# Patient Record
Sex: MALE | Race: WHITE | NOT HISPANIC OR LATINO | Employment: FULL TIME | URBAN - METROPOLITAN AREA
[De-identification: names, ages, dates, MRNs, and addresses within clinical notes are randomized per-mention and may not be internally consistent; named-entity substitution may affect disease eponyms.]

---

## 2023-06-09 ENCOUNTER — RA CDI HCC (OUTPATIENT)
Dept: OTHER | Facility: HOSPITAL | Age: 63
End: 2023-06-09

## 2023-06-09 NOTE — PROGRESS NOTES
[facility]  coding opportunities       Chart reviewed, no opportunity found: CHART REVIEWED, NO OPPORTUNITY FOUND        Patients Insurance        Commercial Insurance: [address] [place] [address]

## 2023-06-13 RX ORDER — OMEGA-3S/DHA/EPA/FISH OIL/D3 300MG-1000
400 CAPSULE ORAL DAILY
COMMUNITY

## 2023-06-16 ENCOUNTER — OFFICE VISIT (OUTPATIENT)
Dept: FAMILY MEDICINE CLINIC | Facility: CLINIC | Age: 63
End: 2023-06-16
Payer: COMMERCIAL

## 2023-06-16 VITALS
BODY MASS INDEX: 46.65 KG/M2 | HEIGHT: 69 IN | WEIGHT: 315 LBS | OXYGEN SATURATION: 98 % | HEART RATE: 91 BPM | SYSTOLIC BLOOD PRESSURE: 198 MMHG | DIASTOLIC BLOOD PRESSURE: 100 MMHG

## 2023-06-16 DIAGNOSIS — M16.11 PRIMARY OSTEOARTHRITIS OF RIGHT HIP: ICD-10-CM

## 2023-06-16 DIAGNOSIS — Z00.00 ENCOUNTER FOR SCREENING AND PREVENTATIVE CARE: Primary | ICD-10-CM

## 2023-06-16 DIAGNOSIS — M17.11 PRIMARY OSTEOARTHRITIS OF RIGHT KNEE: ICD-10-CM

## 2023-06-16 DIAGNOSIS — Z13.0 SCREENING FOR DEFICIENCY ANEMIA: ICD-10-CM

## 2023-06-16 DIAGNOSIS — Z13.220 SCREENING CHOLESTEROL LEVEL: ICD-10-CM

## 2023-06-16 DIAGNOSIS — Z13.1 SCREENING FOR DIABETES MELLITUS: ICD-10-CM

## 2023-06-16 DIAGNOSIS — R73.03 PREDIABETES: ICD-10-CM

## 2023-06-16 DIAGNOSIS — R60.0 BILATERAL EDEMA OF LOWER EXTREMITY: ICD-10-CM

## 2023-06-16 DIAGNOSIS — Z13.29 SCREENING FOR THYROID DISORDER: ICD-10-CM

## 2023-06-16 DIAGNOSIS — I10 PRIMARY HYPERTENSION: ICD-10-CM

## 2023-06-16 DIAGNOSIS — I89.0 LYMPHEDEMA: ICD-10-CM

## 2023-06-16 DIAGNOSIS — Z79.899 MEDICATION MANAGEMENT: ICD-10-CM

## 2023-06-16 DIAGNOSIS — M17.12 PRIMARY OSTEOARTHRITIS OF LEFT KNEE: ICD-10-CM

## 2023-06-16 DIAGNOSIS — Z11.59 NEED FOR HEPATITIS C SCREENING TEST: ICD-10-CM

## 2023-06-16 PROBLEM — K42.0 INCARCERATED UMBILICAL HERNIA: Status: ACTIVE | Noted: 2022-01-05

## 2023-06-16 PROCEDURE — 99204 OFFICE O/P NEW MOD 45 MIN: CPT | Performed by: FAMILY MEDICINE

## 2023-06-16 RX ORDER — HYDROCHLOROTHIAZIDE 12.5 MG/1
12.5 TABLET ORAL DAILY
Qty: 30 TABLET | Refills: 0 | Status: SHIPPED | OUTPATIENT
Start: 2023-06-16

## 2023-06-16 NOTE — PATIENT INSTRUCTIONS
Please obtain the labs that I have ordered for you today  They should be fasting, no food or drink except for water for 8 to 12 hours before  It is very important that you do fast so that the lipids or cholesterol and the sugars are accurate  Please obtain the medication that I ordered for you today  It is called hydrochlorothiazide and I would start taking it in the morning so that if you do have an increase to urination which you likely will you are not can to be getting up overnight to do that  Please make sure you are monitoring muscle aches, pains, spasms  Occasionally when we start these diuretics they can get rid of some of the potassium or electrolytes that are necessary if you do have any of the symptoms stop it and give me a call  Please obtain the echocardiogram which is an ultrasound of the heart  This is to look at the function and make sure that it is not contributing to the fluid in the legs  In order to look at the liver we will be watching the liver function tests on the labs and if those are abnormal we will be following up with other imaging  Hydrochlorothiazide (By mouth)   Hydrochlorothiazide (ivan-droe-klor-je-OPXX-s-zide)  Treats high blood pressure and fluid retention (edema)  This medicine is a diuretic (water pill)  Brand Name(s):   There may be other brand names for this medicine  When This Medicine Should Not Be Used: This medicine is not right for everyone  Do not use this medicine if you had an allergic reaction to hydrochlorothiazide or sulfa drugs, or if you are not able to urinate  How to Use This Medicine:   Capsule, Liquid, Tablet  Take your medicine as directed  Your dose may need to be changed several times to find what works best for you  Measure the oral liquid medicine with a marked measuring spoon, oral syringe, or medicine cup  Missed dose: Take a dose as soon as you remember   If it is almost time for your next dose, wait until then and take a regular dose  Do not take extra medicine to make up for a missed dose  Store the medicine in a closed container at room temperature, away from heat, moisture, and direct light  Drugs and Foods to Avoid:   Ask your doctor or pharmacist before using any other medicine, including over-the-counter medicines, vitamins, and herbal products  Some medicines and foods can affect how hydrochlorothiazide works  Tell your doctor if you are also using any of the following:   Cholestyramine, colestipol, digoxin, lithium  Insulin or other diabetes medicine  NSAIDs (including aspirin, diclofenac, ibuprofen, naproxen, celecoxib)  Steroid medicine (including hydrocortisone, methylprednisolone, prednisone, prednisolone, dexamethasone)  Alcohol, narcotic pain relievers, or sleeping pills may cause you to feel more lightheaded, dizzy, or faint when used with this medicine  Warnings While Using This Medicine:   Tell your doctor if you are pregnant or breastfeeding, or if you have kidney disease, liver disease, heart disease or heart failure, high cholesterol, diabetes, gout, trouble urinating, or lupus  This medicine may cause the following problems:  Eye or vision problems, including glaucoma, myopia  High uric acid in the blood  Parathyroid gland problems  Increased risk of skin cancer  This medicine may make you dizzy  Do not drive or do anything else that could be dangerous until you know how this medicine affects you  This medicine could lower your blood pressure too much, especially when you first use it or if you are dehydrated  Stand or sit up slowly if you feel lightheaded or dizzy  Alcohol may make this problem worse  Tell any doctor or dentist who treats you that you are using this medicine  This medicine may make your skin more sensitive to sunlight  Wear sunscreen  Do not use sunlamps or tanning beds  Your doctor will do lab tests at regular visits to check on the effects of this medicine   Keep all appointments  Keep all medicine out of the reach of children  Never share your medicine with anyone  Possible Side Effects While Using This Medicine:   Call your doctor right away if you notice any of these side effects: Allergic reaction: Itching or hives, swelling in your face or hands, swelling or tingling in your mouth or throat, chest tightness, trouble breathing  Blistering, peeling, or red skin rash  Confusion, weakness, and muscle twitching  Dry mouth, increased thirst, muscle cramps, nausea or vomiting, uneven heartbeat  Lightheadedness, dizziness, or fainting  Sores, reddish patch or irritated area, shiny bump, pink growth, or white, yellow or waxy scar-like area on the skin  Trouble seeing, eye pain, blurred vision or other vision changes  Unusual tiredness or weakness  If you notice these less serious side effects, talk with your doctor:   Headache  Mild diarrhea, constipation, nausea  If you notice other side effects that you think are caused by this medicine, tell your doctor  Call your doctor for medical advice about side effects  You may report side effects to FDA at 1-047-FDA-7555  © Copyright [employer] 2022 Information is for End User's use only and may not be sold, redistributed or otherwise used for commercial purposes  The above information is an  only  It is not intended as medical advice for individual conditions or treatments  Talk to your doctor, nurse or pharmacist before following any medical regimen to see if it is safe and effective for you

## 2023-06-16 NOTE — PROGRESS NOTES
New Patient Outpatient Note    HPI:     Pamela Garibay, 70 y o  male  presents today as a new patient and to establish care  The patient presents today after not seeing a physician for quite some time  He is following with the orthopedic physician for increased arthritis in the right hip and bilateral knees  His orthopedic specialist is through [facility]  The primary reason the patient comes in today is that he has been having increased swelling in his bilateral lower extremities  The left started about 5 years ago, and over the course of the last several years the right side has also become swollen  Initially about 5 years ago on the left side there may have been a spider bite or area of blotchiness that started the process, and then it has got progressively worse from there  Patient was evaluated for Lyme's disease, treated with antibiotics, and ruled out a DVT about 4 years ago when the initial symptoms started in the left lower extremity  He recently had an issue where he had a change in sensation to the leg at an iron pigs game where he had to stop and rest before he could continue to walk  He does continue to get aching in the legs with walking  He is concerned due to the severity and frequency of his symptoms      Family Hx  UTD in chart    Past Medical History:   Diagnosis Date   • Diabetes mellitus ([facility] [address] )    • Migraine    • Obesity    • MARLA on CPAP    • Sleep apnea    • Vertigo     episode 1/30/2016        Past Surgical History:   Procedure Laterality Date   • HAND SURGERY      football injury   • KNEE SURGERY Left     left meniscus    • MENISCECTOMY Left           Current Outpatient Medications:   •  Ascorbic Acid, Vitamin C, (VITAMIN C) 100 MG tablet, Take by mouth daily, Disp: , Rfl:   •  cholecalciferol (VITAMIN D3) 400 units tablet, Take 400 Units by mouth daily, Disp: , Rfl:   •  hydrochlorothiazide (HYDRODIURIL) 12 5 mg tablet, Take 1 tablet (12 5 mg total) by mouth daily, Disp: 30 tablet, Rfl: 0  •  meloxicam (MOBIC) 15 mg tablet, , Disp: , Rfl:      SOCIAL:   Rent/Own: Own  Currently living with: wife, dog  Stable food: Yes  Safe At Home: Yes  Hobbies: old cars  Profession/ employment: pump maintenance  Restriction to medical procedures:    None    SEXUAL HISTORY:   Preference: Women  Sexually Active: yes  Birth Control: post nivia    Psychological History  Psychiatric history: None  History of inpatient:  none  Current Therapy/ Provider:  None  Current Medications:  None    Substance History  Smoking: none  Alcohol Use: 14 drinks per week  Substance use:  None    ROS:   Review of Systems   Constitutional: Negative for chills, fever and unexpected weight change  HENT: Negative for congestion, ear discharge, ear pain, hearing loss, postnasal drip, rhinorrhea, sinus pressure, sinus pain and sore throat  Eyes: Positive for visual disturbance (glasses for distance)  Respiratory: Negative for cough, chest tightness and shortness of breath  Cardiovascular: Positive for palpitations (intermittent, few seconds, rarely)  Negative for chest pain  Gastrointestinal: Negative for abdominal pain, blood in stool, constipation, diarrhea, nausea and vomiting  Genitourinary: Positive for frequency (intermittent throughout the week, not nightly  )  Negative for dysuria  Skin: Negative for rash and wound  Neurological: Positive for dizziness (balance issue)  Negative for light-headedness and headaches  Psychiatric/Behavioral: Negative for self-injury and suicidal ideas  OBJECTIVE  Vitals:    06/16/23 1334   BP: (!) 198/100   Pulse: 91   SpO2: 98%        Physical Exam  Constitutional:       General: He is not in acute distress  Appearance: Normal appearance  He is obese  He is not ill-appearing, toxic-appearing or diaphoretic  HENT:      Head: Normocephalic and atraumatic  Right Ear: Tympanic membrane, ear canal and external ear normal  There is no impacted cerumen        Left Ear: Tympanic membrane, ear canal and external ear normal  There is no impacted cerumen  Nose: Nose normal  No congestion or rhinorrhea  Mouth/Throat:      Mouth: Mucous membranes are moist       Pharynx: Oropharynx is clear  No oropharyngeal exudate or posterior oropharyngeal erythema  Eyes:      General:         Right eye: No discharge  Left eye: No discharge  Extraocular Movements: Extraocular movements intact  Pupils: Pupils are equal, round, and reactive to light  Cardiovascular:      Rate and Rhythm: Normal rate and regular rhythm  Heart sounds: Normal heart sounds  No murmur heard  No friction rub  No gallop  Pulmonary:      Effort: Pulmonary effort is normal  No respiratory distress  Breath sounds: Normal breath sounds  No stridor  No wheezing, rhonchi or rales  Comments: No crackles noted  Abdominal:      General: Bowel sounds are normal  There is no distension  Palpations: Abdomen is soft  Tenderness: There is no abdominal tenderness  Musculoskeletal:         General: Swelling and tenderness ( mild tenderness with dimpling on left side) present  Cervical back: Neck supple  No tenderness  Right lower leg: Edema ( severe lower extremity edema, +4 pitting) present  Left lower leg: Edema (  severe lower extremity edema, +4 pitting) present  Lymphadenopathy:      Cervical: No cervical adenopathy  Skin:     General: Skin is warm  Capillary Refill: Capillary refill takes less than 2 seconds  Neurological:      Mental Status: He is alert  Sensory: Sensory deficit ( pressure like sensation in lower legs  Light touch present in all 4 extremities) present  Motor: No weakness ( 5/5 in all 4 extremities)  Deep Tendon Reflexes: Reflexes normal ( 2/4, oral)  ASSESSMENT AND PLAN   Dejan Pollard was seen today for establish care    Diagnoses and all orders for this visit:    Encounter for screening and preventative care  Screening for diabetes mellitus  Screening cholesterol level  Screening for thyroid disorder  Screening for deficiency anemia  Need for hepatitis C screening test  Prediabetes  Patient presents for initial visit and to establish care  Baseline labs will be obtained to review for since previous labs were in 2021  We will ob baseline labs will be obtained to review for evidence of abnormal liver/kidney function, glucose, electrolytes, cell lines, thyroid function, and hepatitis C  Patient notes in the past he may have been borderline diabetic/prediabetic  Hemoglobin A1c also ordered for further evaluation   -     Lipid panel; Future  -     Comprehensive metabolic panel; Future  -     HEMOGLOBIN A1C W/ EAG ESTIMATION; Future  -     CBC and differential; Future  -     TSH, 3rd generation with Free T4 reflex; Future  -     Hepatitis C antibody; Future    Bilateral edema of lower extremity  Lymphedema  Unknown etiology of severe lower extremity edema  Obtain echocardiogram to rule out cardiac dysfunction/heart failure  Obtain labs to determine if there is any evidence of cirrhosis or liver dysfunction  Due to patient's severe hypertension he will require treatment, will use diuretic initially to help with lower extremity edema  Once labs have been obtained may consider transitioning to Lasix depending on echo and kidney function  Patient is to start 12 5 mg daily and we will increase to 25 mg if kidney function normal   -     hydrochlorothiazide (HYDRODIURIL) 12 5 mg tablet; Take 1 tablet (12 5 mg total) by mouth daily  -     Echo complete w/ contrast if indicated; Future    Primary hypertension  Medication management  Patient initially presented with blood pressure of 198/100  This was retaken and his BP was still elevated, but found to be at 170/90  Will start hydrochlorothiazide  Labs to be obtained to review risk factors associated with elevated blood pressure   -     Lipid panel;  Future  - Comprehensive metabolic panel; Future  -     CBC and differential; Future  -     hydrochlorothiazide (HYDRODIURIL) 12 5 mg tablet; Take 1 tablet (12 5 mg total) by mouth daily  -     Echo complete w/ contrast if indicated; Future    Primary osteoarthritis of left knee  Primary osteoarthritis of right hip  Primary osteoarthritis of right knee  Patient is following with orthopedics for osteoarthritis  Recommend weight loss and continue follow-up with specialist to decrease weight on joints and follow-up medication/surgical intervention  Patient will likely need to lose weight and confirm that he is not diabetic prior to consideration of replacements         Tiffanie Rodríguez DO  [facility]  6/17/2023 8:02 PM

## 2023-06-20 ENCOUNTER — APPOINTMENT (OUTPATIENT)
Dept: LAB | Facility: CLINIC | Age: 63
End: 2023-06-20
Payer: MEDICARE

## 2023-06-20 DIAGNOSIS — Z13.0 SCREENING FOR DEFICIENCY ANEMIA: ICD-10-CM

## 2023-06-20 DIAGNOSIS — Z79.899 MEDICATION MANAGEMENT: ICD-10-CM

## 2023-06-20 DIAGNOSIS — Z11.59 NEED FOR HEPATITIS C SCREENING TEST: ICD-10-CM

## 2023-06-20 DIAGNOSIS — Z13.1 SCREENING FOR DIABETES MELLITUS: ICD-10-CM

## 2023-06-20 DIAGNOSIS — Z00.00 ENCOUNTER FOR SCREENING AND PREVENTATIVE CARE: ICD-10-CM

## 2023-06-20 DIAGNOSIS — R73.03 PREDIABETES: ICD-10-CM

## 2023-06-20 DIAGNOSIS — Z13.29 SCREENING FOR THYROID DISORDER: ICD-10-CM

## 2023-06-20 DIAGNOSIS — Z13.220 SCREENING CHOLESTEROL LEVEL: ICD-10-CM

## 2023-06-20 LAB
ALBUMIN SERPL BCP-MCNC: 3.5 G/DL (ref 3.5–5)
ALP SERPL-CCNC: 80 U/L (ref 46–116)
ALT SERPL W P-5'-P-CCNC: 31 U/L (ref 12–78)
ANION GAP SERPL CALCULATED.3IONS-SCNC: 3 MMOL/L
AST SERPL W P-5'-P-CCNC: 16 U/L (ref 5–45)
BASOPHILS # BLD AUTO: 0.01 THOUSANDS/ÂΜL (ref 0–0.1)
BASOPHILS NFR BLD AUTO: 0 % (ref 0–1)
BILIRUB SERPL-MCNC: 0.99 MG/DL (ref 0.2–1)
BUN SERPL-MCNC: 15 MG/DL (ref 5–25)
CALCIUM SERPL-MCNC: 9.1 MG/DL (ref 8.3–10.1)
CHLORIDE SERPL-SCNC: 106 MMOL/L (ref 96–108)
CHOLEST SERPL-MCNC: 191 MG/DL
CO2 SERPL-SCNC: 27 MMOL/L (ref 21–32)
CREAT SERPL-MCNC: 0.95 MG/DL (ref 0.6–1.3)
EOSINOPHIL # BLD AUTO: 0.03 THOUSAND/ÂΜL (ref 0–0.61)
EOSINOPHIL NFR BLD AUTO: 0 % (ref 0–6)
ERYTHROCYTE [DISTWIDTH] IN BLOOD BY AUTOMATED COUNT: 12.7 % (ref 11.6–15.1)
EST. AVERAGE GLUCOSE BLD GHB EST-MCNC: 126 MG/DL
GFR SERPL CREATININE-BSD FRML MDRD: 83 ML/MIN/1.73SQ M
GLUCOSE P FAST SERPL-MCNC: 126 MG/DL (ref 65–99)
HBA1C MFR BLD: 6 %
HCT VFR BLD AUTO: 48.7 % (ref 36.5–49.3)
HCV AB SER QL: NORMAL
HDLC SERPL-MCNC: 53 MG/DL
HGB BLD-MCNC: 15.8 G/DL (ref 12–17)
IMM GRANULOCYTES # BLD AUTO: 0.03 THOUSAND/UL (ref 0–0.2)
IMM GRANULOCYTES NFR BLD AUTO: 0 % (ref 0–2)
LDLC SERPL CALC-MCNC: 111 MG/DL (ref 0–100)
LYMPHOCYTES # BLD AUTO: 1.68 THOUSANDS/ÂΜL (ref 0.6–4.47)
LYMPHOCYTES NFR BLD AUTO: 19 % (ref 14–44)
MCH RBC QN AUTO: 31.5 PG (ref 26.8–34.3)
MCHC RBC AUTO-ENTMCNC: 32.4 G/DL (ref 31.4–37.4)
MCV RBC AUTO: 97 FL (ref 82–98)
MONOCYTES # BLD AUTO: 0.71 THOUSAND/ÂΜL (ref 0.17–1.22)
MONOCYTES NFR BLD AUTO: 8 % (ref 4–12)
NEUTROPHILS # BLD AUTO: 6.37 THOUSANDS/ÂΜL (ref 1.85–7.62)
NEUTS SEG NFR BLD AUTO: 73 % (ref 43–75)
NONHDLC SERPL-MCNC: 138 MG/DL
NRBC BLD AUTO-RTO: 0 /100 WBCS
PLATELET # BLD AUTO: 210 THOUSANDS/UL (ref 149–390)
PMV BLD AUTO: 10 FL (ref 8.9–12.7)
POTASSIUM SERPL-SCNC: 3.9 MMOL/L (ref 3.5–5.3)
PROT SERPL-MCNC: 7.1 G/DL (ref 6.4–8.4)
RBC # BLD AUTO: 5.02 MILLION/UL (ref 3.88–5.62)
SODIUM SERPL-SCNC: 136 MMOL/L (ref 135–147)
TRIGL SERPL-MCNC: 133 MG/DL
TSH SERPL DL<=0.05 MIU/L-ACNC: 2.66 UIU/ML (ref 0.45–4.5)
WBC # BLD AUTO: 8.83 THOUSAND/UL (ref 4.31–10.16)

## 2023-06-20 PROCEDURE — 83036 HEMOGLOBIN GLYCOSYLATED A1C: CPT

## 2023-06-20 PROCEDURE — 86803 HEPATITIS C AB TEST: CPT

## 2023-06-20 PROCEDURE — 36415 COLL VENOUS BLD VENIPUNCTURE: CPT

## 2023-06-20 PROCEDURE — 84443 ASSAY THYROID STIM HORMONE: CPT

## 2023-06-20 PROCEDURE — 85025 COMPLETE CBC W/AUTO DIFF WBC: CPT

## 2023-06-20 PROCEDURE — 80061 LIPID PANEL: CPT

## 2023-06-20 PROCEDURE — 80053 COMPREHEN METABOLIC PANEL: CPT

## 2023-06-30 ENCOUNTER — TELEPHONE (OUTPATIENT)
Dept: FAMILY MEDICINE CLINIC | Facility: CLINIC | Age: 63
End: 2023-06-30

## 2023-06-30 DIAGNOSIS — R73.03 PREDIABETES: Primary | ICD-10-CM

## 2023-06-30 DIAGNOSIS — E78.2 MIXED HYPERLIPIDEMIA: ICD-10-CM

## 2023-06-30 NOTE — TELEPHONE ENCOUNTER
Patient reviewed labs  He is to work on his diet control with cholesterol and sugars  He is in the prediabetic range for his sugars and had an elevated LDL with other borderline values for triglycerides and total cholesterol  He is to monitor his diet and we will repeat labs in about 3 months

## 2023-07-03 ENCOUNTER — HOSPITAL ENCOUNTER (OUTPATIENT)
Dept: NON INVASIVE DIAGNOSTICS | Facility: HOSPITAL | Age: 63
Discharge: HOME | End: 2023-07-03
Attending: FAMILY MEDICINE
Payer: MEDICARE

## 2023-07-03 VITALS
SYSTOLIC BLOOD PRESSURE: 198 MMHG | DIASTOLIC BLOOD PRESSURE: 100 MMHG | WEIGHT: 315 LBS | HEART RATE: 91 BPM | HEIGHT: 69 IN | BODY MASS INDEX: 46.65 KG/M2

## 2023-07-03 DIAGNOSIS — R60.0 BILATERAL EDEMA OF LOWER EXTREMITY: ICD-10-CM

## 2023-07-03 DIAGNOSIS — Z79.899 MEDICATION MANAGEMENT: ICD-10-CM

## 2023-07-03 DIAGNOSIS — I10 PRIMARY HYPERTENSION: ICD-10-CM

## 2023-07-03 DIAGNOSIS — I89.0 LYMPHEDEMA: ICD-10-CM

## 2023-07-03 LAB
AORTIC ROOT: 3.9 CM
ASCENDING AORTA: 3.6 CM
AV LVOT MEAN GRADIENT: 2.9 MMHG
AV LVOT PEAK GRADIENT: 5.6 MMHG
AV MEAN GRADIENT: 11.6 MMHG
AV PEAK GRADIENT: 6 MMHG
AV VELOCITY RATIO: 0.71
DOP CALC AO PEAK VEL: 1.7 M/S
DOP CALC AO VTI: 32 CM
DOP CALC LVOT PEAK VEL VTI: 25 CM
DOP CALC LVOT PEAK VEL: 1.2 M/S
E WAVE DECELERATION TIME: 222 MS
FRACTIONAL SHORTENING: 32 % (ref 28–44)
INTERVENTRICULAR SEPTUM IN DIASTOLE (PARASTERNAL SHORT AXIS VIEW): 1.6 CM
INTERVENTRICULAR SEPTUM: 1.6 CM (ref 0.6–1.1)
LAAS-AP2: 25.2 CM2
LAAS-AP4: 24.9 CM2
LEFT ATRIUM SIZE: 3.9 CM
LEFT ATRIUM VOLUME (MOD BIPLANE): 90 ML
LEFT INTERNAL DIMENSION IN SYSTOLE: 3 CM (ref 2.1–4)
LEFT VENTRICULAR INTERNAL DIMENSION IN DIASTOLE: 4.4 CM (ref 3.5–6)
LEFT VENTRICULAR POSTERIOR WALL IN END DIASTOLE: 1.6 CM
LEFT VENTRICULAR STROKE VOLUME: 64 ML
LVSV (TEICH): 64 ML
MV E'TISSUE VEL-SEP: 12 CM/S
MV PEAK A VEL: 0.71 M/S
MV PEAK E VEL: 93 CM/S
MV STENOSIS PRESSURE HALF TIME: 64 MS
MV VALVE AREA P 1/2 METHOD: 3.44
RIGHT ATRIAL 2D VOLUME: 41 ML
RIGHT ATRIUM AREA SYSTOLE A4C: 16.9 CM2
RIGHT VENTRICLE ID DIMENSION: 3.7 CM
SL CV LEFT ATRIUM LENGTH A2C: 5.9 CM
SL CV LV EF: 65
SL CV PED ECHO LEFT VENTRICLE DIASTOLIC VOLUME (MOD BIPLANE) 2D: 101 ML
SL CV PED ECHO LEFT VENTRICLE SYSTOLIC VOLUME (MOD BIPLANE) 2D: 36 ML
TRICUSPID ANNULAR PLANE SYSTOLIC EXCURSION: 2.3 CM

## 2023-07-03 PROCEDURE — 93306 TTE W/DOPPLER COMPLETE: CPT | Performed by: INTERNAL MEDICINE

## 2023-07-03 PROCEDURE — 93306 TTE W/DOPPLER COMPLETE: CPT

## 2023-07-05 ENCOUNTER — TELEPHONE (OUTPATIENT)
Dept: FAMILY MEDICINE CLINIC | Facility: CLINIC | Age: 63
End: 2023-07-05

## 2023-07-05 NOTE — TELEPHONE ENCOUNTER
Called and left a message stating I got results of the ECHO. No specific information was left. Will attempt to call back to review.       Violette Da Silva DO  [facility]  7/5/2023 6:00 PM

## 2023-07-14 ENCOUNTER — OFFICE VISIT (OUTPATIENT)
Dept: FAMILY MEDICINE CLINIC | Facility: CLINIC | Age: 63
End: 2023-07-14
Payer: MEDICARE

## 2023-07-14 VITALS
HEIGHT: 69 IN | BODY MASS INDEX: 46.65 KG/M2 | DIASTOLIC BLOOD PRESSURE: 100 MMHG | SYSTOLIC BLOOD PRESSURE: 140 MMHG | OXYGEN SATURATION: 97 % | RESPIRATION RATE: 16 BRPM | HEART RATE: 92 BPM | WEIGHT: 315 LBS

## 2023-07-14 DIAGNOSIS — R60.0 BILATERAL EDEMA OF LOWER EXTREMITY: Primary | ICD-10-CM

## 2023-07-14 DIAGNOSIS — E78.2 MIXED HYPERLIPIDEMIA: ICD-10-CM

## 2023-07-14 DIAGNOSIS — I89.0 LYMPHEDEMA: ICD-10-CM

## 2023-07-14 DIAGNOSIS — I10 PRIMARY HYPERTENSION: ICD-10-CM

## 2023-07-14 DIAGNOSIS — Z79.899 MEDICATION MANAGEMENT: ICD-10-CM

## 2023-07-14 DIAGNOSIS — R73.03 PREDIABETES: ICD-10-CM

## 2023-07-14 PROCEDURE — 99214 OFFICE O/P EST MOD 30 MIN: CPT | Performed by: FAMILY MEDICINE

## 2023-07-14 RX ORDER — HYDROCHLOROTHIAZIDE 25 MG/1
25 TABLET ORAL DAILY
Qty: 90 TABLET | Refills: 0 | Status: SHIPPED | OUTPATIENT
Start: 2023-07-14

## 2023-07-14 NOTE — PROGRESS NOTES
Outpatient Note- Follow up     HPI:     Honorio Lloyd , 70 y.o. male  presents today for follow-up labs and cardiac imaging. The patient recently had labs performed. Overall they were grossly normal except for a mild elevation in his LDL and hemoglobin A1c to 6.0. We reviewed the importance of avoiding excessive carbs and high cholesterol foods. He is also lost some weight whether this was from a decrease in fluid or on purpose, the patient will continue to do so. We started him on hydrochlorothiazide 12.5 mg at his last visit. He was up to 198/100. On follow-up today he is at 140/100. This is a significant improvement and will hopefully be able to continue to improve his blood pressure over the next several weeks. He notes a mild improvement in his left lower extremity with swelling, his right lower extremity remains slightly more edematous. He denies any significant redness, tenderness, or increased heat to either lower extremities. I also reviewed the patient's echocardiogram.  Overall it was grossly normal with some mild sclerosis/calcification of the aortic valve. His EF was 60-65, borderline elevated with mild concentric hypertrophy likely due to elevated blood pressure    Patient uses CPAP and requires a follow-up with CPAP supplies. Past Medical History:   Diagnosis Date   • Diabetes mellitus ([address])    • Migraine    • Obesity    • MARLA on CPAP    • Sleep apnea    • Vertigo     episode 1/30/2016        ROS:   Review of Systems       OBJECTIVE  Vitals:    07/14/23 1324   BP: 140/100   Pulse: 92   Resp: 16   SpO2: 97%        Physical Exam  Constitutional:       General: He is not in acute distress. Appearance: Normal appearance. He is obese. He is not ill-appearing, toxic-appearing or diaphoretic. HENT:      Head: Normocephalic and atraumatic. Cardiovascular:      Rate and Rhythm: Normal rate. Heart sounds: Normal heart sounds. No murmur heard. No friction rub. No gallop. Pulmonary:      Effort: Pulmonary effort is normal. No respiratory distress. Breath sounds: Normal breath sounds. No stridor. No wheezing, rhonchi or rales. Abdominal:      General: Bowel sounds are normal. There is no distension. Palpations: Abdomen is soft. Tenderness: There is no abdominal tenderness. Comments: Large abdomen   Musculoskeletal:      Right lower leg: Edema (+2 pitting ) present. Left lower leg: Edema ( +1 pitting edema) present. Skin:     General: Skin is warm. Capillary Refill: Capillary refill takes less than 2 seconds. Neurological:      Mental Status: He is alert. ASSESSMENT AND PLAN   David Okeefe was seen today for follow-up. Diagnoses and all orders for this visit:    Bilateral edema of lower extremity  Lymphedema  Improving. Patient has seen a moderate improvement on the left leg and mild improvement on the right. He is to let me know next week when we follow up over the phone. I am happy with the mild improvement. He also has had weight loss which is likely loss of fluid. -     hydrochlorothiazide (HYDRODIURIL) 25 mg tablet; Take 1 tablet (25 mg total) by mouth daily    Primary hypertension  Medication management  Improving. Patient's blood pressure has reduced from 198/100 to 140/100. This is secondary to starting hydrochlorothiazide. Will increase to 25mg daily from 12.5mg. I feel this will improve his fluid and blood pressure. -     hydrochlorothiazide (HYDRODIURIL) 25 mg tablet; Take 1 tablet (25 mg total) by mouth daily    Prediabetes  Mixed hyperlipidemia  BMI 45.0-49.9, adult ([address])  Very mild increase to LDL. Patient is to monitor his cholesterol and sugar intake due to borderline values for A1c (prediabetes) and LDL. He will work to lose weight and further improve diet. We did briefly go over possible medications for weight loss but he is interested in completing himself if possible.          Conchis Smalls,   [facility]  7/14/2023 1:56 PM

## 2023-07-21 ENCOUNTER — TELEPHONE (OUTPATIENT)
Dept: FAMILY MEDICINE CLINIC | Facility: CLINIC | Age: 63
End: 2023-07-21

## 2023-07-21 NOTE — TELEPHONE ENCOUNTER
Called and left a message requesting he call back and give information on his lower extremity swelling and blood pressure.      Maryellen Reilly DO  [facility]  7/21/2023 6:03 PM

## 2023-07-21 NOTE — TELEPHONE ENCOUNTER
----- Message from Melvina Shukla DO sent at 7/15/2023 10:51 AM EDT -----  Follow up blood pressure and lower extremity edema

## 2023-07-24 NOTE — TELEPHONE ENCOUNTER
Sounds good I am glad that the swelling has reduced. I want to make sure he continues to monitor. Thank you.

## 2023-07-24 NOTE — TELEPHONE ENCOUNTER
Patient called and stated that he received the message from the doctor and just wanted to respond. He states that he has not checked his bp since his last appointment but says that the swelling has gone down.

## 2023-08-25 ENCOUNTER — OFFICE VISIT (OUTPATIENT)
Dept: FAMILY MEDICINE CLINIC | Facility: CLINIC | Age: 63
End: 2023-08-25
Payer: MEDICARE

## 2023-08-25 VITALS
RESPIRATION RATE: 16 BRPM | WEIGHT: 315 LBS | BODY MASS INDEX: 46.65 KG/M2 | HEART RATE: 100 BPM | HEIGHT: 69 IN | OXYGEN SATURATION: 98 % | SYSTOLIC BLOOD PRESSURE: 150 MMHG | DIASTOLIC BLOOD PRESSURE: 100 MMHG

## 2023-08-25 DIAGNOSIS — Z79.899 MEDICATION MANAGEMENT: ICD-10-CM

## 2023-08-25 DIAGNOSIS — B35.3 TINEA PEDIS OF BOTH FEET: Primary | ICD-10-CM

## 2023-08-25 DIAGNOSIS — R60.0 BILATERAL EDEMA OF LOWER EXTREMITY: ICD-10-CM

## 2023-08-25 DIAGNOSIS — I10 PRIMARY HYPERTENSION: ICD-10-CM

## 2023-08-25 DIAGNOSIS — I89.0 LYMPHEDEMA: ICD-10-CM

## 2023-08-25 PROCEDURE — 99214 OFFICE O/P EST MOD 30 MIN: CPT | Performed by: FAMILY MEDICINE

## 2023-08-25 RX ORDER — FUROSEMIDE 20 MG/1
20 TABLET ORAL DAILY
Qty: 30 TABLET | Refills: 0 | Status: SHIPPED | OUTPATIENT
Start: 2023-08-25

## 2023-08-25 RX ORDER — POTASSIUM CHLORIDE 1.5 G/1.58G
20 POWDER, FOR SOLUTION ORAL DAILY
Qty: 30 PACKET | Refills: 5 | Status: SHIPPED | OUTPATIENT
Start: 2023-08-25

## 2023-08-25 RX ORDER — TERBINAFINE HYDROCHLORIDE 250 MG/1
250 TABLET ORAL DAILY
Qty: 60 TABLET | Refills: 0 | Status: SHIPPED | OUTPATIENT
Start: 2023-08-25 | End: 2023-10-06

## 2023-08-25 NOTE — PROGRESS NOTES
Outpatient Note- Follow up     HPI:     Yifan Dawn , 70 y.o. male  presents today for follow up of lower extremity edema and hypertension. Recently obtained ECHO shows only mild changes to heart with concentric hypertrophy and aortic value sclerosis. The st whichructure and function overall is maintained. I personally reviewed this again with patient. He continues to have swelling bilaterally with the right leg greater than the left leg. He is not using any compression sock or wraps which. He has been using hydrochlorothiazide and there has been mild changes to fluid. He continues to watch diet and lose weight, whether this is actual weight or water weight it is unknown. He continues to have multiple MSK pains in hips, back and lower extremities. After removing his shoes and socks he also has severe tinea pedis with color changes patientand rash. There is mild itching. Miquel Negus He uses socks and airs out feet frequently after work. He denies any chest pain, shortness of breath, nausea, vomiting, diarrhea or constipation. Past Medical History:   Diagnosis Date   • Diabetes mellitus ([address])    • Migraine    • Obesity    • MARLA on CPAP    • Sleep apnea    • Vertigo     episode 1/30/2016        ROS:   Review of Systems   See HPI    OBJECTIVE  Vitals:    08/25/23 1334   BP: 150/100   Pulse: 100   Resp: 16   SpO2: 98%        Physical Exam  Constitutional:       General: He is not in acute distress. Appearance: Normal appearance. He is obese. He is not ill-appearing, toxic-appearing or diaphoretic. HENT:      Nose: Nose normal. No congestion or rhinorrhea. Mouth/Throat:      Mouth: Mucous membranes are moist.      Pharynx: Oropharynx is clear. No oropharyngeal exudate or posterior oropharyngeal erythema. Cardiovascular:      Rate and Rhythm: Normal rate and regular rhythm. Heart sounds: Normal heart sounds. No murmur heard. No friction rub. No gallop.    Pulmonary:      Effort: Pulmonary effort is normal. No respiratory distress. Breath sounds: Normal breath sounds. No stridor. No wheezing, rhonchi or rales. Musculoskeletal:      Cervical back: No tenderness. Lymphadenopathy:      Cervical: No cervical adenopathy. Skin:     General: Skin is warm. Capillary Refill: Capillary refill takes less than 2 seconds. Comments: Lower extremity skin color changes with fluid, PVD likely present. Neurological:      Mental Status: He is alert. ASSESSMENT AND PLAN   Gladis Pearson was seen today for follow-up and hypertension. Diagnoses and all orders for this visit:    Tinea pedis of both feet  Patient has severe tinea pedis of both feet. Since topicals are unlikely to fully treat the rash, will recommend patient start terbinafine 250 mg daily. Once the patient runs out we will need to evaluate his liver function tests. He should attempt to keep the feet dry the best he can to help avoid recurrence  -     terbinafine (LamISIL) 250 mg tablet; Take 1 tablet (250 mg total) by mouth daily    Bilateral edema of lower extremity  Lymphedema  Patient continues to have severe bilateral lower extremity edema. Right leg is worse than left. She will obtain a vascular study of the right side. I do not believe that this requires a stat evaluation since this has been chronic. The ultrasound is to further evaluate due to unilateral increased swelling compared to the left. Will start with Lasix 20 mg to help with blood pressure but also for lower extremity edema. We will also be referred to the lymphedema clinic for further evaluation and treatment if appropriate. Leg graft was given for the right side to help with swelling. Potassium also offered to the patient since it is possible that he may experience low potassium on Lasix. Specific symptoms were reviewed and he is to take the potassium over the weekend if present.  -     furosemide (LASIX) 20 mg tablet;  Take 1 tablet (20 mg total) by mouth daily  -     potassium chloride (Klor-Con) 20 mEq packet; Take 20 mEq by mouth daily  -     Ambulatory Referral to PT/OT Lymphedema Therapy; Future  -     VAS lower limb venous duplex study, unilateral/limited; Future    Primary hypertension  Medication management  Patient continues to have elevated blood pressure. Thankfully it is not in the range that it was previously at the time of establishing care. His systolic is much improved. There is still improvement required since his systolic was 516 today on presentation. We will attempt Lasix 20 mg, and will increase this based on the response of lower extremity swelling. May also want to consider Diovan in addition to Lasix if fluid becomes optimized. -     furosemide (LASIX) 20 mg tablet; Take 1 tablet (20 mg total) by mouth daily  -     potassium chloride (Klor-Con) 20 mEq packet;  Take 20 mEq by mouth daily     Kimberly Moy DO  [facility]  8/25/2023 2:19 PM no

## 2023-08-25 NOTE — PATIENT INSTRUCTIONS
Please discontinue the hydrochlorothiazide. You can put this into the cabinet or keep it safe, but do not take it with the Lasix. The medication that I have sent over to the pharmacy for you is something called Lasix or furosemide. We will start at the 20 mg dose which is the lower dose, and we may need to increase it depending on how well you do with the medication. If you start to have aches pains muscle spasms please make sure that you take the potassium that is also sent to the pharmacy. This is just in case, you do not necessarily need to take it unless you are having some of the symptoms. I have placed a referral for the lymphedema clinic for you today this is the physical therapist and occupational therapist that work with you to try and get rid of some fluid. Lastly I have placed an order for an ultrasound. I do not feel that it is necessary we do it today or tomorrow but I would like it in the next few weeks. If for any reason you have trouble with doing that please message me or call the office, I will attempt to talk to touch base with the  as well. Please start taking the terbinafine 250 mg for the lower foot tinea or fungal infection. Furosemide (By mouth)   Furosemide ([id number]-[id number]-mide)  Treats fluid retention (edema) and high blood pressure. This medicine is a diuretic (water pill). Brand Name(s): Lasix   There may be other brand names for this medicine. When This Medicine Should Not Be Used: This medicine is not right for everyone. Do not use it if you had an allergic reaction to furosemide. How to Use This Medicine:   Liquid, Tablet  Take your medicine as directed. Your dose may need to be changed several times to find what works best for you. You may take this medicine with food if it upsets your stomach. Oral liquid: Measure the oral liquid medicine with a marked measuring spoon, oral syringe, or medicine cup. Tablet: Swallow the tablet whole.  Do not crush, break, or chew it. Missed dose: Take a dose as soon as you remember. If it is almost time for your next dose, wait until then and take a regular dose. Do not take extra medicine to make up for a missed dose. Store the medicine in a closed container at room temperature, away from heat, moisture, and direct light. Drugs and Foods to Avoid:   Ask your doctor or pharmacist before using any other medicine, including over-the-counter medicines, vitamins, and herbal products. Some medicines can affect how furosemide works. Tell your doctor if you are also using any of the following:  Cisplatin, cyclosporine, digoxin, ethacrynic acid, licorice, lithium, methotrexate, or phenytoin  Adrenocorticotropic hormone (ACTH)  Laxative  Medicine to treat an infection  NSAID pain or arthritis medicine (including aspirin, diclofenac, ibuprofen, indomethacin, naproxen)  Other blood pressure medicines  Steroid medicine (including dexamethasone, hydrocortisone, methylprednisolone, prednisolone, prednisone)  Thyroid medicine  If you also take sucralfate, allow at least 2 hours between the time you take furosemide and the time you take sucralfate. Alcohol, narcotic pain medicine, or sleeping pills may cause you to feel more lightheaded, dizzy, or faint when used with this medicine. Warnings While Using This Medicine:   Tell your doctor if you are pregnant or breastfeeding, or if you have kidney disease, liver disease (including cirrhosis), diabetes, gout, low blood pressure, lupus, an enlarged prostate, trouble urinating, or an allergy to sulfa drugs. Tell your doctor if you are on a low-salt diet. This medicine may cause the following problems:   Low levels of minerals in your blood, such as potassium and sodium  Blood sugar level changes  Hearing problems  Make sure any doctor or dentist who treats you knows that you are using this medicine.   This medicine could lower your blood pressure too much, especially when you first use it or if you are dehydrated. Stand or sit up slowly if you feel lightheaded or dizzy. This medicine may make your skin more sensitive to sunlight. Wear sunscreen. Do not use sunlamps or tanning beds. Your doctor will do lab tests at regular visits to check on the effects of this medicine. Keep all appointments. Keep all medicine out of the reach of children. Never share your medicine with anyone. Possible Side Effects While Using This Medicine:   Call your doctor right away if you notice any of these side effects: Allergic reaction: Itching or hives, swelling in your face or hands, swelling or tingling in your mouth or throat, chest tightness, trouble breathing  Blistering, peeling, red skin rash  Confusion, weakness, muscle twitching  Dry mouth, increased thirst, muscle cramps, uneven heartbeat  Sudden and severe stomach pain, nausea, vomiting, fever, lightheadedness  Hearing loss, ringing in the ears  Lightheadedness, dizziness, fainting  Severe diarrhea  Unusual bleeding or bruising  Yellow skin or eyes  If you notice these less serious side effects, talk with your doctor:   Loss of appetite, stomach cramps  If you notice other side effects that you think are caused by this medicine, tell your doctor. Call your doctor for medical advice about side effects. You may report side effects to FDA at 0-998-FDA-6730  © Copyright Jesus Cardenas 2022 Information is for End User's use only and may not be sold, redistributed or otherwise used for commercial purposes. The above information is an  only. It is not intended as medical advice for individual conditions or treatments. Talk to your doctor, nurse or pharmacist before following any medical regimen to see if it is safe and effective for you. Furosemide (By mouth)   Furosemide ([id number]-BO-iw-mide)  Treats fluid retention (edema) and high blood pressure. This medicine is a diuretic (water pill).    Brand Name(s): Lasix   There may be other brand names for this medicine. When This Medicine Should Not Be Used: This medicine is not right for everyone. Do not use it if you had an allergic reaction to furosemide. How to Use This Medicine:   Liquid, Tablet  Take your medicine as directed. Your dose may need to be changed several times to find what works best for you. You may take this medicine with food if it upsets your stomach. Oral liquid: Measure the oral liquid medicine with a marked measuring spoon, oral syringe, or medicine cup. Tablet: Swallow the tablet whole. Do not crush, break, or chew it. Missed dose: Take a dose as soon as you remember. If it is almost time for your next dose, wait until then and take a regular dose. Do not take extra medicine to make up for a missed dose. Store the medicine in a closed container at room temperature, away from heat, moisture, and direct light. Drugs and Foods to Avoid:   Ask your doctor or pharmacist before using any other medicine, including over-the-counter medicines, vitamins, and herbal products. Some medicines can affect how furosemide works. Tell your doctor if you are also using any of the following:  Cisplatin, cyclosporine, digoxin, ethacrynic acid, licorice, lithium, methotrexate, or phenytoin  Adrenocorticotropic hormone (ACTH)  Laxative  Medicine to treat an infection  NSAID pain or arthritis medicine (including aspirin, diclofenac, ibuprofen, indomethacin, naproxen)  Other blood pressure medicines  Steroid medicine (including dexamethasone, hydrocortisone, methylprednisolone, prednisolone, prednisone)  Thyroid medicine  If you also take sucralfate, allow at least 2 hours between the time you take furosemide and the time you take sucralfate. Alcohol, narcotic pain medicine, or sleeping pills may cause you to feel more lightheaded, dizzy, or faint when used with this medicine.   Warnings While Using This Medicine:   Tell your doctor if you are pregnant or breastfeeding, or if you have kidney disease, liver disease (including cirrhosis), diabetes, gout, low blood pressure, lupus, an enlarged prostate, trouble urinating, or an allergy to sulfa drugs. Tell your doctor if you are on a low-salt diet. This medicine may cause the following problems:   Low levels of minerals in your blood, such as potassium and sodium  Blood sugar level changes  Hearing problems  Make sure any doctor or dentist who treats you knows that you are using this medicine. This medicine could lower your blood pressure too much, especially when you first use it or if you are dehydrated. Stand or sit up slowly if you feel lightheaded or dizzy. This medicine may make your skin more sensitive to sunlight. Wear sunscreen. Do not use sunlamps or tanning beds. Your doctor will do lab tests at regular visits to check on the effects of this medicine. Keep all appointments. Keep all medicine out of the reach of children. Never share your medicine with anyone. Possible Side Effects While Using This Medicine:   Call your doctor right away if you notice any of these side effects: Allergic reaction: Itching or hives, swelling in your face or hands, swelling or tingling in your mouth or throat, chest tightness, trouble breathing  Blistering, peeling, red skin rash  Confusion, weakness, muscle twitching  Dry mouth, increased thirst, muscle cramps, uneven heartbeat  Sudden and severe stomach pain, nausea, vomiting, fever, lightheadedness  Hearing loss, ringing in the ears  Lightheadedness, dizziness, fainting  Severe diarrhea  Unusual bleeding or bruising  Yellow skin or eyes  If you notice these less serious side effects, talk with your doctor:   Loss of appetite, stomach cramps  If you notice other side effects that you think are caused by this medicine, tell your doctor. Call your doctor for medical advice about side effects.  You may report side effects to FDA at 3-322-FDA-7821  © Copyright Holly Boyce 2022 Information is for End User's use only and may not be sold, redistributed or otherwise used for commercial purposes. The above information is an  only. It is not intended as medical advice for individual conditions or treatments. Talk to your doctor, nurse or pharmacist before following any medical regimen to see if it is safe and effective for you.

## 2023-09-07 ENCOUNTER — HOSPITAL ENCOUNTER (OUTPATIENT)
Dept: VASCULAR ULTRASOUND | Facility: HOSPITAL | Age: 63
Discharge: HOME | End: 2023-09-07
Attending: FAMILY MEDICINE
Payer: MEDICARE

## 2023-09-07 DIAGNOSIS — R60.0 BILATERAL EDEMA OF LOWER EXTREMITY: ICD-10-CM

## 2023-09-07 DIAGNOSIS — Z79.899 MEDICATION MANAGEMENT: ICD-10-CM

## 2023-09-07 DIAGNOSIS — I89.0 LYMPHEDEMA: ICD-10-CM

## 2023-09-07 PROCEDURE — 93971 EXTREMITY STUDY: CPT | Performed by: SURGERY

## 2023-09-07 PROCEDURE — 93971 EXTREMITY STUDY: CPT

## 2023-09-12 ENCOUNTER — TELEPHONE (OUTPATIENT)
Dept: FAMILY MEDICINE CLINIC | Facility: CLINIC | Age: 63
End: 2023-09-12

## 2023-09-12 NOTE — TELEPHONE ENCOUNTER
Called patient and informed him that the testing for his lower extremity for possible clot was negative. No further intervention needed in the form of anticoagulation. He has had some mild improvement with Lasix, will consider increasing dose at next visit which is in about 1.5 to 2 weeks.       Grant Antony,   [facility]  9/12/2023 6:25 PM

## 2023-09-17 DIAGNOSIS — R60.0 BILATERAL EDEMA OF LOWER EXTREMITY: ICD-10-CM

## 2023-09-17 DIAGNOSIS — I89.0 LYMPHEDEMA: ICD-10-CM

## 2023-09-17 DIAGNOSIS — Z79.899 MEDICATION MANAGEMENT: ICD-10-CM

## 2023-09-18 RX ORDER — FUROSEMIDE 20 MG/1
20 TABLET ORAL DAILY
Qty: 90 TABLET | Refills: 0 | Status: SHIPPED | OUTPATIENT
Start: 2023-09-18

## 2023-09-18 RX ORDER — POTASSIUM CHLORIDE 1.5 G/1
POWDER, FOR SOLUTION ORAL
Qty: 90 PACKET | Refills: 2 | OUTPATIENT
Start: 2023-09-18

## 2023-09-18 NOTE — TELEPHONE ENCOUNTER
According to patient he has not required the potassium. I will discontinue this medication, if he does require it in the future we will consider 90-day supply. We will continue with Lasix 20 mg.  90 days sent, but may need to consider changing dosage  In future.

## 2023-09-22 ENCOUNTER — OFFICE VISIT (OUTPATIENT)
Dept: FAMILY MEDICINE CLINIC | Facility: CLINIC | Age: 63
End: 2023-09-22
Payer: MEDICARE

## 2023-09-22 VITALS
SYSTOLIC BLOOD PRESSURE: 160 MMHG | WEIGHT: 315 LBS | HEART RATE: 97 BPM | BODY MASS INDEX: 46.65 KG/M2 | DIASTOLIC BLOOD PRESSURE: 80 MMHG | OXYGEN SATURATION: 98 % | HEIGHT: 69 IN | RESPIRATION RATE: 16 BRPM

## 2023-09-22 DIAGNOSIS — I89.0 LYMPHEDEMA: Primary | ICD-10-CM

## 2023-09-22 DIAGNOSIS — R60.0 BILATERAL EDEMA OF LOWER EXTREMITY: ICD-10-CM

## 2023-09-22 DIAGNOSIS — R73.03 PREDIABETES: ICD-10-CM

## 2023-09-22 DIAGNOSIS — E78.2 MIXED HYPERLIPIDEMIA: ICD-10-CM

## 2023-09-22 DIAGNOSIS — I10 PRIMARY HYPERTENSION: ICD-10-CM

## 2023-09-22 DIAGNOSIS — Z79.899 MEDICATION MANAGEMENT: ICD-10-CM

## 2023-09-22 DIAGNOSIS — L56.8 PHOTOSENSITIVITY: ICD-10-CM

## 2023-09-22 DIAGNOSIS — R23.3 EASY BRUISING: ICD-10-CM

## 2023-09-22 PROCEDURE — 99214 OFFICE O/P EST MOD 30 MIN: CPT | Performed by: FAMILY MEDICINE

## 2023-09-22 NOTE — Clinical Note
Dear Dr. Laurent Amador,   I had a patient on Friday that had new skin changes on upper arms. It is intermittent with it resolving and returning. Other info in note. Unknown if this is drug reaction form lasix, I have not seen one before. Also possible just from mobic and increased risk for bleeding.    Thanks,   Gilmar Sahni

## 2023-09-22 NOTE — PROGRESS NOTES
Outpatient Note- Follow up     HPI:     Liv Cunningham , 70 y.o. male  presents today for follow-up blood pressure and lower extremity swelling. The patient has had some improvement in urination, unfortunately his blood pressure has not reduced with the Lasix 20 mg. In addition there is new rash on bilateral arms. It comes intermittently and then resolves. He notes that he has been working outside and may have brushed up against pine trees also it the area has been exposed to sunlight over the weekends. The rash has been intermittent throughout the last 4 weeks. No further pain, pruritus, or other symptoms. He denies any other exposures- foods, detergents, soaps, dryer sheets, clothing. He denies any fever, chills, nausea, vomiting, lightheadedness, dizziness. He denies any strokelike symptoms or excessive headaches. On presentation today his blood pressure is elevated at 160/80. Past Medical History:   Diagnosis Date   • Diabetes mellitus ([address])    • Migraine    • Obesity    • MARLA on CPAP    • Sleep apnea    • Vertigo     episode 1/30/2016      ROS:   Review of Systems   See HPI    OBJECTIVE  Vitals:    09/22/23 1341   BP: 160/80   Pulse: 97   Resp: 16   SpO2: 98%        Physical Exam  Constitutional:       General: He is not in acute distress. Appearance: Normal appearance. He is obese. He is not ill-appearing, toxic-appearing or diaphoretic. HENT:      Head: Normocephalic and atraumatic. Cardiovascular:      Rate and Rhythm: Normal rate and regular rhythm. Heart sounds: Normal heart sounds. No murmur heard. No friction rub. No gallop. Pulmonary:      Effort: Pulmonary effort is normal. No respiratory distress. Breath sounds: Normal breath sounds. No stridor. No wheezing, rhonchi or rales. Abdominal:      General: Bowel sounds are normal. There is no distension. Palpations: Abdomen is soft. Tenderness: There is no abdominal tenderness.    Musculoskeletal: General: Swelling present. No tenderness. Right lower leg: Edema ( +3 pitting) present. Left lower leg: Edema (  +3 pitting) present. Skin:     Capillary Refill: Capillary refill takes less than 2 seconds. Findings: Bruising and rash present. Neurological:      Mental Status: He is alert. ASSESSMENT AND PLAN   Oliver Archibald was seen today for follow-up. Diagnoses and all orders for this visit:    Lymphedema  Bilateral edema of lower extremity  Medication management  Photosensitivity  Easy bruising  Unknown cause of bruising/rash on bilateral upper arms. Possibly associated with Lasix. Patient prefers to continue with lasix due to improvement of lower extremities. If the rash worsens I recommend that he discontinue the medication immediately. I will forward information to dermatology on Monday to determine if there is other possible diagnosis or if they believe it to be drug rash. Coagulation studies also ordered. Bruising may also be from mobic and increased bleeding risk. -     Protime-INR; Future  -     APTT; Future  -     CBC and differential; Future  -     Comprehensive metabolic panel; Future    Primary hypertension  Still elevated. Complications with lasix prevents increase to 40mg until clear by dermatology or patient with continued use. If unable to use lasix due to rash then place back onto hydrochlorothiazide and add ARB. Either way he will need a second agent to improve BP since it is still quite elevated. He will follow up nursing visit to monitor pressures. -     Comprehensive metabolic panel; Future    Prediabetes  Recent labs demonstrate prediabetes with A1c of 6.0. The patient will attempt to cut down on sugar and carbs. Will follow up in three months.   -     HEMOGLOBIN A1C W/ EAG ESTIMATION; Future    Mixed hyperlipidemia  Overall patient has mild elevations in cholesterol. Will hold off on medication at this time until blood pressure is stable. Increased ASCVD risk present. -     Comprehensive metabolic panel; Future  -     Lipid panel;  Future            Wellington Gambino DO  [facility]  9/22/2023 2:15 PM

## 2023-09-22 NOTE — PATIENT INSTRUCTIONS
Please continue the lasix 20 mg daily to help with swelling    Please if you see that the rash that is on the arm is spreading or in other areas please discontinue the lasix immediately. If we are unable to continue with lasix due to recommendations of the dermatologist I have included a blood pressure medication below. I have placed several labs in for you please attempt to get them over the next week to review your labs numbers for kidney, liver, and clotting for blood. Valsartan (By mouth)   Valsartan (deann-GUILHERME-tan)  Treats high blood pressure and heart failure. May lower the risk of death after a heart attack. This medicine is an angiotensin receptor blocker (ARB). Brand Name(s): Michael, Valsartan AvPak   There may be other brand names for this medicine. When This Medicine Should Not Be Used: This medicine is not right for everyone. Do not use it if you had an allergic reaction to valsartan, or if you are pregnant. How to Use This Medicine:   Capsule, Tablet  Take your medicine as directed. Your dose may need to be changed several times to find what works best for you. Oral liquid: Shake the bottle well for at least 10 seconds before you measure the dose. Measure the oral liquid medicine with a marked measuring spoon, oral syringe, or medicine cup. Read and follow the patient instructions that come with this medicine. Talk to your doctor or pharmacist if you have any questions. Missed dose: Take a dose as soon as you remember. If it is almost time for your next dose, wait until then and take a regular dose. Do not take extra medicine to make up for a missed dose. Store the medicine in a closed container at room temperature, away from heat, moisture, and direct light. Store the oral liquid at room temperature for up to 30 days or in the refrigerator for up to 75 days.   Drugs and Foods to Avoid:   Ask your doctor or pharmacist before using any other medicine, including over-the-counter medicines, vitamins, and herbal products. Do not use this medicine together with aliskiren, especially if you have diabetes or kidney disease. Some medicines can affect how valsartan works. Tell your doctor if you also use any of the following:  Cyclosporine, lithium, rifampin, ritonavir  ACE inhibitor blood pressure medicine  Diuretic (water pill)  Heparin  NSAID pain or arthritis medicine, including aspirin, diclofenac, ibuprofen, naproxen  Ask your doctor before you use any medicine, supplement, or salt substitute that contains potassium. Warnings While Using This Medicine: It is not safe to take this medicine during pregnancy. It could harm an unborn baby. Tell your doctor right away if you become pregnant. Tell your doctor if you are breastfeeding, or if you have kidney problems, liver disease, or heart or blood vessel problems. This medicine could lower your blood pressure too much, especially when you first use it or if you are dehydrated. Stand or sit up slowly if you feel lightheaded or dizzy. Your doctor will do lab tests at regular visits to check on the effects of this medicine. Keep all appointments. Keep all medicine out of the reach of children. Never share your medicine with anyone. Possible Side Effects While Using This Medicine:   Call your doctor right away if you notice any of these side effects: Allergic reaction: Itching or hives, swelling in your face or hands, swelling or tingling in your mouth or throat, chest tightness, trouble breathing  Change in how much or how often you urinate, bloody or cloudy urine  Confusion, weakness, uneven heartbeat, trouble breathing, numbness in your hands, feet, or lips  Lightheadedness, dizziness, fainting  Rapid weight gain, swelling in your hands, ankles, or feet  If you notice other side effects that you think are caused by this medicine, tell your doctor. Call your doctor for medical advice about side effects.  You may report side effects to FDA at 3-414-FDA-1088  © Copyright Holly Boyce 2023 Information is for End User's use only and may not be sold, redistributed or otherwise used for commercial purposes. The above information is an  only. It is not intended as medical advice for individual conditions or treatments. Talk to your doctor, nurse or pharmacist before following any medical regimen to see if it is safe and effective for you.

## 2023-09-26 ENCOUNTER — APPOINTMENT (OUTPATIENT)
Dept: LAB | Facility: CLINIC | Age: 63
End: 2023-09-26
Payer: MEDICARE

## 2023-09-26 DIAGNOSIS — I10 PRIMARY HYPERTENSION: ICD-10-CM

## 2023-09-26 DIAGNOSIS — Z79.899 MEDICATION MANAGEMENT: ICD-10-CM

## 2023-09-26 DIAGNOSIS — E78.2 MIXED HYPERLIPIDEMIA: ICD-10-CM

## 2023-09-26 DIAGNOSIS — R23.3 EASY BRUISING: ICD-10-CM

## 2023-09-26 DIAGNOSIS — I89.0 LYMPHEDEMA: ICD-10-CM

## 2023-09-26 DIAGNOSIS — R73.03 PREDIABETES: ICD-10-CM

## 2023-09-26 DIAGNOSIS — L56.8 PHOTOSENSITIVITY: ICD-10-CM

## 2023-09-26 DIAGNOSIS — R60.0 BILATERAL EDEMA OF LOWER EXTREMITY: ICD-10-CM

## 2023-09-26 LAB
ALBUMIN SERPL BCP-MCNC: 3.6 G/DL (ref 3.5–5)
ALP SERPL-CCNC: 67 U/L (ref 34–104)
ALT SERPL W P-5'-P-CCNC: 17 U/L (ref 7–52)
ANION GAP SERPL CALCULATED.3IONS-SCNC: 6 MMOL/L
APTT PPP: 29 SECONDS (ref 23–37)
AST SERPL W P-5'-P-CCNC: 17 U/L (ref 13–39)
BASOPHILS # BLD AUTO: 0.03 THOUSANDS/ÂΜL (ref 0–0.1)
BASOPHILS NFR BLD AUTO: 0 % (ref 0–1)
BILIRUB SERPL-MCNC: 0.85 MG/DL (ref 0.2–1)
BUN SERPL-MCNC: 16 MG/DL (ref 5–25)
CALCIUM SERPL-MCNC: 9.2 MG/DL (ref 8.4–10.2)
CHLORIDE SERPL-SCNC: 102 MMOL/L (ref 96–108)
CHOLEST SERPL-MCNC: 179 MG/DL
CO2 SERPL-SCNC: 28 MMOL/L (ref 21–32)
CREAT SERPL-MCNC: 0.88 MG/DL (ref 0.6–1.3)
EOSINOPHIL # BLD AUTO: 0.02 THOUSAND/ÂΜL (ref 0–0.61)
EOSINOPHIL NFR BLD AUTO: 0 % (ref 0–6)
ERYTHROCYTE [DISTWIDTH] IN BLOOD BY AUTOMATED COUNT: 13.8 % (ref 11.6–15.1)
EST. AVERAGE GLUCOSE BLD GHB EST-MCNC: 146 MG/DL
GFR SERPL CREATININE-BSD FRML MDRD: 90 ML/MIN/1.73SQ M
GLUCOSE P FAST SERPL-MCNC: 120 MG/DL (ref 65–99)
HBA1C MFR BLD: 6.7 %
HCT VFR BLD AUTO: 47.3 % (ref 36.5–49.3)
HDLC SERPL-MCNC: 49 MG/DL
HGB BLD-MCNC: 15.1 G/DL (ref 12–17)
IMM GRANULOCYTES # BLD AUTO: 0.03 THOUSAND/UL (ref 0–0.2)
IMM GRANULOCYTES NFR BLD AUTO: 0 % (ref 0–2)
INR PPP: 0.96 (ref 0.84–1.19)
LDLC SERPL CALC-MCNC: 104 MG/DL (ref 0–100)
LYMPHOCYTES # BLD AUTO: 1.99 THOUSANDS/ÂΜL (ref 0.6–4.47)
LYMPHOCYTES NFR BLD AUTO: 21 % (ref 14–44)
MCH RBC QN AUTO: 30.6 PG (ref 26.8–34.3)
MCHC RBC AUTO-ENTMCNC: 31.9 G/DL (ref 31.4–37.4)
MCV RBC AUTO: 96 FL (ref 82–98)
MONOCYTES # BLD AUTO: 0.59 THOUSAND/ÂΜL (ref 0.17–1.22)
MONOCYTES NFR BLD AUTO: 6 % (ref 4–12)
NEUTROPHILS # BLD AUTO: 6.74 THOUSANDS/ÂΜL (ref 1.85–7.62)
NEUTS SEG NFR BLD AUTO: 73 % (ref 43–75)
NONHDLC SERPL-MCNC: 130 MG/DL
NRBC BLD AUTO-RTO: 0 /100 WBCS
PLATELET # BLD AUTO: 257 THOUSANDS/UL (ref 149–390)
PMV BLD AUTO: 9.8 FL (ref 8.9–12.7)
POTASSIUM SERPL-SCNC: 4.2 MMOL/L (ref 3.5–5.3)
PROT SERPL-MCNC: 7 G/DL (ref 6.4–8.4)
PROTHROMBIN TIME: 13 SECONDS (ref 11.6–14.5)
RBC # BLD AUTO: 4.93 MILLION/UL (ref 3.88–5.62)
SODIUM SERPL-SCNC: 136 MMOL/L (ref 135–147)
TRIGL SERPL-MCNC: 132 MG/DL
WBC # BLD AUTO: 9.4 THOUSAND/UL (ref 4.31–10.16)

## 2023-09-26 PROCEDURE — 80053 COMPREHEN METABOLIC PANEL: CPT

## 2023-09-26 PROCEDURE — 85610 PROTHROMBIN TIME: CPT

## 2023-09-26 PROCEDURE — 85730 THROMBOPLASTIN TIME PARTIAL: CPT

## 2023-09-26 PROCEDURE — 80061 LIPID PANEL: CPT

## 2023-09-26 PROCEDURE — 36415 COLL VENOUS BLD VENIPUNCTURE: CPT

## 2023-09-26 PROCEDURE — 83036 HEMOGLOBIN GLYCOSYLATED A1C: CPT

## 2023-09-26 PROCEDURE — 85025 COMPLETE CBC W/AUTO DIFF WBC: CPT

## 2023-09-28 ENCOUNTER — TELEPHONE (OUTPATIENT)
Dept: FAMILY MEDICINE CLINIC | Facility: CLINIC | Age: 63
End: 2023-09-28

## 2023-09-28 NOTE — TELEPHONE ENCOUNTER
----- Message from Stevie Barfield MD sent at 9/26/2023  6:11 PM EDT -----  Regarding: RE:  Agree with you! Just looks like bruising, actinic purpura  ----- Message -----  From: Hilary Newman DO  Sent: 9/23/2023   1:19 PM EDT  To: Stevie Barfield MD    Dear Dr. Jacquelyn Henley,     I had a patient on Friday that had new skin changes on upper arms. It is intermittent with it resolving and returning. Other info in note. Unknown if this is drug reaction form lasix, I have not seen one before. Also possible just from mobic and increased risk for bleeding.      Thanks,     Ruchi Villanueva

## 2023-09-28 NOTE — TELEPHONE ENCOUNTER
Called and reviewed patient's symptoms. Possibly from increased sensitivity to the sun versus the Mobic causing some problems. We will have the patient monitor his symptoms closely. If there is any significant changes or abnormalities, I do recommend that he stop the Lasix and call immediately.   We will follow-up with blood pressures in 1-2 weeks over the phone        Kasey Mukherjee DO  [facility]  9/28/2023 3:55 PM

## 2023-10-10 ENCOUNTER — OFFICE VISIT (OUTPATIENT)
Dept: FAMILY MEDICINE CLINIC | Facility: CLINIC | Age: 63
End: 2023-10-10
Payer: MEDICARE

## 2023-10-10 VITALS
SYSTOLIC BLOOD PRESSURE: 130 MMHG | HEART RATE: 100 BPM | DIASTOLIC BLOOD PRESSURE: 80 MMHG | BODY MASS INDEX: 46.65 KG/M2 | HEIGHT: 69 IN | WEIGHT: 315 LBS | OXYGEN SATURATION: 98 %

## 2023-10-10 DIAGNOSIS — E11.65 TYPE 2 DIABETES MELLITUS WITH HYPERGLYCEMIA, WITHOUT LONG-TERM CURRENT USE OF INSULIN (HCC): Primary | ICD-10-CM

## 2023-10-10 DIAGNOSIS — I10 PRIMARY HYPERTENSION: ICD-10-CM

## 2023-10-10 DIAGNOSIS — Z79.899 MEDICATION MANAGEMENT: ICD-10-CM

## 2023-10-10 DIAGNOSIS — I89.0 LYMPHEDEMA: ICD-10-CM

## 2023-10-10 DIAGNOSIS — E11.9 NEWLY DIAGNOSED DIABETES (HCC): ICD-10-CM

## 2023-10-10 DIAGNOSIS — R60.0 BILATERAL EDEMA OF LOWER EXTREMITY: ICD-10-CM

## 2023-10-10 PROCEDURE — 99213 OFFICE O/P EST LOW 20 MIN: CPT | Performed by: FAMILY MEDICINE

## 2023-10-11 NOTE — PROGRESS NOTES
Outpatient Note- Follow up     HPI:     Alondra Rosado , 70 y.o. male  presents today for discussion of new diagnosis of diabetes. With recent labs, he transition from a A1c of 6.0-6.7. This places him in the type II diabetic range. We reviewed the pathophysiology of diabetes and the causes. We also discussed what constitutes as a sugar and carbohydrate. Standard of care for diabetics was reviewed including metformin, statin, and ACE/ARB. Patient is hesitant to start any new medication since we have been actively attempting to treat his blood pressure and lower extremity edema. Patient's questions were answered. He presents with his wife today. We discussed different diets that may be helpful/successful in reducing his sugars. He is interested in diet control over metformin currently. We have been monitoring the patient's blood pressure and treating with Lasix 20 mg daily. This was to treat lower extremity edema as well as BP. BP on presentation today 130/80 which is significantly improved from prior level values. He continues to have lower extremity swelling/lymphedema. He has tried different socks which have helped a little bit with swelling and decreased pressure and pain since the others were too small. He does note some mild improvements. Patient denies any chest pain, shortness of breath, nausea, vomiting, polyuria, polydipsia, polyphagia, numbness and tingling in hands or feet. Past Medical History:   Diagnosis Date    Diabetes mellitus ([address])     Migraine     Obesity     MARLA on CPAP     Sleep apnea     Vertigo     episode 1/30/2016      ROS:   Review of Systems   See HPI    OBJECTIVE  Vitals:    10/10/23 1212   BP: 130/80   Pulse: 100   SpO2: 98%        Physical Exam   No PE performed    ASSESSMENT AND PLAN   Gracy Berumen was seen today for follow-up.     Diagnoses and all orders for this visit:    Type 2 diabetes mellitus with hyperglycemia, without long-term current use of insulin [facility])  Newly diagnosed diabetes [facility])  Patient newly diagnosed with type 2 diabetes. He is interested in diet control initially. If unable to reduce elevated sugar, patient will be open to medication management. Currently he is not interested in oral or injectable medications. We will have him follow-up in 3 months with hemoglobin A1c and urine microalbumin. He will need a eye exam and foot exam on next office visit.  -     HEMOGLOBIN A1C W/ EAG ESTIMATION; Future  -     Albumin / creatinine urine ratio; Future    Lymphedema  Bilateral edema of lower extremity  Medication management  Primary hypertension  No significant improvement in lymphedema. Discussed increasing dose of Lasix from 20 mg to 40 mg daily. He will attempt that over the weekend to see if there is any benefit. Most recent labs do not demonstrate any abnormalities in kidney function or electrolytes. Patient has not seen lymphedema clinic yet. Hypertension is better controlled today with BP of 130/80. We will need to continue to monitor.        Radha Irwin DO  [facility]  10/11/2023 7:47 AM

## 2023-11-14 ENCOUNTER — RA CDI HCC (OUTPATIENT)
Dept: OTHER | Facility: HOSPITAL | Age: 63
End: 2023-11-14

## 2023-11-14 NOTE — PROGRESS NOTES
[address] [facility] coding opportunities          Chart Reviewed number of suggestions sent to Provider: 1  E66.01     Patients Insurance     Medicare Insurance: [employer]

## 2023-12-15 DIAGNOSIS — I89.0 LYMPHEDEMA: ICD-10-CM

## 2023-12-15 DIAGNOSIS — Z79.899 MEDICATION MANAGEMENT: ICD-10-CM

## 2023-12-15 DIAGNOSIS — R60.0 BILATERAL EDEMA OF LOWER EXTREMITY: ICD-10-CM

## 2023-12-15 RX ORDER — FUROSEMIDE 20 MG/1
20 TABLET ORAL DAILY
Qty: 90 TABLET | Refills: 0 | Status: SHIPPED | OUTPATIENT
Start: 2023-12-15 | End: 2023-12-21 | Stop reason: SDUPTHER

## 2023-12-21 ENCOUNTER — OFFICE VISIT (OUTPATIENT)
Dept: FAMILY MEDICINE CLINIC | Facility: CLINIC | Age: 63
End: 2023-12-21
Payer: MEDICARE

## 2023-12-21 VITALS
BODY MASS INDEX: 46.65 KG/M2 | WEIGHT: 315 LBS | HEIGHT: 69 IN | OXYGEN SATURATION: 96 % | HEART RATE: 81 BPM | SYSTOLIC BLOOD PRESSURE: 140 MMHG | DIASTOLIC BLOOD PRESSURE: 70 MMHG

## 2023-12-21 DIAGNOSIS — E11.65 TYPE 2 DIABETES MELLITUS WITH HYPERGLYCEMIA, WITHOUT LONG-TERM CURRENT USE OF INSULIN (HCC): ICD-10-CM

## 2023-12-21 DIAGNOSIS — Z79.899 MEDICATION MANAGEMENT: ICD-10-CM

## 2023-12-21 DIAGNOSIS — R60.0 BILATERAL EDEMA OF LOWER EXTREMITY: ICD-10-CM

## 2023-12-21 DIAGNOSIS — I89.0 LYMPHEDEMA: ICD-10-CM

## 2023-12-21 DIAGNOSIS — E11.9 NEWLY DIAGNOSED DIABETES (HCC): ICD-10-CM

## 2023-12-21 DIAGNOSIS — I10 PRIMARY HYPERTENSION: Primary | ICD-10-CM

## 2023-12-21 PROCEDURE — 99214 OFFICE O/P EST MOD 30 MIN: CPT | Performed by: FAMILY MEDICINE

## 2023-12-21 RX ORDER — FUROSEMIDE 20 MG/1
20 TABLET ORAL DAILY
Qty: 90 TABLET | Refills: 0 | Status: SHIPPED | OUTPATIENT
Start: 2023-12-21

## 2023-12-21 RX ORDER — VALSARTAN 40 MG/1
TABLET ORAL DAILY
Qty: 30 TABLET | Refills: 0 | Status: SHIPPED | OUTPATIENT
Start: 2023-12-21 | End: 2024-01-20

## 2023-12-21 NOTE — PATIENT INSTRUCTIONS
[phone]    [facility]      Valsartan (By mouth)   Valsartan (deann-GUILHERME-tan)  Treats high blood pressure and heart failure. May lower the risk of death after a heart attack. This medicine is an angiotensin receptor blocker (ARB).   Brand Name(s): Michael, Valsartan AvPak   There may be other brand names for this medicine.  When This Medicine Should Not Be Used:   This medicine is not right for everyone. Do not use it if you had an allergic reaction to valsartan, or if you are pregnant.  How to Use This Medicine:   Capsule, Tablet  Take your medicine as directed. Your dose may need to be changed several times to find what works best for you.  Oral liquid: Shake the bottle well for at least 10 seconds before you measure the dose. Measure the oral liquid medicine with a marked measuring spoon, oral syringe, or medicine cup.  Read and follow the patient instructions that come with this medicine. Talk to your doctor or pharmacist if you have any questions.  Missed dose: Take a dose as soon as you remember. If it is almost time for your next dose, wait until then and take a regular dose. Do not take extra medicine to make up for a missed dose.  Store the medicine in a closed container at room temperature, away from heat, moisture, and direct light. Store the oral liquid at room temperature for up to 30 days or in the refrigerator for up to 75 days.  Drugs and Foods to Avoid:   Ask your doctor or pharmacist before using any other medicine, including over-the-counter medicines, vitamins, and herbal products.  Do not use this medicine together with aliskiren, especially if you have diabetes or kidney disease.  Some medicines can affect how valsartan works. Tell your doctor if you also use any of the following:  Cyclosporine, lithium, rifampin, ritonavir  ACE inhibitor blood pressure medicine  Diuretic (water pill)  Heparin  NSAID pain or arthritis medicine, including aspirin, diclofenac, ibuprofen, naproxen  Ask your doctor  before you use any medicine, supplement, or salt substitute that contains potassium.  Warnings While Using This Medicine:   It is not safe to take this medicine during pregnancy. It could harm an unborn baby. Tell your doctor right away if you become pregnant.  Tell your doctor if you are breastfeeding, or if you have kidney problems, liver disease, or heart or blood vessel problems.  This medicine could lower your blood pressure too much, especially when you first use it or if you are dehydrated. Stand or sit up slowly if you feel lightheaded or dizzy.  Your doctor will do lab tests at regular visits to check on the effects of this medicine. Keep all appointments.  Keep all medicine out of the reach of children. Never share your medicine with anyone.  Possible Side Effects While Using This Medicine:   Call your doctor right away if you notice any of these side effects:  Allergic reaction: Itching or hives, swelling in your face or hands, swelling or tingling in your mouth or throat, chest tightness, trouble breathing  Change in how much or how often you urinate, bloody or cloudy urine  Confusion, weakness, uneven heartbeat, trouble breathing, numbness in your hands, feet, or lips  Lightheadedness, dizziness, fainting  Rapid weight gain, swelling in your hands, ankles, or feet  If you notice other side effects that you think are caused by this medicine, tell your doctor.   Call your doctor for medical advice about side effects. You may report side effects to FDA at 8-871-FDA-6741  © Copyright [employer] 2023 Information is for End User's use only and may not be sold, redistributed or otherwise used for commercial purposes.  The above information is an  only. It is not intended as medical advice for individual conditions or treatments. Talk to your doctor, nurse or pharmacist before following any medical regimen to see if it is safe and effective for you.      Tramadol (By mouth)   Tramadol  (TRAM-a-dol)  Treats moderate to severe pain. This medicine is a narcotic pain reliever.   Brand Name(s): FusePaq Synapryn, Qdolo, Ultram, Ultram ER   There may be other brand names for this medicine.  When This Medicine Should Not Be Used:   This medicine is not right for everyone. Do not use it if you had an allergic reaction to tramadol or other narcotic medicine, or if you have stomach or bowel blockage (including paralytic ileus) or serious lung or breathing problems (including asthma, respiratory depression).  How to Use This Medicine:   Long Acting Capsule, Liquid, Tablet, Long Acting Tablet  Take your medicine as directed. Your dose may need to be changed several times to find what works best for you.  Swallow the extended-release capsule whole. Do not crush, break, or chew it.  Swallow the extended-release tablet whole. Do not crush, break, or chew it.  Drink plenty of liquids to help avoid constipation.  This medicine should come with a Medication Guide. Ask your pharmacist for a copy if you do not have one.  Missed dose: Take a dose as soon as you remember. If it is almost time for your next dose, wait until then and take a regular dose. Do not take extra medicine to make up for a missed dose.  Store the medicine in a closed container at room temperature, away from heat, moisture, and direct light.  Drop off any unused narcotic medicine at a drug take-back location right away. If you do not have a drug take-back location near you, flush any unused narcotic medicine down the toilet. Check your local drug store and clinics for take-back locations. You can also check the [employer] web site for locations. Here is the link to the FDA safe disposal of medicines website: www.fda.gov/drugs/resourcesforyou/consumers/buyingusingmedicinesafely/ensuringsafeuseofmedicine/safedisposalofmedicines/iir279992.htm  Drugs and Foods to Avoid:   Ask your doctor or pharmacist before using any other medicine, including  over-the-counter medicines, vitamins, and herbal products.  Do not use this medicine if you are using or have used an MAO inhibitor within the past 14 days.  Some medicines can affect how tramadol works. Tell your doctor if you are using any of the following:  Amiodarone, carbamazepine, cyclobenzaprine, digoxin, erythromycin, ketoconazole, lithium, metaxalone, mirtazapine, phenytoin, promethazine, rifampin, ritonavir, quinidine, trazodone  Blood thinner (including warfarin)  Diuretic (water pill)  Medicine to treat depression (including bupropion, fluoxetine, paroxetine, quinidine, SSRIs, TCAs)  Phenothiazine medicine  Triptan medicine for migraine headaches  Tell your doctor if you use anything else that makes you sleepy. Some examples are allergy medicine, narcotic pain medicine, and alcohol. Tell your doctor if you are using buprenorphine, butorphanol, nalbuphine, pentazocine, or a muscle relaxer (including cyclobenzaprine, metaxalone).  Do not drink alcohol while you are using this medicine.  Warnings While Using This Medicine:   Tell your doctor if you are pregnant or breastfeeding, or if you have kidney disease, liver disease (including cirrhosis), adrenal problems, gallstones, lung or breathing problems (including sleep apnea), diabetes, pancreas problems, or a history of head injury, seizures, drug addiction, or depression or similar emotional problems. Tell your doctor if you have phenylketonuria.  This medicine may cause the following problems:  High risk of overdose, which can lead to death  Respiratory depression (serious breathing problem that can be life-threatening)  Sleep-related breathing problems (including sleep apnea, sleep-related hypoxemia)  Serotonin syndrome (when used with certain medicines)  Increased risk of seizures  Adrenal gland problem  Low blood pressure  Unusual change in mood or behavior  Hypoglycemia (low blood sugar level)  This medicine may make you dizzy, drowsy, or lightheaded.  Do not drive or do anything else that could be dangerous until you know how this medicine affects you. Sit or lie down if you feel dizzy. Stand up carefully.  This medicine can be habit-forming. Do not use more than your prescribed dose. Call your doctor if you think your medicine is not working.  Do not stop using this medicine suddenly. Your doctor will need to slowly decrease your dose before you stop it completely.  Tell any doctor or dentist who treats you that you are using this medicine.  This medicine may cause constipation, especially with long-term use. Ask your doctor if you should use a laxative to prevent and treat constipation.  This medicine could cause infertility. Talk with your doctor before using this medicine if you plan to have children.  Your doctor will do lab tests at regular visits to check on the effects of this medicine. Keep all appointments.  Keep all medicine out of the reach of children. Never share your medicine with anyone.  Possible Side Effects While Using This Medicine:   Call your doctor right away if you notice any of these side effects:  Allergic reaction: Itching or hives, swelling in your face or hands, swelling or tingling in your mouth or throat, chest tightness, trouble breathing  Anxiety, restlessness, fast heartbeat, fever, sweating, muscle spasms, nausea, vomiting, diarrhea, seeing or hearing things that are not there  Blistering, peeling, red skin rash  Blue lips, fingernails, or skin  Changes in skin color, dark freckles, cold feeling, tiredness, weight loss  Extreme dizziness, drowsiness, or weakness, shallow breathing, slow heartbeat, seizures, and cold, clammy skin  Lightheadedness, dizziness, fainting  Seizures  Shaking, trembling, sweating, hunger, confusion  Unusual mood or behavior, thoughts of killing yourself or others  Trouble breathing  Weakness, muscle twitching  If you notice these less serious side effects, talk with your doctor:   Constipation, loss of  appetite, stomach upset  Dry mouth  Headache  If you notice other side effects that you think are caused by this medicine, tell your doctor.   Call your doctor for medical advice about side effects. You may report side effects to FDA at 9-913-FDA-7138  © Copyright [employer] 2023 Information is for End User's use only and may not be sold, redistributed or otherwise used for commercial purposes.  The above information is an  only. It is not intended as medical advice for individual conditions or treatments. Talk to your doctor, nurse or pharmacist before following any medical regimen to see if it is safe and effective for you.

## 2023-12-22 NOTE — PROGRESS NOTES
Outpatient Note- Follow up     HPI:     Luther Vaughan , 63 y.o. male  presents today for follow up of chronic conditions.  Currently we are attempting to improve his blood pressure.  He has a history of lower extremity edema vs. Lyphedema.  We have been using intially HCTZ but transitioned to lasix after BP did not improve nor lower extremity swelling significantly change.  Blood pressure on presentation today is 140/70.  This is improved compared to the 160/100 that was previously documented.  He denies any significant stigmata of elevated blood pressure including lightheadedness, dizziness, chest pain, shortness of breath, blurry vision, headache.  Additionally he is having increased pain and discomfort in his bilateral lower extremities due to significant osteoarthritis of the right hip and bilateral knees.  He has evidence of significant arthritis, but due to his weight, the orthopedic physician is hesitant to perform surgery.  He is currently taking Mobic 15 mg as needed, but no greater than daily.  He states that it may take the edge off of the pain, but while sitting he has significant irritation and grimacing.  Upon getting up and walking took him until leaving the office to start to have a relatively normal gait due to pain and stiffness.    We briefly reviewed his previous labs again, going over the diagnosis of diabetes type 2 and hyperlipidemia again.  We have avoided the lipid medication up to now secondary to his severe aches and pains in the lower extremities and not wanting to exacerbate this already severe issue.  Will need to consider medication for both if labs do not improve.    Past Medical History:   Diagnosis Date    Diabetes mellitus (HCC)     Migraine     Obesity     MARLA on CPAP     Sleep apnea     Vertigo     episode 1/30/2016          ROS:     Review of Systems       OBJECTIVE  Vitals:    12/21/23 0845   BP: 140/70   Pulse: 81   SpO2: 96%        Physical Exam  Constitutional:        General: He is not in acute distress.     Appearance: Normal appearance. He is obese. He is not ill-appearing, toxic-appearing or diaphoretic.   HENT:      Head: Normocephalic and atraumatic.      Right Ear: Tympanic membrane, ear canal and external ear normal.      Left Ear: Tympanic membrane, ear canal and external ear normal.      Nose: Nose normal. No congestion or rhinorrhea.   Eyes:      General:         Right eye: No discharge.         Left eye: No discharge.      Pupils: Pupils are equal, round, and reactive to light.   Cardiovascular:      Rate and Rhythm: Normal rate and regular rhythm.      Heart sounds: Normal heart sounds. No murmur heard.     No friction rub. No gallop.   Pulmonary:      Effort: Pulmonary effort is normal. No respiratory distress.      Breath sounds: Normal breath sounds. No stridor. No wheezing, rhonchi or rales.   Abdominal:      General: Bowel sounds are normal. There is no distension.      Palpations: Abdomen is soft.      Tenderness: There is no abdominal tenderness.   Skin:     General: Skin is warm.   Neurological:      Mental Status: He is alert.      Gait: Gait abnormal (antalgic).            ASSESSMENT AND PLAN   Diagnoses and all orders for this visit:    Primary hypertension  Bilateral edema of lower extremity  Lymphedema  Patient continues to have bilateral lower extremity edema and/or lymphedema.  This is multifactorial and likely associated with weight, diet, lack of exercise due to osteoarthritis, and most recently diabetes diagnosis.  Due to his diabetes diagnosis, his goal for blood pressure would be 130 or less systolic.  Will continue him on Lasix 20 mg due to possible mild improvement in bilateral lower edema and will add Diovan 20 mg initially, may increase dose to 40 mg after 1 to 2 weeks depending on values.  This will also satisfy the ACE/ARB recommendation for diabetes.  -     furosemide (LASIX) 20 mg tablet; Take 1 tablet (20 mg total) by mouth daily  -      valsartan (DIOVAN) 40 mg tablet; Take 0.5 tablets (20 mg total) by mouth daily for 7 days, THEN 1 tablet (40 mg total) daily for 23 days.  -     Comprehensive metabolic panel; Future    Medication management  Newly diagnosed diabetes (HCC)  Type 2 diabetes mellitus with hyperglycemia, without long-term current use of insulin (HCC)  Requires follow-up testing.  Last performed in September.  Will obtain hemoglobin A1c, urine microalbumin, and CMP for evaluation of kidney function and average sugar over the last 3 months.  If A1c is elevated, consider metformin.  Will also need to consider statin, although his cholesterol values are overall within normal limits, only mild elevation in LDL.  ASCVD risk is elevated due to blood pressure, blood pressure treatment, age, type 2 diabetes, and systolic blood pressure.  -     Hemoglobin A1C; Future  -     Albumin / creatinine urine ratio  -     Comprehensive metabolic panel; Future        DO [facility]  12/22/2023 5:53 AM

## 2023-12-27 ENCOUNTER — TELEPHONE (OUTPATIENT)
Age: 63
End: 2023-12-27

## 2023-12-27 NOTE — TELEPHONE ENCOUNTER
Patient has open sores on his knee, wife was calling for an appointment. He has a nurse appointment on Friday for BP check. I looked at the schedule there is no opening for today and next two days there is same days to be requested the day of. She stated that she will take him to urgent care to get them checked out.

## 2023-12-28 ENCOUNTER — TELEPHONE (OUTPATIENT)
Age: 63
End: 2023-12-28

## 2023-12-28 NOTE — TELEPHONE ENCOUNTER
PT has an open wound on right lower calf area for about 3 weeks now that's not healing. He has a nurse's visit tomorrow 12/29 for a BP check, but I was unable to find availabilty to be seen.    PT requesting a phone call to advise.    Thank You

## 2023-12-29 ENCOUNTER — OFFICE VISIT (OUTPATIENT)
Dept: FAMILY MEDICINE CLINIC | Facility: CLINIC | Age: 63
End: 2023-12-29
Payer: MEDICARE

## 2023-12-29 VITALS
DIASTOLIC BLOOD PRESSURE: 90 MMHG | WEIGHT: 315 LBS | BODY MASS INDEX: 46.65 KG/M2 | HEIGHT: 69 IN | OXYGEN SATURATION: 98 % | SYSTOLIC BLOOD PRESSURE: 140 MMHG | RESPIRATION RATE: 20 BRPM | HEART RATE: 85 BPM

## 2023-12-29 DIAGNOSIS — L97.911 LOWER EXTREMITY ULCERATION, RIGHT, LIMITED TO BREAKDOWN OF SKIN (HCC): Primary | ICD-10-CM

## 2023-12-29 DIAGNOSIS — R60.0 LOWER EXTREMITY EDEMA: ICD-10-CM

## 2023-12-29 DIAGNOSIS — L89.899 PRESSURE INJURY OF SKIN OF RIGHT CALF, UNSPECIFIED INJURY STAGE: ICD-10-CM

## 2023-12-29 PROCEDURE — 99213 OFFICE O/P EST LOW 20 MIN: CPT | Performed by: FAMILY MEDICINE

## 2023-12-29 RX ORDER — SULFAMETHOXAZOLE AND TRIMETHOPRIM 800; 160 MG/1; MG/1
1 TABLET ORAL EVERY 12 HOURS SCHEDULED
Qty: 14 TABLET | Refills: 0 | Status: ON HOLD | OUTPATIENT
Start: 2023-12-29 | End: 2024-01-05

## 2023-12-29 NOTE — PROGRESS NOTES
Outpatient Note- Follow up     HPI:     Luther Vaughan , 63 y.o. male  presents today for right lower extremity wound.  The patient states that he remembers knocking the area in the garage.  Over the course of the last 3 weeks the area has been progressively getting larger and leaking more fluid.  He has been attempting to cover the area with bandages and using Neosporin.  He denies any new systemic symptoms of fever, chills, nausea, vomiting, lightheadedness, dizziness.  He has sensation, but due to the increase in fluid of his lower extremity, it feels slightly duller.  He had some mild increased redness around the area.  The right leg is still having improved swelling of the lower extremity.  This is likely multifactorial with lymphedema, increased weight, severe osteoarthritis limiting his exercise.      Past Medical History:   Diagnosis Date    Diabetes mellitus (HCC)     Migraine     Obesity     MARLA on CPAP     Sleep apnea     Vertigo     episode 1/30/2016      ROS:   Review of Systems       OBJECTIVE  Vitals:    12/29/23 0825   BP: 140/90   Pulse: 85   Resp: 20   SpO2: 98%        Physical Exam  Constitutional:       General: He is not in acute distress.     Appearance: Normal appearance. He is obese. He is not ill-appearing, toxic-appearing or diaphoretic.   Musculoskeletal:      Right lower leg: Edema (+4 pitting) present.      Left lower leg: Edema (+2 pitting) present.   Skin:     Findings: Erythema (Increased heat and redness around wounds.) and lesion (open wound on right lower extremity.  Drainage clear fluid.  No blood discharge.  Skin breakdown in several areas.  All draining clear fluid.) present. No rash.   Neurological:      Mental Status: He is alert.      Gait: Gait abnormal (antalgic, limited ROM 2/2 to arthritis.).                          ASSESSMENT AND PLAN   Luther was seen today for follow-up.  Diagnoses and all orders for this visit:    Lower extremity ulceration, right, limited to  breakdown of skin (HCC)  Pressure injury of skin of right calf, unspecified injury stage  Lower extremity edema  Area on legs weeping.  Patient should increase frequency of Lasix to daily treatment, patient has been attempting every other day.  This may be causing an increase in fluid pressure.  Although he did bump the area, it is likely the pressure from the fluid in lower extremity that there is worsening wound.  I recommend that he be referred to wound care.  I will touch base with the local wound care physician to see if we can get him in sooner than later to avoid any further complications.  Due to the increased redness, swelling, and heat to the area I would recommend prophylactic antibiotics of Bactrim to avoid any complications of MRSA or common skin darius.  -     sulfamethoxazole-trimethoprim (BACTRIM DS) 800-160 mg per tablet; Take 1 tablet by mouth every 12 (twelve) hours for 7 days  -     Ambulatory Referral to Wound Care; Future    DO Ron Gordon [facility]  12/29/2023 9:05 AM

## 2024-01-05 ENCOUNTER — APPOINTMENT (EMERGENCY)
Dept: RADIOLOGY | Facility: HOSPITAL | Age: 64
DRG: 291 | End: 2024-01-05
Payer: MEDICARE

## 2024-01-05 ENCOUNTER — HOSPITAL ENCOUNTER (INPATIENT)
Facility: HOSPITAL | Age: 64
LOS: 4 days | Discharge: HOME | DRG: 291 | End: 2024-01-09
Attending: STUDENT IN AN ORGANIZED HEALTH CARE EDUCATION/TRAINING PROGRAM | Admitting: INTERNAL MEDICINE
Payer: MEDICARE

## 2024-01-05 ENCOUNTER — OFFICE VISIT (OUTPATIENT)
Dept: WOUND CARE | Facility: HOSPITAL | Age: 64
End: 2024-01-05
Payer: MEDICARE

## 2024-01-05 ENCOUNTER — APPOINTMENT (INPATIENT)
Dept: NON INVASIVE DIAGNOSTICS | Facility: HOSPITAL | Age: 64
DRG: 291 | End: 2024-01-05
Payer: MEDICARE

## 2024-01-05 VITALS
HEIGHT: 68 IN | BODY MASS INDEX: 47.74 KG/M2 | SYSTOLIC BLOOD PRESSURE: 164 MMHG | WEIGHT: 315 LBS | RESPIRATION RATE: 18 BRPM | TEMPERATURE: 97.2 F | HEART RATE: 107 BPM | OXYGEN SATURATION: 97 % | DIASTOLIC BLOOD PRESSURE: 109 MMHG

## 2024-01-05 DIAGNOSIS — G47.33 OBSTRUCTIVE SLEEP APNEA: ICD-10-CM

## 2024-01-05 DIAGNOSIS — I49.9 IRREGULAR HEART RATE: ICD-10-CM

## 2024-01-05 DIAGNOSIS — I10 ELEVATED BLOOD PRESSURE READING IN OFFICE WITH DIAGNOSIS OF HYPERTENSION: ICD-10-CM

## 2024-01-05 DIAGNOSIS — I89.0 LYMPHEDEMA OF BOTH LOWER EXTREMITIES: ICD-10-CM

## 2024-01-05 DIAGNOSIS — Z79.899 MEDICATION MANAGEMENT: ICD-10-CM

## 2024-01-05 DIAGNOSIS — R03.0 ELEVATED BLOOD-PRESSURE READING WITHOUT DIAGNOSIS OF HYPERTENSION: ICD-10-CM

## 2024-01-05 DIAGNOSIS — R60.9 LIPEDEMA: ICD-10-CM

## 2024-01-05 DIAGNOSIS — I48.91 ATRIAL FIBRILLATION WITH RVR (HCC): Primary | ICD-10-CM

## 2024-01-05 DIAGNOSIS — I50.31 ACUTE DIASTOLIC CHF (CONGESTIVE HEART FAILURE) (HCC): ICD-10-CM

## 2024-01-05 DIAGNOSIS — I89.0 LYMPHEDEMA: ICD-10-CM

## 2024-01-05 DIAGNOSIS — I48.91 NEW ONSET ATRIAL FIBRILLATION (HCC): ICD-10-CM

## 2024-01-05 DIAGNOSIS — R60.0 BILATERAL EDEMA OF LOWER EXTREMITY: ICD-10-CM

## 2024-01-05 DIAGNOSIS — R21 RASH: ICD-10-CM

## 2024-01-05 DIAGNOSIS — L97.912 NON-PRESSURE CHRONIC ULCER OF RIGHT LOWER LEG WITH FAT LAYER EXPOSED (HCC): Primary | ICD-10-CM

## 2024-01-05 DIAGNOSIS — I87.311 CHRONIC VENOUS HYPERTENSION (IDIOPATHIC) WITH ULCER OF RIGHT LOWER EXTREMITY (CODE) (HCC): ICD-10-CM

## 2024-01-05 DIAGNOSIS — E11.9 TYPE 2 DIABETES MELLITUS WITHOUT COMPLICATION, WITHOUT LONG-TERM CURRENT USE OF INSULIN (HCC): ICD-10-CM

## 2024-01-05 PROBLEM — E78.5 DYSLIPIDEMIA: Status: ACTIVE | Noted: 2024-01-05

## 2024-01-05 PROBLEM — L97.909 CHRONIC ULCER OF LOWER EXTREMITY (HCC): Status: ACTIVE | Noted: 2024-01-05

## 2024-01-05 PROBLEM — E87.70 VOLUME OVERLOAD: Status: ACTIVE | Noted: 2024-01-05

## 2024-01-05 LAB
2HR DELTA HS TROPONIN: 0 NG/L
4HR DELTA HS TROPONIN: 0 NG/L
ALBUMIN SERPL BCP-MCNC: 3.9 G/DL (ref 3.5–5)
ALP SERPL-CCNC: 60 U/L (ref 34–104)
ALT SERPL W P-5'-P-CCNC: 22 U/L (ref 7–52)
ANION GAP SERPL CALCULATED.3IONS-SCNC: 7 MMOL/L
AORTIC ROOT: 3.7 CM
APICAL FOUR CHAMBER EJECTION FRACTION: 59 %
ASCENDING AORTA: 3.3 CM
AST SERPL W P-5'-P-CCNC: 22 U/L (ref 13–39)
BASOPHILS # BLD AUTO: 0.03 THOUSANDS/ÂΜL (ref 0–0.1)
BASOPHILS NFR BLD AUTO: 0 % (ref 0–1)
BILIRUB SERPL-MCNC: 0.7 MG/DL (ref 0.2–1)
BNP SERPL-MCNC: 174 PG/ML (ref 0–100)
BUN SERPL-MCNC: 18 MG/DL (ref 5–25)
CALCIUM SERPL-MCNC: 9.4 MG/DL (ref 8.4–10.2)
CARDIAC TROPONIN I PNL SERPL HS: 5 NG/L
CHLORIDE SERPL-SCNC: 104 MMOL/L (ref 96–108)
CO2 SERPL-SCNC: 25 MMOL/L (ref 21–32)
CREAT SERPL-MCNC: 0.95 MG/DL (ref 0.6–1.3)
D DIMER PPP FEU-MCNC: 1.18 UG/ML FEU
EOSINOPHIL # BLD AUTO: 0.03 THOUSAND/ÂΜL (ref 0–0.61)
EOSINOPHIL NFR BLD AUTO: 0 % (ref 0–6)
ERYTHROCYTE [DISTWIDTH] IN BLOOD BY AUTOMATED COUNT: 13.2 % (ref 11.6–15.1)
FRACTIONAL SHORTENING: 36 (ref 28–44)
GFR SERPL CREATININE-BSD FRML MDRD: 83 ML/MIN/1.73SQ M
GLUCOSE SERPL-MCNC: 111 MG/DL (ref 65–140)
HCT VFR BLD AUTO: 47.3 % (ref 36.5–49.3)
HGB BLD-MCNC: 15.3 G/DL (ref 12–17)
IMM GRANULOCYTES # BLD AUTO: 0.06 THOUSAND/UL (ref 0–0.2)
IMM GRANULOCYTES NFR BLD AUTO: 1 % (ref 0–2)
INTERVENTRICULAR SEPTUM IN DIASTOLE (PARASTERNAL SHORT AXIS VIEW): 1.4 CM
INTERVENTRICULAR SEPTUM: 1.4 CM (ref 0.6–1.1)
LAAS-AP2: 26.1 CM2
LAAS-AP4: 19.9 CM2
LEFT ATRIUM SIZE: 4.4 CM
LEFT ATRIUM VOLUME (MOD BIPLANE): 71 ML
LEFT ATRIUM VOLUME INDEX (MOD BIPLANE): 28.3 ML/M2
LEFT INTERNAL DIMENSION IN SYSTOLE: 2.9 CM (ref 2.1–4)
LEFT VENTRICULAR INTERNAL DIMENSION IN DIASTOLE: 4.5 CM (ref 3.5–6)
LEFT VENTRICULAR POSTERIOR WALL IN END DIASTOLE: 1.4 CM
LEFT VENTRICULAR STROKE VOLUME: 60 ML
LVSV (TEICH): 60 ML
LYMPHOCYTES # BLD AUTO: 2.45 THOUSANDS/ÂΜL (ref 0.6–4.47)
LYMPHOCYTES NFR BLD AUTO: 22 % (ref 14–44)
MAGNESIUM SERPL-MCNC: 1.9 MG/DL (ref 1.9–2.7)
MCH RBC QN AUTO: 30.4 PG (ref 26.8–34.3)
MCHC RBC AUTO-ENTMCNC: 32.3 G/DL (ref 31.4–37.4)
MCV RBC AUTO: 94 FL (ref 82–98)
MONOCYTES # BLD AUTO: 0.66 THOUSAND/ÂΜL (ref 0.17–1.22)
MONOCYTES NFR BLD AUTO: 6 % (ref 4–12)
MV E'TISSUE VEL-LAT: 9 CM/S
MV E'TISSUE VEL-SEP: 9 CM/S
MV PEAK E VEL: 85 CM/S
NEUTROPHILS # BLD AUTO: 8.18 THOUSANDS/ÂΜL (ref 1.85–7.62)
NEUTS SEG NFR BLD AUTO: 71 % (ref 43–75)
NRBC BLD AUTO-RTO: 0 /100 WBCS
PLATELET # BLD AUTO: 261 THOUSANDS/UL (ref 149–390)
PMV BLD AUTO: 9.2 FL (ref 8.9–12.7)
POTASSIUM SERPL-SCNC: 4.1 MMOL/L (ref 3.5–5.3)
PROT SERPL-MCNC: 7.1 G/DL (ref 6.4–8.4)
RBC # BLD AUTO: 5.04 MILLION/UL (ref 3.88–5.62)
RIGHT ATRIUM AREA SYSTOLE A4C: 16.5 CM2
RIGHT VENTRICLE ID DIMENSION: 3.4 CM
SINOTUBULAR JUNCTION: 3.3 CM
SL CV LEFT ATRIUM LENGTH A2C: 5.7 CM
SL CV LV EF: 55
SL CV PED ECHO LEFT VENTRICLE DIASTOLIC VOLUME (MOD BIPLANE) 2D: 93 ML
SL CV PED ECHO LEFT VENTRICLE SYSTOLIC VOLUME (MOD BIPLANE) 2D: 33 ML
SL CV SINUS OF VALSALVA 2D: 3.7 CM
SODIUM SERPL-SCNC: 136 MMOL/L (ref 135–147)
STJ: 3.3 CM
TR MAX PG: 21 MMHG
TR PEAK VELOCITY: 2.3 M/S
TRICUSPID ANNULAR PLANE SYSTOLIC EXCURSION: 1.9 CM
TRICUSPID VALVE PEAK REGURGITATION VELOCITY: 2.3 M/S
WBC # BLD AUTO: 11.41 THOUSAND/UL (ref 4.31–10.16)

## 2024-01-05 PROCEDURE — 71275 CT ANGIOGRAPHY CHEST: CPT

## 2024-01-05 PROCEDURE — 85025 COMPLETE CBC W/AUTO DIFF WBC: CPT | Performed by: STUDENT IN AN ORGANIZED HEALTH CARE EDUCATION/TRAINING PROGRAM

## 2024-01-05 PROCEDURE — 36415 COLL VENOUS BLD VENIPUNCTURE: CPT | Performed by: STUDENT IN AN ORGANIZED HEALTH CARE EDUCATION/TRAINING PROGRAM

## 2024-01-05 PROCEDURE — 96365 THER/PROPH/DIAG IV INF INIT: CPT

## 2024-01-05 PROCEDURE — 94660 CPAP INITIATION&MGMT: CPT

## 2024-01-05 PROCEDURE — 97597 DBRDMT OPN WND 1ST 20 CM/<: CPT | Performed by: STUDENT IN AN ORGANIZED HEALTH CARE EDUCATION/TRAINING PROGRAM

## 2024-01-05 PROCEDURE — 99291 CRITICAL CARE FIRST HOUR: CPT | Performed by: STUDENT IN AN ORGANIZED HEALTH CARE EDUCATION/TRAINING PROGRAM

## 2024-01-05 PROCEDURE — 80053 COMPREHEN METABOLIC PANEL: CPT | Performed by: STUDENT IN AN ORGANIZED HEALTH CARE EDUCATION/TRAINING PROGRAM

## 2024-01-05 PROCEDURE — 99213 OFFICE O/P EST LOW 20 MIN: CPT | Performed by: STUDENT IN AN ORGANIZED HEALTH CARE EDUCATION/TRAINING PROGRAM

## 2024-01-05 PROCEDURE — 93005 ELECTROCARDIOGRAM TRACING: CPT

## 2024-01-05 PROCEDURE — 99223 1ST HOSP IP/OBS HIGH 75: CPT | Performed by: INTERNAL MEDICINE

## 2024-01-05 PROCEDURE — 99285 EMERGENCY DEPT VISIT HI MDM: CPT

## 2024-01-05 PROCEDURE — 97598 DBRDMT OPN WND ADDL 20CM/<: CPT | Performed by: STUDENT IN AN ORGANIZED HEALTH CARE EDUCATION/TRAINING PROGRAM

## 2024-01-05 PROCEDURE — C8929 TTE W OR WO FOL WCON,DOPPLER: HCPCS

## 2024-01-05 PROCEDURE — G1004 CDSM NDSC: HCPCS

## 2024-01-05 PROCEDURE — 84484 ASSAY OF TROPONIN QUANT: CPT | Performed by: STUDENT IN AN ORGANIZED HEALTH CARE EDUCATION/TRAINING PROGRAM

## 2024-01-05 PROCEDURE — 71045 X-RAY EXAM CHEST 1 VIEW: CPT

## 2024-01-05 PROCEDURE — 85379 FIBRIN DEGRADATION QUANT: CPT | Performed by: STUDENT IN AN ORGANIZED HEALTH CARE EDUCATION/TRAINING PROGRAM

## 2024-01-05 PROCEDURE — 83735 ASSAY OF MAGNESIUM: CPT | Performed by: STUDENT IN AN ORGANIZED HEALTH CARE EDUCATION/TRAINING PROGRAM

## 2024-01-05 PROCEDURE — 99204 OFFICE O/P NEW MOD 45 MIN: CPT | Performed by: STUDENT IN AN ORGANIZED HEALTH CARE EDUCATION/TRAINING PROGRAM

## 2024-01-05 PROCEDURE — 93306 TTE W/DOPPLER COMPLETE: CPT | Performed by: INTERNAL MEDICINE

## 2024-01-05 PROCEDURE — 96375 TX/PRO/DX INJ NEW DRUG ADDON: CPT

## 2024-01-05 PROCEDURE — 83880 ASSAY OF NATRIURETIC PEPTIDE: CPT | Performed by: STUDENT IN AN ORGANIZED HEALTH CARE EDUCATION/TRAINING PROGRAM

## 2024-01-05 PROCEDURE — 94760 N-INVAS EAR/PLS OXIMETRY 1: CPT

## 2024-01-05 RX ORDER — ENOXAPARIN SODIUM 150 MG/ML
1 INJECTION SUBCUTANEOUS EVERY 12 HOURS SCHEDULED
Status: DISCONTINUED | OUTPATIENT
Start: 2024-01-05 | End: 2024-01-08

## 2024-01-05 RX ORDER — LIDOCAINE HYDROCHLORIDE 40 MG/ML
5 SOLUTION TOPICAL ONCE
Status: COMPLETED | OUTPATIENT
Start: 2024-01-05 | End: 2024-01-05

## 2024-01-05 RX ORDER — FUROSEMIDE 10 MG/ML
40 INJECTION INTRAMUSCULAR; INTRAVENOUS ONCE
Status: COMPLETED | OUTPATIENT
Start: 2024-01-05 | End: 2024-01-05

## 2024-01-05 RX ORDER — MAGNESIUM SULFATE HEPTAHYDRATE 40 MG/ML
2 INJECTION, SOLUTION INTRAVENOUS ONCE
Status: COMPLETED | OUTPATIENT
Start: 2024-01-05 | End: 2024-01-05

## 2024-01-05 RX ORDER — ACETAMINOPHEN 325 MG/1
650 TABLET ORAL EVERY 6 HOURS PRN
Status: DISCONTINUED | OUTPATIENT
Start: 2024-01-05 | End: 2024-01-09 | Stop reason: HOSPADM

## 2024-01-05 RX ORDER — LOSARTAN POTASSIUM 25 MG/1
25 TABLET ORAL DAILY
Status: DISCONTINUED | OUTPATIENT
Start: 2024-01-05 | End: 2024-01-05

## 2024-01-05 RX ORDER — METOPROLOL TARTRATE 1 MG/ML
5 INJECTION, SOLUTION INTRAVENOUS ONCE
Status: COMPLETED | OUTPATIENT
Start: 2024-01-05 | End: 2024-01-05

## 2024-01-05 RX ORDER — LOSARTAN POTASSIUM 25 MG/1
25 TABLET ORAL DAILY
Status: DISCONTINUED | OUTPATIENT
Start: 2024-01-06 | End: 2024-01-08

## 2024-01-05 RX ORDER — ATORVASTATIN CALCIUM 40 MG/1
40 TABLET, FILM COATED ORAL
Status: DISCONTINUED | OUTPATIENT
Start: 2024-01-05 | End: 2024-01-05

## 2024-01-05 RX ORDER — SODIUM CHLORIDE 9 MG/ML
3 INJECTION INTRAVENOUS
Status: DISCONTINUED | OUTPATIENT
Start: 2024-01-05 | End: 2024-01-09 | Stop reason: HOSPADM

## 2024-01-05 RX ORDER — MAGNESIUM SULFATE HEPTAHYDRATE 40 MG/ML
2 INJECTION, SOLUTION INTRAVENOUS ONCE
Status: DISCONTINUED | OUTPATIENT
Start: 2024-01-05 | End: 2024-01-05

## 2024-01-05 RX ORDER — ATORVASTATIN CALCIUM 10 MG/1
10 TABLET, FILM COATED ORAL
Status: DISCONTINUED | OUTPATIENT
Start: 2024-01-05 | End: 2024-01-09 | Stop reason: HOSPADM

## 2024-01-05 RX ORDER — OMEGA-3S/DHA/EPA/FISH OIL/D3 300MG-1000
400 CAPSULE ORAL DAILY
Status: DISCONTINUED | OUTPATIENT
Start: 2024-01-06 | End: 2024-01-09 | Stop reason: HOSPADM

## 2024-01-05 RX ADMIN — METOPROLOL TARTRATE 25 MG: 25 TABLET, FILM COATED ORAL at 13:56

## 2024-01-05 RX ADMIN — ENOXAPARIN SODIUM 150 MG: 150 INJECTION SUBCUTANEOUS at 16:44

## 2024-01-05 RX ADMIN — MAGNESIUM SULFATE HEPTAHYDRATE 2 G: 40 INJECTION, SOLUTION INTRAVENOUS at 13:10

## 2024-01-05 RX ADMIN — PERFLUTREN 0.8 ML/MIN: 6.52 INJECTION, SUSPENSION INTRAVENOUS at 15:32

## 2024-01-05 RX ADMIN — FUROSEMIDE 40 MG: 10 INJECTION, SOLUTION INTRAMUSCULAR; INTRAVENOUS at 15:50

## 2024-01-05 RX ADMIN — METOPROLOL TARTRATE 25 MG: 25 TABLET, FILM COATED ORAL at 20:06

## 2024-01-05 RX ADMIN — METOPROLOL TARTRATE 5 MG: 5 INJECTION INTRAVENOUS at 13:25

## 2024-01-05 RX ADMIN — LIDOCAINE HYDROCHLORIDE 5 ML: 40 SOLUTION TOPICAL at 10:58

## 2024-01-05 RX ADMIN — METOPROLOL TARTRATE 5 MG: 5 INJECTION INTRAVENOUS at 13:19

## 2024-01-05 RX ADMIN — ATORVASTATIN CALCIUM 10 MG: 10 TABLET, FILM COATED ORAL at 16:02

## 2024-01-05 RX ADMIN — DILTIAZEM HYDROCHLORIDE 5 MG/HR: 5 INJECTION INTRAVENOUS at 15:49

## 2024-01-05 RX ADMIN — METOPROLOL TARTRATE 5 MG: 5 INJECTION INTRAVENOUS at 13:10

## 2024-01-05 RX ADMIN — IOHEXOL 100 ML: 350 INJECTION, SOLUTION INTRAVENOUS at 12:28

## 2024-01-05 NOTE — ASSESSMENT & PLAN NOTE
In setting of afib with RVR  Cardiology following  On IV lasix 40 mg currently  Monitor Is/Os and daily weights  Fluid and salt restriction  Follow up ECHO

## 2024-01-05 NOTE — ASSESSMENT & PLAN NOTE
"Lab Results   Component Value Date    HGBA1C 6.7 (H) 09/26/2023       No results for input(s): \"POCGLU\" in the last 72 hours.    Blood Sugar Average: Last 72 hrs:  not on any prior to admission diabetes medications  Monitor glucose  Will start sliding scale if needed    " Passed

## 2024-01-05 NOTE — H&P
"[facility]  H&P  Name: Luther Vaughan 63 y.o. male I MRN: [id number]  Unit/Bed#: [address] [place] [address] Date of Admission: 1/5/2024   Date of Service: 1/5/2024 I Hospital Day: 0      Assessment/Plan   * New onset atrial fibrillation (HCC)  Assessment & Plan  Patient was sent in from wound care after he was noted to be hypertensive and tachycardic.  CTA chest: No pulmonary embolus. Mild interstitial edema.  EKG: Afib with RVR   Given metoprolol in ED, HR remained in 120s  Cardiology consulted  Started on cardizem gtt  Started on metoprolol 25 mg q12h  On weight based lovenox  ECHO ordered  May need ELISA with cardioversion if he does not spontaneously convert    Volume overload  Assessment & Plan  In setting of afib with RVR  Cardiology following  On IV lasix 40 mg currently  Monitor Is/Os and daily weights  Fluid and salt restriction  Follow up ECHO    Hypertension  Assessment & Plan  Home valsartan substituted with losartan 25 mg daily  BP improved since admission  Monitor on cardizem gtt    Chronic ulcer of lower extremity (HCC)  Assessment & Plan  Follows with wound care outpatient  On lasix 20 mg outpatient for lymphedema  Wound care consulted    Dyslipidemia  Assessment & Plan  Started on Lipitor 10 mg    Type 2 diabetes mellitus (HCC)  Assessment & Plan  Lab Results   Component Value Date    HGBA1C 6.7 (H) 09/26/2023       No results for input(s): \"POCGLU\" in the last 72 hours.    Blood Sugar Average: Last 72 hrs:  not on any prior to admission diabetes medications  Monitor glucose  Will start sliding scale if needed      Obstructive sleep apnea  Assessment & Plan  Continue cpap hs         VTE Pharmacologic Prophylaxis:   Moderate Risk (Score 3-4) - Pharmacological DVT Prophylaxis Ordered: enoxaparin (Lovenox).  Code Status: No Order   Discussion with family: Updated  (several family members) at bedside.    Anticipated Length of Stay: Patient will be admitted on an " inpatient basis with an anticipated length of stay of greater than 2 midnights secondary to new onset a fib with RVR.    Total Time Spent on Date of Encounter in care of patient: 65 mins. This time was spent on one or more of the following: performing physical exam; counseling and coordination of care; obtaining or reviewing history; documenting in the medical record; reviewing/ordering tests, medications or procedures; communicating with other healthcare professionals and discussing with patient's family/caregivers.    Chief Complaint: tachycardia, HTN    History of Present Illness:  Luther Vaughan is a 63 y.o. male with a PMH of HTN, HLD, T2DM, MARLA, chronic lower extremity ulcer who presents from [facility] after he was found to be hypertensive and tachycardic. He was sent to the ED for further evaluation.  Patient reports he was just sitting in chair when they told him he was hypertensive and tachycardic.  Reports he was feeling no symptoms.  Only symptom he can recall over past few months was occasional heartburn.  He denies fever, chills, dizziness, lightheadedness, chest pain, cough, shortness of breath, palpitations, nausea, vomiting, abdominal pain, or syncope.    Review of Systems:  Review of Systems   Constitutional:  Negative for chills and fever.   HENT:  Negative for ear pain and sore throat.    Eyes:  Negative for pain and visual disturbance.   Respiratory:  Negative for cough and shortness of breath.    Cardiovascular:  Negative for chest pain and palpitations.   Gastrointestinal:  Negative for abdominal pain and vomiting.   Genitourinary:  Negative for dysuria and hematuria.   Musculoskeletal:  Negative for arthralgias and back pain.   Skin:  Negative for color change and rash.   Neurological:  Negative for seizures and syncope.   All other systems reviewed and are negative.      Past Medical and Surgical History:   Past Medical History:   Diagnosis Date    Diabetes mellitus (HCC)      "Hypertension     Migraine     Obesity     MARLA on CPAP     Sleep apnea     Vertigo     episode 1/30/2016       Past Surgical History:   Procedure Laterality Date    HAND SURGERY      football injury    HERNIA REPAIR      KNEE SURGERY Left     left meniscus     MENISCECTOMY Left        Meds/Allergies:  Prior to Admission medications    Medication Sig Start Date End Date Taking? Authorizing Provider   Ascorbic Acid, Vitamin C, (VITAMIN C) 100 MG tablet Take by mouth daily   Yes Historical Provider, MD   cholecalciferol (VITAMIN D3) 400 units tablet Take 400 Units by mouth daily   Yes Historical Provider, MD   furosemide (LASIX) 20 mg tablet Take 1 tablet (20 mg total) by mouth daily 12/21/23  Yes Maycol Arango DO   meloxicam (MOBIC) 15 mg tablet 15 mg daily States does not take daily. 8/29/21  Yes Historical Provider, MD   valsartan (DIOVAN) 40 mg tablet Take 0.5 tablets (20 mg total) by mouth daily for 7 days, THEN 1 tablet (40 mg total) daily for 23 days. 12/21/23 1/20/24 Yes Maycol Arango DO   potassium chloride (Klor-Con) 20 mEq packet Take 20 mEq by mouth daily  Patient not taking: Reported on 1/5/2024 8/25/23   Maycol Arango DO   sulfamethoxazole-trimethoprim (BACTRIM DS) 800-160 mg per tablet Take 1 tablet by mouth every 12 (twelve) hours for 7 days  Patient not taking: Reported on 1/5/2024 12/29/23 1/5/24  Maycol Arango DO     I have reviewed home medications with patient personally.    Allergies:   Allergies   Allergen Reactions    Erythromycin Diarrhea    Fish-Derived Products - Food Allergy Diarrhea     States \"cartilage fish\" cause sweating, diarrhea, vomiting       Social History:  Marital Status: /Civil Union   Occupation:   Patient Pre-hospital Living Situation: Home  Patient Pre-hospital Level of Mobility: walks  Patient Pre-hospital Diet Restrictions: none  Substance Use History:   Social History     Substance and Sexual Activity   Alcohol Use Not Currently    " "Alcohol/week: 14.0 standard drinks of alcohol    Types: 14 Cans of beer per week     Social History     Tobacco Use   Smoking Status Never   Smokeless Tobacco Never   Tobacco Comments    never smoker- per Lisa      Social History     Substance and Sexual Activity   Drug Use Never       Family History:  Family History   Problem Relation Age of Onset    No Known Problems Mother     Heart attack Father     Heart disease Father     Prostate cancer Neg Hx     Testicular cancer Neg Hx     Colon cancer Neg Hx        Physical Exam:     Vitals:   Blood Pressure: 129/92 (01/05/24 1825)  Pulse: 84 (01/05/24 1825)  Temperature: (!) 97.2 °F (36.2 °C) (01/05/24 1501)  Respirations: 19 (01/05/24 1501)  Height: 5' 8\" (172.7 cm) (01/05/24 1501)  Weight - Scale: (!) 147 kg (324 lb 1.2 oz) (01/05/24 1501)  SpO2: 93 % (01/05/24 1825)    Physical Exam  Vitals and nursing note reviewed.   Constitutional:       General: He is not in acute distress.     Appearance: He is well-developed. He is obese.   Cardiovascular:      Rate and Rhythm: Tachycardia present. Rhythm irregular.      Heart sounds: No murmur heard.  Pulmonary:      Effort: Pulmonary effort is normal. No respiratory distress.      Breath sounds: Normal breath sounds.   Abdominal:      Palpations: Abdomen is soft.      Tenderness: There is no abdominal tenderness.   Musculoskeletal:         General: No swelling.      Cervical back: Neck supple.      Right lower leg: Edema present.      Left lower leg: Edema present.   Skin:     General: Skin is warm and dry.      Capillary Refill: Capillary refill takes less than 2 seconds.      Comments: Dressing to RLE c/d/i   Neurological:      Mental Status: He is alert.   Psychiatric:         Mood and Affect: Mood normal.          Additional Data:     Lab Results:  Results from last 7 days   Lab Units 01/05/24  1152   WBC Thousand/uL 11.41*   HEMOGLOBIN g/dL 15.3   HEMATOCRIT % 47.3   PLATELETS Thousands/uL 261   NEUTROS PCT % 71 "   LYMPHS PCT % 22   MONOS PCT % 6   EOS PCT % 0     Results from last 7 days   Lab Units 01/05/24  1152   SODIUM mmol/L 136   POTASSIUM mmol/L 4.1   CHLORIDE mmol/L 104   CO2 mmol/L 25   BUN mg/dL 18   CREATININE mg/dL 0.95   ANION GAP mmol/L 7   CALCIUM mg/dL 9.4   ALBUMIN g/dL 3.9   TOTAL BILIRUBIN mg/dL 0.70   ALK PHOS U/L 60   ALT U/L 22   AST U/L 22   GLUCOSE RANDOM mg/dL 111                       Lines/Drains:  Invasive Devices       Peripheral Intravenous Line  Duration             Peripheral IV 01/05/24 Right Antecubital <1 day                        Imaging: Reviewed radiology reports from this admission including: chest xray and chest CT scan  CTA ED chest PE Study   Final Result by So Sandoval MD (01/05 1303)      No pulmonary embolus.      Mild interstitial edema.      Mild calcification of the aortic valve leaflets which can contribute to aortic stenosis.            Workstation performed: [id number]         X-ray chest 1 view portable   Final Result by Andi Sewell MD (01/05 8855)      No acute cardiopulmonary disease.                  Workstation performed: [id number]             EKG and Other Studies Reviewed on Admission:   EKG: Atrial fibrillation. .    ** Please Note: This note has been constructed using a voice recognition system. **

## 2024-01-05 NOTE — PROGRESS NOTES
Patient ID: Luther Vaughan is a 63 y.o. male Date of Birth 1960     Chief Complaint  Chief Complaint   Patient presents with    New Patient Visit     RLE       Allergies  Erythromycin and Fish-derived products - food allergy    Assessment:     Diagnoses and all orders for this visit:    Non-pressure chronic ulcer of right lower leg with fat layer exposed ([facility])  -     lidocaine (XYLOCAINE) 4 % topical solution 5 mL  -     Wound cleansing and dressings Venous Ulcer Right;Lower Leg; Future  -     Wound compression and edema control Venous Ulcer Right;Lower Leg; Future  -     Wound miscellaneous orders Venous Ulcer Right;Lower Leg; Future  -     Debridement    Chronic venous hypertension (idiopathic) with ulcer of right lower extremity (CODE) ([facility])  -     lidocaine (XYLOCAINE) 4 % topical solution 5 mL  -     Wound cleansing and dressings Venous Ulcer Right;Lower Leg; Future  -     Wound compression and edema control Venous Ulcer Right;Lower Leg; Future  -     Wound miscellaneous orders Venous Ulcer Right;Lower Leg; Future    Lymphedema of both lower extremities  -     lidocaine (XYLOCAINE) 4 % topical solution 5 mL  -     Wound cleansing and dressings Venous Ulcer Right;Lower Leg; Future  -     Wound compression and edema control Venous Ulcer Right;Lower Leg; Future  -     Wound miscellaneous orders Venous Ulcer Right;Lower Leg; Future    Lipedema  -     lidocaine (XYLOCAINE) 4 % topical solution 5 mL  -     Wound cleansing and dressings Venous Ulcer Right;Lower Leg; Future  -     Wound compression and edema control Venous Ulcer Right;Lower Leg; Future  -     Wound miscellaneous orders Venous Ulcer Right;Lower Leg; Future    Type 2 diabetes mellitus without complication, without long-term current use of insulin ([facility])  -     Wound cleansing and dressings Venous Ulcer Right;Lower Leg; Future  -     Wound compression and edema control Venous Ulcer Right;Lower Leg; Future  -     Wound miscellaneous orders Venous  "Ulcer Right;Lower Leg; Future    Irregular heart rate    Elevated blood pressure reading in office with diagnosis of hypertension    Other orders  -     Cancel: Debridement              Debridement   Wound 01/05/24 Venous Ulcer Leg Right;Lower    Universal Protocol:  Consent: Verbal consent obtained.  Risks and benefits: risks, benefits and alternatives were discussed  Consent given by: patient  Time out: Immediately prior to procedure a \"time out\" was called to verify the correct patient, procedure, equipment, support staff and site/side marked as required.  Patient identity confirmed: verbally with patient    Debridement Details  Performed by: physician  Debridement type: selective  Pain control: lidocaine 4%      Post-debridement measurements  Length (cm): 18.5  Width (cm): 15  Depth (cm): 0.1  Percent debrided: 20%  Surface Area (cm^2): 277.5  Area Debrided (cm^2): 55.5  Volume (cm^3): 27.75    Devitalized tissue debrided: biofilm, exudate and fibrin  Instrument(s) utilized: curette  Bleeding: small  Hemostasis obtained with: pressure  Procedural pain (0-10): 1  Post-procedural pain: 0   Response to treatment: procedure was tolerated well      Plan:   It was a pleasure to see Luther Vaughan for wound care consult today  Selective debridement performed today as above  Start plan of care as noted below with ointment with silver.  No overt signs of infection however with moderate exudate and fibrin deposition in the wound bed.  Patient with elevated blood pressure reading and noted to have an irregularly irregular heart rate.  O2 saturation appropriate on room air. I advised patient that he is high risk for worsening volume overload as he already has significant get edema and plus 4 bilateral lower extremity pitting edema.  Currently without shortness of breath.  After discussion regarding the consequences of unchecked paroxysmal atrial fibrillation, which I suspect, patient is amenable to going to ER for " further evaluation.  Notably patient has been noncompliant with all blood pressure medications including Lasix -which was recently changed to daily- as well as Bactrim which was recently prescribed by PCP for presumptive treatment of cellulitis.  Personally spoke to ER physician Dr. Rodney Cueva regarding my concerns   A1C results reviewed with the patient today. Patient understands that uncontrolled BSGs will results in overall poor wound healing along with other sequelae of DM. Patient advised to maintain tight glycemic control and work towards this goal with PCP and/or endocrinology.  No signs or symptoms of infection today. Patient understands that if any signs of infection start (such as increased redness, drainage, pain, fever, chills, diaphoresis), they should call our office or proceed to the ER or Urgent Care.  Patient should continue a high protein diet to facilitate wound healing  Patient is advised to not submerge wound or leave wound open to air.  Follow up in 1 weeks  Given the multi-factorial nature of wound care, additional time was taken to review patient's treatment plan with other specialties and most recent pertinent lab work and imaging.   All plans of care discussed with patient at bedside who verbalized understanding with treatment plan.    Wound 01/05/24 Venous Ulcer Leg Right;Lower (Active)   Wound Image Images linked 01/05/24 1031   Wound Description Epithelialization;Beefy red;Yellow;Slough 01/05/24 1029   Paula-wound Assessment Hyperpigmented;Edema;Dry 01/05/24 1029   Wound Length (cm) 18.5 cm (scattered open areas) 01/05/24 1029   Wound Width (cm) 15 cm (scattered open areas) 01/05/24 1029   Wound Depth (cm) 0.1 cm 01/05/24 1029   Wound Surface Area (cm^2) 277.5 cm^2 01/05/24 1029   Wound Volume (cm^3) 27.75 cm^3 01/05/24 1029   Calculated Wound Volume (cm^3) 27.75 cm^3 01/05/24 1029   Drainage Amount Moderate 01/05/24 1029   Drainage Description Serosanguineous 01/05/24 1029   Non-staged  Wound Description Full thickness 01/05/24 1029   Dressing Status Intact (upon arrival) 01/05/24 1029       Wound 01/05/24 Venous Ulcer Leg Right;Lower (Active)   Date First Assessed/Time First Assessed: 01/05/24 1028   Primary Wound Type: Venous Ulcer  Location: Leg  Wound Location Orientation: Right;Lower       Subjective:      .    1/5/24: Consult - Luther is a pleasant 63-year-old male with a past medical history of hypertension and recently diagnosed type 2 diabetes mellitus here today for initial wound care consult.  Patient was referred by his PCP Dr. Maycol Arango.  Seen by PCP on 12/29/2023 at that time presenting for the first time for right lower extremity wound.  Per chart review patient that his right lower extremity on his garbage at home 3 weeks prior to that visit and wound has been getting larger.  Has been using bandages and Neosporin to cover the wound.  Increased swelling and erythema.  At that appointment PCP advised patient to increase Lasix to daily versus every other day due to fluid overload.  Was also given prescription for Bactrim x 7 days and referral to wound management.  Patient notes today that he is not taking any blood pressure medications and also did not  the Bactrim.  Denies any fever chills diaphoresis chest pain headache vision changes shortness of breath.  Does not he that he does have significant gotten lower extremity edema.  He does have a hx of chronic bilateral lower extremity wounds in the past.        The following portions of the patient's history were reviewed and updated as appropriate: allergies, current medications, past family history, past medical history, past social history, past surgical history, and problem list.    Review of Systems   Constitutional:  Negative for chills, diaphoresis and fever.   Skin:  Positive for wound.   All other systems reviewed and are negative.        Objective:       Wound 01/05/24 Venous Ulcer Leg Right;Lower (Active)   Wound  "Image Images linked 01/05/24 1031   Wound Description Epithelialization;Beefy red;Yellow;Slough 01/05/24 1029   Paula-wound Assessment Hyperpigmented;Edema;Dry 01/05/24 1029   Wound Length (cm) 18.5 cm (scattered open areas) 01/05/24 1029   Wound Width (cm) 15 cm (scattered open areas) 01/05/24 1029   Wound Depth (cm) 0.1 cm 01/05/24 1029   Wound Surface Area (cm^2) 277.5 cm^2 01/05/24 1029   Wound Volume (cm^3) 27.75 cm^3 01/05/24 1029   Calculated Wound Volume (cm^3) 27.75 cm^3 01/05/24 1029   Drainage Amount Moderate 01/05/24 1029   Drainage Description Serosanguineous 01/05/24 1029   Non-staged Wound Description Full thickness 01/05/24 1029   Dressing Status Intact (upon arrival) 01/05/24 1029       BP (!) 164/109   Pulse (!) 107   Temp (!) 97.2 °F (36.2 °C)   Resp 18   Ht 5' 8\" (1.727 m)   Wt (!) 147 kg (325 lb)   SpO2 97% Comment: room air  BMI 49.42 kg/m²     Physical Exam  Vitals reviewed.   Constitutional:       Appearance: Normal appearance.   HENT:      Head: Normocephalic and atraumatic.   Eyes:      Extraocular Movements: Extraocular movements intact.   Pulmonary:      Effort: Pulmonary effort is normal.   Musculoskeletal:      Cervical back: Neck supple.      Right lower leg: Edema (+4) present.      Left lower leg: Edema (+4) present.   Skin:     Comments: 2 discrete wound openings on anterior distal right lower extremity.  Both with serosanguineous exudate and heavy fibrin deposition.  Periwound with minimal maceration.  There is some lymphorrhea around the calf and ankle of the right lower extremity as well.  Hyperpigmentation consistent with chronic venous stasis disease.  No overt signs of infection.  No odor.   Neurological:      Mental Status: He is alert.   Psychiatric:         Mood and Affect: Mood normal.                 Wound Instructions:  Orders Placed This Encounter   Procedures    Wound cleansing and dressings Venous Ulcer Right;Lower Leg     Right Lower Leg Wounds:    Wash your " hands with soap and water.  Remove old dressing, discard into plastic bag and place in trash.  Cleanse the wound with soap and water prior to applying a clean dressing. Do not use tissue or cotton balls. Do not scrub the wound. Pat dry using gauze.  Shower yes   Apply moisturizer to skin surrounding wound  Apply Polymem Max AG to the leg wounds.  Cover with ABD  Secure with rolled gauze and tape.  Change dressing every other day.    This was done today.     Standing Status:   Future     Standing Expiration Date:   1/5/2025    Wound compression and edema control Venous Ulcer Right;Lower Leg     Elastic Tubular Stocking: Spandagrip G to right lower leg    Tubular elastic bandage: Apply from base of toes to behind the knee. Apply in AM, may remove for sleep.    Avoid prolonged standing in one place.    Elevate leg(s) above the level of the heart when sitting or as much as possible.     Standing Status:   Future     Standing Expiration Date:   1/5/2025    Wound miscellaneous orders Venous Ulcer Right;Lower Leg     Your heart rate and rhythm is very irregular; your blood pressure is high.  We recommend you go to the Emergency Department for evaluation.     Standing Status:   Future     Standing Expiration Date:   1/5/2025    Debridement     This order was created via procedure documentation        Diagnosis ICD-10-CM Associated Orders   1. 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 Chronic venous hypertension (idiopathic) with ulcer of right lower extremity (CODE) ([facility])  I87.311 lidocaine (XYLOCAINE) 4 % topical solution 5 mL     Wound cleansing and dressings Venous Ulcer Right;Lower Leg     Wound compression and edema  "control Venous Ulcer Right;Lower Leg     Wound miscellaneous orders Venous Ulcer Right;Lower Leg      3. Lymphedema of both lower extremities  I89.0 lidocaine (XYLOCAINE) 4 % topical solution 5 mL     Wound cleansing and dressings Venous Ulcer Right;Lower Leg     Wound compression and edema control Venous Ulcer Right;Lower Leg     Wound miscellaneous orders Venous Ulcer Right;Lower Leg      4. Lipedema  R60.9 lidocaine (XYLOCAINE) 4 % topical solution 5 mL     Wound cleansing and dressings Venous Ulcer Right;Lower Leg     Wound compression and edema control Venous Ulcer Right;Lower Leg     Wound miscellaneous orders Venous Ulcer Right;Lower Leg      5. Type 2 diabetes mellitus without complication, without long-term current use of insulin ([facility])  E11.9 Wound cleansing and dressings Venous Ulcer Right;Lower Leg     Wound compression and edema control Venous Ulcer Right;Lower Leg     Wound miscellaneous orders Venous Ulcer Right;Lower Leg      6. Irregular heart rate  I49.9       7. Elevated blood pressure reading in office with diagnosis of hypertension  I10           --  Piotr Mcgill MD    \"This note has been constructed using a voice recognition system. Therefore there may be syntax, spelling, and/or grammatical errors. Occasional wrong word or \"sound alike\" substitutions may have occurred due to the inherent limitations of voice recognition software. Read the chart carefully and recognize, using context, where substitutions have occurred. Please call if you have any questions.\"     "

## 2024-01-05 NOTE — ASSESSMENT & PLAN NOTE
Follows with wound care outpatient  On lasix 20 mg outpatient for lymphedema  Wound care consulted

## 2024-01-05 NOTE — Clinical Note
Case was discussed with  and the patient's admission status was agreed to be Admission Status: observation status to the service of Dr. Tony

## 2024-01-05 NOTE — ED PROVIDER NOTES
History  Chief Complaint   Patient presents with    Palpitations     Was at wound care for a procedure and sent to ER for new onset afib and hypertension      Patient is a 63-year-old male, past medical history including diabetes, hypertension, and sleep apnea, who presents emergency department after being found to have an elevated heart rate.  Patient states that he is going to wound care for wounds on his right lower leg.  While there they found to be hypertensive and his heart rate to be significant elevated.  Now presents for further evaluation.  Currently patient states he has some reflux pain that is not new.  Does not feel any palpitations.  No shortness of breath.  No prior history of A-fib.  No other complaints or concerns.        Prior to Admission Medications   Prescriptions Last Dose Informant Patient Reported? Taking?   Ascorbic Acid, Vitamin C, (VITAMIN C) 100 MG tablet 1/5/2024  Yes Yes   Sig: Take by mouth daily   cholecalciferol (VITAMIN D3) 400 units tablet 1/5/2024  Yes Yes   Sig: Take 400 Units by mouth daily   furosemide (LASIX) 20 mg tablet Past Week  No Yes   Sig: Take 1 tablet (20 mg total) by mouth daily   meloxicam (MOBIC) 15 mg tablet 1/4/2024  Yes Yes   Sig: 15 mg daily States does not take daily.   potassium chloride (Klor-Con) 20 mEq packet   No No   Sig: Take 20 mEq by mouth daily   Patient not taking: Reported on 1/5/2024   sulfamethoxazole-trimethoprim (BACTRIM DS) 800-160 mg per tablet   No No   Sig: Take 1 tablet by mouth every 12 (twelve) hours for 7 days   Patient not taking: Reported on 1/5/2024   valsartan (DIOVAN) 40 mg tablet 1/4/2024  No Yes   Sig: Take 0.5 tablets (20 mg total) by mouth daily for 7 days, THEN 1 tablet (40 mg total) daily for 23 days.      Facility-Administered Medications Last Administration Doses Remaining   lidocaine (XYLOCAINE) 4 % topical solution 5 mL 1/5/2024 10:58 AM 0          Past Medical History:   Diagnosis Date    Diabetes mellitus (HCC)      Hypertension     Migraine     Obesity     MARLA on CPAP     Sleep apnea     Vertigo     episode 1/30/2016       Past Surgical History:   Procedure Laterality Date    HAND SURGERY      football injury    HERNIA REPAIR      KNEE SURGERY Left     left meniscus     MENISCECTOMY Left        Family History   Problem Relation Age of Onset    No Known Problems Mother     Heart attack Father     Heart disease Father     Prostate cancer Neg Hx     Testicular cancer Neg Hx     Colon cancer Neg Hx      I have reviewed and agree with the history as documented.    E-Cigarette/Vaping    E-Cigarette Use Never User      E-Cigarette/Vaping Substances    Nicotine No     THC No     CBD No     Flavoring No     Other No     Unknown No      Social History     Tobacco Use    Smoking status: Never    Smokeless tobacco: Never    Tobacco comments:     never smoker- per [place]    Vaping Use    Vaping status: Never Used   Substance Use Topics    Alcohol use: Not Currently     Alcohol/week: 14.0 standard drinks of alcohol     Types: 14 Cans of beer per week    Drug use: Never       Review of Systems   Constitutional:  Negative for chills and fever.   Respiratory:  Negative for shortness of breath.    Cardiovascular:  Negative for chest pain and palpitations.   All other systems reviewed and are negative.      Physical Exam  Physical Exam  Vitals and nursing note reviewed.   Constitutional:       General: He is not in acute distress.     Appearance: He is well-developed. He is not diaphoretic.   HENT:      Head: Normocephalic and atraumatic.      Right Ear: External ear normal.      Left Ear: External ear normal.      Nose: Nose normal.   Eyes:      General: Lids are normal. No scleral icterus.  Cardiovascular:      Rate and Rhythm: Tachycardia present. Rhythm irregular.      Heart sounds: Normal heart sounds. No murmur heard.     No friction rub. No gallop.   Pulmonary:      Effort: Pulmonary effort is normal. No respiratory distress.      Breath  sounds: Normal breath sounds. No wheezing or rales.   Abdominal:      Palpations: Abdomen is soft.      Tenderness: There is no abdominal tenderness. There is no guarding or rebound.   Musculoskeletal:         General: No deformity. Normal range of motion.      Cervical back: Normal range of motion and neck supple.   Skin:     General: Skin is warm and dry.   Neurological:      General: No focal deficit present.      Mental Status: He is alert.   Psychiatric:         Mood and Affect: Mood normal.         Behavior: Behavior normal.         Vital Signs  ED Triage Vitals [01/05/24 1130]   Temperature Pulse Respirations Blood Pressure SpO2   (!) 97.4 °F (36.3 °C) (!) 109 18 (!) 202/100 97 %      Temp src Heart Rate Source Patient Position - Orthostatic VS BP Location FiO2 (%)   -- -- -- -- --      Pain Score       6           Vitals:    01/05/24 1319 01/05/24 1345 01/05/24 1356 01/05/24 1501   BP: 120/87 130/58  129/86   Pulse: (!) 145 (!) 120 (!) 120 61         Visual Acuity      ED Medications  Medications   sodium chloride (PF) 0.9 % injection 3 mL (has no administration in time range)   diltiazem (CARDIZEM) 125 mg in sodium chloride 0.9 % 125 mL infusion (7.5 mg/hr Intravenous Rate/Dose Change 1/5/24 1613)   enoxaparin (LOVENOX) subcutaneous injection 150 mg (has no administration in time range)   metoprolol tartrate (LOPRESSOR) tablet 25 mg (has no administration in time range)   losartan (COZAAR) tablet 25 mg (has no administration in time range)   atorvastatin (LIPITOR) tablet 10 mg (10 mg Oral Given 1/5/24 1602)   iohexol (OMNIPAQUE) 350 MG/ML injection (SINGLE-DOSE) 100 mL (100 mL Intravenous Given 1/5/24 1228)   metoprolol (LOPRESSOR) injection 5 mg (5 mg Intravenous Given 1/5/24 1310)   magnesium sulfate 2 g/50 mL IVPB (premix) 2 g (2 g Intravenous New Bag 1/5/24 1310)   metoprolol (LOPRESSOR) injection 5 mg (5 mg Intravenous Given 1/5/24 1319)   metoprolol (LOPRESSOR) injection 5 mg (5 mg Intravenous Given  1/5/24 1325)   metoprolol tartrate (LOPRESSOR) tablet 25 mg (25 mg Oral Given 1/5/24 1356)   furosemide (LASIX) injection 40 mg (40 mg Intravenous Given 1/5/24 1550)   perflutren lipid microsphere (DEFINITY) injection (0.8 mL/min Intravenous Given 1/5/24 1532)       Diagnostic Studies  Results Reviewed       Procedure Component Value Units Date/Time    HS Troponin I 2hr [[id number]]  (Normal) Collected: 01/05/24 1537    Lab Status: Final result Specimen: Blood from Arm, Left Updated: 01/05/24 1602     hs TnI 2hr 5 ng/L      Delta 2hr hsTnI 0 ng/L     HS Troponin I 4hr [[id number]]     Lab Status: No result Specimen: Blood     HS Troponin 0hr (reflex protocol) [[id number]]  (Normal) Collected: 01/05/24 1152    Lab Status: Final result Specimen: Blood from Line, Venous Updated: 01/05/24 1444     hs TnI 0hr 5 ng/L     B-Type Natriuretic Peptide(BNP) [[id number]]  (Abnormal) Collected: 01/05/24 1152    Lab Status: Final result Specimen: Blood from Line, Venous Updated: 01/05/24 1233      pg/mL     Comprehensive metabolic panel [[id number]] Collected: 01/05/24 1152    Lab Status: Final result Specimen: Blood from Line, Venous Updated: 01/05/24 1219     Sodium 136 mmol/L      Potassium 4.1 mmol/L      Chloride 104 mmol/L      CO2 25 mmol/L      ANION GAP 7 mmol/L      BUN 18 mg/dL      Creatinine 0.95 mg/dL      Glucose 111 mg/dL      Calcium 9.4 mg/dL      AST 22 U/L      ALT 22 U/L      Alkaline Phosphatase 60 U/L      Total Protein 7.1 g/dL      Albumin 3.9 g/dL      Total Bilirubin 0.70 mg/dL      eGFR 83 ml/min/1.73sq m     Narrative:      [employer] guidelines for Chronic Kidney Disease (CKD):     Stage 1 with normal or high GFR (GFR > 90 mL/min/1.73 square meters)    Stage 2 Mild CKD (GFR = 60-89 mL/min/1.73 square meters)    Stage 3A Moderate CKD (GFR = 45-59 mL/min/1.73 square meters)    Stage 3B Moderate CKD (GFR = 30-44 mL/min/1.73 square meters)    Stage 4 Severe CKD (GFR = 15-29  mL/min/1.73 square meters)    Stage 5 End Stage CKD (GFR <15 mL/min/1.73 square meters)  Note: GFR calculation is accurate only with a steady state creatinine    Magnesium [[id number]]  (Normal) Collected: 01/05/24 1152    Lab Status: Final result Specimen: Blood from Line, Venous Updated: 01/05/24 1219     Magnesium 1.9 mg/dL     D-dimer, quantitative [[id number]]  (Abnormal) Collected: 01/05/24 1152    Lab Status: Final result Specimen: Blood from Line, Venous Updated: 01/05/24 1217     D-Dimer, Quant 1.18 ug/ml FEU     Narrative:      In the evaluation for possible pulmonary embolism, in the appropriate (Well's Score of 4 or less) patient, the age adjusted d-dimer cutoff for this patient can be calculated as:    Age x 0.01 (in ug/mL) for Age-adjusted D-dimer exclusion threshold for a patient over 50 years.    CBC and differential [[id number]]  (Abnormal) Collected: 01/05/24 1152    Lab Status: Final result Specimen: Blood from Line, Venous Updated: 01/05/24 1204     WBC 11.41 Thousand/uL      RBC 5.04 Million/uL      Hemoglobin 15.3 g/dL      Hematocrit 47.3 %      MCV 94 fL      MCH 30.4 pg      MCHC 32.3 g/dL      RDW 13.2 %      MPV 9.2 fL      Platelets 261 Thousands/uL      nRBC 0 /100 WBCs      Neutrophils Relative 71 %      Immat GRANS % 1 %      Lymphocytes Relative 22 %      Monocytes Relative 6 %      Eosinophils Relative 0 %      Basophils Relative 0 %      Neutrophils Absolute 8.18 Thousands/µL      Immature Grans Absolute 0.06 Thousand/uL      Lymphocytes Absolute 2.45 Thousands/µL      Monocytes Absolute 0.66 Thousand/µL      Eosinophils Absolute 0.03 Thousand/µL      Basophils Absolute 0.03 Thousands/µL                    CTA ED chest PE Study   Final Result by So Sandoval MD (01/05 1303)      No pulmonary embolus.      Mild interstitial edema.      Mild calcification of the aortic valve leaflets which can contribute to aortic stenosis.            Workstation performed: [id number]          X-ray chest 1 view portable   Final Result by Andi Sewell MD (01/05 7804)      No acute cardiopulmonary disease.                  Workstation performed: [id number]                    Procedures  ECG 12 Lead Documentation Only    Date/Time: 1/5/2024 4:36 PM    Performed by: Salvador Riojas DO  Authorized by: Salvador Riojas DO    ECG reviewed by me, the ED Provider: yes    Patient location:  ED  Interpretation:     Interpretation: abnormal    Rate:     ECG rate:  154    ECG rate assessment: tachycardic    Rhythm:     Rhythm: atrial fibrillation    Ectopy:     Ectopy: none    QRS:     QRS axis:  Normal  Conduction:     Conduction: normal    ST segments:     ST segments:  Normal  T waves:     T waves: normal    CriticalCare Time    Date/Time: 1/5/2024 4:41 PM    Performed by: Salvador Riojas DO  Authorized by: Salvador Riojas DO    Critical care provider statement:     Critical care time (minutes):  31    Critical care start time:  1/5/2024 11:33 AM    Critical care end time:  1/5/2024 1:34 PM    Critical care time was exclusive of:  Separately billable procedures and treating other patients and teaching time    Critical care was necessary to treat or prevent imminent or life-threatening deterioration of the following conditions:  Cardiac failure    Critical care was time spent personally by me on the following activities:  Blood draw for specimens, obtaining history from patient or surrogate, examination of patient, evaluation of patient's response to treatment, development of treatment plan with patient or surrogate, ordering and performing treatments and interventions, ordering and review of laboratory studies, ordering and review of radiographic studies, re-evaluation of patient's condition and review of old charts    I assumed direction of critical care for this patient from another provider in my specialty: no             ED Course  ED Course as of 01/05/24 1641   Fri Jan 05, 2024   1203 Bedside  "echo performed by myself.  EF appears within normal limits.  No pericardial effusion.  Exam somewhat limited secondary to body habitus   1204 WBC(!): 11.41   1303 CTA ED chest PE Study  No pulmonary embolus.     Mild interstitial edema.     Mild calcification of the aortic valve leaflets which can contribute to aortic stenosis.        1330 Heart rate improved following 3 doses of metoprolol.  Will give p.o. metoprolol.  Will admit   1334 Discussed with SLIM. Accepts admission                               SBIRT 20yo+      Flowsheet Row Most Recent Value   Initial Alcohol Screen: US AUDIT-C     1. How often do you have a drink containing alcohol? 0 Filed at: 01/05/2024 1130   2. How many drinks containing alcohol do you have on a typical day you are drinking?  0 Filed at: 01/05/2024 1130   3a. Male UNDER 65: How often do you have five or more drinks on one occasion? 0 Filed at: 01/05/2024 1130   3b. FEMALE Any Age, or MALE 65+: How often do you have 4 or more drinks on one occassion? 0 Filed at: 01/05/2024 1130   Audit-C Score 0 Filed at: 01/05/2024 1130   ALEXANDER: How many times in the past year have you...    Used an illegal drug or used a prescription medication for non-medical reasons? Never Filed at: 01/05/2024 1130                      Medical Decision Making  Patient is a 63 y.o. male who presents to the ED for A-fib with RVR.  Patient is nontoxic and well-appearing.  He is tachycardic and hypertensive but otherwise vitals are stable.  On exam he is a well-healing wound to the right anterior shin.    Differential includes but is not limited to: New onset A-fib with RVR.  Unclear etiology.  Differential includes electrode abnormality, ischemia, PE.    Plan: Labs, rate control once reversible etiologies are ruled out, admission for new onset A-fib                 Portions of the record may have been created with voice recognition software. Occasional wrong word or \"sound a like\" substitutions may have occurred due " to the inherent limitations of voice recognition software. Read the chart carefully and recognize, using context, where substitutions have occurred.    Problems Addressed:  Atrial fibrillation with RVR (HCC): acute illness or injury    Amount and/or Complexity of Data Reviewed  Labs: ordered. Decision-making details documented in ED Course.  Radiology: ordered and independent interpretation performed.     Details: No acute cardiopulmonary disease  ECG/medicine tests: ordered and independent interpretation performed.     Details: A-fib with RVR    Risk  OTC drugs.  Prescription drug management.  Decision regarding hospitalization.             Disposition  Final diagnoses:   Atrial fibrillation with RVR (HCC)     Time reflects when diagnosis was documented in both MDM as applicable and the Disposition within this note       Time User Action Codes Description Comment    1/5/2024  1:33 PM Salvador Riojas Add [I48.91] Atrial fibrillation with RVR (HCC)     1/5/2024  2:18 PM Kelin Russo Add [R03.0] Elevated blood-pressure reading without diagnosis of hypertension           ED Disposition       ED Disposition   Admit    Condition   Stable    Date/Time   Fri Jan 5, 2024 1641    Comment   Case was discussed with MARCELINO and the patient's admission status was agreed to be Admission Status: inpatient status to the service of Dr. Lacy  .               Follow-up Information    None         Current Discharge Medication List        CONTINUE these medications which have NOT CHANGED    Details   Ascorbic Acid, Vitamin C, (VITAMIN C) 100 MG tablet Take by mouth daily      cholecalciferol (VITAMIN D3) 400 units tablet Take 400 Units by mouth daily      furosemide (LASIX) 20 mg tablet Take 1 tablet (20 mg total) by mouth daily  Qty: 90 tablet, Refills: 0    Associated Diagnoses: Bilateral edema of lower extremity; Lymphedema; Medication management      meloxicam (MOBIC) 15 mg tablet 15 mg daily States does not take daily.       valsartan (DIOVAN) 40 mg tablet Take 0.5 tablets (20 mg total) by mouth daily for 7 days, THEN 1 tablet (40 mg total) daily for 23 days.  Qty: 30 tablet, Refills: 0    Associated Diagnoses: Primary hypertension      potassium chloride (Klor-Con) 20 mEq packet Take 20 mEq by mouth daily  Qty: 30 packet, Refills: 5    Associated Diagnoses: Bilateral edema of lower extremity; Lymphedema; Medication management      sulfamethoxazole-trimethoprim (BACTRIM DS) 800-160 mg per tablet Take 1 tablet by mouth every 12 (twelve) hours for 7 days  Qty: 14 tablet, Refills: 0    Associated Diagnoses: Lower extremity ulceration, right, limited to breakdown of skin (HCC); Pressure injury of skin of right calf, unspecified injury stage             No discharge procedures on file.    PDMP Review       None            ED Provider  Electronically Signed by             Salvador Riojas DO  01/05/24 7006

## 2024-01-05 NOTE — ASSESSMENT & PLAN NOTE
Home valsartan substituted with losartan 25 mg daily  BP improved since admission  Monitor on cardizem gtt

## 2024-01-05 NOTE — CONSULTS
Consultation - Cardiology   Luther Vaughan 63 y.o. male MRN: [id number]  Unit/Bed#: [facility] Encounter: [id number]    Assessment/Plan     Assessment:  1.  Rapid atrial fibrillation of unknown duration: Asymptomatic  2.  Volume overload secondary to #1  3.  Hypertension  4.  Morbid obesity with obstructive sleep apnea: Compliant with CPAP  5.  Diabetes  6.  Chronic ulcer right lower extremity, nonpressure  7.  Question of tinea corporis of lower extremities      Plan:  Patient will be admitted to the hospitalist service  1.  Will start Cardizem drip at 5 mg/hr, with orders to titrate for goal heart rate less than 100 bpm.    2.  Will start oral Lopressor 25 mg every 12 hours at 9 PM this evening    3.  Will start patient on Lovenox 1 mg/kg subcutaneous every 12 hours and transition to factor Xa inhibitor in the a.m.    4.  Will obtain 2D echocardiogram to evaluate cardiac function, structure and wall motion    5.  Discussed with patient if he should self convert can discharge him over the weekend, but if he remains in atrial fibrillation would plan for ELISA guided cardioversion on Monday, 1/8/2024.    6.  Wound care per wound nurse    7.  Will give patient Lasix 40 mg IV x 1 and reevaluate in the a.m.    8.  Close monitoring of I and O, labs and weights    9.  Ischemic workup, can be done in the outpatient setting    10.  Check fasting lipids in the a.m., but ASCVD risk factor is 28.7%.  Will start patient on medium intensity statin with Lipitor 40 mg daily.      History of Present Illness   Physician Requesting Consult: Hayden Lacy DO  Reason for Consult / Principal Problem: Rapid atrial fibrillation, asymptomatic of unknown duration      HPI: Luther Vaughan is a 63 y.o. year old male who was initially seen today in the wound center for care of a wound on his right lower extremity.  While he was there they checked his heart rate and noted his heart rate was irregular and about 160 to 170 bpm.  He was directed to  the emergency room for further evaluation.  Twelve-lead EKG demonstrates rapid atrial fibrillation.  Patient was unaware of his rapid heart rate.  He did not feel any palpitations.  His wife is at the bedside and she does note that intermittently he had some chest heaviness probably for over the last month.  Patient unfortunately is not very active due to his osteoarthritis in both knees and hips.      He does have a history of morbid obesity with obstructive sleep apnea and states he that he uses his CPAP machine faithfully.  Other history is for hypertension, diabetes and intermittent vertigo.  Patient's wife notes that he has a history of drinking alcohol and she has been asking him to cut back.    In the emergency room, patient received 3 doses of Lopressor 5 mg IV with intermittent response to his heart rate.  Heart rate at this time is between 100-130.  Labs in the emergency room, high-sensitivity troponin is 5 with a BNP of 174 and D-dimer 1.18.  CT a of the chest did not demonstrate pulmonary embolus, there was mild interstitial edema and mild calcification of the aortic valve leaflets.        Inpatient consult to Cardiology  Consult performed by: DANYA Stafford  Consult ordered by: Kelin Russo PA-C          Review of Systems   Constitutional:  Positive for activity change and fatigue.   HENT: Negative.  Negative for congestion, facial swelling, sinus pain and tinnitus.    Eyes: Negative.  Negative for photophobia and visual disturbance.   Respiratory:  Positive for shortness of breath. Negative for chest tightness.    Cardiovascular:  Positive for chest pain and leg swelling. Negative for palpitations.   Gastrointestinal:  Negative for abdominal distention, diarrhea, nausea and vomiting.   Endocrine: Negative.  Negative for polydipsia, polyphagia and polyuria.   Genitourinary: Negative.  Negative for difficulty urinating.   Musculoskeletal:  Positive for gait problem.        Osteoarthritis   Skin:  Negative.    Neurological:  Negative for dizziness, syncope, weakness and light-headedness.   Hematological: Negative.    Psychiatric/Behavioral: Negative.         Historical Information   Past Medical History:   Diagnosis Date    Diabetes mellitus (HCC)     Hypertension     Migraine     Obesity     MARLA on CPAP     Sleep apnea     Vertigo     episode 1/30/2016     Past Surgical History:   Procedure Laterality Date    HAND SURGERY      football injury    HERNIA REPAIR      KNEE SURGERY Left     left meniscus     MENISCECTOMY Left      Social History     Substance and Sexual Activity   Alcohol Use Yes    Alcohol/week: 14.0 standard drinks of alcohol    Types: 14 Cans of beer per week     Social History     Substance and Sexual Activity   Drug Use Never     E-Cigarette/Vaping    E-Cigarette Use Never User      E-Cigarette/Vaping Substances    Nicotine No     THC No     CBD No     Flavoring No     Other No     Unknown No      Social History     Tobacco Use   Smoking Status Never   Smokeless Tobacco Never   Tobacco Comments    never smoker- per Lisa      Family History:   Family History   Problem Relation Age of Onset    No Known Problems Mother     Heart attack Father     Heart disease Father     Prostate cancer Neg Hx     Testicular cancer Neg Hx     Colon cancer Neg Hx        Meds/Allergies   all current active meds have been reviewed, current meds:   Current Facility-Administered Medications   Medication Dose Route Frequency    diltiazem (CARDIZEM) 125 mg in sodium chloride 0.9 % 125 mL infusion  5 mg/hr Intravenous Titrated    enoxaparin (LOVENOX) subcutaneous injection 150 mg  1 mg/kg Subcutaneous Q12H MACHELLE    furosemide (LASIX) injection 40 mg  40 mg Intravenous Once    metoprolol tartrate (LOPRESSOR) tablet 25 mg  25 mg Oral Q12H MACHELLE    sodium chloride (PF) 0.9 % injection 3 mL  3 mL Intravenous Q1H PRN   , and PTA meds:   Prior to Admission Medications   Prescriptions Last Dose Informant Patient Reported?  "Taking?   Ascorbic Acid, Vitamin C, (VITAMIN C) 100 MG tablet   Yes No   Sig: Take by mouth daily   cholecalciferol (VITAMIN D3) 400 units tablet   Yes No   Sig: Take 400 Units by mouth daily   furosemide (LASIX) 20 mg tablet   No No   Sig: Take 1 tablet (20 mg total) by mouth daily   Patient not taking: Reported on 1/5/2024   meloxicam (MOBIC) 15 mg tablet   Yes No   Sig: 15 mg daily States does not take daily.   potassium chloride (Klor-Con) 20 mEq packet   No No   Sig: Take 20 mEq by mouth daily   Patient not taking: Reported on 1/5/2024   sulfamethoxazole-trimethoprim (BACTRIM DS) 800-160 mg per tablet   No No   Sig: Take 1 tablet by mouth every 12 (twelve) hours for 7 days   Patient not taking: Reported on 1/5/2024   valsartan (DIOVAN) 40 mg tablet   No No   Sig: Take 0.5 tablets (20 mg total) by mouth daily for 7 days, THEN 1 tablet (40 mg total) daily for 23 days.      Facility-Administered Medications Last Administration Doses Remaining   lidocaine (XYLOCAINE) 4 % topical solution 5 mL 1/5/2024 10:58 AM 0        Allergies   Allergen Reactions    Erythromycin Diarrhea    Fish-Derived Products - Food Allergy Diarrhea     States \"cartilage fish\" cause sweating, diarrhea, vomiting       Objective   Vitals: Blood pressure 130/58, pulse (!) 120, temperature (!) 97.4 °F (36.3 °C), resp. rate 18, height 5' 8\" (1.727 m), weight (!) 147 kg (325 lb), SpO2 96%.  Orthostatic Blood Pressures      Flowsheet Row Most Recent Value   Blood Pressure 130/58 filed at 01/05/2024 1345            No intake or output data in the 24 hours ending 01/05/24 1432    Invasive Devices       Peripheral Intravenous Line  Duration             Peripheral IV 01/05/24 Right Antecubital <1 day                    Physical Exam  Vitals and nursing note reviewed.   Constitutional:       Appearance: Normal appearance. He is morbidly obese.   HENT:      Right Ear: External ear normal.      Left Ear: External ear normal.   Eyes:      General: No " scleral icterus.        Right eye: No discharge.         Left eye: No discharge.   Cardiovascular:      Rate and Rhythm: Normal rate and regular rhythm.      Pulses: Normal pulses.   Pulmonary:      Effort: Pulmonary effort is normal.      Breath sounds: Normal breath sounds.   Abdominal:      General: Bowel sounds are normal. There is no distension.      Palpations: Abdomen is soft.   Musculoskeletal:      Right lower leg: Edema present.      Left lower leg: Edema present.   Skin:     General: Skin is warm and dry.      Capillary Refill: Capillary refill takes less than 2 seconds.      Comments: Tinea like rash on both lower extremities left greater than right   Neurological:      General: No focal deficit present.      Mental Status: He is alert and oriented to person, place, and time. Mental status is at baseline.   Psychiatric:         Mood and Affect: Mood normal.         Behavior: Behavior is cooperative.         Lab Results: I have personally reviewed pertinent lab results.    CBC with diff:   Results from last 7 days   Lab Units 01/05/24  1152   WBC Thousand/uL 11.41*   RBC Million/uL 5.04   HEMOGLOBIN g/dL 15.3   HEMATOCRIT % 47.3   MCV fL 94   MCH pg 30.4   MCHC g/dL 32.3   RDW % 13.2   MPV fL 9.2   PLATELETS Thousands/uL 261     CMP:   Results from last 7 days   Lab Units 01/05/24  1152   SODIUM mmol/L 136   CHLORIDE mmol/L 104   CO2 mmol/L 25   BUN mg/dL 18   CREATININE mg/dL 0.95   CALCIUM mg/dL 9.4   AST U/L 22   ALT U/L 22   ALK PHOS U/L 60   EGFR ml/min/1.73sq m 83     HS Troponin:   0   Lab Value Date/Time    HSTNI0 5 01/05/2024 1152     BNP:   Results from last 7 days   Lab Units 01/05/24  1152   POTASSIUM mmol/L 4.1   CHLORIDE mmol/L 104   CO2 mmol/L 25   BUN mg/dL 18   CREATININE mg/dL 0.95   CALCIUM mg/dL 9.4   EGFR ml/min/1.73sq m 83       Magnesium:   Results from last 7 days   Lab Units 01/05/24  1152   MAGNESIUM mg/dL 1.9     Lipid Profile:     Imaging: I have personally reviewed pertinent  reports.    EKG: Twelve-lead EKG demonstrates rapid atrial fibrillation with nonspecific ST and T wave changes  VTE Prophylaxis: Sequential compression device (Venodyne)  and Enoxaparin (Lovenox)    Code Status: No Order  Advance Directive and Living Will:      Power of :    POLST:      Reyna HAGAN  Cardiology

## 2024-01-05 NOTE — PLAN OF CARE
Problem: PAIN - ADULT  Goal: Verbalizes/displays adequate comfort level or baseline comfort level  Description: Interventions:  - Encourage patient to monitor pain and request assistance  - Assess pain using appropriate pain scale  - Administer analgesics based on type and severity of pain and evaluate response  - Implement non-pharmacological measures as appropriate and evaluate response  - Consider cultural and social influences on pain and pain management  - Notify physician/advanced practitioner if interventions unsuccessful or patient reports new pain  Outcome: Progressing     Problem: INFECTION - ADULT  Goal: Absence or prevention of progression during hospitalization  Description: INTERVENTIONS:  - Assess and monitor for signs and symptoms of infection  - Monitor lab/diagnostic results  - Monitor all insertion sites, i.e. indwelling lines, tubes, and drains  - Monitor endotracheal if appropriate and nasal secretions for changes in amount and color  - [place] appropriate cooling/warming therapies per order  - Administer medications as ordered  - Instruct and encourage patient and family to use good hand hygiene technique  - Identify and instruct in appropriate isolation precautions for identified infection/condition  Outcome: Progressing  Goal: Absence of fever/infection during neutropenic period  Description: INTERVENTIONS:  - Monitor WBC    Outcome: Progressing     Problem: SAFETY ADULT  Goal: Patient will remain free of falls  Description: INTERVENTIONS:  - Educate patient/family on patient safety including physical limitations  - Instruct patient to call for assistance with activity   - Consult OT/PT to assist with strengthening/mobility   - Keep Call bell within reach  - Keep bed low and locked with side rails adjusted as appropriate  - Keep care items and personal belongings within reach  - Initiate and maintain comfort rounds  - Make Fall Risk Sign visible to staff  - Offer Toileting every 2 Hours,  in advance of need  - Initiate/Maintain bed alarm  - Obtain necessary fall risk management equipment: socks   - Apply yellow socks and bracelet for high fall risk patients  - Consider moving patient to room near nurses station  Outcome: Progressing  Goal: Maintain or return to baseline ADL function  Description: INTERVENTIONS:  -  Assess patient's ability to carry out ADLs; assess patient's baseline for ADL function and identify physical deficits which impact ability to perform ADLs (bathing, care of mouth/teeth, toileting, grooming, dressing, etc.)  - Assess/evaluate cause of self-care deficits   - Assess range of motion  - Assess patient's mobility; develop plan if impaired  - Assess patient's need for assistive devices and provide as appropriate  - Encourage maximum independence but intervene and supervise when necessary  - Involve family in performance of ADLs  - Assess for home care needs following discharge   - Consider OT consult to assist with ADL evaluation and planning for discharge  - Provide patient education as appropriate  Outcome: Progressing  Goal: Maintains/Returns to pre admission functional level  Description: INTERVENTIONS:  - Perform AM-PAC 6 Click Basic Mobility/ Daily Activity assessment daily.  - Set and communicate daily mobility goal to care team and patient/family/caregiver.   - Collaborate with rehabilitation services on mobility goals if consulted  - Perform Range of Motion 3 times a day.  - Reposition patient every 2 hours.  - Dangle patient 3 times a day  - Stand patient 3 times a day  - Ambulate patient 3 times a day  - Out of bed to chair 3 times a day   - Out of bed for meals 3 times a day  - Out of bed for toileting  - Record patient progress and toleration of activity level   Outcome: Progressing     Problem: DISCHARGE PLANNING  Goal: Discharge to home or other facility with appropriate resources  Description: INTERVENTIONS:  - Identify barriers to discharge w/patient and  caregiver  - Arrange for needed discharge resources and transportation as appropriate  - Identify discharge learning needs (meds, wound care, etc.)  - Arrange for interpretive services to assist at discharge as needed  - Refer to Case Management Department for coordinating discharge planning if the patient needs post-hospital services based on physician/advanced practitioner order or complex needs related to functional status, cognitive ability, or social support system  Outcome: Progressing     Problem: Knowledge Deficit  Goal: Patient/family/caregiver demonstrates understanding of disease process, treatment plan, medications, and discharge instructions  Description: Complete learning assessment and assess knowledge base.  Interventions:  - Provide teaching at level of understanding  - Provide teaching via preferred learning methods  Outcome: Progressing     Problem: CARDIOVASCULAR - ADULT  Goal: Maintains optimal cardiac output and hemodynamic stability  Description: INTERVENTIONS:  - Monitor I/O, vital signs and rhythm  - Monitor for S/S and trends of decreased cardiac output  - Administer and titrate ordered vasoactive medications to optimize hemodynamic stability  - Assess quality of pulses, skin color and temperature  - Assess for signs of decreased coronary artery perfusion  - Instruct patient to report change in severity of symptoms  Outcome: Progressing  Goal: Absence of cardiac dysrhythmias or at baseline rhythm  Description: INTERVENTIONS:  - Continuous cardiac monitoring, vital signs, obtain 12 lead EKG if ordered  - Administer antiarrhythmic and heart rate control medications as ordered  - Monitor electrolytes and administer replacement therapy as ordered  Outcome: Progressing

## 2024-01-05 NOTE — ASSESSMENT & PLAN NOTE
Patient was sent in from wound care after he was noted to be hypertensive and tachycardic.  CTA chest: No pulmonary embolus. Mild interstitial edema.  EKG: Afib with RVR   Given metoprolol in ED, HR remained in 120s  Cardiology consulted  Started on cardizem gtt  Started on metoprolol 25 mg q12h  On weight based lovenox  ECHO ordered  May need ELISA with cardioversion if he does not spontaneously convert

## 2024-01-05 NOTE — PATIENT INSTRUCTIONS
Orders Placed This Encounter   Procedures    Wound cleansing and dressings Venous Ulcer Right;Lower Leg     Right Lower Leg Wounds:    Wash your hands with soap and water.  Remove old dressing, discard into plastic bag and place in trash.  Cleanse the wound with soap and water prior to applying a clean dressing. Do not use tissue or cotton balls. Do not scrub the wound. Pat dry using gauze.  Shower yes   Apply moisturizer to skin surrounding wound  Apply Polymem Max AG to the leg wounds.  Cover with ABD  Secure with rolled gauze and tape.  Change dressing every other day.    This was done today.     Standing Status:   Future     Standing Expiration Date:   1/5/2025    Wound compression and edema control Venous Ulcer Right;Lower Leg     Elastic Tubular Stocking: Spandagrip G to right lower leg    Tubular elastic bandage: Apply from base of toes to behind the knee. Apply in AM, may remove for sleep.    Avoid prolonged standing in one place.    Elevate leg(s) above the level of the heart when sitting or as much as possible.     Standing Status:   Future     Standing Expiration Date:   1/5/2025    Wound miscellaneous orders Venous Ulcer Right;Lower Leg     Your heart rate and rhythm is very irregular; your blood pressure is high.  We recommend you go to the Emergency Department for evaluation.     Standing Status:   Future     Standing Expiration Date:   1/5/2025

## 2024-01-06 LAB
ANION GAP SERPL CALCULATED.3IONS-SCNC: 9 MMOL/L
ATRIAL RATE: 0 BPM
ATRIAL RATE: 117 BPM
ATRIAL RATE: 138 BPM
ATRIAL RATE: 208 BPM
BUN SERPL-MCNC: 21 MG/DL (ref 5–25)
CALCIUM SERPL-MCNC: 8.7 MG/DL (ref 8.4–10.2)
CHLORIDE SERPL-SCNC: 104 MMOL/L (ref 96–108)
CHOLEST SERPL-MCNC: 135 MG/DL
CO2 SERPL-SCNC: 22 MMOL/L (ref 21–32)
CREAT SERPL-MCNC: 0.9 MG/DL (ref 0.6–1.3)
ERYTHROCYTE [DISTWIDTH] IN BLOOD BY AUTOMATED COUNT: 13.3 % (ref 11.6–15.1)
GFR SERPL CREATININE-BSD FRML MDRD: 89 ML/MIN/1.73SQ M
GLUCOSE SERPL-MCNC: 112 MG/DL (ref 65–140)
HCT VFR BLD AUTO: 48.2 % (ref 36.5–49.3)
HDLC SERPL-MCNC: 37 MG/DL
HGB BLD-MCNC: 15 G/DL (ref 12–17)
LDLC SERPL CALC-MCNC: 79 MG/DL (ref 0–100)
MAGNESIUM SERPL-MCNC: 2.5 MG/DL (ref 1.9–2.7)
MCH RBC QN AUTO: 30.1 PG (ref 26.8–34.3)
MCHC RBC AUTO-ENTMCNC: 31.1 G/DL (ref 31.4–37.4)
MCV RBC AUTO: 97 FL (ref 82–98)
NONHDLC SERPL-MCNC: 98 MG/DL
PLATELET # BLD AUTO: 265 THOUSANDS/UL (ref 149–390)
PMV BLD AUTO: 8.9 FL (ref 8.9–12.7)
POTASSIUM SERPL-SCNC: 4.4 MMOL/L (ref 3.5–5.3)
QRS AXIS: 0 DEGREES
QRS AXIS: 117 DEGREES
QRS AXIS: 125 DEGREES
QRS AXIS: 143 DEGREES
QRSD INTERVAL: 0 MS
QRSD INTERVAL: 100 MS
QRSD INTERVAL: 102 MS
QRSD INTERVAL: 94 MS
QT INTERVAL: 0 MS
QT INTERVAL: 296 MS
QT INTERVAL: 300 MS
QT INTERVAL: 338 MS
QTC INTERVAL: 0 MS
QTC INTERVAL: 407 MS
QTC INTERVAL: 471 MS
QTC INTERVAL: 480 MS
RBC # BLD AUTO: 4.99 MILLION/UL (ref 3.88–5.62)
SODIUM SERPL-SCNC: 135 MMOL/L (ref 135–147)
T WAVE AXIS: -5 DEGREES
T WAVE AXIS: 0 DEGREES
T WAVE AXIS: 10 DEGREES
T WAVE AXIS: 15 DEGREES
TRIGL SERPL-MCNC: 97 MG/DL
VENTRICULAR RATE: 0 BPM
VENTRICULAR RATE: 114 BPM
VENTRICULAR RATE: 117 BPM
VENTRICULAR RATE: 154 BPM
WBC # BLD AUTO: 11.03 THOUSAND/UL (ref 4.31–10.16)

## 2024-01-06 PROCEDURE — 99232 SBSQ HOSP IP/OBS MODERATE 35: CPT

## 2024-01-06 PROCEDURE — 94660 CPAP INITIATION&MGMT: CPT

## 2024-01-06 PROCEDURE — 99232 SBSQ HOSP IP/OBS MODERATE 35: CPT | Performed by: INTERNAL MEDICINE

## 2024-01-06 PROCEDURE — 80048 BASIC METABOLIC PNL TOTAL CA: CPT

## 2024-01-06 PROCEDURE — 93010 ELECTROCARDIOGRAM REPORT: CPT | Performed by: INTERNAL MEDICINE

## 2024-01-06 PROCEDURE — 83735 ASSAY OF MAGNESIUM: CPT

## 2024-01-06 PROCEDURE — 85027 COMPLETE CBC AUTOMATED: CPT

## 2024-01-06 PROCEDURE — 94760 N-INVAS EAR/PLS OXIMETRY 1: CPT

## 2024-01-06 PROCEDURE — 80061 LIPID PANEL: CPT | Performed by: NURSE PRACTITIONER

## 2024-01-06 RX ADMIN — METOPROLOL TARTRATE 25 MG: 25 TABLET, FILM COATED ORAL at 20:06

## 2024-01-06 RX ADMIN — LOSARTAN POTASSIUM 25 MG: 25 TABLET, FILM COATED ORAL at 08:21

## 2024-01-06 RX ADMIN — DILTIAZEM HYDROCHLORIDE 7.5 MG/HR: 5 INJECTION INTRAVENOUS at 01:27

## 2024-01-06 RX ADMIN — DILTIAZEM HYDROCHLORIDE 10 MG/HR: 5 INJECTION INTRAVENOUS at 20:56

## 2024-01-06 RX ADMIN — METOPROLOL TARTRATE 25 MG: 25 TABLET, FILM COATED ORAL at 08:21

## 2024-01-06 RX ADMIN — CHOLECALCIFEROL (VITAMIN D3) 10 MCG (400 UNIT) TABLET 400 UNITS: at 08:21

## 2024-01-06 RX ADMIN — ATORVASTATIN CALCIUM 10 MG: 10 TABLET, FILM COATED ORAL at 17:06

## 2024-01-06 RX ADMIN — ENOXAPARIN SODIUM 150 MG: 150 INJECTION SUBCUTANEOUS at 17:06

## 2024-01-06 RX ADMIN — ENOXAPARIN SODIUM 150 MG: 150 INJECTION SUBCUTANEOUS at 05:20

## 2024-01-06 NOTE — PROGRESS NOTES
"[facility]  Progress Note  Name: Luther Vaughan I  MRN: [id number]  Unit/Bed#: 4 [address] Date of Admission: 1/5/2024   Date of Service: 1/6/2024 I Hospital Day: 1    Assessment/Plan   * New onset atrial fibrillation (HCC)  Assessment & Plan  Patient was sent in from wound care after he was noted to be hypertensive and tachycardic.  CTA chest: No pulmonary embolus. Mild interstitial edema.  EKG: Afib with RVR   Given metoprolol in ED, HR remained in 120s  Cardiology consulted  Started on cardizem gtt  Started on metoprolol 25 mg q12h  On weight based lovenox  ECHO 1/5/23: EF 55%, systolic function normal, hard to assess due to tachycardia and afib  May need ELISA with cardioversion if he does not spontaneously convert by Monday    Volume overload  Assessment & Plan  In setting of afib with RVR  Cardiology following  Received IV lasix 40 mg 1/5  Further diuretics per cardiology  Monitor Is/Os and daily weights  Fluid and salt restriction    Hypertension  Assessment & Plan  Home valsartan substituted with losartan 25 mg daily  BP improved since admission  Monitor on cardizem gtt    Chronic ulcer of lower extremity (HCC)  Assessment & Plan  Follows with wound care outpatient  On lasix 20 mg outpatient for lymphedema  Wound care consulted    Dyslipidemia  Assessment & Plan  Started on Lipitor 10 mg    Type 2 diabetes mellitus (HCC)  Assessment & Plan  Lab Results   Component Value Date    HGBA1C 6.7 (H) 09/26/2023       No results for input(s): \"POCGLU\" in the last 72 hours.    Blood Sugar Average: Last 72 hrs:  not on any prior to admission diabetes medications  Monitor glucose  Will start sliding scale if needed    Obstructive sleep apnea  Assessment & Plan  Compliant with cpap  Continue cpap hs           VTE Pharmacologic Prophylaxis: VTE Score: 5 High Risk (Score >/= 5) - Pharmacological DVT Prophylaxis Ordered: enoxaparin (Lovenox). Sequential Compression Devices " Ordered.    Mobility:   Basic Mobility Inpatient Raw Score: 24  JH-HLM Goal: 8: Walk 250 feet or more  JH-HLM Achieved: 8: Walk 250 feet ot more  HLM Goal achieved. Continue to encourage appropriate mobility.    Patient Centered Rounds: I performed bedside rounds with nursing staff today.   Discussions with Specialists or Other Care Team Provider: rn, cm    Education and Discussions with Family / Patient: Updated  (wife) at bedside.    Total Time Spent on Date of Encounter in care of patient: 35 mins. This time was spent on one or more of the following: performing physical exam; counseling and coordination of care; obtaining or reviewing history; documenting in the medical record; reviewing/ordering tests, medications or procedures; communicating with other healthcare professionals and discussing with patient's family/caregivers.    Current Length of Stay: 1 day(s)  Current Patient Status: Inpatient   Certification Statement: The patient will continue to require additional inpatient hospital stay due to afib with RVR, IV diuretics  Discharge Plan: Anticipate discharge in 24-48 hrs to home.    Code Status: Level 1 - Full Code    Subjective:   Patient seen and examined at bedside this morning. Denies any complaints including chest pain, shortness of breath, palpitations, dizziness, lightheadedness.     Objective:     Vitals:   Temp (24hrs), Av.1 °F (36.2 °C), Min:96.6 °F (35.9 °C), Max:97.4 °F (36.3 °C)    Temp:  [96.6 °F (35.9 °C)-97.4 °F (36.3 °C)] 96.6 °F (35.9 °C)  HR:  [] 92  Resp:  [18-21] 18  BP: (122-141)/() 137/81  SpO2:  [93 %-99 %] 95 %  Body mass index is 49.28 kg/m².     Input and Output Summary (last 24 hours):     Intake/Output Summary (Last 24 hours) at 2024 1428  Last data filed at 2024 0800  Gross per 24 hour   Intake 420 ml   Output 1150 ml   Net -730 ml       Physical Exam:   Physical Exam  Vitals and nursing note reviewed.   Constitutional:       General: He  is not in acute distress.     Appearance: He is well-developed. He is obese.   Cardiovascular:      Rate and Rhythm: Normal rate. Rhythm irregular.   Pulmonary:      Effort: Pulmonary effort is normal. No respiratory distress.      Breath sounds: Normal breath sounds.   Abdominal:      Palpations: Abdomen is soft.      Tenderness: There is no abdominal tenderness.   Musculoskeletal:         General: No swelling.      Right lower leg: Edema present.      Left lower leg: Edema present.   Skin:     General: Skin is warm and dry.      Capillary Refill: Capillary refill takes less than 2 seconds.      Comments: RLE dressing c/d/i   Neurological:      Mental Status: He is alert.   Psychiatric:         Mood and Affect: Mood normal.          Additional Data:     Labs:  Results from last 7 days   Lab Units 01/06/24  0522 01/05/24  1152   WBC Thousand/uL 11.03* 11.41*   HEMOGLOBIN g/dL 15.0 15.3   HEMATOCRIT % 48.2 47.3   PLATELETS Thousands/uL 265 261   NEUTROS PCT %  --  71   LYMPHS PCT %  --  22   MONOS PCT %  --  6   EOS PCT %  --  0     Results from last 7 days   Lab Units 01/06/24  0522 01/05/24  1152   SODIUM mmol/L 135 136   POTASSIUM mmol/L 4.4 4.1   CHLORIDE mmol/L 104 104   CO2 mmol/L 22 25   BUN mg/dL 21 18   CREATININE mg/dL 0.90 0.95   ANION GAP mmol/L 9 7   CALCIUM mg/dL 8.7 9.4   ALBUMIN g/dL  --  3.9   TOTAL BILIRUBIN mg/dL  --  0.70   ALK PHOS U/L  --  60   ALT U/L  --  22   AST U/L  --  22   GLUCOSE RANDOM mg/dL 112 111                       Lines/Drains:  Invasive Devices       Peripheral Intravenous Line  Duration             Peripheral IV 01/05/24 Right Antecubital 1 day                      Telemetry:  Telemetry Orders (From admission, onward)               24 Hour Telemetry Monitoring  Continuous x 24 Hours (Telem)        Question:  Reason for 24 Hour Telemetry  Answer:  Decompensated CHF- and any one of the following: continuous diuretic infusion or total diuretic dose >200 mg daily, associated  electrolyte derangement (I.e. K < 3.0), ionotropic drip (continuous infusion), hx of ventricular arrhythmia, or new EF < 35%                     Telemetry Reviewed: Atrial fibrillation. HR averaging 90  Indication for Continued Telemetry Use: Arrthymias requiring medical therapy             Imaging: Reviewed radiology reports from this admission including: ECHO    Recent Cultures (last 7 days):         Last 24 Hours Medication List:   Current Facility-Administered Medications   Medication Dose Route Frequency Provider Last Rate    acetaminophen  650 mg Oral Q6H PRN Kelin Russo PA-C      atorvastatin  10 mg Oral Daily With Dinner Dimas Anguiano MD      cholecalciferol  400 Units Oral Daily Kelin Russo PA-C      diltiazem  1-20 mg/hr Intravenous Titrated Mellisa Donnelly DO 7.5 mg/hr (01/06/24 1012)    enoxaparin  1 mg/kg Subcutaneous Q12H DANYA Skelton      losartan  25 mg Oral Daily DANYA Stafford      metoprolol tartrate  25 mg Oral Q12H MACHELLE DANYA Stafford      sodium chloride (PF)  3 mL Intravenous Q1H PRN Salvador Riojas DO          Today, Patient Was Seen By: Kelin Russo PA-C    **Please Note: This note may have been constructed using a voice recognition system.**

## 2024-01-06 NOTE — ASSESSMENT & PLAN NOTE
Patient was sent in from wound care after he was noted to be hypertensive and tachycardic.  CTA chest: No pulmonary embolus. Mild interstitial edema.  EKG: Afib with RVR   Given metoprolol in ED, HR remained in 120s  Cardiology consulted  Started on cardizem gtt  Started on metoprolol 25 mg q12h  On weight based lovenox  ECHO 1/5/23: EF 55%, systolic function normal, hard to assess due to tachycardia and afib  May need ELISA with cardioversion if he does not spontaneously convert by Monday

## 2024-01-06 NOTE — ASSESSMENT & PLAN NOTE
"Lab Results   Component Value Date    HGBA1C 6.7 (H) 09/26/2023       No results for input(s): \"POCGLU\" in the last 72 hours.    Blood Sugar Average: Last 72 hrs:  not on any prior to admission diabetes medications  Monitor glucose  Will start sliding scale if needed  "

## 2024-01-06 NOTE — PLAN OF CARE
Problem: PAIN - ADULT  Goal: Verbalizes/displays adequate comfort level or baseline comfort level  Description: Interventions:  - Encourage patient to monitor pain and request assistance  - Assess pain using appropriate pain scale  - Administer analgesics based on type and severity of pain and evaluate response  - Implement non-pharmacological measures as appropriate and evaluate response  - Consider cultural and social influences on pain and pain management  - Notify physician/advanced practitioner if interventions unsuccessful or patient reports new pain  Outcome: Progressing     Problem: INFECTION - ADULT  Goal: Absence or prevention of progression during hospitalization  Description: INTERVENTIONS:  - Assess and monitor for signs and symptoms of infection  - Monitor lab/diagnostic results  - Monitor all insertion sites, i.e. indwelling lines, tubes, and drains  - Monitor endotracheal if appropriate and nasal secretions for changes in amount and color  - [place] appropriate cooling/warming therapies per order  - Administer medications as ordered  - Instruct and encourage patient and family to use good hand hygiene technique  - Identify and instruct in appropriate isolation precautions for identified infection/condition  Outcome: Progressing  Goal: Absence of fever/infection during neutropenic period  Description: INTERVENTIONS:  - Monitor WBC    Outcome: Progressing     Goal: Maintain or return to baseline ADL function  Description: INTERVENTIONS:  -  Assess patient's ability to carry out ADLs; assess patient's baseline for ADL function and identify physical deficits which impact ability to perform ADLs (bathing, care of mouth/teeth, toileting, grooming, dressing, etc.)  - Assess/evaluate cause of self-care deficits   - Assess range of motion  - Assess patient's mobility; develop plan if impaired  - Assess patient's need for assistive devices and provide as appropriate  - Encourage maximum independence but  intervene and supervise when necessary  - Involve family in performance of ADLs  - Assess for home care needs following discharge   - Consider OT consult to assist with ADL evaluation and planning for discharge  - Provide patient education as appropriate  Outcome: Progressing  Goal: Maintains/Returns to pre admission functional level  Description: INTERVENTIONS:  - Perform AM-PAC 6 Click Basic Mobility/ Daily Activity assessment daily.  - Set and communicate daily mobility goal to care team and patient/family/caregiver.   - Collaborate with rehabilitation services on mobility goals if consulted  - Perform Range of Motion 3 times a day.  - Reposition patient every 2 hours.  - Dangle patient 3 times a day  - Stand patient 3 times a day  - Ambulate patient 3 times a day  - Out of bed to chair 3 times a day   - Out of bed for meals 3 times a day  - Out of bed for toileting  - Record patient progress and toleration of activity level   Outcome: Progressing     Problem: DISCHARGE PLANNING  Goal: Discharge to home or other facility with appropriate resources  Description: INTERVENTIONS:  - Identify barriers to discharge w/patient and caregiver  - Arrange for needed discharge resources and transportation as appropriate  - Identify discharge learning needs (meds, wound care, etc.)  - Arrange for interpretive services to assist at discharge as needed  - Refer to Case Management Department for coordinating discharge planning if the patient needs post-hospital services based on physician/advanced practitioner order or complex needs related to functional status, cognitive ability, or social support system  Outcome: Progressing     Problem: Knowledge Deficit  Goal: Patient/family/caregiver demonstrates understanding of disease process, treatment plan, medications, and discharge instructions  Description: Complete learning assessment and assess knowledge base.  Interventions:  - Provide teaching at level of understanding  - Provide  teaching via preferred learning methods  Outcome: Progressing     Problem: CARDIOVASCULAR - ADULT  Goal: Maintains optimal cardiac output and hemodynamic stability  Description: INTERVENTIONS:  - Monitor I/O, vital signs and rhythm  - Monitor for S/S and trends of decreased cardiac output  - Administer and titrate ordered vasoactive medications to optimize hemodynamic stability  - Assess quality of pulses, skin color and temperature  - Assess for signs of decreased coronary artery perfusion  - Instruct patient to report change in severity of symptoms  Outcome: Progressing  Goal: Absence of cardiac dysrhythmias or at baseline rhythm  Description: INTERVENTIONS:  - Continuous cardiac monitoring, vital signs, obtain 12 lead EKG if ordered  - Administer antiarrhythmic and heart rate control medications as ordered  - Monitor electrolytes and administer replacement therapy as ordered  Outcome: Progressing     Problem: RESPIRATORY - ADULT  Goal: Achieves optimal ventilation and oxygenation  Description: INTERVENTIONS:  - Assess for changes in respiratory status  - Assess for changes in mentation and behavior  - Position to facilitate oxygenation and minimize respiratory effort  - Oxygen administered by appropriate delivery if ordered  - Initiate smoking cessation education as indicated  - Encourage broncho-pulmonary hygiene including cough, deep breathe, Incentive Spirometry  - Assess the need for suctioning and aspirate as needed  - Assess and instruct to report SOB or any respiratory difficulty  - Respiratory Therapy support as indicated  Outcome: Progressing

## 2024-01-06 NOTE — PROGRESS NOTES
"Progress Note - Cardiology   Luther Vaughan 63 y.o. male MRN: [id number]  Unit/Bed#: [address] [place] [address] Encounter: [id number]  01/06/24          Assessment:  Newly diagnosed atrial fibrillation - rate controlled with cardizem gtt  Acute diastolic congestive heart failure - he received IV lasix yesterday. Will give additional dose now.  Obesity  DM  MARLA on CPAP  Dyslipidemia  Alcohol abuse    Plan:  - Continue IV cardizem gtt  - Cardioversion on Monday  - Stress test on Monday  - Started on Eliquis      Subjective: Luther Vaughan denies any chest pain or shortness of breath.  No overnight events.  He has LE edema.     Meds/Allergies   current meds:   Current Facility-Administered Medications   Medication Dose Route Frequency    acetaminophen (TYLENOL) tablet 650 mg  650 mg Oral Q6H PRN    atorvastatin (LIPITOR) tablet 10 mg  10 mg Oral Daily With Dinner    cholecalciferol (VITAMIN D3) tablet 400 Units  400 Units Oral Daily    diltiazem (CARDIZEM) 125 mg in sodium chloride 0.9 % 125 mL infusion  1-20 mg/hr Intravenous Titrated    enoxaparin (LOVENOX) subcutaneous injection 150 mg  1 mg/kg Subcutaneous Q12H MACHELLE    losartan (COZAAR) tablet 25 mg  25 mg Oral Daily    metoprolol tartrate (LOPRESSOR) tablet 25 mg  25 mg Oral Q12H MACHELLE    sodium chloride (PF) 0.9 % injection 3 mL  3 mL Intravenous Q1H PRN     Allergies   Allergen Reactions    Erythromycin Diarrhea    Fish-Derived Products - Food Allergy Diarrhea     States \"cartilage fish\" cause sweating, diarrhea, vomiting       Objective   Vitals: Blood pressure 147/93, pulse 92, temperature 97.6 °F (36.4 °C), resp. rate 18, height 5' 8\" (1.727 m), weight (!) 147 kg (324 lb 1.2 oz), SpO2 97%.,         Intake/Output Summary (Last 24 hours) at 1/6/2024 3089  Last data filed at 1/6/2024 0800  Gross per 24 hour   Intake 420 ml   Output 650 ml   Net -230 ml       Physical Exam   Constitutional: He appears healthy. No distress.   HENT:   Nose: Nose normal.   Mouth/Throat: " "Dentition is normal. Oropharynx is clear.   Eyes: Pupils are equal, round, and reactive to light. Conjunctivae are normal.   Neck: No JVD present.   Cardiovascular: Normal rate and normal heart sounds. An irregularly irregular rhythm present. Exam reveals no gallop and no friction rub.   No murmur heard.  Pulmonary/Chest: Effort normal and breath sounds normal. He has no wheezes. He has no rales.   Musculoskeletal:         General: Edema present.      Cervical back: Normal range of motion and neck supple.   Neurological: He is alert and oriented to person, place, and time.   Skin: Skin is warm and dry.       Lab Results:     Troponins:       Recent Results (from the past 24 hour(s))   HS Troponin I 4hr    Collection Time: 01/05/24  5:59 PM   Result Value Ref Range    hs TnI 4hr 5 \"Refer to ACS Flowchart\"- see link ng/L    Delta 4hr hsTnI 0 <20 ng/L   Lipid panel    Collection Time: 01/06/24  5:22 AM   Result Value Ref Range    Cholesterol 135 See Comment mg/dL    Triglycerides 97 See Comment mg/dL    HDL, Direct 37 (L) >=40 mg/dL    LDL Calculated 79 0 - 100 mg/dL    Non-HDL-Chol (CHOL-HDL) 98 mg/dl   Basic metabolic panel    Collection Time: 01/06/24  5:22 AM   Result Value Ref Range    Sodium 135 135 - 147 mmol/L    Potassium 4.4 3.5 - 5.3 mmol/L    Chloride 104 96 - 108 mmol/L    CO2 22 21 - 32 mmol/L    ANION GAP 9 mmol/L    BUN 21 5 - 25 mg/dL    Creatinine 0.90 0.60 - 1.30 mg/dL    Glucose 112 65 - 140 mg/dL    Calcium 8.7 8.4 - 10.2 mg/dL    eGFR 89 ml/min/1.73sq m   Magnesium    Collection Time: 01/06/24  5:22 AM   Result Value Ref Range    Magnesium 2.5 1.9 - 2.7 mg/dL   CBC (With Platelets)    Collection Time: 01/06/24  5:22 AM   Result Value Ref Range    WBC 11.03 (H) 4.31 - 10.16 Thousand/uL    RBC 4.99 3.88 - 5.62 Million/uL    Hemoglobin 15.0 12.0 - 17.0 g/dL    Hematocrit 48.2 36.5 - 49.3 %    MCV 97 82 - 98 fL    MCH 30.1 26.8 - 34.3 pg    MCHC 31.1 (L) 31.4 - 37.4 g/dL    RDW 13.3 11.6 - 15.1 %    " Platelets 265 149 - 390 Thousands/uL    MPV 8.9 8.9 - 12.7 fL          Cardiac testing: Reviewed - See Above      EKG/TELE: Personally reviewed  Atrial fibrillation with rate 70-90    Imaging: I have personally reviewed pertinent films in PACS

## 2024-01-06 NOTE — PLAN OF CARE
Problem: PAIN - ADULT  Goal: Verbalizes/displays adequate comfort level or baseline comfort level  Description: Interventions:  - Encourage patient to monitor pain and request assistance  - Assess pain using appropriate pain scale  - Administer analgesics based on type and severity of pain and evaluate response  - Implement non-pharmacological measures as appropriate and evaluate response  - Consider cultural and social influences on pain and pain management  - Notify physician/advanced practitioner if interventions unsuccessful or patient reports new pain  Outcome: Progressing     Problem: INFECTION - ADULT  Goal: Absence or prevention of progression during hospitalization  Description: INTERVENTIONS:  - Assess and monitor for signs and symptoms of infection  - Monitor lab/diagnostic results  - Monitor all insertion sites, i.e. indwelling lines, tubes, and drains  - Monitor endotracheal if appropriate and nasal secretions for changes in amount and color  - [place] appropriate cooling/warming therapies per order  - Administer medications as ordered  - Instruct and encourage patient and family to use good hand hygiene technique  - Identify and instruct in appropriate isolation precautions for identified infection/condition  Outcome: Progressing  Goal: Absence of fever/infection during neutropenic period  Description: INTERVENTIONS:  - Monitor WBC    Outcome: Progressing     Problem: SAFETY ADULT  Goal: Patient will remain free of falls  Description: INTERVENTIONS:  - Educate patient/family on patient safety including physical limitations  - Instruct patient to call for assistance with activity   - Consult OT/PT to assist with strengthening/mobility   - Keep Call bell within reach  - Keep bed low and locked with side rails adjusted as appropriate  - Keep care items and personal belongings within reach  - Initiate and maintain comfort rounds  - Make Fall Risk Sign visible to staff  - Offer Toileting every 2 Hours,  in advance of need  - Initiate/Maintain bed alarm  - Obtain necessary fall risk management equipment: yellow socks  - Apply yellow socks and bracelet for high fall risk patients  - Consider moving patient to room near nurses station  Outcome: Progressing  Goal: Maintain or return to baseline ADL function  Description: INTERVENTIONS:  -  Assess patient's ability to carry out ADLs; assess patient's baseline for ADL function and identify physical deficits which impact ability to perform ADLs (bathing, care of mouth/teeth, toileting, grooming, dressing, etc.)  - Assess/evaluate cause of self-care deficits   - Assess range of motion  - Assess patient's mobility; develop plan if impaired  - Assess patient's need for assistive devices and provide as appropriate  - Encourage maximum independence but intervene and supervise when necessary  - Involve family in performance of ADLs  - Assess for home care needs following discharge   - Consider OT consult to assist with ADL evaluation and planning for discharge  - Provide patient education as appropriate  Outcome: Progressing  Goal: Maintains/Returns to pre admission functional level  Description: INTERVENTIONS:  - Perform AM-PAC 6 Click Basic Mobility/ Daily Activity assessment daily.  - Set and communicate daily mobility goal to care team and patient/family/caregiver.   - Collaborate with rehabilitation services on mobility goals if consulted  - Perform Range of Motion 2 times a day.  - Reposition patient every 2 hours.  - Dangle patient 2 times a day  - Stand patient 2 times a day  - Ambulate patient 2 times a day  - Out of bed to chair 2 times a day   - Out of bed for meals 2 times a day  - Out of bed for toileting  - Record patient progress and toleration of activity level   Outcome: Progressing     Problem: DISCHARGE PLANNING  Goal: Discharge to home or other facility with appropriate resources  Description: INTERVENTIONS:  - Identify barriers to discharge w/patient and  caregiver  - Arrange for needed discharge resources and transportation as appropriate  - Identify discharge learning needs (meds, wound care, etc.)  - Arrange for interpretive services to assist at discharge as needed  - Refer to Case Management Department for coordinating discharge planning if the patient needs post-hospital services based on physician/advanced practitioner order or complex needs related to functional status, cognitive ability, or social support system  Outcome: Progressing     Problem: Knowledge Deficit  Goal: Patient/family/caregiver demonstrates understanding of disease process, treatment plan, medications, and discharge instructions  Description: Complete learning assessment and assess knowledge base.  Interventions:  - Provide teaching at level of understanding  - Provide teaching via preferred learning methods  Outcome: Progressing     Problem: CARDIOVASCULAR - ADULT  Goal: Maintains optimal cardiac output and hemodynamic stability  Description: INTERVENTIONS:  - Monitor I/O, vital signs and rhythm  - Monitor for S/S and trends of decreased cardiac output  - Administer and titrate ordered vasoactive medications to optimize hemodynamic stability  - Assess quality of pulses, skin color and temperature  - Assess for signs of decreased coronary artery perfusion  - Instruct patient to report change in severity of symptoms  Outcome: Progressing  Goal: Absence of cardiac dysrhythmias or at baseline rhythm  Description: INTERVENTIONS:  - Continuous cardiac monitoring, vital signs, obtain 12 lead EKG if ordered  - Administer antiarrhythmic and heart rate control medications as ordered  - Monitor electrolytes and administer replacement therapy as ordered  Outcome: Progressing     Problem: RESPIRATORY - ADULT  Goal: Achieves optimal ventilation and oxygenation  Description: INTERVENTIONS:  - Assess for changes in respiratory status  - Assess for changes in mentation and behavior  - Position to  facilitate oxygenation and minimize respiratory effort  - Oxygen administered by appropriate delivery if ordered  - Initiate smoking cessation education as indicated  - Encourage broncho-pulmonary hygiene including cough, deep breathe, Incentive Spirometry  - Assess the need for suctioning and aspirate as needed  - Assess and instruct to report SOB or any respiratory difficulty  - Respiratory Therapy support as indicated  Outcome: Progressing

## 2024-01-06 NOTE — ASSESSMENT & PLAN NOTE
In setting of afib with RVR  Cardiology following  Received IV lasix 40 mg 1/5  Further diuretics per cardiology  Monitor Is/Os and daily weights  Fluid and salt restriction

## 2024-01-07 PROBLEM — I50.31 ACUTE DIASTOLIC CHF (CONGESTIVE HEART FAILURE) (HCC): Status: ACTIVE | Noted: 2024-01-05

## 2024-01-07 LAB
ANION GAP SERPL CALCULATED.3IONS-SCNC: 6 MMOL/L
BUN SERPL-MCNC: 21 MG/DL (ref 5–25)
CALCIUM SERPL-MCNC: 9 MG/DL (ref 8.4–10.2)
CHLORIDE SERPL-SCNC: 106 MMOL/L (ref 96–108)
CO2 SERPL-SCNC: 24 MMOL/L (ref 21–32)
CREAT SERPL-MCNC: 0.94 MG/DL (ref 0.6–1.3)
ERYTHROCYTE [DISTWIDTH] IN BLOOD BY AUTOMATED COUNT: 13.2 % (ref 11.6–15.1)
GFR SERPL CREATININE-BSD FRML MDRD: 84 ML/MIN/1.73SQ M
GLUCOSE SERPL-MCNC: 120 MG/DL (ref 65–140)
HCT VFR BLD AUTO: 45.7 % (ref 36.5–49.3)
HGB BLD-MCNC: 15.4 G/DL (ref 12–17)
MAGNESIUM SERPL-MCNC: 2 MG/DL (ref 1.9–2.7)
MCH RBC QN AUTO: 31.6 PG (ref 26.8–34.3)
MCHC RBC AUTO-ENTMCNC: 33.7 G/DL (ref 31.4–37.4)
MCV RBC AUTO: 94 FL (ref 82–98)
PLATELET # BLD AUTO: 236 THOUSANDS/UL (ref 149–390)
PMV BLD AUTO: 9 FL (ref 8.9–12.7)
POTASSIUM SERPL-SCNC: 4.3 MMOL/L (ref 3.5–5.3)
RBC # BLD AUTO: 4.87 MILLION/UL (ref 3.88–5.62)
SODIUM SERPL-SCNC: 136 MMOL/L (ref 135–147)
WBC # BLD AUTO: 10.3 THOUSAND/UL (ref 4.31–10.16)

## 2024-01-07 PROCEDURE — 83735 ASSAY OF MAGNESIUM: CPT

## 2024-01-07 PROCEDURE — 99232 SBSQ HOSP IP/OBS MODERATE 35: CPT

## 2024-01-07 PROCEDURE — 99232 SBSQ HOSP IP/OBS MODERATE 35: CPT | Performed by: INTERNAL MEDICINE

## 2024-01-07 PROCEDURE — 85027 COMPLETE CBC AUTOMATED: CPT

## 2024-01-07 PROCEDURE — 94760 N-INVAS EAR/PLS OXIMETRY 1: CPT

## 2024-01-07 PROCEDURE — 80048 BASIC METABOLIC PNL TOTAL CA: CPT

## 2024-01-07 RX ORDER — FUROSEMIDE 10 MG/ML
20 INJECTION INTRAMUSCULAR; INTRAVENOUS ONCE
Status: COMPLETED | OUTPATIENT
Start: 2024-01-07 | End: 2024-01-07

## 2024-01-07 RX ADMIN — METOPROLOL TARTRATE 25 MG: 25 TABLET, FILM COATED ORAL at 08:23

## 2024-01-07 RX ADMIN — DILTIAZEM HYDROCHLORIDE 7.5 MG/HR: 5 INJECTION INTRAVENOUS at 16:28

## 2024-01-07 RX ADMIN — ATORVASTATIN CALCIUM 10 MG: 10 TABLET, FILM COATED ORAL at 16:28

## 2024-01-07 RX ADMIN — CHOLECALCIFEROL (VITAMIN D3) 10 MCG (400 UNIT) TABLET 400 UNITS: at 08:23

## 2024-01-07 RX ADMIN — ENOXAPARIN SODIUM 150 MG: 150 INJECTION SUBCUTANEOUS at 08:23

## 2024-01-07 RX ADMIN — LOSARTAN POTASSIUM 25 MG: 25 TABLET, FILM COATED ORAL at 08:23

## 2024-01-07 RX ADMIN — ENOXAPARIN SODIUM 150 MG: 150 INJECTION SUBCUTANEOUS at 20:04

## 2024-01-07 RX ADMIN — METOPROLOL TARTRATE 25 MG: 25 TABLET, FILM COATED ORAL at 20:04

## 2024-01-07 RX ADMIN — FUROSEMIDE 20 MG: 10 INJECTION, SOLUTION INTRAMUSCULAR; INTRAVENOUS at 10:34

## 2024-01-07 NOTE — ASSESSMENT & PLAN NOTE
"Lab Results   Component Value Date    HGBA1C 6.7 (H) 09/26/2023       No results for input(s): \"POCGLU\" in the last 72 hours.    Blood Sugar Average: Last 72 hrs:  ?not on any prior to admission diabetes medications  Monitor glucose  Will start sliding scale if needed  "

## 2024-01-07 NOTE — MALNUTRITION/BMI
This medical record reflects one or more clinical indicators suggestive of morbid obesity.    360 Statement: morbid obesity is related to energy intake >energy output over time as evidenced by BMI 49.2     -pt declined diet education at present    BMI Findings:  Adult BMI Classifications: Morbid Obesity 45-49.9        Body mass index is 49.28 kg/m².     See Nutrition note dated 1/7/24 for additional details.  Completed nutrition assessment is viewable in the nutrition documentation.

## 2024-01-07 NOTE — PLAN OF CARE
Problem: PAIN - ADULT  Goal: Verbalizes/displays adequate comfort level or baseline comfort level  Description: Interventions:  - Encourage patient to monitor pain and request assistance  - Assess pain using appropriate pain scale  - Administer analgesics based on type and severity of pain and evaluate response  - Implement non-pharmacological measures as appropriate and evaluate response  - Consider cultural and social influences on pain and pain management  - Notify physician/advanced practitioner if interventions unsuccessful or patient reports new pain  Outcome: Progressing     Problem: INFECTION - ADULT  Goal: Absence or prevention of progression during hospitalization  Description: INTERVENTIONS:  - Assess and monitor for signs and symptoms of infection  - Monitor lab/diagnostic results  - Monitor all insertion sites, i.e. indwelling lines, tubes, and drains  - Monitor endotracheal if appropriate and nasal secretions for changes in amount and color  - [place] appropriate cooling/warming therapies per order  - Administer medications as ordered  - Instruct and encourage patient and family to use good hand hygiene technique  - Identify and instruct in appropriate isolation precautions for identified infection/condition  Outcome: Progressing  Goal: Absence of fever/infection during neutropenic period  Description: INTERVENTIONS:  - Monitor WBC    Outcome: Progressing     Problem: SAFETY ADULT  Goal: Patient will remain free of falls  Description: INTERVENTIONS:  - Educate patient/family on patient safety including physical limitations  - Instruct patient to call for assistance with activity   - Consult OT/PT to assist with strengthening/mobility   - Keep Call bell within reach  - Keep bed low and locked with side rails adjusted as appropriate  - Keep care items and personal belongings within reach  - Initiate and maintain comfort rounds  - Make Fall Risk Sign visible to staff  - Offer Toileting every 2 Hours,  in advance of need  - Initiate/Maintain bed alarm  - Obtain necessary fall risk management equipment: socks   - Apply yellow socks and bracelet for high fall risk patients  - Consider moving patient to room near nurses station  Outcome: Progressing  Goal: Maintain or return to baseline ADL function  Description: INTERVENTIONS:  -  Assess patient's ability to carry out ADLs; assess patient's baseline for ADL function and identify physical deficits which impact ability to perform ADLs (bathing, care of mouth/teeth, toileting, grooming, dressing, etc.)  - Assess/evaluate cause of self-care deficits   - Assess range of motion  - Assess patient's mobility; develop plan if impaired  - Assess patient's need for assistive devices and provide as appropriate  - Encourage maximum independence but intervene and supervise when necessary  - Involve family in performance of ADLs  - Assess for home care needs following discharge   - Consider OT consult to assist with ADL evaluation and planning for discharge  - Provide patient education as appropriate  Outcome: Progressing  Goal: Maintains/Returns to pre admission functional level  Description: INTERVENTIONS:  - Perform AM-PAC 6 Click Basic Mobility/ Daily Activity assessment daily.  - Set and communicate daily mobility goal to care team and patient/family/caregiver.   - Collaborate with rehabilitation services on mobility goals if consulted  - Perform Range of Motion 3 times a day.  - Reposition patient every 2 hours.  - Dangle patient 3 times a day  - Stand patient 3 times a day  - Ambulate patient 3 times a day  - Out of bed to chair 3 times a day   - Out of bed for meals 3 times a day  - Out of bed for toileting  - Record patient progress and toleration of activity level   Outcome: Progressing     Problem: DISCHARGE PLANNING  Goal: Discharge to home or other facility with appropriate resources  Description: INTERVENTIONS:  - Identify barriers to discharge w/patient and  caregiver  - Arrange for needed discharge resources and transportation as appropriate  - Identify discharge learning needs (meds, wound care, etc.)  - Arrange for interpretive services to assist at discharge as needed  - Refer to Case Management Department for coordinating discharge planning if the patient needs post-hospital services based on physician/advanced practitioner order or complex needs related to functional status, cognitive ability, or social support system  Outcome: Progressing     Problem: Knowledge Deficit  Goal: Patient/family/caregiver demonstrates understanding of disease process, treatment plan, medications, and discharge instructions  Description: Complete learning assessment and assess knowledge base.  Interventions:  - Provide teaching at level of understanding  - Provide teaching via preferred learning methods  Outcome: Progressing     Problem: CARDIOVASCULAR - ADULT  Goal: Maintains optimal cardiac output and hemodynamic stability  Description: INTERVENTIONS:  - Monitor I/O, vital signs and rhythm  - Monitor for S/S and trends of decreased cardiac output  - Administer and titrate ordered vasoactive medications to optimize hemodynamic stability  - Assess quality of pulses, skin color and temperature  - Assess for signs of decreased coronary artery perfusion  - Instruct patient to report change in severity of symptoms  Outcome: Progressing  Goal: Absence of cardiac dysrhythmias or at baseline rhythm  Description: INTERVENTIONS:  - Continuous cardiac monitoring, vital signs, obtain 12 lead EKG if ordered  - Administer antiarrhythmic and heart rate control medications as ordered  - Monitor electrolytes and administer replacement therapy as ordered  Outcome: Progressing     Problem: RESPIRATORY - ADULT  Goal: Achieves optimal ventilation and oxygenation  Description: INTERVENTIONS:  - Assess for changes in respiratory status  - Assess for changes in mentation and behavior  - Position to  facilitate oxygenation and minimize respiratory effort  - Oxygen administered by appropriate delivery if ordered  - Initiate smoking cessation education as indicated  - Encourage broncho-pulmonary hygiene including cough, deep breathe, Incentive Spirometry  - Assess the need for suctioning and aspirate as needed  - Assess and instruct to report SOB or any respiratory difficulty  - Respiratory Therapy support as indicated  Outcome: Progressing

## 2024-01-07 NOTE — PROGRESS NOTES
"[facility]  Progress Note  Name: Luther Vaughan I  MRN: [id number]  Unit/Bed#: [address] [place] [address] I Date of Admission: 1/5/2024   Date of Service: 1/7/2024 I Hospital Day: 2    Assessment/Plan   * New onset atrial fibrillation (HCC)  Assessment & Plan  Patient was sent in from wound care after he was noted to be hypertensive and tachycardic.  CTA chest: No pulmonary embolus. Mild interstitial edema.  EKG: Afib with RVR   Given metoprolol in ED, HR remained in 120s  Cardiology consulted  Currently rate controlled on cardizem gtt  Started on metoprolol 25 mg q12h  On weight based lovenox  ECHO 1/5/23: EF 55%, systolic function normal, hard to assess due to tachycardia and afib  Plan for ELISA with cardioversion if he does not spontaneously convert by Monday  Stress test Monday    Acute diastolic CHF (congestive heart failure) (HCC)  Assessment & Plan  In setting of afib with RVR  ECHO 1/5: EF 55%, systolic function normal, difficult to accurately assess due to tachycardia and a fib, mild to moderate aortic stenosis  Cardiology following  Diuresing with IV lasix per cardiology  Monitor Is/Os and daily weights  Fluid and salt restriction    Hypertension  Assessment & Plan  Home valsartan substituted with losartan 25 mg daily  BP improved since admission  Monitor on cardizem gtt    Chronic ulcer of lower extremity (HCC)  Assessment & Plan  Follows with wound care outpatient  On lasix 20 mg outpatient for lymphedema  Wound care consulted    Dyslipidemia  Assessment & Plan  Started on Lipitor 10 mg    Type 2 diabetes mellitus (HCC)  Assessment & Plan  Lab Results   Component Value Date    HGBA1C 6.7 (H) 09/26/2023       No results for input(s): \"POCGLU\" in the last 72 hours.    Blood Sugar Average: Last 72 hrs:  not on any prior to admission diabetes medications  Monitor glucose  Will start sliding scale if needed    Obstructive sleep apnea  Assessment & Plan  Compliant with cpap  Continue cpap " hs           VTE Pharmacologic Prophylaxis: VTE Score: 5 Moderate Risk (Score 3-4) - Pharmacological DVT Prophylaxis Ordered: enoxaparin (Lovenox).    Mobility:   Basic Mobility Inpatient Raw Score: 24  JH-HLM Goal: 8: Walk 250 feet or more  JH-HLM Achieved: 8: Walk 250 feet ot more  HLM Goal achieved. Continue to encourage appropriate mobility.    Patient Centered Rounds: I performed bedside rounds with nursing staff today.   Discussions with Specialists or Other Care Team Provider: [email or web address]    Education and Discussions with Family / Patient: Patient declined call to .     Total Time Spent on Date of Encounter in care of patient: 35 mins. This time was spent on one or more of the following: performing physical exam; counseling and coordination of care; obtaining or reviewing history; documenting in the medical record; reviewing/ordering tests, medications or procedures; communicating with other healthcare professionals and discussing with patient's family/caregivers.    Current Length of Stay: 2 day(s)  Current Patient Status: Inpatient   Certification Statement: The patient will continue to require additional inpatient hospital stay due to a fib, acute CHF, cardioversion/stress test  Discharge Plan: Anticipate discharge in 24-48 hrs to home.    Code Status: Level 1 - Full Code    Subjective:   Patient reports feeling well today, denies any  new symptoms including chest pain, palpitations, or shortness of breath.  Thinks his leg swelling has improved especially in his left leg.    Objective:     Vitals:   Temp (24hrs), Av.3 °F (36.3 °C), Min:97 °F (36.1 °C), Max:97.6 °F (36.4 °C)    Temp:  [97 °F (36.1 °C)-97.6 °F (36.4 °C)] 97 °F (36.1 °C)  HR:  [] 92  Resp:  [21] 21  BP: (138-162)/(76-93) 157/76  SpO2:  [94 %-97 %] 97 %  Body mass index is 49.28 kg/m².     Input and Output Summary (last 24 hours):   No intake or output data in the 24 hours ending 24 1105    Physical Exam:   Physical  Exam  Vitals and nursing note reviewed.   Constitutional:       General: He is not in acute distress.     Appearance: He is well-developed. He is obese.   Cardiovascular:      Rate and Rhythm: Normal rate. Rhythm irregularly irregular.      Heart sounds: No murmur heard.  Pulmonary:      Effort: Pulmonary effort is normal. No respiratory distress.      Breath sounds: Normal breath sounds.   Abdominal:      Palpations: Abdomen is soft.      Tenderness: There is no abdominal tenderness.   Musculoskeletal:         General: No swelling.      Right lower leg: Edema present.      Left lower leg: Edema present.   Skin:     General: Skin is warm and dry.      Capillary Refill: Capillary refill takes less than 2 seconds.      Comments: RLE dressing c/d/i   Neurological:      Mental Status: He is alert.   Psychiatric:         Mood and Affect: Mood normal.          Additional Data:     Labs:  Results from last 7 days   Lab Units 01/07/24 0538 01/06/24 0522 01/05/24  1152   WBC Thousand/uL 10.30*   < > 11.41*   HEMOGLOBIN g/dL 15.4   < > 15.3   HEMATOCRIT % 45.7   < > 47.3   PLATELETS Thousands/uL 236   < > 261   NEUTROS PCT %  --   --  71   LYMPHS PCT %  --   --  22   MONOS PCT %  --   --  6   EOS PCT %  --   --  0    < > = values in this interval not displayed.     Results from last 7 days   Lab Units 01/07/24 0538 01/06/24 0522 01/05/24  1152   SODIUM mmol/L 136   < > 136   POTASSIUM mmol/L 4.3   < > 4.1   CHLORIDE mmol/L 106   < > 104   CO2 mmol/L 24   < > 25   BUN mg/dL 21   < > 18   CREATININE mg/dL 0.94   < > 0.95   ANION GAP mmol/L 6   < > 7   CALCIUM mg/dL 9.0   < > 9.4   ALBUMIN g/dL  --   --  3.9   TOTAL BILIRUBIN mg/dL  --   --  0.70   ALK PHOS U/L  --   --  60   ALT U/L  --   --  22   AST U/L  --   --  22   GLUCOSE RANDOM mg/dL 120   < > 111    < > = values in this interval not displayed.                       Lines/Drains:  Invasive Devices       Peripheral Intravenous Line  Duration             Peripheral IV  01/05/24 Right Antecubital 1 day                      Telemetry:  Telemetry Orders (From admission, onward)               24 Hour Telemetry Monitoring  Continuous x 24 Hours ([place])        Expiring   Question:  Reason for 24 Hour Telemetry  Answer:  Decompensated CHF- and any one of the following: continuous diuretic infusion or total diuretic dose >200 mg daily, associated electrolyte derangement (I.e. K < 3.0), ionotropic drip (continuous infusion), hx of ventricular arrhythmia, or new EF < 35%                     Telemetry Reviewed: Atrial fibrillation. HR averaging   Indication for Continued Telemetry Use: Arrthymias requiring medical therapy             Imaging: Reviewed radiology reports from this admission including: ECHO    Recent Cultures (last 7 days):         Last 24 Hours Medication List:   Current Facility-Administered Medications   Medication Dose Route Frequency Provider Last Rate    acetaminophen  650 mg Oral Q6H PRN Kelin Russo PA-C      atorvastatin  10 mg Oral Daily With Dinner Dimas Anguiano MD      cholecalciferol  400 Units Oral Daily Kelin Russo PA-C      diltiazem  1-20 mg/hr Intravenous Titrated Mellisa Donnelly DO 7.5 mg/hr (01/07/24 0822)    enoxaparin  1 mg/kg Subcutaneous Q12H MACHELLE Reyna Carvajal, CRNP      losartan  25 mg Oral Daily Reyna Carvajal, CRFERNANDO      metoprolol tartrate  25 mg Oral Q12H MACHELLE Reyna Duroniam, CRNP      sodium chloride (PF)  3 mL Intravenous Q1H PRN Salvador Riojas DO          Today, Patient Was Seen By: Kelin Russo PA-C    **Please Note: This note may have been constructed using a voice recognition system.**

## 2024-01-07 NOTE — PLAN OF CARE
Problem: PAIN - ADULT  Goal: Verbalizes/displays adequate comfort level or baseline comfort level  Description: Interventions:  - Encourage patient to monitor pain and request assistance  - Assess pain using appropriate pain scale  - Administer analgesics based on type and severity of pain and evaluate response  - Implement non-pharmacological measures as appropriate and evaluate response  - Consider cultural and social influences on pain and pain management  - Notify physician/advanced practitioner if interventions unsuccessful or patient reports new pain  Outcome: Progressing     Problem: INFECTION - ADULT  Goal: Absence or prevention of progression during hospitalization  Description: INTERVENTIONS:  - Assess and monitor for signs and symptoms of infection  - Monitor lab/diagnostic results  - Monitor all insertion sites, i.e. indwelling lines, tubes, and drains  - Monitor endotracheal if appropriate and nasal secretions for changes in amount and color  - [place] appropriate cooling/warming therapies per order  - Administer medications as ordered  - Instruct and encourage patient and family to use good hand hygiene technique  - Identify and instruct in appropriate isolation precautions for identified infection/condition  Outcome: Progressing  Goal: Absence of fever/infection during neutropenic period  Description: INTERVENTIONS:  - Monitor WBC    Outcome: Progressing     Problem: SAFETY ADULT  Goal: Patient will remain free of falls  Description: INTERVENTIONS:  - Educate patient/family on patient safety including physical limitations  - Instruct patient to call for assistance with activity   - Consult OT/PT to assist with strengthening/mobility   - Keep Call bell within reach  - Keep bed low and locked with side rails adjusted as appropriate  - Keep care items and personal belongings within reach  - Initiate and maintain comfort rounds  - Make Fall Risk Sign visible to staff  - Offer Toileting every 2 Hours,  in advance of need  - Initiate/Maintain bed alarm  - Obtain necessary fall risk management equipment: yellow socks  - Apply yellow socks and bracelet for high fall risk patients  - Consider moving patient to room near nurses station  Outcome: Progressing  Goal: Maintain or return to baseline ADL function  Description: INTERVENTIONS:  -  Assess patient's ability to carry out ADLs; assess patient's baseline for ADL function and identify physical deficits which impact ability to perform ADLs (bathing, care of mouth/teeth, toileting, grooming, dressing, etc.)  - Assess/evaluate cause of self-care deficits   - Assess range of motion  - Assess patient's mobility; develop plan if impaired  - Assess patient's need for assistive devices and provide as appropriate  - Encourage maximum independence but intervene and supervise when necessary  - Involve family in performance of ADLs  - Assess for home care needs following discharge   - Consider OT consult to assist with ADL evaluation and planning for discharge  - Provide patient education as appropriate  Outcome: Progressing  Goal: Maintains/Returns to pre admission functional level  Description: INTERVENTIONS:  - Perform AM-PAC 6 Click Basic Mobility/ Daily Activity assessment daily.  - Set and communicate daily mobility goal to care team and patient/family/caregiver.   - Collaborate with rehabilitation services on mobility goals if consulted  - Perform Range of Motion 2 times a day.  - Reposition patient every 2 hours.  - Dangle patient 2 times a day  - Stand patient 2 times a day  - Ambulate patient 2 times a day  - Out of bed to chair 2 times a day   - Out of bed for meals 2 times a day  - Out of bed for toileting  - Record patient progress and toleration of activity level   Outcome: Progressing     Problem: DISCHARGE PLANNING  Goal: Discharge to home or other facility with appropriate resources  Description: INTERVENTIONS:  - Identify barriers to discharge w/patient and  caregiver  - Arrange for needed discharge resources and transportation as appropriate  - Identify discharge learning needs (meds, wound care, etc.)  - Arrange for interpretive services to assist at discharge as needed  - Refer to Case Management Department for coordinating discharge planning if the patient needs post-hospital services based on physician/advanced practitioner order or complex needs related to functional status, cognitive ability, or social support system  Outcome: Progressing     Problem: Knowledge Deficit  Goal: Patient/family/caregiver demonstrates understanding of disease process, treatment plan, medications, and discharge instructions  Description: Complete learning assessment and assess knowledge base.  Interventions:  - Provide teaching at level of understanding  - Provide teaching via preferred learning methods  Outcome: Progressing     Problem: CARDIOVASCULAR - ADULT  Goal: Maintains optimal cardiac output and hemodynamic stability  Description: INTERVENTIONS:  - Monitor I/O, vital signs and rhythm  - Monitor for S/S and trends of decreased cardiac output  - Administer and titrate ordered vasoactive medications to optimize hemodynamic stability  - Assess quality of pulses, skin color and temperature  - Assess for signs of decreased coronary artery perfusion  - Instruct patient to report change in severity of symptoms  Outcome: Progressing  Goal: Absence of cardiac dysrhythmias or at baseline rhythm  Description: INTERVENTIONS:  - Continuous cardiac monitoring, vital signs, obtain 12 lead EKG if ordered  - Administer antiarrhythmic and heart rate control medications as ordered  - Monitor electrolytes and administer replacement therapy as ordered  Outcome: Progressing     Problem: RESPIRATORY - ADULT  Goal: Achieves optimal ventilation and oxygenation  Description: INTERVENTIONS:  - Assess for changes in respiratory status  - Assess for changes in mentation and behavior  - Position to  facilitate oxygenation and minimize respiratory effort  - Oxygen administered by appropriate delivery if ordered  - Initiate smoking cessation education as indicated  - Encourage broncho-pulmonary hygiene including cough, deep breathe, Incentive Spirometry  - Assess the need for suctioning and aspirate as needed  - Assess and instruct to report SOB or any respiratory difficulty  - Respiratory Therapy support as indicated  Outcome: Progressing

## 2024-01-07 NOTE — ASSESSMENT & PLAN NOTE
Patient was sent in from wound care after he was noted to be hypertensive and tachycardic.  CTA chest: No pulmonary embolus. Mild interstitial edema.  EKG: Afib with RVR   Given metoprolol in ED, HR remained in 120s  Cardiology consulted  Currently rate controlled on cardizem gtt  Started on metoprolol 25 mg q12h  On weight based lovenox  ECHO 1/5/23: EF 55%, systolic function normal, hard to assess due to tachycardia and afib  Plan for ELISA with cardioversion if he does not spontaneously convert by Monday  Stress test Monday

## 2024-01-07 NOTE — PROGRESS NOTES
"Progress Note - Cardiology   Luther Vaughan 63 y.o. male MRN: [id number]  Unit/Bed#: [address] [place] [address] Encounter: [id number]  01/07/24          Assessment:  Newly diagnosed atrial fibrillation - rate controlled with cardizem gtt  Acute diastolic congestive heart failure - he received IV lasix yesterday. Will give additional dose this morning  Obesity  DM  MARLA on CPAP  Dyslipidemia  Alcohol abuse    Plan:  - Continue IV cardizem gtt  - Cardioversion on Monday  -Patient will have stress test but will need to confirm cardioversion time as he wishes to go home tomorrow.  Stress test may be done as outpatient.  .  - Started on Eliquis      Subjective: Luther Vaughan denies any chest pain or shortness of breath.  No overnight events.  He has LE edema.  Remains in atrial fibrillation    Meds/Allergies   current meds:   Current Facility-Administered Medications   Medication Dose Route Frequency    acetaminophen (TYLENOL) tablet 650 mg  650 mg Oral Q6H PRN    atorvastatin (LIPITOR) tablet 10 mg  10 mg Oral Daily With Dinner    cholecalciferol (VITAMIN D3) tablet 400 Units  400 Units Oral Daily    diltiazem (CARDIZEM) 125 mg in sodium chloride 0.9 % 125 mL infusion  1-20 mg/hr Intravenous Titrated    enoxaparin (LOVENOX) subcutaneous injection 150 mg  1 mg/kg Subcutaneous Q12H MACHELLE    losartan (COZAAR) tablet 25 mg  25 mg Oral Daily    metoprolol tartrate (LOPRESSOR) tablet 25 mg  25 mg Oral Q12H MACHELLE    sodium chloride (PF) 0.9 % injection 3 mL  3 mL Intravenous Q1H PRN     Allergies   Allergen Reactions    Erythromycin Diarrhea    Fish-Derived Products - Food Allergy Diarrhea     States \"cartilage fish\" cause sweating, diarrhea, vomiting       Objective   Vitals: Blood pressure 157/76, pulse 92, temperature (!) 97 °F (36.1 °C), resp. rate 21, height 5' 8\" (1.727 m), weight (!) 147 kg (324 lb 1.2 oz), SpO2 97%.,         Intake/Output Summary (Last 24 hours) at 1/7/2024 1200  Last data filed at 1/7/2024 0822  Gross per 24 " hour   Intake --   Output 100 ml   Net -100 ml       Physical Exam   Constitutional: He appears healthy. No distress.   Eyes: Pupils are equal, round, and reactive to light. Conjunctivae are normal.   Neck: No JVD present.   Cardiovascular: Normal rate and normal heart sounds. An irregularly irregular rhythm present. Exam reveals no gallop and no friction rub.   No murmur heard.  Pulmonary/Chest: Effort normal and breath sounds normal. He has no wheezes. He has no rales.   Musculoskeletal:         General: Edema present. No tenderness or deformity.      Cervical back: Normal range of motion and neck supple.   Neurological: He is alert and oriented to person, place, and time.   Skin: Skin is warm and dry.       Lab Results:     Troponins:       Recent Results (from the past 24 hour(s))   Basic metabolic panel    Collection Time: 01/07/24  5:38 AM   Result Value Ref Range    Sodium 136 135 - 147 mmol/L    Potassium 4.3 3.5 - 5.3 mmol/L    Chloride 106 96 - 108 mmol/L    CO2 24 21 - 32 mmol/L    ANION GAP 6 mmol/L    BUN 21 5 - 25 mg/dL    Creatinine 0.94 0.60 - 1.30 mg/dL    Glucose 120 65 - 140 mg/dL    Calcium 9.0 8.4 - 10.2 mg/dL    eGFR 84 ml/min/1.73sq m   Magnesium    Collection Time: 01/07/24  5:38 AM   Result Value Ref Range    Magnesium 2.0 1.9 - 2.7 mg/dL   CBC    Collection Time: 01/07/24  5:38 AM   Result Value Ref Range    WBC 10.30 (H) 4.31 - 10.16 Thousand/uL    RBC 4.87 3.88 - 5.62 Million/uL    Hemoglobin 15.4 12.0 - 17.0 g/dL    Hematocrit 45.7 36.5 - 49.3 %    MCV 94 82 - 98 fL    MCH 31.6 26.8 - 34.3 pg    MCHC 33.7 31.4 - 37.4 g/dL    RDW 13.2 11.6 - 15.1 %    Platelets 236 149 - 390 Thousands/uL    MPV 9.0 8.9 - 12.7 fL          Cardiac testing: Reviewed - See Above      EKG/TELE: Personally reviewed  Atrial fibrillation with rate 70-90    Imaging: I have personally reviewed pertinent films in PACS

## 2024-01-07 NOTE — ASSESSMENT & PLAN NOTE
In setting of afib with RVR  ECHO 1/5: EF 55%, systolic function normal, difficult to accurately assess due to tachycardia and a fib, mild to moderate aortic stenosis  Cardiology following  Diuresing with IV lasix per cardiology  Monitor Is/Os and daily weights  Fluid and salt restriction

## 2024-01-08 ENCOUNTER — APPOINTMENT (OUTPATIENT)
Dept: NON INVASIVE DIAGNOSTICS | Facility: HOSPITAL | Age: 64
DRG: 291 | End: 2024-01-08
Payer: MEDICARE

## 2024-01-08 LAB
ANION GAP SERPL CALCULATED.3IONS-SCNC: 5 MMOL/L
ATRIAL RATE: 120 BPM
BUN SERPL-MCNC: 19 MG/DL (ref 5–25)
CALCIUM SERPL-MCNC: 8.9 MG/DL (ref 8.4–10.2)
CHLORIDE SERPL-SCNC: 104 MMOL/L (ref 96–108)
CO2 SERPL-SCNC: 28 MMOL/L (ref 21–32)
CREAT SERPL-MCNC: 0.93 MG/DL (ref 0.6–1.3)
ERYTHROCYTE [DISTWIDTH] IN BLOOD BY AUTOMATED COUNT: 13.2 % (ref 11.6–15.1)
GFR SERPL CREATININE-BSD FRML MDRD: 85 ML/MIN/1.73SQ M
GLUCOSE SERPL-MCNC: 112 MG/DL (ref 65–140)
HCT VFR BLD AUTO: 46.8 % (ref 36.5–49.3)
HGB BLD-MCNC: 15.2 G/DL (ref 12–17)
MAGNESIUM SERPL-MCNC: 1.9 MG/DL (ref 1.9–2.7)
MCH RBC QN AUTO: 30.6 PG (ref 26.8–34.3)
MCHC RBC AUTO-ENTMCNC: 32.5 G/DL (ref 31.4–37.4)
MCV RBC AUTO: 94 FL (ref 82–98)
PLATELET # BLD AUTO: 246 THOUSANDS/UL (ref 149–390)
PMV BLD AUTO: 9.4 FL (ref 8.9–12.7)
POTASSIUM SERPL-SCNC: 4 MMOL/L (ref 3.5–5.3)
QRS AXIS: 126 DEGREES
QRSD INTERVAL: 104 MS
QT INTERVAL: 364 MS
QTC INTERVAL: 433 MS
RBC # BLD AUTO: 4.96 MILLION/UL (ref 3.88–5.62)
SODIUM SERPL-SCNC: 137 MMOL/L (ref 135–147)
T WAVE AXIS: 14 DEGREES
VENTRICULAR RATE: 85 BPM
WBC # BLD AUTO: 11.12 THOUSAND/UL (ref 4.31–10.16)

## 2024-01-08 PROCEDURE — 93312 ECHO TRANSESOPHAGEAL: CPT

## 2024-01-08 PROCEDURE — 80048 BASIC METABOLIC PNL TOTAL CA: CPT

## 2024-01-08 PROCEDURE — 85027 COMPLETE CBC AUTOMATED: CPT

## 2024-01-08 PROCEDURE — B24BZZ4 ULTRASONOGRAPHY OF HEART WITH AORTA, TRANSESOPHAGEAL: ICD-10-PCS | Performed by: INTERNAL MEDICINE

## 2024-01-08 PROCEDURE — 5A2204Z RESTORATION OF CARDIAC RHYTHM, SINGLE: ICD-10-PCS | Performed by: INTERNAL MEDICINE

## 2024-01-08 PROCEDURE — 92960 CARDIOVERSION ELECTRIC EXT: CPT

## 2024-01-08 PROCEDURE — 92960 CARDIOVERSION ELECTRIC EXT: CPT | Performed by: INTERNAL MEDICINE

## 2024-01-08 PROCEDURE — 99232 SBSQ HOSP IP/OBS MODERATE 35: CPT | Performed by: NURSE PRACTITIONER

## 2024-01-08 PROCEDURE — 99232 SBSQ HOSP IP/OBS MODERATE 35: CPT | Performed by: INTERNAL MEDICINE

## 2024-01-08 PROCEDURE — 83735 ASSAY OF MAGNESIUM: CPT

## 2024-01-08 PROCEDURE — 94760 N-INVAS EAR/PLS OXIMETRY 1: CPT

## 2024-01-08 PROCEDURE — 93005 ELECTROCARDIOGRAM TRACING: CPT

## 2024-01-08 RX ORDER — DABIGATRAN ETEXILATE 150 MG/1
150 CAPSULE ORAL EVERY 12 HOURS SCHEDULED
Qty: 60 CAPSULE | Refills: 4 | Status: SHIPPED | OUTPATIENT
Start: 2024-01-08 | End: 2024-01-19 | Stop reason: CLARIF

## 2024-01-08 RX ORDER — TORSEMIDE 10 MG/1
10 TABLET ORAL DAILY
Qty: 30 TABLET | Refills: 4 | Status: SHIPPED | OUTPATIENT
Start: 2024-01-09

## 2024-01-08 RX ORDER — SODIUM CHLORIDE 9 MG/ML
INJECTION, SOLUTION INTRAVENOUS CONTINUOUS PRN
Status: DISCONTINUED | OUTPATIENT
Start: 2024-01-08 | End: 2024-01-08

## 2024-01-08 RX ORDER — TORSEMIDE 10 MG/1
10 TABLET ORAL DAILY
Status: DISCONTINUED | OUTPATIENT
Start: 2024-01-08 | End: 2024-01-08

## 2024-01-08 RX ORDER — METOPROLOL TARTRATE 50 MG/1
50 TABLET, FILM COATED ORAL EVERY 12 HOURS SCHEDULED
Qty: 60 TABLET | Refills: 4 | Status: SHIPPED | OUTPATIENT
Start: 2024-01-08

## 2024-01-08 RX ORDER — LIDOCAINE HYDROCHLORIDE 10 MG/ML
INJECTION, SOLUTION EPIDURAL; INFILTRATION; INTRACAUDAL; PERINEURAL AS NEEDED
Status: DISCONTINUED | OUTPATIENT
Start: 2024-01-08 | End: 2024-01-08

## 2024-01-08 RX ORDER — CLOTRIMAZOLE AND BETAMETHASONE DIPROPIONATE 10; .64 MG/G; MG/G
CREAM TOPICAL 2 TIMES DAILY
Status: DISCONTINUED | OUTPATIENT
Start: 2024-01-08 | End: 2024-01-09 | Stop reason: HOSPADM

## 2024-01-08 RX ORDER — DABIGATRAN ETEXILATE 150 MG/1
150 CAPSULE ORAL EVERY 12 HOURS SCHEDULED
Status: DISCONTINUED | OUTPATIENT
Start: 2024-01-08 | End: 2024-01-09 | Stop reason: HOSPADM

## 2024-01-08 RX ORDER — ATORVASTATIN CALCIUM 10 MG/1
10 TABLET, FILM COATED ORAL
Qty: 30 TABLET | Refills: 4 | Status: SHIPPED | OUTPATIENT
Start: 2024-01-08

## 2024-01-08 RX ORDER — METOPROLOL TARTRATE 50 MG/1
50 TABLET, FILM COATED ORAL EVERY 12 HOURS SCHEDULED
Status: DISCONTINUED | OUTPATIENT
Start: 2024-01-08 | End: 2024-01-09 | Stop reason: HOSPADM

## 2024-01-08 RX ORDER — NYSTATIN 100000 [USP'U]/G
POWDER TOPICAL 2 TIMES DAILY
Status: DISCONTINUED | OUTPATIENT
Start: 2024-01-08 | End: 2024-01-09 | Stop reason: HOSPADM

## 2024-01-08 RX ORDER — PROPOFOL 10 MG/ML
INJECTION, EMULSION INTRAVENOUS AS NEEDED
Status: DISCONTINUED | OUTPATIENT
Start: 2024-01-08 | End: 2024-01-08

## 2024-01-08 RX ORDER — LOSARTAN POTASSIUM 50 MG/1
50 TABLET ORAL DAILY
Status: DISCONTINUED | OUTPATIENT
Start: 2024-01-08 | End: 2024-01-09 | Stop reason: HOSPADM

## 2024-01-08 RX ORDER — TORSEMIDE 10 MG/1
10 TABLET ORAL DAILY
Status: DISCONTINUED | OUTPATIENT
Start: 2024-01-09 | End: 2024-01-09 | Stop reason: HOSPADM

## 2024-01-08 RX ADMIN — LIDOCAINE HYDROCHLORIDE 50 MG: 10 INJECTION, SOLUTION EPIDURAL; INFILTRATION; INTRACAUDAL; PERINEURAL at 11:53

## 2024-01-08 RX ADMIN — ENOXAPARIN SODIUM 150 MG: 150 INJECTION SUBCUTANEOUS at 08:32

## 2024-01-08 RX ADMIN — METOPROLOL TARTRATE 25 MG: 25 TABLET, FILM COATED ORAL at 08:32

## 2024-01-08 RX ADMIN — PROPOFOL 100 MG: 10 INJECTION, EMULSION INTRAVENOUS at 11:53

## 2024-01-08 RX ADMIN — CLOTRIMAZOLE AND BETAMETHASONE DIPROPIONATE: 10; .64 CREAM TOPICAL at 16:26

## 2024-01-08 RX ADMIN — DILTIAZEM HYDROCHLORIDE 10 MG/HR: 5 INJECTION INTRAVENOUS at 05:40

## 2024-01-08 RX ADMIN — CHOLECALCIFEROL (VITAMIN D3) 10 MCG (400 UNIT) TABLET 400 UNITS: at 08:32

## 2024-01-08 RX ADMIN — PROPOFOL 50 MG: 10 INJECTION, EMULSION INTRAVENOUS at 12:00

## 2024-01-08 RX ADMIN — ATORVASTATIN CALCIUM 10 MG: 10 TABLET, FILM COATED ORAL at 16:25

## 2024-01-08 RX ADMIN — DABIGATRAN ETEXILATE MESYLATE 150 MG: 150 CAPSULE ORAL at 20:57

## 2024-01-08 RX ADMIN — NYSTATIN: 100000 POWDER TOPICAL at 16:26

## 2024-01-08 RX ADMIN — SODIUM CHLORIDE: 0.9 INJECTION, SOLUTION INTRAVENOUS at 11:46

## 2024-01-08 RX ADMIN — PROPOFOL 100 MG: 10 INJECTION, EMULSION INTRAVENOUS at 11:57

## 2024-01-08 RX ADMIN — METOPROLOL TARTRATE 50 MG: 50 TABLET, FILM COATED ORAL at 20:57

## 2024-01-08 RX ADMIN — LOSARTAN POTASSIUM 50 MG: 50 TABLET, FILM COATED ORAL at 08:32

## 2024-01-08 NOTE — PLAN OF CARE
Problem: PAIN - ADULT  Goal: Verbalizes/displays adequate comfort level or baseline comfort level  Description: Interventions:  - Encourage patient to monitor pain and request assistance  - Assess pain using appropriate pain scale  - Administer analgesics based on type and severity of pain and evaluate response  - Implement non-pharmacological measures as appropriate and evaluate response  - Consider cultural and social influences on pain and pain management  - Notify physician/advanced practitioner if interventions unsuccessful or patient reports new pain  Outcome: Progressing     Problem: INFECTION - ADULT  Goal: Absence or prevention of progression during hospitalization  Description: INTERVENTIONS:  - Assess and monitor for signs and symptoms of infection  - Monitor lab/diagnostic results  - Monitor all insertion sites, i.e. indwelling lines, tubes, and drains  - Monitor endotracheal if appropriate and nasal secretions for changes in amount and color  - Tofte appropriate cooling/warming therapies per order  - Administer medications as ordered  - Instruct and encourage patient and family to use good hand hygiene technique  - Identify and instruct in appropriate isolation precautions for identified infection/condition  Outcome: Progressing  Goal: Absence of fever/infection during neutropenic period  Description: INTERVENTIONS:  - Monitor WBC    Outcome: Progressing     Problem: SAFETY ADULT  Goal: Patient will remain free of falls  Description: INTERVENTIONS:  - Educate patient/family on patient safety including physical limitations  - Instruct patient to call for assistance with activity   - Consult OT/PT to assist with strengthening/mobility   - Keep Call bell within reach  - Keep bed low and locked with side rails adjusted as appropriate  - Keep care items and personal belongings within reach  - Initiate and maintain comfort rounds  - Make Fall Risk Sign visible to staff  - Offer Toileting every 2 Hours,  in advance of need  - Initiate/Maintain bed alarm  - Obtain necessary fall risk management equipment: yellow socks  - Apply yellow socks and bracelet for high fall risk patients  - Consider moving patient to room near nurses station  Outcome: Progressing  Goal: Maintain or return to baseline ADL function  Description: INTERVENTIONS:  -  Assess patient's ability to carry out ADLs; assess patient's baseline for ADL function and identify physical deficits which impact ability to perform ADLs (bathing, care of mouth/teeth, toileting, grooming, dressing, etc.)  - Assess/evaluate cause of self-care deficits   - Assess range of motion  - Assess patient's mobility; develop plan if impaired  - Assess patient's need for assistive devices and provide as appropriate  - Encourage maximum independence but intervene and supervise when necessary  - Involve family in performance of ADLs  - Assess for home care needs following discharge   - Consider OT consult to assist with ADL evaluation and planning for discharge  - Provide patient education as appropriate  Outcome: Progressing  Goal: Maintains/Returns to pre admission functional level  Description: INTERVENTIONS:  - Perform AM-PAC 6 Click Basic Mobility/ Daily Activity assessment daily.  - Set and communicate daily mobility goal to care team and patient/family/caregiver.   - Collaborate with rehabilitation services on mobility goals if consulted  - Perform Range of Motion 2 times a day.  - Reposition patient every 2 hours.  - Dangle patient 2 times a day  - Stand patient 2 times a day  - Ambulate patient 2 times a day  - Out of bed to chair 2 times a day   - Out of bed for meals 2 times a day  - Out of bed for toileting  - Record patient progress and toleration of activity level   Outcome: Progressing     Problem: DISCHARGE PLANNING  Goal: Discharge to home or other facility with appropriate resources  Description: INTERVENTIONS:  - Identify barriers to discharge w/patient and  caregiver  - Arrange for needed discharge resources and transportation as appropriate  - Identify discharge learning needs (meds, wound care, etc.)  - Arrange for interpretive services to assist at discharge as needed  - Refer to Case Management Department for coordinating discharge planning if the patient needs post-hospital services based on physician/advanced practitioner order or complex needs related to functional status, cognitive ability, or social support system  Outcome: Progressing     Problem: Knowledge Deficit  Goal: Patient/family/caregiver demonstrates understanding of disease process, treatment plan, medications, and discharge instructions  Description: Complete learning assessment and assess knowledge base.  Interventions:  - Provide teaching at level of understanding  - Provide teaching via preferred learning methods  Outcome: Progressing     Problem: CARDIOVASCULAR - ADULT  Goal: Maintains optimal cardiac output and hemodynamic stability  Description: INTERVENTIONS:  - Monitor I/O, vital signs and rhythm  - Monitor for S/S and trends of decreased cardiac output  - Administer and titrate ordered vasoactive medications to optimize hemodynamic stability  - Assess quality of pulses, skin color and temperature  - Assess for signs of decreased coronary artery perfusion  - Instruct patient to report change in severity of symptoms  Outcome: Progressing  Goal: Absence of cardiac dysrhythmias or at baseline rhythm  Description: INTERVENTIONS:  - Continuous cardiac monitoring, vital signs, obtain 12 lead EKG if ordered  - Administer antiarrhythmic and heart rate control medications as ordered  - Monitor electrolytes and administer replacement therapy as ordered  Outcome: Progressing     Problem: RESPIRATORY - ADULT  Goal: Achieves optimal ventilation and oxygenation  Description: INTERVENTIONS:  - Assess for changes in respiratory status  - Assess for changes in mentation and behavior  - Position to  facilitate oxygenation and minimize respiratory effort  - Oxygen administered by appropriate delivery if ordered  - Initiate smoking cessation education as indicated  - Encourage broncho-pulmonary hygiene including cough, deep breathe, Incentive Spirometry  - Assess the need for suctioning and aspirate as needed  - Assess and instruct to report SOB or any respiratory difficulty  - Respiratory Therapy support as indicated  Outcome: Progressing     Problem: Nutrition/Hydration-ADULT  Goal: Nutrient/Hydration intake appropriate for improving, restoring or maintaining nutritional needs  Description: Monitor and assess patient's nutrition/hydration status for malnutrition. Collaborate with interdisciplinary team and initiate plan and interventions as ordered.  Monitor patient's weight and dietary intake as ordered or per policy. Utilize nutrition screening tool and intervene as necessary. Determine patient's food preferences and provide high-protein, high-caloric foods as appropriate.     INTERVENTIONS:  - Monitor oral intake, urinary output, labs, and treatment plans  - Assess nutrition and hydration status and recommend course of action  - Evaluate amount of meals eaten  - Assist patient with eating if necessary   - Allow adequate time for meals  - Recommend/ encourage appropriate diets, oral nutritional supplements, and vitamin/mineral supplements  - Order, calculate, and assess calorie counts as needed  - Recommend, monitor, and adjust tube feedings and TPN/PPN based on assessed needs  - Assess need for intravenous fluids  - Provide specific nutrition/hydration education as appropriate  - Include patient/family/caregiver in decisions related to nutrition  Outcome: Progressing

## 2024-01-08 NOTE — PLAN OF CARE
Problem: PAIN - ADULT  Goal: Verbalizes/displays adequate comfort level or baseline comfort level  Description: Interventions:  - Encourage patient to monitor pain and request assistance  - Assess pain using appropriate pain scale  - Administer analgesics based on type and severity of pain and evaluate response  - Implement non-pharmacological measures as appropriate and evaluate response  - Consider cultural and social influences on pain and pain management  - Notify physician/advanced practitioner if interventions unsuccessful or patient reports new pain  Outcome: Progressing     Problem: CARDIOVASCULAR - ADULT  Goal: Maintains optimal cardiac output and hemodynamic stability  Description: INTERVENTIONS:  - Monitor I/O, vital signs and rhythm  - Monitor for S/S and trends of decreased cardiac output  - Administer and titrate ordered vasoactive medications to optimize hemodynamic stability  - Assess quality of pulses, skin color and temperature  - Assess for signs of decreased coronary artery perfusion  - Instruct patient to report change in severity of symptoms  Outcome: Progressing     Problem: RESPIRATORY - ADULT  Goal: Achieves optimal ventilation and oxygenation  Description: INTERVENTIONS:  - Assess for changes in respiratory status  - Assess for changes in mentation and behavior  - Position to facilitate oxygenation and minimize respiratory effort  - Oxygen administered by appropriate delivery if ordered  - Initiate smoking cessation education as indicated  - Encourage broncho-pulmonary hygiene including cough, deep breathe, Incentive Spirometry  - Assess the need for suctioning and aspirate as needed  - Assess and instruct to report SOB or any respiratory difficulty  - Respiratory Therapy support as indicated  Outcome: Progressing

## 2024-01-08 NOTE — PROGRESS NOTES
"Progress Note - Cardiology   [facility]     Luther Vaughan 63 y.o. male MRN: [id number]  : 1960  Unit/Bed#: 4 [address] Encounter: [id number]    Assessment and Plan:   1.  Paroxysmal atrial fibrillation: Rate controlled but remains in atrial fibrillation    -   Continue Cardizem drip    -   Will increase Lopressor to 50 mg twice daily    -   [id number] score = 4, patient currently on weight-based Lovenox, awaiting price check for Pradaxa and factor Xa inhibitors.    -   For ELISA guided cardioversion today    2.  Hypertension: Blood pressure still above goal    -   Increase Cozaar to 50 mg daily    -   Transition patient to Demadex 10 mg once a day    -   Continue to monitor    3.  Diabetes: Hemoglobin A1c 6.7    -   Managed per primary team    4.  Morbid obesity with obstructive sleep apnea: BMI 49.2    -   Patient notes he is compliant with CPAP    5.  Chronic right lower extremity ulcer: Care per wound nurse    6.  Alcohol use: Encourage cessation    Subjective / Objective:   Patient seen and examined.  Unfortunately he remains in atrial fibrillation and will undergo ELISA guided cardioversion.  Rate controlled on beta-blocker and low-dose Cardizem drip.    Vitals: Blood pressure 144/87, pulse 96, temperature 97.8 °F (36.6 °C), temperature source Oral, resp. rate 17, height 5' 8\" (1.727 m), weight (!) 147 kg (324 lb), SpO2 97%.  Vitals:    24 1501 24 0743   Weight: (!) 147 kg (324 lb 1.2 oz) (!) 147 kg (324 lb)     Body mass index is 49.26 kg/m².  BP Readings from Last 3 Encounters:   24 144/87   24 (!) 164/109   23 140/90     Orthostatic Blood Pressures      Flowsheet Row Most Recent Value   Blood Pressure 144/87 filed at 2024 0741          I/O          0701   07 0701   07 07 0700    P.O. 180 1140 30    Total Intake(mL/kg) 180 (1.2) 1140 (7.8) 30 (0.2)    Urine (mL/kg/hr)  1175 (0.3)     Total Output  " 1175     Net +180 -35 +30                 Invasive Devices       Peripheral Intravenous Line  Duration             Peripheral IV 01/05/24 Right Antecubital 2 days                      Intake/Output Summary (Last 24 hours) at 1/8/2024 0953  Last data filed at 1/8/2024 0848  Gross per 24 hour   Intake 690 ml   Output 1075 ml   Net -385 ml         Physical Exam:   Physical Exam  Vitals and nursing note reviewed.   Constitutional:       Appearance: Normal appearance. He is morbidly obese.   HENT:      Right Ear: External ear normal.      Left Ear: External ear normal.   Eyes:      General: No scleral icterus.        Right eye: No discharge.         Left eye: No discharge.   Cardiovascular:      Rate and Rhythm: Normal rate. Rhythm irregular.      Pulses: Normal pulses.      Heart sounds: Murmur heard.   Pulmonary:      Effort: Pulmonary effort is normal.      Breath sounds: Normal breath sounds.   Abdominal:      General: Bowel sounds are normal. There is no distension.      Palpations: Abdomen is soft.   Musculoskeletal:      Right lower leg: Edema present.      Left lower leg: Edema present.   Skin:     General: Skin is warm and dry.      Capillary Refill: Capillary refill takes less than 2 seconds.   Neurological:      General: No focal deficit present.      Mental Status: He is alert and oriented to person, place, and time. Mental status is at baseline.   Psychiatric:         Mood and Affect: Mood normal.         Behavior: Behavior is cooperative.                Medications/ Allergies:     Current Facility-Administered Medications   Medication Dose Route Frequency Provider Last Rate    acetaminophen  650 mg Oral Q6H PRN Kelin Russo PA-C      atorvastatin  10 mg Oral Daily With Dinner Dimas Anguiano MD      cholecalciferol  400 Units Oral Daily Kelin Russo PA-C      diltiazem  1-20 mg/hr Intravenous Titrated Navtenicole Cony, DO 10 mg/hr (01/08/24 0540)    enoxaparin  1 mg/kg Subcutaneous Q12H [facility] Reyna Carvajal  "CRNP      losartan  50 mg Oral Daily Reyna Carvajal, CRNP      metoprolol tartrate  25 mg Oral Q12H [facility] Reyna Carvajal, CRNP      sodium chloride (PF)  3 mL Intravenous Q1H PRN Salvador Riojas,        acetaminophen, 650 mg, Q6H PRN  sodium chloride (PF), 3 mL, Q1H PRN      Allergies   Allergen Reactions    Erythromycin Diarrhea    Fish-Derived Products - Food Allergy Diarrhea     States \"cartilage fish\" cause sweating, diarrhea, vomiting       VTE Pharmacologic Prophylaxis:   Sequential compression device (Venodyne)     Labs:   Troponins:  Results from last 7 days   Lab Units 01/05/24  1759 01/05/24  1537   HSTNI D2 ng/L  --  0   HSTNI D4 ng/L 0  --      CBC with diff:  Results from last 7 days   Lab Units 01/08/24 0435 01/07/24  0538 01/06/24  0522 01/05/24  1152   WBC Thousand/uL 11.12* 10.30* 11.03* 11.41*   HEMOGLOBIN g/dL 15.2 15.4 15.0 15.3   HEMATOCRIT % 46.8 45.7 48.2 47.3   MCV fL 94 94 97 94   PLATELETS Thousands/uL 246 236 265 261   RBC Million/uL 4.96 4.87 4.99 5.04   MCH pg 30.6 31.6 30.1 30.4   MCHC g/dL 32.5 33.7 31.1* 32.3   RDW % 13.2 13.2 13.3 13.2   MPV fL 9.4 9.0 8.9 9.2   NRBC AUTO /100 WBCs  --   --   --  0     CMP:  Results from last 7 days   Lab Units 01/08/24  0435 01/07/24  0538 01/06/24  0522 01/05/24  1152   SODIUM mmol/L 137 136 135 136   POTASSIUM mmol/L 4.0 4.3 4.4 4.1   CHLORIDE mmol/L 104 106 104 104   CO2 mmol/L 28 24 22 25   ANION GAP mmol/L 5 6 9 7   BUN mg/dL 19 21 21 18   CREATININE mg/dL 0.93 0.94 0.90 0.95   CALCIUM mg/dL 8.9 9.0 8.7 9.4   AST U/L  --   --   --  22   ALT U/L  --   --   --  22   ALK PHOS U/L  --   --   --  60   TOTAL PROTEIN g/dL  --   --   --  7.1   ALBUMIN g/dL  --   --   --  3.9   TOTAL BILIRUBIN mg/dL  --   --   --  0.70   EGFR ml/min/1.73sq m 85 84 89 83       Magnesium:  Results from last 7 days   Lab Units 01/08/24  0435 01/07/24  0538 01/06/24  0522 01/05/24  1152   MAGNESIUM mg/dL 1.9 2.0 2.5 1.9       Lipid Profile:  Results from last 7 days   Lab " Units 01/06/24  0522   CHOLESTEROL mg/dL 135   TRIGLYCERIDES mg/dL 97   HDL mg/dL 37*   LDL CALC mg/dL 79         Imaging & Testing   I have personally reviewed pertinent reports.    Echo complete w/ contrast if indicated    Result Date: 1/5/2024  Narrative:   Left Ventricle: Left ventricular cavity size is normal. Wall thickness is moderately increased. There is mild to moderate concentric hypertrophy. The left ventricular ejection fraction is around 55% by visual estimation. Systolic function is normal.  Hard to assess accurately EF due to tachycardia and atrial fibrillation. Although no diagnostic regional wall motion abnormality was identified, this possibility cannot be completely excluded on the basis of this study.   Left Atrium: The atrium is mildly dilated by 2D.   Aortic Valve: The aortic valve is trileaflet. The leaflets are moderately thickened. The leaflets are moderately calcified. There is moderately reduced mobility. There is mild to moderate stenosis by 2D, it was a limited study velocities were not measured..   Mitral Valve: There is mild regurgitation.   Tricuspid Valve: There is mild regurgitation.  Pulmonary artery pressure around 30 mmHg.   IVC/SVC: The inferior vena cava is upper normal in size.  It has around 50% collapse with inspiration.   Prior TTE study available for comparison. Prior study date: 7/3/2023.  As compared to that study EF remained the same.  Aortic valve appears to have at least mild stenosis.     X-ray chest 1 view portable    Result Date: 1/5/2024  Narrative: CHEST INDICATION:   chest pain. COMPARISON: Chest radiograph January 30, 2016 EXAM PERFORMED/VIEWS:  XR CHEST PORTABLE FINDINGS: Cardiomediastinal silhouette appears unremarkable. The lungs are clear.  No pneumothorax or pleural effusion. Osseous structures appear within normal limits for patient age.     Impression: No acute cardiopulmonary disease. Workstation performed: [id number]     CTA ED chest PE  "Study    Result Date: 1/5/2024  Narrative: CTA - CHEST WITH IV CONTRAST - PULMONARY ANGIOGRAM INDICATION:   Tachy, new afib, elevated dimer. \"Palpitations. Was at wound care for a procedure and sent to ER for new onset A-fib and hypertension.\" COMPARISON: CXR 1/5/2024. TECHNIQUE: CT angiogram timed for optimal opacification of the pulmonary arteries.  Axial, sagittal, and coronal 2D reformats created from source data.  Coronal 3D MIP postprocessing on the acquisition scanner. Radiation dose length product (DLP):  1518.28 mGy-cm .  Radiation dose exposure minimized using iterative reconstruction and automated exposure control. IV Contrast:  100 mL of iohexol (OMNIPAQUE) FINDINGS: PULMONARY ARTERIES:  No pulmonary embolus. LUNGS: Mild septal thickening due to interstitial edema. Mild dependent lower lobe groundglass opacity, likely benign atelectasis. AIRWAYS: No significant filling defects. PLEURA:  Unremarkable. HEART/GREAT VESSELS: Mild cardiomegaly. Mild calcification of the aortic valve leaflets which can contribute to aortic stenosis. MEDIASTINUM AND ANTONIO:  Unremarkable. CHEST WALL AND LOWER NECK: Unremarkable. UPPER ABDOMEN: Colonic diverticuli. OSSEOUS STRUCTURES: Mild degenerative disease in the spine.     Impression: No pulmonary embolus. Mild interstitial edema. Mild calcification of the aortic valve leaflets which can contribute to aortic stenosis. Workstation performed: [id number]        EKG / Monitor: Personally reviewed.    Atrial fibrillation with controlled ventricular response, patient is for ELISA guided cardioversion today            Reyna HAGAN  Cardiology      \"This note was completed in part utilizing [employer] fluency direct voice recognition software.   Grammatical errors, random word insertion, spelling mistakes, and incomplete sentences may be an occasional consequence of the system secondary to software limitations, ambient noise and hardware issues.    Please read the chart carefully " "and recognize, using context, where substitutions have occurred.  If you have any questions or concerns about the context, text or information contained within the body of this dictation, please contact myself, the provider, for further clarification.\"  "

## 2024-01-08 NOTE — ASSESSMENT & PLAN NOTE
Patient was sent in from wound care after he was noted to be hypertensive and tachycardic.  CTA chest: No pulmonary embolus. Mild interstitial edema.  EKG: Afib with RVR   Given metoprolol in ED, HR remained in 120s  Cardiology consulted  Patient rate had been controlled on cardizem gtt; cardioversion as noted below  Started on metoprolol 25 mg q12h  Was on weight based lovenox; switch to Pradaxa 150 mg p.o. every 12 hours by cardiology  ECHO 1/5/23: EF 55%, systolic function normal, hard to assess due to tachycardia and afib  Plan for ELISA with cardioversion if he does not spontaneously convert by Monday  Patient underwent ELISA on 1/8 with successful cardioversion to sinus rhythm  Continue to monitor at this time

## 2024-01-08 NOTE — ANESTHESIA POSTPROCEDURE EVALUATION
Post-Op Assessment Note    CV Status:  Stable  Pain Score: 0    Pain management: adequate       Mental Status:  Sleepy   Hydration Status:  Stable   PONV Controlled:  None   Airway Patency:  Patent     Post Op Vitals Reviewed: Yes      Staff: Anesthesiologist               BP   105/61   Temp      Pulse  64   Resp      SpO2  100%

## 2024-01-08 NOTE — PROGRESS NOTES
"[facility]  Progress Note  Name: Luther Vaughan I  MRN: [id number]  Unit/Bed#: 4 [address] Date of Admission: 1/5/2024   Date of Service: 1/8/2024 I Hospital Day: 3    Assessment/Plan   * New onset atrial fibrillation (HCC)  Assessment & Plan  Patient was sent in from wound care after he was noted to be hypertensive and tachycardic.  CTA chest: No pulmonary embolus. Mild interstitial edema.  EKG: Afib with RVR   Given metoprolol in ED, HR remained in 120s  Cardiology consulted  Patient rate had been controlled on cardizem gtt; cardioversion as noted below  Started on metoprolol 25 mg q12h  Was on weight based lovenox; switch to Pradaxa 150 mg p.o. every 12 hours by cardiology  ECHO 1/5/23: EF 55%, systolic function normal, hard to assess due to tachycardia and afib  Plan for ELISA with cardioversion if he does not spontaneously convert by Monday  Patient underwent ELISA on 1/8 with successful cardioversion to sinus rhythm  Continue to monitor at this time    Hypertension  Assessment & Plan  Home valsartan substituted with losartan 25 mg daily  BP improved since admission      Acute diastolic CHF (congestive heart failure) (HCC)  Assessment & Plan  In setting of afib with RVR  ECHO 1/5: EF 55%, systolic function normal, difficult to accurately assess due to tachycardia and a fib, mild to moderate aortic stenosis  Cardiology following  Had received IV Lasix, transition to Demadex 10 mg p.o. daily  Monitor Is/Os and daily weights  Fluid and salt restriction    Type 2 diabetes mellitus (HCC)  Assessment & Plan  Lab Results   Component Value Date    HGBA1C 6.7 (H) 09/26/2023       No results for input(s): \"POCGLU\" in the last 72 hours.    Blood Sugar Average: Last 72 hrs:  not on any prior to admission diabetes medications  Monitor glucose  Will start sliding scale if needed    Obstructive sleep apnea  Assessment & Plan  Compliant with cpap  Continue cpap hs    Dyslipidemia  Assessment " & Plan  Started on Lipitor 10 mg  Heart healthy diet    Chronic ulcer of lower extremity (HCC)  Assessment & Plan  Follows with wound care outpatient  On lasix 20 mg outpatient for lymphedema, transition to Demadex 10 mg p.o. daily  Wound care consulted, appreciate recommendations               VTE Pharmacologic Prophylaxis: VTE Score: 5 High Risk (Score >/= 5) - Pharmacological DVT Prophylaxis Ordered: dabigatran (Pradaxa). Sequential Compression Devices Ordered.    Mobility:   Basic Mobility Inpatient Raw Score: 24  JH-HLM Goal: 8: Walk 250 feet or more  JH-HLM Achieved: 8: Walk 250 feet ot more  HLM Goal achieved. Continue to encourage appropriate mobility.    Patient Centered Rounds: I performed bedside rounds with nursing staff today.   Discussions with Specialists or Other Care Team Provider: Multidisciplinary team    Education and Discussions with Family / Patient: Attempted to update  (wife) via phone. Left voicemail.     Total Time Spent on Date of Encounter in care of patient: greater than 45 mins. This time was spent on one or more of the following: performing physical exam; counseling and coordination of care; obtaining or reviewing history; documenting in the medical record; reviewing/ordering tests, medications or procedures; communicating with other healthcare professionals and discussing with patient's family/caregivers.    Current Length of Stay: 3 day(s)  Current Patient Status: Inpatient   Certification Statement: The patient will continue to require additional inpatient hospital stay due to    Discharge Plan: Anticipate discharge in 24-48 hrs to home.    Code Status: Level 1 - Full Code    Subjective:   Patient seen sitting up in chair resting comfortably.  He is waiting to go for his ELISA/cardioversion.  We discussed the procedure and he verbalized understanding.  Denies any chest pain or palpitations, no shortness of breath.    Objective:     Vitals:   Temp (24hrs), Av.7 °F  (36.5 °C), Min:97.5 °F (36.4 °C), Max:97.9 °F (36.6 °C)    Temp:  [97.5 °F (36.4 °C)-97.9 °F (36.6 °C)] 97.8 °F (36.6 °C)  HR:  [] 89  Resp:  [15-23] 18  BP: (105-155)/(60-87) 137/77  SpO2:  [93 %-100 %] 97 %  Body mass index is 49.28 kg/m².     Input and Output Summary (last 24 hours):     Intake/Output Summary (Last 24 hours) at 1/8/2024 1848  Last data filed at 1/8/2024 1400  Gross per 24 hour   Intake 390 ml   Output --   Net 390 ml       Physical Exam:   Physical Exam  Vitals and nursing note reviewed.   Constitutional:       General: He is not in acute distress.     Appearance: He is obese.   HENT:      Head: Normocephalic.      Nose: Nose normal.      Mouth/Throat:      Mouth: Mucous membranes are moist.   Eyes:      Extraocular Movements: Extraocular movements intact.      Conjunctiva/sclera: Conjunctivae normal.      Pupils: Pupils are equal, round, and reactive to light.   Cardiovascular:      Rate and Rhythm: Normal rate. Rhythm irregular.      Pulses: Normal pulses.   Pulmonary:      Effort: Pulmonary effort is normal.      Breath sounds: Normal breath sounds.   Abdominal:      General: Bowel sounds are normal. There is no distension.      Palpations: Abdomen is soft.      Tenderness: There is no abdominal tenderness.   Genitourinary:     Comments: voiding spontaneously  Musculoskeletal:         General: Normal range of motion.      Cervical back: Normal range of motion.   Skin:     Capillary Refill: Capillary refill takes less than 2 seconds.      Comments: Dressing LLE   Neurological:      General: No focal deficit present.      Mental Status: He is alert and oriented to person, place, and time.   Psychiatric:         Mood and Affect: Mood normal.         Behavior: Behavior normal.         Thought Content: Thought content normal.         Judgment: Judgment normal.          Additional Data:     Labs:  Results from last 7 days   Lab Units 01/08/24  0435 01/06/24  0522 01/05/24  1152   WBC  Thousand/uL 11.12*   < > 11.41*   HEMOGLOBIN g/dL 15.2   < > 15.3   HEMATOCRIT % 46.8   < > 47.3   PLATELETS Thousands/uL 246   < > 261   NEUTROS PCT %  --   --  71   LYMPHS PCT %  --   --  22   MONOS PCT %  --   --  6   EOS PCT %  --   --  0    < > = values in this interval not displayed.     Results from last 7 days   Lab Units 01/08/24  0435 01/06/24  0522 01/05/24  1152   SODIUM mmol/L 137   < > 136   POTASSIUM mmol/L 4.0   < > 4.1   CHLORIDE mmol/L 104   < > 104   CO2 mmol/L 28   < > 25   BUN mg/dL 19   < > 18   CREATININE mg/dL 0.93   < > 0.95   ANION GAP mmol/L 5   < > 7   CALCIUM mg/dL 8.9   < > 9.4   ALBUMIN g/dL  --   --  3.9   TOTAL BILIRUBIN mg/dL  --   --  0.70   ALK PHOS U/L  --   --  60   ALT U/L  --   --  22   AST U/L  --   --  22   GLUCOSE RANDOM mg/dL 112   < > 111    < > = values in this interval not displayed.                       Lines/Drains:  Invasive Devices       Peripheral Intravenous Line  Duration             Peripheral IV 01/05/24 Right Antecubital 3 days                      Telemetry:  Telemetry Orders (From admission, onward)               24 Hour Telemetry Monitoring  Continuous x 24 Hours (Telem)        Question:  Reason for 24 Hour Telemetry  Answer:  Decompensated CHF- and any one of the following: continuous diuretic infusion or total diuretic dose >200 mg daily, associated electrolyte derangement (I.e. K < 3.0), ionotropic drip (continuous infusion), hx of ventricular arrhythmia, or new EF < 35%                     Telemetry Reviewed: Atrial fibrillation. HR averaging 70s-80s  Indication for Continued Telemetry Use: Arrthymias requiring medical therapy             Imaging: No pertinent imaging reviewed.    Recent Cultures (last 7 days):         Last 24 Hours Medication List:   Current Facility-Administered Medications   Medication Dose Route Frequency Provider Last Rate    acetaminophen  650 mg Oral Q6H PRN Kelin Russo PA-C      atorvastatin  10 mg Oral Daily With Dinner  Dimas Anguiano MD      cholecalciferol  400 Units Oral Daily Kelin Russo PA-C      clotrimazole-betamethasone   Topical BID Leonardo Duron DO      dabigatran etexilate  150 mg Oral Q12H MACHELLE DANYA Stafford      losartan  50 mg Oral Daily DANYA Stafford      metoprolol tartrate  50 mg Oral Q12H MACHELLE DANYA Stafford      nystatin   Topical BID Leonardo Duron DO      sodium chloride (PF)  3 mL Intravenous Q1H PRN Salvador Riojas DO      [START ON 1/9/2024] torsemide  10 mg Oral Daily DANYA Stafford          Today, Patient Was Seen By: DANYA Whitfield    **Please Note: This note may have been constructed using a voice recognition system.**

## 2024-01-08 NOTE — ASSESSMENT & PLAN NOTE
In setting of afib with RVR  ECHO 1/5: EF 55%, systolic function normal, difficult to accurately assess due to tachycardia and a fib, mild to moderate aortic stenosis  Cardiology following  Had received IV Lasix, transition to Demadex 10 mg p.o. daily  Monitor Is/Os and daily weights  Fluid and salt restriction

## 2024-01-08 NOTE — SEDATION DOCUMENTATION
Pt tolerated ELISA/CV. Pt in NSS. Pt in cath recovery then we be transported to IP room. Vitals stable.

## 2024-01-08 NOTE — ANESTHESIA PREPROCEDURE EVALUATION
Procedure:  ELISA  CARDIOVERSION    Relevant Problems   CARDIO   (+) Hypertension   (+) New onset atrial fibrillation (HCC)      ENDO   (+) Type 2 diabetes mellitus (HCC)      MUSCULOSKELETAL   (+) Primary osteoarthritis of left knee   (+) Primary osteoarthritis of right hip   (+) Primary osteoarthritis of right knee      PULMONARY   (+) Obstructive sleep apnea        Physical Exam    Airway    Mallampati score: III  TM Distance: >3 FB  Neck ROM: full     Dental       Cardiovascular  Rhythm: irregular, Rate: normal    Pulmonary   Breath sounds clear to auscultation    Other Findings        Anesthesia Plan  ASA Score- 3     Anesthesia Type- IV sedation with anesthesia with ASA Monitors.         Additional Monitors:     Airway Plan:            Plan Factors-    Chart reviewed.        Patient is not a current smoker.              Induction- intravenous.    Postoperative Plan-     Informed Consent- Anesthetic plan and risks discussed with patient.  I personally reviewed this patient with the CRNA. Discussed and agreed on the Anesthesia Plan with the CRNA..

## 2024-01-08 NOTE — ASSESSMENT & PLAN NOTE
Follows with wound care outpatient  On lasix 20 mg outpatient for lymphedema, transition to Demadex 10 mg p.o. daily  Wound care consulted, appreciate recommendations

## 2024-01-09 VITALS
HEART RATE: 82 BPM | HEIGHT: 68 IN | SYSTOLIC BLOOD PRESSURE: 113 MMHG | DIASTOLIC BLOOD PRESSURE: 98 MMHG | RESPIRATION RATE: 18 BRPM | TEMPERATURE: 97.5 F | BODY MASS INDEX: 47.74 KG/M2 | WEIGHT: 315 LBS | OXYGEN SATURATION: 95 %

## 2024-01-09 LAB
ANION GAP SERPL CALCULATED.3IONS-SCNC: 5 MMOL/L
BUN SERPL-MCNC: 17 MG/DL (ref 5–25)
CALCIUM SERPL-MCNC: 8.7 MG/DL (ref 8.4–10.2)
CHLORIDE SERPL-SCNC: 107 MMOL/L (ref 96–108)
CO2 SERPL-SCNC: 25 MMOL/L (ref 21–32)
CREAT SERPL-MCNC: 0.91 MG/DL (ref 0.6–1.3)
ERYTHROCYTE [DISTWIDTH] IN BLOOD BY AUTOMATED COUNT: 13.3 % (ref 11.6–15.1)
GFR SERPL CREATININE-BSD FRML MDRD: 88 ML/MIN/1.73SQ M
GLUCOSE SERPL-MCNC: 111 MG/DL (ref 65–140)
HCT VFR BLD AUTO: 44.4 % (ref 36.5–49.3)
HGB BLD-MCNC: 14.6 G/DL (ref 12–17)
MAGNESIUM SERPL-MCNC: 2 MG/DL (ref 1.9–2.7)
MCH RBC QN AUTO: 31.4 PG (ref 26.8–34.3)
MCHC RBC AUTO-ENTMCNC: 32.9 G/DL (ref 31.4–37.4)
MCV RBC AUTO: 96 FL (ref 82–98)
PLATELET # BLD AUTO: 222 THOUSANDS/UL (ref 149–390)
PMV BLD AUTO: 8.9 FL (ref 8.9–12.7)
POTASSIUM SERPL-SCNC: 4.3 MMOL/L (ref 3.5–5.3)
RBC # BLD AUTO: 4.65 MILLION/UL (ref 3.88–5.62)
SODIUM SERPL-SCNC: 137 MMOL/L (ref 135–147)
WBC # BLD AUTO: 10.05 THOUSAND/UL (ref 4.31–10.16)

## 2024-01-09 PROCEDURE — 94762 N-INVAS EAR/PLS OXIMTRY CONT: CPT

## 2024-01-09 PROCEDURE — 99232 SBSQ HOSP IP/OBS MODERATE 35: CPT | Performed by: INTERNAL MEDICINE

## 2024-01-09 PROCEDURE — 83735 ASSAY OF MAGNESIUM: CPT | Performed by: NURSE PRACTITIONER

## 2024-01-09 PROCEDURE — 80048 BASIC METABOLIC PNL TOTAL CA: CPT | Performed by: NURSE PRACTITIONER

## 2024-01-09 PROCEDURE — 85027 COMPLETE CBC AUTOMATED: CPT | Performed by: NURSE PRACTITIONER

## 2024-01-09 PROCEDURE — 99239 HOSP IP/OBS DSCHRG MGMT >30: CPT | Performed by: NURSE PRACTITIONER

## 2024-01-09 RX ORDER — LOSARTAN POTASSIUM 50 MG/1
50 TABLET ORAL DAILY
Qty: 30 TABLET | Refills: 0 | Status: SHIPPED | OUTPATIENT
Start: 2024-01-10 | End: 2024-02-09

## 2024-01-09 RX ORDER — CLOTRIMAZOLE AND BETAMETHASONE DIPROPIONATE 10; .64 MG/G; MG/G
CREAM TOPICAL 2 TIMES DAILY
Start: 2024-01-09 | End: 2024-01-16

## 2024-01-09 RX ORDER — NYSTATIN 100000 [USP'U]/G
POWDER TOPICAL 2 TIMES DAILY
Qty: 30 G | Refills: 0 | Status: SHIPPED | OUTPATIENT
Start: 2024-01-09 | End: 2024-01-16

## 2024-01-09 RX ORDER — POTASSIUM CHLORIDE 1.5 G/1.58G
20 POWDER, FOR SOLUTION ORAL DAILY
Qty: 30 PACKET | Refills: 0 | Status: SHIPPED | OUTPATIENT
Start: 2024-01-09 | End: 2024-02-08

## 2024-01-09 RX ADMIN — CLOTRIMAZOLE AND BETAMETHASONE DIPROPIONATE: 10; .64 CREAM TOPICAL at 10:12

## 2024-01-09 RX ADMIN — NYSTATIN: 100000 POWDER TOPICAL at 10:12

## 2024-01-09 RX ADMIN — METOPROLOL TARTRATE 50 MG: 50 TABLET, FILM COATED ORAL at 08:51

## 2024-01-09 RX ADMIN — TORSEMIDE 10 MG: 10 TABLET ORAL at 08:51

## 2024-01-09 RX ADMIN — LOSARTAN POTASSIUM 50 MG: 50 TABLET, FILM COATED ORAL at 08:51

## 2024-01-09 RX ADMIN — DABIGATRAN ETEXILATE MESYLATE 150 MG: 150 CAPSULE ORAL at 08:51

## 2024-01-09 RX ADMIN — CHOLECALCIFEROL (VITAMIN D3) 10 MCG (400 UNIT) TABLET 400 UNITS: at 08:51

## 2024-01-09 NOTE — ASSESSMENT & PLAN NOTE
Compliant with cpap  Continue cpap hs  Patient had overnight sleep study night prior to discharge, indicated that it was too short  Patient to follow-up with outpatient sleep center as he has not had testing in over 5 years, referral made

## 2024-01-09 NOTE — PLAN OF CARE
Problem: PAIN - ADULT  Goal: Verbalizes/displays adequate comfort level or baseline comfort level  Description: Interventions:  - Encourage patient to monitor pain and request assistance  - Assess pain using appropriate pain scale  - Administer analgesics based on type and severity of pain and evaluate response  - Implement non-pharmacological measures as appropriate and evaluate response  - Consider cultural and social influences on pain and pain management  - Notify physician/advanced practitioner if interventions unsuccessful or patient reports new pain  Outcome: Progressing     Problem: INFECTION - ADULT  Goal: Absence or prevention of progression during hospitalization  Description: INTERVENTIONS:  - Assess and monitor for signs and symptoms of infection  - Monitor lab/diagnostic results  - Monitor all insertion sites, i.e. indwelling lines, tubes, and drains  - Monitor endotracheal if appropriate and nasal secretions for changes in amount and color  - [place] appropriate cooling/warming therapies per order  - Administer medications as ordered  - Instruct and encourage patient and family to use good hand hygiene technique  - Identify and instruct in appropriate isolation precautions for identified infection/condition  Outcome: Progressing  Goal: Absence of fever/infection during neutropenic period  Description: INTERVENTIONS:  - Monitor WBC    Outcome: Progressing     Problem: SAFETY ADULT  Goal: Patient will remain free of falls  Description: INTERVENTIONS:  - Educate patient/family on patient safety including physical limitations  - Instruct patient to call for assistance with activity   - Consult OT/PT to assist with strengthening/mobility   - Keep Call bell within reach  - Keep bed low and locked with side rails adjusted as appropriate  - Keep care items and personal belongings within reach  - Initiate and maintain comfort rounds  - Make Fall Risk Sign visible to staff  - Offer Toileting every 4 Hours,  in advance of need  - Initiate/Maintain bed / chair alarm  - Obtain necessary fall risk management equipment  - Apply yellow socks and bracelet for high fall risk patients  - Consider moving patient to room near nurses station  Outcome: Progressing  Goal: Maintain or return to baseline ADL function  Description: INTERVENTIONS:  -  Assess patient's ability to carry out ADLs; assess patient's baseline for ADL function and identify physical deficits which impact ability to perform ADLs (bathing, care of mouth/teeth, toileting, grooming, dressing, etc.)  - Assess/evaluate cause of self-care deficits   - Assess range of motion  - Assess patient's mobility; develop plan if impaired  - Assess patient's need for assistive devices and provide as appropriate  - Encourage maximum independence but intervene and supervise when necessary  - Involve family in performance of ADLs  - Assess for home care needs following discharge   - Consider OT consult to assist with ADL evaluation and planning for discharge  - Provide patient education as appropriate  Outcome: Progressing  Goal: Maintains/Returns to pre admission functional level  Description: INTERVENTIONS:  - Perform AM-PAC 6 Click Basic Mobility/ Daily Activity assessment daily.  - Set and communicate daily mobility goal to care team and patient/family/caregiver.   - Collaborate with rehabilitation services on mobility goals if consulted  - Perform Range of Motion 3 times a day.  - Reposition patient every 3 hours.  - Dangle patient 3 times a day  - Stand patient 3 times a day  - Ambulate patient 3 times a day  - Out of bed to chair 3 times a day   - Out of bed for meals 3 times a day  - Out of bed for toileting  - Record patient progress and toleration of activity level   Outcome: Progressing     Problem: DISCHARGE PLANNING  Goal: Discharge to home or other facility with appropriate resources  Description: INTERVENTIONS:  - Identify barriers to discharge w/patient and  caregiver  - Arrange for needed discharge resources and transportation as appropriate  - Identify discharge learning needs (meds, wound care, etc.)  - Arrange for interpretive services to assist at discharge as needed  - Refer to Case Management Department for coordinating discharge planning if the patient needs post-hospital services based on physician/advanced practitioner order or complex needs related to functional status, cognitive ability, or social support system  Outcome: Progressing     Problem: Knowledge Deficit  Goal: Patient/family/caregiver demonstrates understanding of disease process, treatment plan, medications, and discharge instructions  Description: Complete learning assessment and assess knowledge base.  Interventions:  - Provide teaching at level of understanding  - Provide teaching via preferred learning methods  Outcome: Progressing     Problem: CARDIOVASCULAR - ADULT  Goal: Maintains optimal cardiac output and hemodynamic stability  Description: INTERVENTIONS:  - Monitor I/O, vital signs and rhythm  - Monitor for S/S and trends of decreased cardiac output  - Administer and titrate ordered vasoactive medications to optimize hemodynamic stability  - Assess quality of pulses, skin color and temperature  - Assess for signs of decreased coronary artery perfusion  - Instruct patient to report change in severity of symptoms  Outcome: Progressing  Goal: Absence of cardiac dysrhythmias or at baseline rhythm  Description: INTERVENTIONS:  - Continuous cardiac monitoring, vital signs, obtain 12 lead EKG if ordered  - Administer antiarrhythmic and heart rate control medications as ordered  - Monitor electrolytes and administer replacement therapy as ordered  Outcome: Progressing     Problem: RESPIRATORY - ADULT  Goal: Achieves optimal ventilation and oxygenation  Description: INTERVENTIONS:  - Assess for changes in respiratory status  - Assess for changes in mentation and behavior  - Position to  facilitate oxygenation and minimize respiratory effort  - Oxygen administered by appropriate delivery if ordered  - Initiate smoking cessation education as indicated  - Encourage broncho-pulmonary hygiene including cough, deep breathe, Incentive Spirometry  - Assess the need for suctioning and aspirate as needed  - Assess and instruct to report SOB or any respiratory difficulty  - Respiratory Therapy support as indicated  Outcome: Progressing     Problem: Nutrition/Hydration-ADULT  Goal: Nutrient/Hydration intake appropriate for improving, restoring or maintaining nutritional needs  Description: Monitor and assess patient's nutrition/hydration status for malnutrition. Collaborate with interdisciplinary team and initiate plan and interventions as ordered.  Monitor patient's weight and dietary intake as ordered or per policy. Utilize nutrition screening tool and intervene as necessary. Determine patient's food preferences and provide high-protein, high-caloric foods as appropriate.     INTERVENTIONS:  - Monitor oral intake, urinary output, labs, and treatment plans  - Assess nutrition and hydration status and recommend course of action  - Evaluate amount of meals eaten  - Assist patient with eating if necessary   - Allow adequate time for meals  - Recommend/ encourage appropriate diets, oral nutritional supplements, and vitamin/mineral supplements  - Order, calculate, and assess calorie counts as needed  - Recommend, monitor, and adjust tube feedings and TPN/PPN based on assessed needs  - Assess need for intravenous fluids  - Provide specific nutrition/hydration education as appropriate  - Include patient/family/caregiver in decisions related to nutrition  Outcome: Progressing

## 2024-01-09 NOTE — ASSESSMENT & PLAN NOTE
Patient was sent in from wound care after he was noted to be hypertensive and tachycardic.  CTA chest: No pulmonary embolus. Mild interstitial edema.  EKG: Afib with RVR   Given metoprolol in ED, HR remained in 120s  Cardiology consulted  Patient rate had been controlled on cardizem gtt; cardioversion as noted below  Started on metoprolol 25 mg q12h, increased to 50 mg p.o. every 12 hours which patient will continue on discharge  Was on weight based lovenox; switch to Pradaxa 150 mg p.o. every 12 hours by cardiology  ECHO 1/5/23: EF 55%, systolic function normal, hard to assess due to tachycardia and afib  Plan for ELISA with cardioversion if he does not spontaneously convert by Monday  Patient underwent ELISA on 1/8 with successful cardioversion to sinus rhythm  Remained in sinus rhythm overnight.  Patient will be discharged home today, follow-up with primary care physician 1 to 2 weeks postdischarge.  He is also to follow-up with outpatient cardiology 2 to 4 weeks postdischarge.  This was discussed with the patient and his wife at the bedside, they both verbalized understanding and are agreeable to the plan.

## 2024-01-09 NOTE — DISCHARGE INSTR - OTHER ORDERS
Skin Care Plan:    1. Cleanse wound to right lower leg with wound cleanser or soap and water & pat dry. Apply Melgisorb Ag+ silver alginate to open areas, cover with gauze or ABD pad, jocelynn & tape. Change every other day & as needed for soilage/dislodgement.  2. Cleanse sacrobuttocks with foaming cleanser & pat dry. Apply light dusting of Nystatin powder 2x/day and gently remove excess to prevent caking. Cleanse area and pat dry in-between applications.  3. Apply Lotrisone cream to left medial foot, top of left foot and left knee 2x/day.  4. Apply hydraguard barrier cream or equivalent to both heels 2x/day and as needed for prevention and protection  5. Apply skin nourishing cream to the entire skin daily for moisture  6. Turn and reposition every 2 hours in bed.  7. Float heels off of bed with 2 pillows to offload pressure   8. Apply waffle cushion to chair when out of bed - avoid prolonged sitting.  9. Follow up with Dr Mcgill at [facility] - call [phone] for appointment.

## 2024-01-09 NOTE — ASSESSMENT & PLAN NOTE
Home valsartan substituted with losartan 25 mg daily, uptitrated to 50 mg twice daily  Appreciate cardiology recommendations  BP improved since admission  Patient will follow-up with outpatient cardiology 2 to 4 weeks postdischarge.  He will also follow-up with his PCP 1 to 2 weeks postdischarge

## 2024-01-09 NOTE — NURSING NOTE
Discharge information reviewed with patient and wife, they verbalized understanding of  the discharge information.

## 2024-01-09 NOTE — ASSESSMENT & PLAN NOTE
"Lab Results   Component Value Date    HGBA1C 6.7 (H) 09/26/2023       No results for input(s): \"POCGLU\" in the last 72 hours.    Blood Sugar Average: Last 72 hrs:  not on any prior to admission diabetes medications  Monitor glucose  Patient would benefit from therapeutic lifestyle changes including diet modification, increased exercise and weight loss.  Patient to follow-up with PCP 1 to 2 weeks postdischarge.  "

## 2024-01-09 NOTE — DISCHARGE SUMMARY
[facility]  Discharge- Luther Vaughan 1960, 63 y.o. male MRN: [id number]  Unit/Bed#: [address] [place] [address] Encounter: [id number]  Primary Care Provider: Maycol Arango DO   Date and time admitted to hospital: 1/5/2024 11:33 AM    * New onset atrial fibrillation (HCC)  Assessment & Plan  Patient was sent in from wound care after he was noted to be hypertensive and tachycardic.  CTA chest: No pulmonary embolus. Mild interstitial edema.  EKG: Afib with RVR   Given metoprolol in ED, HR remained in 120s  Cardiology consulted  Patient rate had been controlled on cardizem gtt; cardioversion as noted below  Started on metoprolol 25 mg q12h, increased to 50 mg p.o. every 12 hours which patient will continue on discharge  Was on weight based lovenox; switch to Pradaxa 150 mg p.o. every 12 hours by cardiology  ECHO 1/5/23: EF 55%, systolic function normal, hard to assess due to tachycardia and afib  Plan for ELISA with cardioversion if he does not spontaneously convert by Monday  Patient underwent ELISA on 1/8 with successful cardioversion to sinus rhythm  Remained in sinus rhythm overnight.  Patient will be discharged home today, follow-up with primary care physician 1 to 2 weeks postdischarge.  He is also to follow-up with outpatient cardiology 2 to 4 weeks postdischarge.  This was discussed with the patient and his wife at the bedside, they both verbalized understanding and are agreeable to the plan.    Hypertension  Assessment & Plan  Home valsartan substituted with losartan 25 mg daily, uptitrated to 50 mg twice daily  Appreciate cardiology recommendations  BP improved since admission  Patient will follow-up with outpatient cardiology 2 to 4 weeks postdischarge.  He will also follow-up with his PCP 1 to 2 weeks postdischarge      Acute diastolic CHF (congestive heart failure) (HCC)  Assessment & Plan  In setting of afib with RVR  ECHO 1/5: EF 55%, systolic function normal, difficult to  "accurately assess due to tachycardia and a fib, mild to moderate aortic stenosis  Cardiology following  Had received IV Lasix, transition to Demadex 10 mg p.o. daily  Fluid and salt restriction, heart healthy diet on discharge.  Follow-up outpatient cardiology 2 to 4 weeks postdischarge.  Follow-up with outpatient PCP 1 to 2 weeks postdischarge    Type 2 diabetes mellitus (HCC)  Assessment & Plan  Lab Results   Component Value Date    HGBA1C 6.7 (H) 09/26/2023       No results for input(s): \"POCGLU\" in the last 72 hours.    Blood Sugar Average: Last 72 hrs:  not on any prior to admission diabetes medications  Monitor glucose  Patient would benefit from therapeutic lifestyle changes including diet modification, increased exercise and weight loss.  Patient to follow-up with PCP 1 to 2 weeks postdischarge.    Obstructive sleep apnea  Assessment & Plan  Compliant with cpap  Continue cpap hs  Patient had overnight sleep study night prior to discharge, indicated that it was too short  Patient to follow-up with outpatient sleep center as he has not had testing in over 5 years, referral made    Dyslipidemia  Assessment & Plan  Started on Lipitor 10 mg  Heart healthy diet    Chronic ulcer of lower extremity (HCC)  Assessment & Plan  Follows with wound care outpatient  On lasix 20 mg outpatient for lymphedema, transition to Demadex 10 mg p.o. daily  Wound care consulted, appreciate recommendations  Patient will continue to follow with outpatient wound care        Medical Problems       Resolved Problems  Date Reviewed: 1/9/2024   None       Discharging Physician / Practitioner: DANYA Whitfield  PCP: Maycol Arango,   Admission Date:   Admission Orders (From admission, onward)       Ordered        01/05/24 1334  INPATIENT ADMISSION  Once                          Discharge Date: 01/09/24    Consultations During Hospital Stay:  Cardiology    Procedures Performed:   2D echo performed 1/5 showed EF 55%, " normal systolic function, left atrium mildly dilated, PFO  ELISA and cardioversion performed on 1/8/2024 successful conversion to sinus rhythm    Significant Findings / Test Results:   Chest x-ray performed 1/5/2024 showed no acute cardiopulmonary disease as per radiology report.  CTA of chest performed 1/5/2024 shows no pulmonary embolus, mild interstitial edema, mild calcification of the aortic valve leaflets which can contribute to aortic stenosis as per radiology report.    Incidental Findings:   None      Test Results Pending at Discharge (will require follow up):   None     Outpatient Tests Requested:  None    Complications: None    Reason for Admission: Tachycardia and hypertension    Hospital Course:   Luther Vaughan is a 63 y.o. male patient past medical history of HTN, HLD, type 2 diabetes, MARLA, chronic lower extremity ulcer who originally presented to the hospital on 1/5/2024 due to Cardine hypertension.  Patient presented to the [facility] wound care center where he was found to be hypertensive and tachycardic.  He was sent to the ED for further evaluation.  Patient reported feeling no symptoms.  Patient was found to be in A-fib with RVR, started on Cardizem drip and cardiology was consulted.  Medications were adjusted, patient started on metoprolol and uptitrated during stay for better blood pressure control.  His Cozaar dose was also increased.  He was started on Pradaxa during hospitalization.  2D echo was performed which showed EF 55%, normal valve function left atrium mildly dilated, PFO.  Patient underwent ELISA and cardioversion with successful conversion to sinus rhythm which patient remained in overnight.  Patient will continue to follow with outpatient wound care.  Patient had overnight sleep screen done with his cpap from home; result indicated study was too short, and referral to outpatient sleep center was made as patient has not had sleep study for over five years. Patient will be discharged to  "home today and will follow-up with cardiology 2 to 4 weeks postdischarge.  He is also advised to follow-up with his primary care physician 1 to 2 weeks postdischarge.  This was discussed with the patient and his wife at the bedside, they both verbalized understanding and are agreeable to the plan.        Please see above list of diagnoses and related plan for additional information.     Condition at Discharge: good    Discharge Day Visit / Exam:   Subjective:    Seen sitting up in chair resting comfortably.  Reports that he slept very well last night.  Reports he has been keeping an eye on his heart rate and it has remained stable in the 70s overnight.  Denies any chest pain, no shortness of breath.  Good appetite no nausea or vomiting.  Is hopeful to be going home today.    Vitals: Blood Pressure: 113/98 (01/09/24 0846)  Pulse: 82 (01/09/24 0846)  Temperature: 97.5 °F (36.4 °C) (01/09/24 0846)  Temp Source: Oral (01/09/24 0846)  Respirations: 18 (01/09/24 0331)  Height: 5' 8\" (172.7 cm) (01/08/24 1200)  Weight - Scale: (!) 147 kg (323 lb 10.2 oz) (01/09/24 0600)  SpO2: 95 % (01/09/24 0846)    Exam:   Physical Exam  Vitals and nursing note reviewed.   Constitutional:       General: He is not in acute distress.     Appearance: He is obese.   HENT:      Head: Normocephalic.      Nose: Nose normal.      Mouth/Throat:      Mouth: Mucous membranes are moist.   Eyes:      Extraocular Movements: Extraocular movements intact.      Conjunctiva/sclera: Conjunctivae normal.      Pupils: Pupils are equal, round, and reactive to light.   Cardiovascular:      Rate and Rhythm: Normal rate and regular rhythm.      Pulses: Normal pulses.      Heart sounds: Normal heart sounds.   Pulmonary:      Effort: Pulmonary effort is normal.      Comments: Diminished bases  Abdominal:      General: Bowel sounds are normal. There is no distension.      Palpations: Abdomen is soft.      Tenderness: There is no abdominal tenderness. "   Genitourinary:     Comments: Voiding spontaneously   Musculoskeletal:         General: Normal range of motion.      Cervical back: Normal range of motion.      Right lower leg: Edema present.      Left lower leg: Edema present.      Comments: +1 non pitting edema   Neurological:      General: No focal deficit present.      Mental Status: He is alert and oriented to person, place, and time.   Psychiatric:         Mood and Affect: Mood normal.         Behavior: Behavior normal.         Thought Content: Thought content normal.         Judgment: Judgment normal.        Discussion with Family: Updated  (wife) at bedside.    Discharge instructions/Information to patient and family:   See after visit summary for information provided to patient and family.      Provisions for Follow-Up Care:  See after visit summary for information related to follow-up care and any pertinent home health orders.      Mobility at time of Discharge:   Basic Mobility Inpatient Raw Score: 24  JH-HLM Goal: 8: Walk 250 feet or more  JH-HLM Achieved: 8: Walk 250 feet ot more  HLM Goal achieved. Continue to encourage appropriate mobility.     Disposition:   Home    Planned Readmission: No     Discharge Statement:  I spent greater than 45 minutes discharging the patient. This time was spent on the day of discharge. I had direct contact with the patient on the day of discharge. Greater than 50% of the total time was spent examining patient, answering all patient questions, arranging and discussing plan of care with patient as well as directly providing post-discharge instructions.  Additional time then spent on discharge activities.    Discharge Medications:  See after visit summary for reconciled discharge medications provided to patient and/or family.      **Please Note: This note may have been constructed using a voice recognition system**

## 2024-01-09 NOTE — PLAN OF CARE
Problem: PAIN - ADULT  Goal: Verbalizes/displays adequate comfort level or baseline comfort level  Description: Interventions:  - Encourage patient to monitor pain and request assistance  - Assess pain using appropriate pain scale  - Administer analgesics based on type and severity of pain and evaluate response  - Implement non-pharmacological measures as appropriate and evaluate response  - Consider cultural and social influences on pain and pain management  - Notify physician/advanced practitioner if interventions unsuccessful or patient reports new pain  Outcome: Progressing     Problem: INFECTION - ADULT  Goal: Absence or prevention of progression during hospitalization  Description: INTERVENTIONS:  - Assess and monitor for signs and symptoms of infection  - Monitor lab/diagnostic results  - Monitor all insertion sites, i.e. indwelling lines, tubes, and drains  - Monitor endotracheal if appropriate and nasal secretions for changes in amount and color  - Andale appropriate cooling/warming therapies per order  - Administer medications as ordered  - Instruct and encourage patient and family to use good hand hygiene technique  - Identify and instruct in appropriate isolation precautions for identified infection/condition  Outcome: Progressing  Goal: Absence of fever/infection during neutropenic period  Description: INTERVENTIONS:  - Monitor WBC    Outcome: Progressing     Problem: SAFETY ADULT  Goal: Patient will remain free of falls  Description: INTERVENTIONS:  - Educate patient/family on patient safety including physical limitations  - Instruct patient to call for assistance with activity   - Consult OT/PT to assist with strengthening/mobility   - Keep Call bell within reach  - Keep bed low and locked with side rails adjusted as appropriate  - Keep care items and personal belongings within reach  - Initiate and maintain comfort rounds  - Make Fall Risk Sign visible to staff  - Offer Toileting every 2 Hours,  in advance of need  - Initiate/Maintain bed alarm  - Obtain necessary fall risk management equipment: yellow socks  - Apply yellow socks and bracelet for high fall risk patients  - Consider moving patient to room near nurses station  Outcome: Progressing  Goal: Maintain or return to baseline ADL function  Description: INTERVENTIONS:  -  Assess patient's ability to carry out ADLs; assess patient's baseline for ADL function and identify physical deficits which impact ability to perform ADLs (bathing, care of mouth/teeth, toileting, grooming, dressing, etc.)  - Assess/evaluate cause of self-care deficits   - Assess range of motion  - Assess patient's mobility; develop plan if impaired  - Assess patient's need for assistive devices and provide as appropriate  - Encourage maximum independence but intervene and supervise when necessary  - Involve family in performance of ADLs  - Assess for home care needs following discharge   - Consider OT consult to assist with ADL evaluation and planning for discharge  - Provide patient education as appropriate  Outcome: Progressing  Goal: Maintains/Returns to pre admission functional level  Description: INTERVENTIONS:  - Perform AM-PAC 6 Click Basic Mobility/ Daily Activity assessment daily.  - Set and communicate daily mobility goal to care team and patient/family/caregiver.   - Collaborate with rehabilitation services on mobility goals if consulted  - Perform Range of Motion 4 times a day.  - Reposition patient every 2 hours.  - Dangle patient 3 times a day  - Stand patient 3 times a day  - Ambulate patient 3 times a day  - Out of bed to chair 3 times a day   - Out of bed for meals 3 times a day  - Out of bed for toileting  - Record patient progress and toleration of activity level   Outcome: Progressing     Problem: DISCHARGE PLANNING  Goal: Discharge to home or other facility with appropriate resources  Description: INTERVENTIONS:  - Identify barriers to discharge w/patient and  caregiver  - Arrange for needed discharge resources and transportation as appropriate  - Identify discharge learning needs (meds, wound care, etc.)  - Arrange for interpretive services to assist at discharge as needed  - Refer to Case Management Department for coordinating discharge planning if the patient needs post-hospital services based on physician/advanced practitioner order or complex needs related to functional status, cognitive ability, or social support system  Outcome: Progressing     Problem: Knowledge Deficit  Goal: Patient/family/caregiver demonstrates understanding of disease process, treatment plan, medications, and discharge instructions  Description: Complete learning assessment and assess knowledge base.  Interventions:  - Provide teaching at level of understanding  - Provide teaching via preferred learning methods  Outcome: Progressing     Problem: CARDIOVASCULAR - ADULT  Goal: Maintains optimal cardiac output and hemodynamic stability  Description: INTERVENTIONS:  - Monitor I/O, vital signs and rhythm  - Monitor for S/S and trends of decreased cardiac output  - Administer and titrate ordered vasoactive medications to optimize hemodynamic stability  - Assess quality of pulses, skin color and temperature  - Assess for signs of decreased coronary artery perfusion  - Instruct patient to report change in severity of symptoms  Outcome: Progressing  Goal: Absence of cardiac dysrhythmias or at baseline rhythm  Description: INTERVENTIONS:  - Continuous cardiac monitoring, vital signs, obtain 12 lead EKG if ordered  - Administer antiarrhythmic and heart rate control medications as ordered  - Monitor electrolytes and administer replacement therapy as ordered  Outcome: Progressing     Problem: RESPIRATORY - ADULT  Goal: Achieves optimal ventilation and oxygenation  Description: INTERVENTIONS:  - Assess for changes in respiratory status  - Assess for changes in mentation and behavior  - Position to  facilitate oxygenation and minimize respiratory effort  - Oxygen administered by appropriate delivery if ordered  - Initiate smoking cessation education as indicated  - Encourage broncho-pulmonary hygiene including cough, deep breathe, Incentive Spirometry  - Assess the need for suctioning and aspirate as needed  - Assess and instruct to report SOB or any respiratory difficulty  - Respiratory Therapy support as indicated  Outcome: Progressing     Problem: Nutrition/Hydration-ADULT  Goal: Nutrient/Hydration intake appropriate for improving, restoring or maintaining nutritional needs  Description: Monitor and assess patient's nutrition/hydration status for malnutrition. Collaborate with interdisciplinary team and initiate plan and interventions as ordered.  Monitor patient's weight and dietary intake as ordered or per policy. Utilize nutrition screening tool and intervene as necessary. Determine patient's food preferences and provide high-protein, high-caloric foods as appropriate.     INTERVENTIONS:  - Monitor oral intake, urinary output, labs, and treatment plans  - Assess nutrition and hydration status and recommend course of action  - Evaluate amount of meals eaten  - Assist patient with eating if necessary   - Allow adequate time for meals  - Recommend/ encourage appropriate diets, oral nutritional supplements, and vitamin/mineral supplements  - Order, calculate, and assess calorie counts as needed  - Recommend, monitor, and adjust tube feedings and TPN/PPN based on assessed needs  - Assess need for intravenous fluids  - Provide specific nutrition/hydration education as appropriate  - Include patient/family/caregiver in decisions related to nutrition  Outcome: Progressing

## 2024-01-09 NOTE — ASSESSMENT & PLAN NOTE
Follows with wound care outpatient  On lasix 20 mg outpatient for lymphedema, transition to Demadex 10 mg p.o. daily  Wound care consulted, appreciate recommendations  Patient will continue to follow with outpatient wound care

## 2024-01-09 NOTE — CASE MANAGEMENT
Case Management Discharge Planning Note    Patient name Luther Vaughan  Location [address] [place] [address]/[address] [place] [address]-* MRN [id number]  : 1960 Date 2024       Current Admission Date: 2024  Current Admission Diagnosis:New onset atrial fibrillation (HCC)   Patient Active Problem List    Diagnosis Date Noted    New onset atrial fibrillation (HCC) 2024    Hypertension 2024    Obstructive sleep apnea 2024    Acute diastolic CHF (congestive heart failure) (HCC) 2024    Type 2 diabetes mellitus (HCC) 2024    Dyslipidemia 2024    Chronic ulcer of lower extremity (HCC) 2024    BMI 45.0-49.9, adult (HCC) 2023    Primary osteoarthritis of left knee 2022    Primary osteoarthritis of right knee 2022    Primary osteoarthritis of right hip 2022    Incarcerated umbilical hernia 2022    Elevated blood-pressure reading without diagnosis of hypertension 2008    Obesity, unspecified 2008      LOS (days): 4  Geometric Mean LOS (GMLOS) (days): 3.9  Days to GMLOS:-0.1     OBJECTIVE:  Risk of Unplanned Readmission Score: 6.81       Current admission status: Inpatient   Preferred Pharmacy:   [id number] - [place] - [address].  [address]  [place] [address]  Phone: [phone] Fax: [phone]    Primary Care Provider: Maycol Arango DO    Primary Insurance: MEDICARE  Secondary Insurance: [employer]    DISCHARGE DETAILS:    Discharge planning discussed with:: Patient and wife Esthela  Freedom of Choice: Yes    Comments - Freedom of Choice: Patient's preference is to return home at discharge.  He has been medically cleared by attending and his wife will transport him home.  At this time there are no CM needs for discharge.      CM contacted family/caregiver?: Yes  Were Treatment Team discharge recommendations reviewed with patient/caregiver?: Yes  Did patient/caregiver verbalize understanding of patient care needs?:  Yes  Were patient/caregiver advised of the risks associated with not following Treatment Team discharge recommendations?: Yes    Contacts  Patient Contacts: Etshela Vaughan (wife)  Relationship to Patient:: Family  Contact Method: In Person  Reason/Outcome: Emergency Contact, Discharge Planning    Requested Home Health Care         Is the patient interested in HHC at discharge?: No    DME Referral Provided  Referral made for DME?: No    Other Referral/Resources/Interventions Provided:  Interventions: None Indicated    Would you like to participate in our [facility] service program?  : No - Declined    Treatment Team Recommendation: Home  Discharge Destination Plan:: Home  Transport at Discharge : Family         IMM Given (Date):: 01/09/24  IMM Given to:: Patient (IMM reviewed with and signed by patient.  Patient is in agreement with discharge determination.  Copy given to patient and copy placed in scan bin for chart.)

## 2024-01-09 NOTE — ASSESSMENT & PLAN NOTE
In setting of afib with RVR  ECHO 1/5: EF 55%, systolic function normal, difficult to accurately assess due to tachycardia and a fib, mild to moderate aortic stenosis  Cardiology following  Had received IV Lasix, transition to Demadex 10 mg p.o. daily  Fluid and salt restriction, heart healthy diet on discharge.  Follow-up outpatient cardiology 2 to 4 weeks postdischarge.  Follow-up with outpatient PCP 1 to 2 weeks postdischarge

## 2024-01-10 ENCOUNTER — TRANSITIONAL CARE MANAGEMENT (OUTPATIENT)
Dept: FAMILY MEDICINE CLINIC | Facility: CLINIC | Age: 64
End: 2024-01-10

## 2024-01-10 DIAGNOSIS — G47.33 OSA (OBSTRUCTIVE SLEEP APNEA): Primary | ICD-10-CM

## 2024-01-10 LAB — SL CV LV EF: 55

## 2024-01-10 PROCEDURE — 93325 DOPPLER ECHO COLOR FLOW MAPG: CPT | Performed by: INTERNAL MEDICINE

## 2024-01-10 PROCEDURE — 93321 DOPPLER ECHO F-UP/LMTD STD: CPT | Performed by: INTERNAL MEDICINE

## 2024-01-10 PROCEDURE — 93312 ECHO TRANSESOPHAGEAL: CPT | Performed by: INTERNAL MEDICINE

## 2024-01-12 ENCOUNTER — OFFICE VISIT (OUTPATIENT)
Dept: WOUND CARE | Facility: HOSPITAL | Age: 64
End: 2024-01-12
Payer: MEDICARE

## 2024-01-12 VITALS
RESPIRATION RATE: 18 BRPM | DIASTOLIC BLOOD PRESSURE: 92 MMHG | HEART RATE: 86 BPM | TEMPERATURE: 97.2 F | SYSTOLIC BLOOD PRESSURE: 177 MMHG

## 2024-01-12 DIAGNOSIS — I87.311 CHRONIC VENOUS HYPERTENSION (IDIOPATHIC) WITH ULCER OF RIGHT LOWER EXTREMITY (CODE) (HCC): ICD-10-CM

## 2024-01-12 DIAGNOSIS — I89.0 LYMPHEDEMA OF BOTH LOWER EXTREMITIES: ICD-10-CM

## 2024-01-12 DIAGNOSIS — E11.9 TYPE 2 DIABETES MELLITUS WITHOUT COMPLICATION, WITHOUT LONG-TERM CURRENT USE OF INSULIN (HCC): ICD-10-CM

## 2024-01-12 DIAGNOSIS — Z79.01 CHRONIC ANTICOAGULATION: ICD-10-CM

## 2024-01-12 DIAGNOSIS — L97.912 NON-PRESSURE CHRONIC ULCER OF RIGHT LOWER LEG WITH FAT LAYER EXPOSED (HCC): Primary | ICD-10-CM

## 2024-01-12 DIAGNOSIS — R60.9 LIPEDEMA: ICD-10-CM

## 2024-01-12 PROCEDURE — 97597 DBRDMT OPN WND 1ST 20 CM/<: CPT | Performed by: STUDENT IN AN ORGANIZED HEALTH CARE EDUCATION/TRAINING PROGRAM

## 2024-01-12 RX ORDER — LIDOCAINE HYDROCHLORIDE 40 MG/ML
5 SOLUTION TOPICAL ONCE
Status: COMPLETED | OUTPATIENT
Start: 2024-01-12 | End: 2024-01-12

## 2024-01-12 RX ADMIN — LIDOCAINE HYDROCHLORIDE 5 ML: 40 SOLUTION TOPICAL at 09:45

## 2024-01-12 NOTE — PATIENT INSTRUCTIONS
Orders Placed This Encounter   Procedures    Wound cleansing and dressings     Right Lower Leg Wounds:     Wash your hands with soap and water.  Remove old dressing, discard into plastic bag and place in trash.  Cleanse the wound with soap and water prior to applying a clean dressing. Do not use tissue or cotton balls. Do not scrub the wound. Pat dry using gauze.  Shower yes   Apply moisturizer to skin surrounding wound  Apply Santyl, nickel thick to the Right leg wounds.  Cover with gauze and ABD  Secure with rolled gauze and tape.  Change dressing daily.    Applied Medihoney today     This was done today.     Standing Status:   Future     Standing Expiration Date:   1/12/2025    Wound compression and edema control     Elastic Tubular Stocking: Spandagrip G to right lower leg     Tubular elastic bandage: Apply from base of toes to behind the knee. Apply in AM, may remove for sleep.     Avoid prolonged standing in one place.     Elevate leg(s) above the level of the heart when sitting or as much as possible.     Standing Status:   Future     Standing Expiration Date:   1/12/2025

## 2024-01-12 NOTE — PROGRESS NOTES
"Patient ID: Luther Vaughan is a 63 y.o. male Date of Birth 1960     Chief Complaint  Chief Complaint   Patient presents with    Follow Up Wound Care Visit       Allergies  Erythromycin and Fish-derived products - food allergy    Assessment:     Diagnoses and all orders for this visit:    Non-pressure chronic ulcer of right lower leg with fat layer exposed (HCC)  -     Wound cleansing and dressings; Future  -     Wound compression and edema control; Future  -     lidocaine (XYLOCAINE) 4 % topical solution 5 mL  -     collagenase (SANTYL) ointment; Apply topically daily  -     Debridement    Chronic venous hypertension (idiopathic) with ulcer of right lower extremity (CODE) (HCC)  -     Wound cleansing and dressings; Future  -     Wound compression and edema control; Future  -     lidocaine (XYLOCAINE) 4 % topical solution 5 mL    Lymphedema of both lower extremities  -     Wound cleansing and dressings; Future  -     Wound compression and edema control; Future  -     lidocaine (XYLOCAINE) 4 % topical solution 5 mL    Lipedema  -     Wound cleansing and dressings; Future  -     Wound compression and edema control; Future  -     lidocaine (XYLOCAINE) 4 % topical solution 5 mL    Type 2 diabetes mellitus without complication, without long-term current use of insulin (HCC)  -     Wound cleansing and dressings; Future  -     Wound compression and edema control; Future  -     lidocaine (XYLOCAINE) 4 % topical solution 5 mL    Chronic anticoagulation              Debridement   Wound 01/05/24 Venous Ulcer Leg Right;Lower    Universal Protocol:  Consent: Verbal consent obtained.  Risks and benefits: risks, benefits and alternatives were discussed  Consent given by: patient  Time out: Immediately prior to procedure a \"time out\" was called to verify the correct patient, procedure, equipment, support staff and site/side marked as required.  Patient identity confirmed: verbally with patient    Debridement " Details  Performed by: physician  Debridement type: selective  Pain control: lidocaine 4%      Post-debridement measurements  Length (cm): 2.8  Width (cm): 1.6  Depth (cm): 0.2  Percent debrided: 90%  Surface Area (cm^2): 4.48  Area Debrided (cm^2): 4.03  Volume (cm^3): 0.9    Devitalized tissue debrided: biofilm, exudate and fibrin  Instrument(s) utilized: curette  Bleeding: small  Hemostasis obtained with: pressure  Procedural pain (0-10): 1  Post-procedural pain: 0   Response to treatment: procedure was tolerated well        Plan:  It was a pleasure to see Luther Vaughan for wound care follow up today  Selective debridement performed today as above  Wound is improving   Continue plan of care as noted below with santyl. Cont medihoney if santly is too cost prohibitive  No signs or symptoms of infection today. Patient understands that if any signs of infection start (such as increased redness, drainage, pain, fever, chills, diaphoresis), they should call our office or proceed to the ER or Urgent Care.  Patient should continue a high protein diet to facilitate wound healing  Patient is advised to not submerge wound or leave wound open to air.  Follow up in 1 weeks  Given the multi-factorial nature of wound care, additional time was taken to review patient's treatment plan with other specialties and most recent pertinent lab work and imaging.   All plans of care discussed with patient at bedside who verbalized understanding with treatment plan.       Wound 01/05/24 Venous Ulcer Leg Right;Lower (Active)   Wound Image Images linked 01/12/24 0937   Wound Description Beefy red;Slough 01/12/24 0937   Paula-wound Assessment Hyperpigmented;Edema;Dry 01/12/24 0937   Wound Length (cm) 2.8 cm 01/12/24 0937   Wound Width (cm) 1.6 cm 01/12/24 0937   Wound Depth (cm) 0.2 cm 01/12/24 0937   Wound Surface Area (cm^2) 4.48 cm^2 01/12/24 0937   Wound Volume (cm^3) 0.896 cm^3 01/12/24 0937   Calculated Wound Volume (cm^3) 0.9 cm^3  01/12/24 0937   Change in Wound Size % 96.76 01/12/24 0937   Drainage Amount Moderate 01/12/24 0937   Drainage Description Serosanguineous 01/12/24 0937   Non-staged Wound Description Full thickness 01/12/24 0937   Treatments Irrigation with NSS 01/12/24 0937       Wound 01/05/24 Venous Ulcer Leg Right;Lower (Active)   Date First Assessed/Time First Assessed: 01/05/24 1028   Primary Wound Type: Venous Ulcer  Location: Leg  Wound Location Orientation: Right;Lower       Subjective:      .    1/12/24: After last visit patient needed to the ER as recommended.  He was admitted for atrial fibrillation and uncontrolled hypertension.  Eventually needed a cardioversion and is now on chronic anticoagulation.  Also given IV diuretics during his hospitalization.  Patient was discharged a few days ago and is using diuretic, anticoagulation, antihypertensives as directed.  Being wound covered.  Denies any symptoms of infection today.    1/5/24: Consult - Luther is a pleasant 63-year-old male with a past medical history of hypertension and recently diagnosed type 2 diabetes mellitus here today for initial wound care consult.  Patient was referred by his PCP Dr. Maycol Arango.  Seen by PCP on 12/29/2023 at that time presenting for the first time for right lower extremity wound.  Per chart review patient that his right lower extremity on his garbage at home 3 weeks prior to that visit and wound has been getting larger.  Has been using bandages and Neosporin to cover the wound.  Increased swelling and erythema.  At that appointment PCP advised patient to increase Lasix to daily versus every other day due to fluid overload.  Was also given prescription for Bactrim x 7 days and referral to wound management.  Patient notes today that he is not taking any blood pressure medications and also did not  the Bactrim.  Denies any fever chills diaphoresis chest pain headache vision changes shortness of breath.  Does not he that he does  have significant gotten lower extremity edema.  He does have a hx of chronic bilateral lower extremity wounds in the past.                The following portions of the patient's history were reviewed and updated as appropriate: allergies, current medications, past family history, past medical history, past social history, past surgical history, and problem list.    Review of Systems   Constitutional:  Negative for chills, diaphoresis and fever.   Skin:  Positive for wound.   All other systems reviewed and are negative.        Objective:       Wound 01/05/24 Venous Ulcer Leg Right;Lower (Active)   Wound Image Images linked 01/12/24 0937   Wound Description Beefy red;Slough 01/12/24 0937   Paula-wound Assessment Hyperpigmented;Edema;Dry 01/12/24 0937   Wound Length (cm) 2.8 cm 01/12/24 0937   Wound Width (cm) 1.6 cm 01/12/24 0937   Wound Depth (cm) 0.2 cm 01/12/24 0937   Wound Surface Area (cm^2) 4.48 cm^2 01/12/24 0937   Wound Volume (cm^3) 0.896 cm^3 01/12/24 0937   Calculated Wound Volume (cm^3) 0.9 cm^3 01/12/24 0937   Change in Wound Size % 96.76 01/12/24 0937   Drainage Amount Moderate 01/12/24 0937   Drainage Description Serosanguineous 01/12/24 0937   Non-staged Wound Description Full thickness 01/12/24 0937   Treatments Irrigation with NSS 01/12/24 0937       BP (!) 177/92 Comment: Didnt take meds yet today  Pulse 86   Temp (!) 97.2 °F (36.2 °C)   Resp 18     Physical Exam  Vitals reviewed.   Constitutional:       Appearance: Normal appearance.   HENT:      Head: Normocephalic and atraumatic.   Eyes:      Extraocular Movements: Extraocular movements intact.   Pulmonary:      Effort: Pulmonary effort is normal.   Musculoskeletal:      Cervical back: Neck supple.      Right lower leg: Edema (+2) present.   Skin:     Comments: Right lower extremity anterior wound with only 1 discrete opening today.  This area itself is smaller than last exam.  Moderate fibrin deposition in the wound bed.  Rolled edges.  No  signs of infection.   Neurological:      Mental Status: He is alert.   Psychiatric:         Mood and Affect: Mood normal.           Wound Instructions:  Orders Placed This Encounter   Procedures    Wound cleansing and dressings     Right Lower Leg Wounds:     Wash your hands with soap and water.  Remove old dressing, discard into plastic bag and place in trash.  Cleanse the wound with soap and water prior to applying a clean dressing. Do not use tissue or cotton balls. Do not scrub the wound. Pat dry using gauze.  Shower yes   Apply moisturizer to skin surrounding wound  Apply Santyl, nickel thick to the Right leg wounds.  Cover with gauze and ABD  Secure with rolled gauze and tape.  Change dressing daily.    Applied Medihoney today     This was done today.     Standing Status:   Future     Standing Expiration Date:   1/12/2025    Wound compression and edema control     Elastic Tubular Stocking: Spandagrip G to right lower leg     Tubular elastic bandage: Apply from base of toes to behind the knee. Apply in AM, may remove for sleep.     Avoid prolonged standing in one place.     Elevate leg(s) above the level of the heart when sitting or as much as possible.     Standing Status:   Future     Standing Expiration Date:   1/12/2025    Debridement     This order was created via procedure documentation        Diagnosis ICD-10-CM Associated Orders   1. Non-pressure chronic ulcer of right lower leg with fat layer exposed ([facility])  L97.912 Wound cleansing and dressings     Wound compression and edema control     lidocaine (XYLOCAINE) 4 % topical solution 5 mL     collagenase (SANTYL) ointment     Debridement      2. Chronic venous hypertension (idiopathic) with ulcer of right lower extremity (CODE) ([facility])  I87.311 Wound cleansing and dressings     Wound compression and edema control     lidocaine (XYLOCAINE) 4 % topical solution 5 mL      3. Lymphedema of both lower extremities  I89.0 Wound cleansing and dressings     Wound  "compression and edema control     lidocaine (XYLOCAINE) 4 % topical solution 5 mL      4. Lipedema  R60.9 Wound cleansing and dressings     Wound compression and edema control     lidocaine (XYLOCAINE) 4 % topical solution 5 mL      5. Type 2 diabetes mellitus without complication, without long-term current use of insulin (HCC)  E11.9 Wound cleansing and dressings     Wound compression and edema control     lidocaine (XYLOCAINE) 4 % topical solution 5 mL      6. Chronic anticoagulation  Z79.01           --  Piotr Mcgill MD    \"This note has been constructed using a voice recognition system. Therefore there may be syntax, spelling, and/or grammatical errors. Occasional wrong word or \"sound alike\" substitutions may have occurred due to the inherent limitations of voice recognition software. Read the chart carefully and recognize, using context, where substitutions have occurred. Please call if you have any questions.\"     "

## 2024-01-16 ENCOUNTER — OFFICE VISIT (OUTPATIENT)
Dept: FAMILY MEDICINE CLINIC | Facility: CLINIC | Age: 64
End: 2024-01-16
Payer: MEDICARE

## 2024-01-16 VITALS
SYSTOLIC BLOOD PRESSURE: 130 MMHG | HEIGHT: 68 IN | OXYGEN SATURATION: 99 % | DIASTOLIC BLOOD PRESSURE: 70 MMHG | HEART RATE: 73 BPM | WEIGHT: 315 LBS | BODY MASS INDEX: 47.74 KG/M2

## 2024-01-16 DIAGNOSIS — L97.911 LOWER EXTREMITY ULCERATION, RIGHT, LIMITED TO BREAKDOWN OF SKIN (HCC): ICD-10-CM

## 2024-01-16 DIAGNOSIS — I48.91 NEW ONSET ATRIAL FIBRILLATION (HCC): Primary | ICD-10-CM

## 2024-01-16 DIAGNOSIS — E78.2 MIXED HYPERLIPIDEMIA: ICD-10-CM

## 2024-01-16 DIAGNOSIS — Z92.89 HISTORY OF CARDIOVERSION: ICD-10-CM

## 2024-01-16 DIAGNOSIS — E11.9 TYPE 2 DIABETES MELLITUS WITHOUT COMPLICATION, WITHOUT LONG-TERM CURRENT USE OF INSULIN (HCC): ICD-10-CM

## 2024-01-16 DIAGNOSIS — I10 PRIMARY HYPERTENSION: ICD-10-CM

## 2024-01-16 DIAGNOSIS — R60.0 EDEMA OF RIGHT LOWER EXTREMITY: ICD-10-CM

## 2024-01-16 DIAGNOSIS — I50.31 ACUTE DIASTOLIC CHF (CONGESTIVE HEART FAILURE) (HCC): ICD-10-CM

## 2024-01-16 PROCEDURE — 99495 TRANSJ CARE MGMT MOD F2F 14D: CPT | Performed by: FAMILY MEDICINE

## 2024-01-17 NOTE — PROGRESS NOTES
Assessment & Plan     1. New onset atrial fibrillation (HCC)  2. History of cardioversion  Currently stable.  In sinus rhythm on presentation.  Cardioversion likely still successful in transitioning to normal sinus rhythm.  Recommended follow up with cardiology for possible loop recorder or long term evaluation of afib rhythm management.  Currently rate controlled on metoprolol.    3. Type 2 diabetes mellitus without complication, without long-term current use of insulin (HCC)  Discussed the importance of A1c greater than 6.5.  This will follow with him even if sugars are improved.  Patient to continue to monitor carb consumption.  Due for follow up A1c, was not performed in hospital.    -     Hemoglobin A1C; Future; Expected date: 01/16/2024    4. Primary hypertension  Stable on presentation.  Defer to cardiology for further titration.  I had a difficult time with changing BP, therefore will leave it to specialists.      5. Mixed hyperlipidemia  Statin started in the hospital due to elevated cholesterol and CHF.  The patient will need to review further with cardiology the CHF portion of the review.  I did not find a reduction in systolic or diastolic dysfunction on ECHO, but ELISA may have been more informative.  Appreciate starting medication for patient.     6. Acute diastolic CHF (congestive heart failure) (HCC)  7. Edema of right lower extremity  As stated above.  ECHO associated with this admission, and prior ECHO in July did not have a significant change to EF.  Still within normal to low normal EF.  It may be due to lower extremity edema but this was not discussed with patient.  Defer details to cardiology to comment further.     8. Lower extremity ulceration, right, limited to breakdown of skin (HCC)  Following with wound care.  I redressed wound today in office.        Subjective     Transitional Care Management Review:   Luther ArchibaldAlexus is a 63 y.o. male here for TCM follow up.      During the TCM phone  call patient stated:  TCM Call       Date and time call was made  1/10/2024 10:08 AM    Hospital care reviewed  Records reviewed    Patient was hospitialized at  [facility]    Date of Admission  01/05/24    Date of discharge  01/09/24    Diagnosis  New onset Afib    Disposition  Home    Were the patients medications reviewed and updated  Yes    Current Symptoms  None          TCM Call       Post hospital issues  None    Should patient be enrolled in anticoag monitoring?  No    Scheduled for follow up?  Yes    Referrals needed  None    Did you obtain your prescribed medications  Yes    Do you need help managing your prescriptions or medications  No    Is transportation to your appointment needed  No    I have advised the patient to call PCP with any new or worsening symptoms  Patricia Simental MA    Living Arrangements  Spouse or Significiant other    Support System  Spouse    The type of support provided  Financial; Emotional; Physical    Do you have social support  Yes, as much as I need    Are you recieving any outpatient services  No    Are you recieving home care services  No    Are you using any community resources  No    Current waiver services  No    Have you fallen in the last 12 months  No    Interperter language line needed  No    Counseling  Patient          HPI  The patient was recently sent to the emergency room after having vitals checked and being found to be in A-fib with RVR.  His pulse was in the 150s.  He was evaluated in the emergency room and recommended admission for possible cardioversion and medication management.  During his admission, he had multiple EKGs, echo, ELISA, and cardioversion.  He was started on Pradaxa for stroke prevention and his blood pressure medication was changed to improve his history of hypertension.  Thankfully after cardioversion, the patient was found to be in sinus rhythm and as far as the patient is aware he is feeling fine.  In hindsight he was thinking that he had  "increased fatigue, tiredness, and overall feeling \"blah\".  It was possible that this was due to underlying heart issue, and this was since mikie time.      Since leaving the hospital his blood pressure has improved, he is feeling more energetic and active.  He is somewhat confused about several aspects of his discharge summary including the Congestive heart failure and t diabetes.  We did review new onset diabetes type II in September after A1c was 6.7.  We further reviewed that despite his fasting sugars being normal/ prediabetic range he will continue to have the diagnosis since he crossed that threshold.  He better understands the diagnosis after discussion.  Additionally, he has remained compliant on medications.  He will be running out of the pradaxa soon though for prevention of stroke.  He also found that the medication was going to be quite expensive.      Patient has noted a decrease in weight and dwelling in the left leg.  His right continues to have increased swelling and lower extremity wound.  He will continue to follow up with wound care.  He denies any chest pain, shortness of breath, nausea, vomiting, diarrhea, constipation, dizziness, lightheadedness, blurry vision.    Review of Systems    Objective     /70   Pulse 73   Ht 5' 8\" (1.727 m)   Wt (!) 144 kg (318 lb)   SpO2 99%   BMI 48.35 kg/m²      Physical Exam  Constitutional:       General: He is not in acute distress.     Appearance: Normal appearance. He is obese. He is not ill-appearing, toxic-appearing or diaphoretic.   HENT:      Head: Normocephalic and atraumatic.      Right Ear: Tympanic membrane, ear canal and external ear normal. There is no impacted cerumen.      Left Ear: Tympanic membrane, ear canal and external ear normal. There is no impacted cerumen.      Nose: Nose normal. No congestion or rhinorrhea.   Cardiovascular:      Rate and Rhythm: Normal rate and regular rhythm.      Heart sounds: Normal heart sounds. No " murmur heard.     No friction rub. No gallop.      Comments: NO afib rhythm noted.  Pulmonary:      Effort: Pulmonary effort is normal. No respiratory distress.      Breath sounds: Normal breath sounds. No stridor. No wheezing, rhonchi or rales.   Abdominal:      General: Bowel sounds are normal. There is no distension.      Palpations: Abdomen is soft.      Tenderness: There is no abdominal tenderness.   Musculoskeletal:      Right lower leg: Edema (chronic w/lower extrmeity wound.) present.   Skin:     Findings: Lesion (open wound on right lower extremity.) present.   Neurological:      Mental Status: He is alert.           Medications have been reviewed by provider in current encounter    Maycol Arango DO

## 2024-01-19 ENCOUNTER — OFFICE VISIT (OUTPATIENT)
Dept: CARDIOLOGY CLINIC | Facility: CLINIC | Age: 64
End: 2024-01-19
Payer: MEDICARE

## 2024-01-19 VITALS
DIASTOLIC BLOOD PRESSURE: 80 MMHG | HEIGHT: 68 IN | BODY MASS INDEX: 47.74 KG/M2 | OXYGEN SATURATION: 97 % | SYSTOLIC BLOOD PRESSURE: 130 MMHG | HEART RATE: 76 BPM | WEIGHT: 315 LBS

## 2024-01-19 DIAGNOSIS — I10 PRIMARY HYPERTENSION: ICD-10-CM

## 2024-01-19 DIAGNOSIS — I48.91 NEW ONSET ATRIAL FIBRILLATION (HCC): Primary | ICD-10-CM

## 2024-01-19 DIAGNOSIS — I50.31 ACUTE DIASTOLIC CHF (CONGESTIVE HEART FAILURE) (HCC): ICD-10-CM

## 2024-01-19 PROCEDURE — 99203 OFFICE O/P NEW LOW 30 MIN: CPT | Performed by: PHYSICIAN ASSISTANT

## 2024-01-19 PROCEDURE — 93000 ELECTROCARDIOGRAM COMPLETE: CPT | Performed by: PHYSICIAN ASSISTANT

## 2024-01-19 RX ORDER — RIVAROXABAN 20 MG/1
20 TABLET, FILM COATED ORAL
Qty: 30 TABLET | Refills: 0 | Status: SHIPPED | OUTPATIENT
Start: 2024-01-19 | End: 2024-02-18

## 2024-01-19 NOTE — PROGRESS NOTES
Consultation - Cardiology Office  [facility].    Luther Vaughan 63 y.o. male MRN: [id number]  : 1960  Unit/Bed#:  Encounter: [id number]      Assessment:     Paroxysmal atrial fibrillation.  Chronic diastolic heart failure.  Valvular heart disease.  PFO.  Essential hypertension.  Dyslipidemia.  Type II diabetes.  Bilateral lower extremity lymphedema.  Chronic right lower extremity venous ulcer.  Obstructive sleep apnea.  Class III obesity.    Discussion summary and Plan:    Paroxysmal atrial fibrillation.  - Patient was hospitalized from 24-24 for new onset atrial fibrillation with RVR.  Patient underwent successful cardioversion on 24 and discharged on Lopressor 50 mg twice daily and Pradaxa.  - Patient is in sinus rhythm during today's office visit, 2024.  - 24 EKG: Sinus rhythm, 76 bpm.  - Continue Lopressor 50 mg twice daily.  - [id number] stroke risk score: 4 points, moderate to high risk.  - Currently on Pradaxa 150 mg twice daily. Finishing Pradaxa on 24. Will transition to Xarelto 20 mg daily on 24 as he has run out of Pradaxa.     Chronic diastolic heart failure.  - Does not appear in acute exacerbation however difficult to assess fluid status given BMI 48.35 kg/m2.   - 24 TTE: LVEF 55%. Systolic function is normal.  Hard to assess accurately EF due to tachycardia and atrial fibrillation. Although no diagnostic regional wall motion abnormality was identified, this possibility cannot be completely excluded on the basis of this study.  Left atrium mildly dilated. Aortic Valve: The aortic valve is trileaflet. The leaflets are moderately thickened. The leaflets are moderately calcified. There is moderately reduced mobility. There is mild to moderate stenosis by 2D, it was a limited study velocities were not measured.  Mitral valve with mild regurgitation.  Tricuspid with mild regurgitation.  - 23 TTE: LVEF 60-65%.  Wall motion normal.   Diastolic function normal.  Aortic valve with mildly reduced mobility, there is aortic valve sclerosis.  Trace tricuspid valve regurgitation.  Trace pulmonic valve regurgitation.  - Currently on losartan 50 mg daily, Lopressor 50 mg twice daily, and torsemide 10 mg daily. Continue at this time.   - CHF education.     Valvular heart disease.  - Mild to moderate aortic valve stenosis.  Mild mitral valve regurgitation.  Mild tricuspid valve regurgitation.  - 1/05/24 TTE:   Aortic Valve The aortic valve is trileaflet. The leaflets are moderately thickened. The leaflets are moderately calcified. There is moderately reduced mobility. There is no evidence of regurgitation. There is mild to moderate stenosis by 2D it was a limited study velocities were not measured..   Mitral Valve There is mild regurgitation. There is no evidence of stenosis.   Tricuspid Valve There is mild regurgitation.  Pulmonary artery pressure around 30 mmHg. There is no evidence of stenosis.   Pulmonic Valve There is trace regurgitation. There is no evidence of stenosis.   - 7/03/23 TTE:   ortic Valve The aortic valve is trileaflet. The leaflets are moderately calcified. There is mildly reduced mobility. There is no evidence of regurgitation. There is aortic valve sclerosis.   Mitral Valve There is no evidence of regurgitation. There is no evidence of stenosis. The mitral valve has normal structure and normal function.   Tricuspid Valve Tricuspid valve structure is normal. There is trace regurgitation. There is no evidence of stenosis. Right ventricular systolic pressure could not be assessed.   Pulmonic Valve The pulmonic valve was not well visualized. There is trace regurgitation. There is no evidence of stenosis.       PFO.  - 1/08/24 ELISA: Atrial Septum: There is a small and patent foramen ovale confirmed at rest and with provocation (abdominal compression) with predominant right to left shunting using saline contrast.   - Currently on Pradaxa  "150 mg twice daily. Finishing Pradaxa on 1/19/24. Will transition to Xarelto 20 mg daily on 1/20/24 as he has run out of Pradaxa.     Essential hypertension.  - BP during today's office visit, 130/80.   - Currently on losartan 50 mg daily, Lopressor 50 mg twice daily, and torsemide 10 mg daily. Continue at this time.   - 1/05/24 TTE: LVEF 55%. Systolic function is normal.  Hard to assess accurately EF due to tachycardia and atrial fibrillation. Although no diagnostic regional wall motion abnormality was identified, this possibility cannot be completely excluded on the basis of this study.  Left atrium mildly dilated. Aortic Valve: The aortic valve is trileaflet. The leaflets are moderately thickened. The leaflets are moderately calcified. There is moderately reduced mobility. There is mild to moderate stenosis by 2D, it was a limited study velocities were not measured.  Mitral valve with mild regurgitation.  Tricuspid with mild regurgitation.    Dyslipidemia.  - 1/06/24 lipid panel: Cholesterol 135, triglycerides 97, HDL 37, LDL 79.  - Continue Lipitor 10 mg daily.    Type II diabetes.  - 9/26/23 HgbA1c: 6.7.   - Care per PCP.    Bilateral lower extremity lymphedema.  - Follows outpatient with [facility] wound care, last seen on 1/12/2024.    Chronic right lower extremity venous ulcer.  - Follows outpatient with [facility] wound care, last seen on 1/12/2024.    Obstructive sleep apnea.  - Patient reports compliance with CPAP.  - 1/09/24 cardiology note: \"Patient had overnight pulse oximetry using his equipment which did note oxygen desaturation index of 9.6, AHI into see of 25.2.  There was also a note \"Attention: Evaluation period too short\"\".   - Discussed with patient recommendations follow-up with his outpatient sleep medicine team for formal re-evaluation for CPAP.  Patient states that he plans to reach out to his outpatient sleep medicine doctor.    Class III obesity.  - BMI 48.35 kg/m2.   - Patient states he " is actively trying to lose weight.      Patient / Caretaker was advised and educated to call our office  immediately if  patient has any new symptoms of chest pain/shortness of breath, near-syncope, syncope, light headedness sustained palpitations or any other cardiovascular symptoms before their scheduled follow-up appointment.  Office number was provided #[phone].      Thank you for your consultation.  If you have any question please call me at [phone]    Counseling :  A description of the counseling.  Goals and Barriers.  Patient's ability to self care: Yes  Medication side effect reviewed with patient in detail and all their questions answered to their satisfaction.      Primary Care Physician Requesting Consult: Maycol Arango DO    Reason for Consult / Principal Problem: Paroxysmal atrial fibrillation.        HPI :     Luther Vaughan is a 63 y.o. male, last 3 obese, with PMHx of paroxysmal atrial fibrillation (on Pradaxa), chronic diastolic heart failure, PFO, HTN, dyslipidemia, DMII, bilateral lower extremity lymphedema, chronic right lower extremity venous ulcer, MARLA (on CPAP), who presents for recent hospital discharge follow-up.      Patient was hospitalized from 1/05/24-1/09/24 for new onset atrial fibrillation with RVR.  Patient underwent successful cardioversion on 1/08/24 and discharged on Lopressor 50 mg twice daily and Pradaxa.  Patient states that he is at his last doses of Pradaxa and his PCP gave him a sample of Xarelto.  Planning to start Xarelto on 1/20/2024.        Since hospital discharge patient reports he has overall been doing well.  States that he feels he has more energy.  He denies experiencing chest pain, palpitations, shortness of breath, lightheadedness, dizziness, headache, nausea, or vomiting.    Review of Systems   Constitutional:  Negative for activity change, appetite change, chills, diaphoresis, fatigue, fever and unexpected weight change.   Respiratory:   "Negative for cough, chest tightness, shortness of breath and wheezing.    Cardiovascular:  Positive for leg swelling (Chronic bilateral lower extremity swelling in setting of lymphedema.).   Gastrointestinal:  Negative for abdominal distention, abdominal pain, constipation, diarrhea, nausea and vomiting.   Skin: Negative.    Neurological:  Negative for dizziness, syncope, weakness, light-headedness, numbness and headaches.       Historical Information   Past Medical History:   Diagnosis Date    Diabetes mellitus (HCC)     Hypertension     Migraine     Obesity     MARLA on CPAP     Sleep apnea     Vertigo     episode 1/30/2016     Past Surgical History:   Procedure Laterality Date    HAND SURGERY      football injury    HERNIA REPAIR      KNEE SURGERY Left     left meniscus     MENISCECTOMY Left      Social History     Substance and Sexual Activity   Alcohol Use Not Currently    Alcohol/week: 14.0 standard drinks of alcohol    Types: 14 Cans of beer per week     Social History     Substance and Sexual Activity   Drug Use Never     Social History     Tobacco Use   Smoking Status Never   Smokeless Tobacco Never   Tobacco Comments    never smoker- per [place]      Family History:   Family History   Problem Relation Age of Onset    No Known Problems Mother     Heart attack Father     Heart disease Father     Prostate cancer Neg Hx     Testicular cancer Neg Hx     Colon cancer Neg Hx        Meds/Allergies     Allergies   Allergen Reactions    Erythromycin Diarrhea    Fish-Derived Products - Food Allergy Diarrhea     States \"cartilage fish\" cause sweating, diarrhea, vomiting       Current Outpatient Medications:     Ascorbic Acid, Vitamin C, (VITAMIN C) 100 MG tablet, Take by mouth daily, Disp: , Rfl:     atorvastatin (LIPITOR) 10 mg tablet, Take 1 tablet (10 mg total) by mouth daily with dinner, Disp: 30 tablet, Rfl: 4    cholecalciferol (VITAMIN D3) 400 units tablet, Take 400 Units by mouth daily, Disp: , Rfl:     " "collagenase (SANTYL) ointment, Apply topically daily, Disp: 30 g, Rfl: 1    losartan (COZAAR) 50 mg tablet, Take 1 tablet (50 mg total) by mouth daily, Disp: 30 tablet, Rfl: 0    meloxicam (MOBIC) 15 mg tablet, 15 mg daily States does not take daily., Disp: , Rfl:     metoprolol tartrate (LOPRESSOR) 50 mg tablet, Take 1 tablet (50 mg total) by mouth every 12 (twelve) hours, Disp: 60 tablet, Rfl: 4    potassium chloride (Klor-Con) 20 mEq packet, Take 20 mEq by mouth daily, Disp: 30 packet, Rfl: 0    torsemide (DEMADEX) 10 mg tablet, Take 1 tablet (10 mg total) by mouth daily Do not start before January 9, 2024., Disp: 30 tablet, Rfl: 4    Xarelto 20 MG tablet, Take 1 tablet (20 mg total) by mouth daily with breakfast Will be started on xarelto once supply of pradaxa is depleted, Disp: 30 tablet, Rfl: 0    clotrimazole-betamethasone (LOTRISONE) 1-0.05 % cream, Apply topically 2 (two) times a day for 7 days Available over the counter, Disp: , Rfl:     nystatin (MYCOSTATIN) powder, Apply topically 2 (two) times a day for 7 days, Disp: 30 g, Rfl: 0    Vitals: Blood pressure 130/80, pulse 76, height 5' 8\" (1.727 m), weight (!) 143 kg (316 lb), SpO2 97%.    Body mass index is 48.05 kg/m².  Wt Readings from Last 3 Encounters:   01/19/24 (!) 143 kg (316 lb)   01/16/24 (!) 144 kg (318 lb)   01/09/24 (!) 147 kg (323 lb 10.2 oz)     Vitals:    01/19/24 1116   Weight: (!) 143 kg (316 lb)     BP Readings from Last 3 Encounters:   01/19/24 130/80   01/16/24 130/70   01/12/24 (!) 177/92       Physical Exam  Vitals reviewed.   Constitutional:       General: He is not in acute distress.     Appearance: He is obese.   Cardiovascular:      Rate and Rhythm: Normal rate and regular rhythm.      Pulses: Normal pulses.      Heart sounds: Murmur heard.   Pulmonary:      Effort: Pulmonary effort is normal. No respiratory distress.   Abdominal:      General: Abdomen is flat. There is no distension.      Palpations: Abdomen is soft.      " Tenderness: There is no abdominal tenderness.   Musculoskeletal:      Right lower leg: Edema (Chronic) present.      Left lower leg: Edema (Chronic) present.   Skin:     General: Skin is warm and dry.   Neurological:      Mental Status: He is alert and oriented to person, place, and time.             Diagnostic Studies Review Cardio:      EK/19/24 EKG: Sinus rhythm, 76 bpm.    Cardiac testing:     ELISA    Result Date: 1/10/2024  Narrative:   Left Ventricle: Left ventricular cavity size is normal. Wall thickness is increased. The left ventricular ejection fraction is 55% by visual estimation. Systolic function is low normal.   Atrial Septum: There is a small and patent foramen ovale confirmed at rest and with provocation (abdominal compression) with predominant right to left shunting using saline contrast.   Left Atrial Appendage: There is no thrombus.   Aorta: .3cm sessile atheroma noted in descending aorta     Cardioversion    Result Date: 1/10/2024  Narrative: ELISA/Cardioversion Luther Vaughan [id number] PRE-OP DIAGNOSIS: Atrial fibrillation POST-OP DIAGNOSIS: Successful electrical cardioversion of atrial fibrillation to normal sinus rhythm with PACs : Sarwat Coyle MD [facility] ANESTHESIA: Propofol given by anesthesiology. COMPLICATIONS: None. OPERATIVE TERM: DC cardioversion. The nature of the procedure, risks and alternatives were discussed with the patient who gave informed consent. OPERATIVE TECHNIQUE: The patient was sedated with propofol. Once adequately sedated, ELISA was performed.  (Please see full ELISA report for details). Once no thrombus confirmed,  DC cardioversion was performed with 200 J biphasically and synchronously. The patient was then monitored until fully alert and left the procedure area in stable condition.     Impression:  Successful DC cardioversion of atrial fibrillation to normal sinus rhythm with PACs.    Echo complete w/ contrast if indicated    Result Date:  1/5/2024  Narrative:   Left Ventricle: Left ventricular cavity size is normal. Wall thickness is moderately increased. There is mild to moderate concentric hypertrophy. The left ventricular ejection fraction is around 55% by visual estimation. Systolic function is normal.  Hard to assess accurately EF due to tachycardia and atrial fibrillation. Although no diagnostic regional wall motion abnormality was identified, this possibility cannot be completely excluded on the basis of this study.   Left Atrium: The atrium is mildly dilated by 2D.   Aortic Valve: The aortic valve is trileaflet. The leaflets are moderately thickened. The leaflets are moderately calcified. There is moderately reduced mobility. There is mild to moderate stenosis by 2D, it was a limited study velocities were not measured..   Mitral Valve: There is mild regurgitation.   Tricuspid Valve: There is mild regurgitation.  Pulmonary artery pressure around 30 mmHg.   IVC/SVC: The inferior vena cava is upper normal in size.  It has around 50% collapse with inspiration.   Prior TTE study available for comparison. Prior study date: 7/3/2023.  As compared to that study EF remained the same.  Aortic valve appears to have at least mild stenosis.     Imaging:  Chest X-Ray:   No Chest XR results available for this patient.    CT-scan of the chest:     No CTA results available for this patient.  Lab Review   Lab Results   Component Value Date    WBC 10.05 01/09/2024    HGB 14.6 01/09/2024    HCT 44.4 01/09/2024    MCV 96 01/09/2024    RDW 13.3 01/09/2024     01/09/2024     BMP:  Lab Results   Component Value Date    SODIUM 137 01/09/2024    K 4.3 01/09/2024     01/09/2024    CO2 25 01/09/2024    BUN 17 01/09/2024    CREATININE 0.91 01/09/2024    GLUC 111 01/09/2024    GLUF 120 (H) 09/26/2023    CALCIUM 8.7 01/09/2024    EGFR 88 01/09/2024    MG 2.0 01/09/2024     Troponins:    LFT:  Lab Results   Component Value Date    AST 22 01/05/2024    ALT 22  "01/05/2024    ALKPHOS 60 01/05/2024    TP 7.1 01/05/2024    ALB 3.9 01/05/2024      Lab Results   Component Value Date    XJB3OQYVJCWD 2.657 06/20/2023     No components found for: \"TSH3\"  Lab Results   Component Value Date    HGBA1C 6.7 (H) 09/26/2023     Lipid Profile:   Lab Results   Component Value Date    CHOLESTEROL 135 01/06/2024    HDL 37 (L) 01/06/2024    LDLCALC 79 01/06/2024    TRIG 97 01/06/2024     Lab Results   Component Value Date    CHOLESTEROL 135 01/06/2024    CHOLESTEROL 179 09/26/2023           Maricruz Casey PA-C  "

## 2024-01-22 ENCOUNTER — OFFICE VISIT (OUTPATIENT)
Dept: WOUND CARE | Facility: HOSPITAL | Age: 64
End: 2024-01-22
Payer: MEDICARE

## 2024-01-22 ENCOUNTER — TELEPHONE (OUTPATIENT)
Dept: INPATIENT UNIT | Facility: HOSPITAL | Age: 64
End: 2024-01-22

## 2024-01-22 VITALS
TEMPERATURE: 97.2 F | HEART RATE: 74 BPM | RESPIRATION RATE: 18 BRPM | SYSTOLIC BLOOD PRESSURE: 150 MMHG | DIASTOLIC BLOOD PRESSURE: 86 MMHG

## 2024-01-22 DIAGNOSIS — E11.9 TYPE 2 DIABETES MELLITUS WITHOUT COMPLICATION, WITHOUT LONG-TERM CURRENT USE OF INSULIN (HCC): ICD-10-CM

## 2024-01-22 DIAGNOSIS — Z79.01 CHRONIC ANTICOAGULATION: ICD-10-CM

## 2024-01-22 DIAGNOSIS — L97.912 NON-PRESSURE CHRONIC ULCER OF RIGHT LOWER LEG WITH FAT LAYER EXPOSED (HCC): Primary | ICD-10-CM

## 2024-01-22 DIAGNOSIS — I89.0 LYMPHEDEMA OF BOTH LOWER EXTREMITIES: ICD-10-CM

## 2024-01-22 DIAGNOSIS — I87.311 CHRONIC VENOUS HYPERTENSION (IDIOPATHIC) WITH ULCER OF RIGHT LOWER EXTREMITY (CODE) (HCC): ICD-10-CM

## 2024-01-22 DIAGNOSIS — R60.9 LIPEDEMA: ICD-10-CM

## 2024-01-22 PROCEDURE — 97597 DBRDMT OPN WND 1ST 20 CM/<: CPT | Performed by: STUDENT IN AN ORGANIZED HEALTH CARE EDUCATION/TRAINING PROGRAM

## 2024-01-22 NOTE — TELEPHONE ENCOUNTER
CHF Evaluation/Screening Form    Patient Level Data  Encounter Level Data  Knowledge Assessment/Post-Discharge Questions  Following Diet: Foods to Limit/Avoid Salt: Yes  Daily Weights Done?: Yes  Date of last weight: 1/22/24  Weight recorded: 316  Knows name of medication/water pill?: Yes  Understands Activity/Exercise: Yes  Knows when to report symptoms?: Yes  Plan in place to call for worsening symptoms?: Yes  Does the patient have a means of transporation?: Yes

## 2024-01-22 NOTE — PATIENT INSTRUCTIONS
Orders Placed This Encounter   Procedures    Wound cleansing and dressings     Right Lower Leg Wounds:     Wash your hands with soap and water.  Remove old dressing, discard into plastic bag and place in trash.  Cleanse the wound with soap and water prior to applying a clean dressing. Do not use tissue or cotton balls. Do not scrub the wound. Pat dry using gauze.  Shower yes   Apply moisturizer to skin surrounding wound  Apply Santyl, nickel thick to the Right leg wounds.  Cover with gauze and ABD  Secure with rolled gauze and tape.  Change dressing daily.     Applied with dry dressing today     This was done today.     Standing Status:   Future     Standing Expiration Date:   1/22/2025    Wound compression and edema control     Elastic Tubular Stocking: Spandagrip G to right lower leg     Tubular elastic bandage: Apply from base of toes to behind the knee. Apply in AM, may remove for sleep.     Avoid prolonged standing in one place.     Elevate leg(s) above the level of the heart when sitting or as much as possible.     Standing Status:   Future     Standing Expiration Date:   1/22/2025

## 2024-01-22 NOTE — PROGRESS NOTES
"Patient ID: Luther Vaughan is a 63 y.o. male Date of Birth 1960     Chief Complaint  Chief Complaint   Patient presents with    Follow Up Wound Care Visit     No compression on at this time.  Removed alginate from wound       Allergies  Erythromycin and Fish-derived products - food allergy    Assessment:     Diagnoses and all orders for this visit:    Non-pressure chronic ulcer of right lower leg with fat layer exposed (HCC)  -     Wound cleansing and dressings; Future  -     Wound compression and edema control; Future  -     Debridement    Chronic venous hypertension (idiopathic) with ulcer of right lower extremity (CODE) (HCC)  -     Wound cleansing and dressings; Future  -     Wound compression and edema control; Future    Lymphedema of both lower extremities  -     Wound cleansing and dressings; Future  -     Wound compression and edema control; Future    Lipedema    Chronic anticoagulation    Type 2 diabetes mellitus without complication, without long-term current use of insulin (HCC)              Debridement   Wound 01/05/24 Venous Ulcer Leg Right;Lower    Universal Protocol:  Consent: Verbal consent obtained.  Risks and benefits: risks, benefits and alternatives were discussed  Consent given by: patient  Time out: Immediately prior to procedure a \"time out\" was called to verify the correct patient, procedure, equipment, support staff and site/side marked as required.  Patient identity confirmed: verbally with patient    Debridement Details  Performed by: physician  Debridement type: selective  Pain control: lidocaine 4%      Post-debridement measurements  Length (cm): 3.6  Width (cm): 2.2  Depth (cm): 0.2  Percent debrided: 80%  Surface Area (cm^2): 7.92  Area Debrided (cm^2): 6.34  Volume (cm^3): 1.58    Devitalized tissue debrided: biofilm, exudate and fibrin  Instrument(s) utilized: curette  Bleeding: small  Hemostasis obtained with: pressure  Procedural pain (0-10): 1  Post-procedural pain: 0 "   Response to treatment: procedure was tolerated well        Plan:  It was a pleasure to see Luther Vaughan for wound care follow up today  Selective debridement performed today as above  Wound is improving   Continue plan of care as noted below with Santyl.  Patient states that he found the cost to severe however after discussion today states that he would like to try Santyl and will pay the co-pay instead of attempting the alternate plan that I suggested with hydrocolloid.  We discussed the importance of compliance with edema control.  I advised him that without lower extremity edema control the wound will not heal.  Advised to be compliant with leg elevation and use of Spandigrip for compression  No signs or symptoms of infection today. Patient understands that if any signs of infection start (such as increased redness, drainage, pain, fever, chills, diaphoresis), they should call our office or proceed to the ER or Urgent Care.  Patient should continue a high protein diet to facilitate wound healing  Patient is advised to not submerge wound or leave wound open to air.  Follow up in 1 weeks  Given the multi-factorial nature of wound care, additional time was taken to review patient's treatment plan with other specialties and most recent pertinent lab work and imaging.   All plans of care discussed with patient at bedside who verbalized understanding with treatment plan.       Wound 01/05/24 Venous Ulcer Leg Right;Lower (Active)   Wound Image Images linked 01/22/24 1410   Wound Description Beefy red;Slough 01/22/24 1410   Paula-wound Assessment Hyperpigmented;Edema;Dry 01/22/24 1410   Wound Length (cm) 3.6 cm 01/22/24 1410   Wound Width (cm) 2.2 cm 01/22/24 1410   Wound Depth (cm) 0.2 cm 01/22/24 1410   Wound Surface Area (cm^2) 7.92 cm^2 01/22/24 1410   Wound Volume (cm^3) 1.584 cm^3 01/22/24 1410   Calculated Wound Volume (cm^3) 1.58 cm^3 01/22/24 1410   Change in Wound Size % 94.31 01/22/24 1410   Drainage  Amount Moderate 01/22/24 1410   Drainage Description Serosanguineous 01/22/24 1410   Non-staged Wound Description Full thickness 01/22/24 1410   Treatments Irrigation with NSS 01/22/24 1410       Wound 01/05/24 Venous Ulcer Leg Right;Lower (Active)   Date First Assessed/Time First Assessed: 01/05/24 1028   Primary Wound Type: Venous Ulcer  Location: Leg  Wound Location Orientation: Right;Lower       Subjective:      .    1/22/24: Has been applying Medihoney and alginate to the wound.  Not wearing compression.  Notes that Santyl is too cost prohibitive with a co-pay of over $300. He denies any signs of infection today.    1/12/24: After last visit patient needed to the ER as recommended.  He was admitted for atrial fibrillation and uncontrolled hypertension.  Eventually needed a cardioversion and is now on chronic anticoagulation.  Also given IV diuretics during his hospitalization.  Patient was discharged a few days ago and is using diuretic, anticoagulation, antihypertensives as directed.  Being wound covered.  Denies any symptoms of infection today.     1/5/24: Consult - Luther is a pleasant 63-year-old male with a past medical history of hypertension and recently diagnosed type 2 diabetes mellitus here today for initial wound care consult.  Patient was referred by his PCP Dr. Maycol Arango.  Seen by PCP on 12/29/2023 at that time presenting for the first time for right lower extremity wound.  Per chart review patient that his right lower extremity on his garbage at home 3 weeks prior to that visit and wound has been getting larger.  Has been using bandages and Neosporin to cover the wound.  Increased swelling and erythema.  At that appointment PCP advised patient to increase Lasix to daily versus every other day due to fluid overload.  Was also given prescription for Bactrim x 7 days and referral to wound management.  Patient notes today that he is not taking any blood pressure medications and also did not pick  up the Bactrim.  Denies any fever chills diaphoresis chest pain headache vision changes shortness of breath.  Does not he that he does have significant gotten lower extremity edema.  He does have a hx of chronic bilateral lower extremity wounds in the past.                The following portions of the patient's history were reviewed and updated as appropriate: allergies, current medications, past family history, past medical history, past social history, past surgical history, and problem list.    Review of Systems   Constitutional:  Negative for chills, diaphoresis and fever.   Skin:  Positive for wound.   All other systems reviewed and are negative.        Objective:       Wound 01/05/24 Venous Ulcer Leg Right;Lower (Active)   Wound Image Images linked 01/22/24 1410   Wound Description Beefy red;Slough 01/22/24 1410   Paula-wound Assessment Hyperpigmented;Edema;Dry 01/22/24 1410   Wound Length (cm) 3.6 cm 01/22/24 1410   Wound Width (cm) 2.2 cm 01/22/24 1410   Wound Depth (cm) 0.2 cm 01/22/24 1410   Wound Surface Area (cm^2) 7.92 cm^2 01/22/24 1410   Wound Volume (cm^3) 1.584 cm^3 01/22/24 1410   Calculated Wound Volume (cm^3) 1.58 cm^3 01/22/24 1410   Change in Wound Size % 94.31 01/22/24 1410   Drainage Amount Moderate 01/22/24 1410   Drainage Description Serosanguineous 01/22/24 1410   Non-staged Wound Description Full thickness 01/22/24 1410   Treatments Irrigation with NSS 01/22/24 1410       /86   Pulse 74   Temp (!) 97.2 °F (36.2 °C)   Resp 18     Physical Exam  Vitals reviewed.   Constitutional:       Appearance: Normal appearance.   HENT:      Head: Normocephalic and atraumatic.   Eyes:      Extraocular Movements: Extraocular movements intact.   Pulmonary:      Effort: Pulmonary effort is normal.   Musculoskeletal:      Cervical back: Neck supple.      Right lower leg: Edema (+3) present.   Skin:     Comments: Anterior distal right lower extremity still with heavy fibrin deposition.  No overt  signs of infection.   Neurological:      Mental Status: He is alert.   Psychiatric:         Mood and Affect: Mood normal.           Wound Instructions:  Orders Placed This Encounter   Procedures    Wound cleansing and dressings     Right Lower Leg Wounds:     Wash your hands with soap and water.  Remove old dressing, discard into plastic bag and place in trash.  Cleanse the wound with soap and water prior to applying a clean dressing. Do not use tissue or cotton balls. Do not scrub the wound. Pat dry using gauze.  Shower yes   Apply moisturizer to skin surrounding wound  Apply Santyl, nickel thick to the Right leg wounds.  Cover with gauze and ABD  Secure with rolled gauze and tape.  Change dressing daily.     Applied with dry dressing today     This was done today.     Standing Status:   Future     Standing Expiration Date:   1/22/2025    Wound compression and edema control     Elastic Tubular Stocking: Spandagrip G to right lower leg     Tubular elastic bandage: Apply from base of toes to behind the knee. Apply in AM, may remove for sleep.     Avoid prolonged standing in one place.     Elevate leg(s) above the level of the heart when sitting or as much as possible.     Standing Status:   Future     Standing Expiration Date:   1/22/2025    Debridement     This order was created via procedure documentation        Diagnosis ICD-10-CM Associated Orders   1. Non-pressure chronic ulcer of right lower leg with fat layer exposed ([facility])  L97.912 Wound cleansing and dressings     Wound compression and edema control     Debridement      2. Chronic venous hypertension (idiopathic) with ulcer of right lower extremity (CODE) ([facility])  I87.311 Wound cleansing and dressings     Wound compression and edema control      3. Lymphedema of both lower extremities  I89.0 Wound cleansing and dressings     Wound compression and edema control      4. Lipedema  R60.9       5. Chronic anticoagulation  Z79.01       6. Type 2 diabetes mellitus  "without complication, without long-term current use of insulin (HCC)  E11.9           --  Piotr Mcgill MD    \"This note has been constructed using a voice recognition system. Therefore there may be syntax, spelling, and/or grammatical errors. Occasional wrong word or \"sound alike\" substitutions may have occurred due to the inherent limitations of voice recognition software. Read the chart carefully and recognize, using context, where substitutions have occurred. Please call if you have any questions.\"     "

## 2024-01-24 DIAGNOSIS — R21 RASH: ICD-10-CM

## 2024-01-24 RX ORDER — NYSTATIN 100000 [USP'U]/G
POWDER TOPICAL 2 TIMES DAILY
Qty: 60 G | Refills: 1 | Status: SHIPPED | OUTPATIENT
Start: 2024-01-24 | End: 2024-01-31

## 2024-01-24 NOTE — PROGRESS NOTES
Patient requesting topical nystatin powder.  He is about to run out.  He finds that this helps with irritation and swelling around the area.  Will continue to use at this time.

## 2024-01-29 ENCOUNTER — OFFICE VISIT (OUTPATIENT)
Dept: WOUND CARE | Facility: HOSPITAL | Age: 64
End: 2024-01-29
Payer: MEDICARE

## 2024-01-29 VITALS
SYSTOLIC BLOOD PRESSURE: 154 MMHG | TEMPERATURE: 97.5 F | RESPIRATION RATE: 15 BRPM | DIASTOLIC BLOOD PRESSURE: 93 MMHG | HEART RATE: 71 BPM

## 2024-01-29 DIAGNOSIS — I89.0 LYMPHEDEMA OF BOTH LOWER EXTREMITIES: ICD-10-CM

## 2024-01-29 DIAGNOSIS — L97.912 NON-PRESSURE CHRONIC ULCER OF RIGHT LOWER LEG WITH FAT LAYER EXPOSED (HCC): Primary | ICD-10-CM

## 2024-01-29 DIAGNOSIS — I87.311 CHRONIC VENOUS HYPERTENSION (IDIOPATHIC) WITH ULCER OF RIGHT LOWER EXTREMITY (CODE) (HCC): ICD-10-CM

## 2024-01-29 PROCEDURE — 97597 DBRDMT OPN WND 1ST 20 CM/<: CPT | Performed by: STUDENT IN AN ORGANIZED HEALTH CARE EDUCATION/TRAINING PROGRAM

## 2024-01-29 RX ORDER — LIDOCAINE HYDROCHLORIDE 40 MG/ML
5 SOLUTION TOPICAL ONCE
Status: COMPLETED | OUTPATIENT
Start: 2024-01-29 | End: 2024-01-29

## 2024-01-29 RX ADMIN — LIDOCAINE HYDROCHLORIDE 5 ML: 40 SOLUTION TOPICAL at 14:21

## 2024-01-29 NOTE — PATIENT INSTRUCTIONS
Orders Placed This Encounter   Procedures    Wound cleansing and dressings Venous Ulcer Right;Lower Leg     Right Lower Leg Wounds:     Wash your hands with soap and water.  Remove old dressing, discard into plastic bag and place in trash.  Cleanse the wound with soap and water prior to applying a clean dressing. Do not use tissue or cotton balls. Do not scrub the wound. Pat dry using gauze.  Shower yes   Apply moisturizer to skin surrounding wound  Apply Santyl, nickel thick to the Right leg wounds.  Cover with gauze and ABD  Secure with rolled gauze and tape.  Change dressing daily.     Applied with dry dressing today     This was done today.     Standing Status:   Future     Standing Expiration Date:   1/29/2025    Wound compression and edema control Venous Ulcer Right;Lower Leg     Elastic Tubular Stocking: Spandagrip G to right lower leg     Tubular elastic bandage: Apply from base of toes to behind the knee. Apply in AM, may remove for sleep.     Avoid prolonged standing in one place.     Elevate leg(s) above the level of the heart when sitting or as much as possible.     Standing Status:   Future     Standing Expiration Date:   1/29/2025    Wound miscellaneous orders Venous Ulcer Right;Lower Leg     Protein: Eat protein with each meal to promote healing.  Examples of protein are fish, meat, chicken, nuts, peanut butter, eggs, lentils, edamame or a protein shake.    Wound infection:  If you have signs of infection please call the wound center.  If the wound center is closed- please go to the Emergency department.  Some signs of infection:  fever, chills, increased redness, red streaks, increase in pain, increased drainage.  Drainage with an odor, Change in drainage color: white/milky/green/tan/yellow,  an increase in swelling, chest pain and/or shortness of breath.     Standing Status:   Future     Standing Expiration Date:   1/29/2025      Orders Placed This Encounter   Procedures    Wound cleansing and  dressings Venous Ulcer Right;Lower Leg     Right Lower Leg Wounds:     Wash your hands with soap and water.  Remove old dressing, discard into plastic bag and place in trash.  Cleanse the wound with soap and water prior to applying a clean dressing. Do not use tissue or cotton balls. Do not scrub the wound. Pat dry using gauze.  Shower yes   Apply moisturizer to skin surrounding wound  Apply Santyl, nickel thick to the Right leg wounds.  Cover with gauze and ABD  Secure with rolled gauze and tape.  Change dressing daily.     Applied with dry dressing today     This was done today.     Standing Status:   Future     Standing Expiration Date:   1/29/2025    Wound compression and edema control Venous Ulcer Right;Lower Leg     Elastic Tubular Stocking: Spandagrip G to right lower leg     Tubular elastic bandage: Apply from base of toes to behind the knee. Apply in AM, may remove for sleep.     Avoid prolonged standing in one place.     Elevate leg(s) above the level of the heart when sitting or as much as possible.     Standing Status:   Future     Standing Expiration Date:   1/29/2025    Wound miscellaneous orders Venous Ulcer Right;Lower Leg     Protein: Eat protein with each meal to promote healing.  Examples of protein are fish, meat, chicken, nuts, peanut butter, eggs, lentils, edamame or a protein shake.    Wound infection:  If you have signs of infection please call the wound center.  If the wound center is closed- please go to the Emergency department.  Some signs of infection:  fever, chills, increased redness, red streaks, increase in pain, increased drainage.  Drainage with an odor, Change in drainage color: white/milky/green/tan/yellow,  an increase in swelling, chest pain and/or shortness of breath.     Standing Status:   Future     Standing Expiration Date:   1/29/2025

## 2024-01-29 NOTE — PROGRESS NOTES
"Patient ID: Luther Vaughan is a 63 y.o. male Date of Birth 1960     Chief Complaint  Chief Complaint   Patient presents with    Follow Up Wound Care Visit     RLE       Allergies  Erythromycin and Fish-derived products - food allergy    Assessment:     Diagnoses and all orders for this visit:    Non-pressure chronic ulcer of right lower leg with fat layer exposed (HCC)  -     lidocaine (XYLOCAINE) 4 % topical solution 5 mL  -     Wound cleansing and dressings Venous Ulcer Right;Lower Leg; Future  -     Wound compression and edema control Venous Ulcer Right;Lower Leg; Future  -     Wound miscellaneous orders Venous Ulcer Right;Lower Leg; Future  -     Debridement Venous Ulcer Right;Lower Leg    Chronic venous hypertension (idiopathic) with ulcer of right lower extremity (CODE) (HCC)  -     lidocaine (XYLOCAINE) 4 % topical solution 5 mL  -     Wound cleansing and dressings Venous Ulcer Right;Lower Leg; Future  -     Wound compression and edema control Venous Ulcer Right;Lower Leg; Future  -     Wound miscellaneous orders Venous Ulcer Right;Lower Leg; Future    Lymphedema of both lower extremities  -     lidocaine (XYLOCAINE) 4 % topical solution 5 mL  -     Wound cleansing and dressings Venous Ulcer Right;Lower Leg; Future  -     Wound compression and edema control Venous Ulcer Right;Lower Leg; Future  -     Wound miscellaneous orders Venous Ulcer Right;Lower Leg; Future              Debridement   Wound 01/05/24 Venous Ulcer Leg Right;Lower    Universal Protocol:  Consent: Verbal consent obtained.  Risks and benefits: risks, benefits and alternatives were discussed  Consent given by: patient  Time out: Immediately prior to procedure a \"time out\" was called to verify the correct patient, procedure, equipment, support staff and site/side marked as required.  Patient identity confirmed: verbally with patient    Debridement Details  Performed by: physician  Debridement type: selective  Pain control: lidocaine " 4%      Post-debridement measurements  Length (cm): 3.8  Width (cm): 2  Depth (cm): 0.1  Percent debrided: 50%  Surface Area (cm^2): 7.6  Area Debrided (cm^2): 3.8  Volume (cm^3): 0.76    Devitalized tissue debrided: biofilm, exudate and fibrin  Instrument(s) utilized: curette  Bleeding: small  Hemostasis obtained with: pressure  Procedural pain (0-10): 1  Post-procedural pain: 0   Response to treatment: procedure was tolerated well        Plan:   It was a pleasure to see Luther Vaughan for wound care follow up today  Selective debridement performed today as above  Wound is improving   Continue plan of care as noted below with santyl  No signs or symptoms of infection today. Patient understands that if any signs of infection start (such as increased redness, drainage, pain, fever, chills, diaphoresis), they should call our office or proceed to the ER or Urgent Care.  Patient should continue a high protein diet to facilitate wound healing  Patient is advised to not submerge wound or leave wound open to air.  Follow up in 2 weeks  Given the multi-factorial nature of wound care, additional time was taken to review patient's treatment plan with other specialties and most recent pertinent lab work and imaging.   All plans of care discussed with patient at bedside who verbalized understanding with treatment plan.    Wound 01/05/24 Venous Ulcer Leg Right;Lower (Active)   Wound Image Images linked 01/29/24 1420   Wound Description Beefy red;Yellow;Slough;Granulation tissue 01/29/24 1420   Paula-wound Assessment Hyperpigmented;Edema;Dry 01/29/24 1420   Wound Length (cm) 3.8 cm 01/29/24 1420   Wound Width (cm) 2 cm 01/29/24 1420   Wound Depth (cm) 0.1 cm 01/29/24 1420   Wound Surface Area (cm^2) 7.6 cm^2 01/29/24 1420   Wound Volume (cm^3) 0.76 cm^3 01/29/24 1420   Calculated Wound Volume (cm^3) 0.76 cm^3 01/29/24 1420   Change in Wound Size % 97.26 01/29/24 1420   Drainage Amount Moderate 01/29/24 1420   Drainage  Description Serosanguineous 01/29/24 1420   Non-staged Wound Description Full thickness 01/29/24 1420   Dressing Status Intact (upon arrival) 01/29/24 1420       Wound 01/05/24 Venous Ulcer Leg Right;Lower (Active)   Date First Assessed/Time First Assessed: 01/05/24 1028   Primary Wound Type: Venous Ulcer  Location: Leg  Wound Location Orientation: Right;Lower       Subjective:      .    1/29/24: Has been applying Santyl daily.  Happy with wound healing.  No symptoms of infection.    1/22/24: Has been applying Medihoney and alginate to the wound.  Not wearing compression.  Notes that Santyl is too cost prohibitive with a co-pay of over $300. He denies any signs of infection today.     1/12/24: After last visit patient needed to the ER as recommended.  He was admitted for atrial fibrillation and uncontrolled hypertension.  Eventually needed a cardioversion and is now on chronic anticoagulation.  Also given IV diuretics during his hospitalization.  Patient was discharged a few days ago and is using diuretic, anticoagulation, antihypertensives as directed.  Being wound covered.  Denies any symptoms of infection today.     1/5/24: Consult - Luther is a pleasant 63-year-old male with a past medical history of hypertension and recently diagnosed type 2 diabetes mellitus here today for initial wound care consult.  Patient was referred by his PCP Dr. Maycol Arango.  Seen by PCP on 12/29/2023 at that time presenting for the first time for right lower extremity wound.  Per chart review patient that his right lower extremity on his garbage at home 3 weeks prior to that visit and wound has been getting larger.  Has been using bandages and Neosporin to cover the wound.  Increased swelling and erythema.  At that appointment PCP advised patient to increase Lasix to daily versus every other day due to fluid overload.  Was also given prescription for Bactrim x 7 days and referral to wound management.  Patient notes today that he is  not taking any blood pressure medications and also did not  the Bactrim.  Denies any fever chills diaphoresis chest pain headache vision changes shortness of breath.  Does not he that he does have significant gotten lower extremity edema.  He does have a hx of chronic bilateral lower extremity wounds in the past.          The following portions of the patient's history were reviewed and updated as appropriate: allergies, current medications, past family history, past medical history, past social history, past surgical history, and problem list.    Review of Systems   Constitutional:  Negative for chills, diaphoresis and fever.   Skin:  Positive for wound.   All other systems reviewed and are negative.        Objective:       Wound 01/05/24 Venous Ulcer Leg Right;Lower (Active)   Wound Image Images linked 01/29/24 1420   Wound Description Beefy red;Yellow;Slough;Granulation tissue 01/29/24 1420   Paula-wound Assessment Hyperpigmented;Edema;Dry 01/29/24 1420   Wound Length (cm) 3.8 cm 01/29/24 1420   Wound Width (cm) 2 cm 01/29/24 1420   Wound Depth (cm) 0.1 cm 01/29/24 1420   Wound Surface Area (cm^2) 7.6 cm^2 01/29/24 1420   Wound Volume (cm^3) 0.76 cm^3 01/29/24 1420   Calculated Wound Volume (cm^3) 0.76 cm^3 01/29/24 1420   Change in Wound Size % 97.26 01/29/24 1420   Drainage Amount Moderate 01/29/24 1420   Drainage Description Serosanguineous 01/29/24 1420   Non-staged Wound Description Full thickness 01/29/24 1420   Dressing Status Intact (upon arrival) 01/29/24 1420       /93   Pulse 71   Temp 97.5 °F (36.4 °C)   Resp 15     Physical Exam  Vitals reviewed.   Constitutional:       Appearance: Normal appearance.   HENT:      Head: Normocephalic and atraumatic.   Eyes:      Extraocular Movements: Extraocular movements intact.   Pulmonary:      Effort: Pulmonary effort is normal.   Musculoskeletal:      Cervical back: Neck supple.      Right lower leg: Edema present.   Skin:     Comments: Anterior  right lower extremity wound with similar excised to last exam.  Wound bed is much healthier with significantly reduced fibrin deposition.  Still with fibrin deposition in the wound bed.  No signs of infection.  Serosanguineous exudate.   Neurological:      Mental Status: He is alert.   Psychiatric:         Mood and Affect: Mood normal.           Wound Instructions:  Orders Placed This Encounter   Procedures    Wound cleansing and dressings Venous Ulcer Right;Lower Leg     Right Lower Leg Wounds:     Wash your hands with soap and water.  Remove old dressing, discard into plastic bag and place in trash.  Cleanse the wound with soap and water prior to applying a clean dressing. Do not use tissue or cotton balls. Do not scrub the wound. Pat dry using gauze.  Shower yes   Apply moisturizer to skin surrounding wound  Apply Santyl, nickel thick to the Right leg wounds.  Cover with gauze and ABD  Secure with rolled gauze and tape.  Change dressing daily.     Applied with dry dressing today     This was done today.     Standing Status:   Future     Standing Expiration Date:   1/29/2025    Wound compression and edema control Venous Ulcer Right;Lower Leg     Elastic Tubular Stocking: Spandagrip G to right lower leg     Tubular elastic bandage: Apply from base of toes to behind the knee. Apply in AM, may remove for sleep.     Avoid prolonged standing in one place.     Elevate leg(s) above the level of the heart when sitting or as much as possible.     Standing Status:   Future     Standing Expiration Date:   1/29/2025    Wound miscellaneous orders Venous Ulcer Right;Lower Leg     Protein: Eat protein with each meal to promote healing.  Examples of protein are fish, meat, chicken, nuts, peanut butter, eggs, lentils, edamame or a protein shake.    Wound infection:  If you have signs of infection please call the wound center.  If the wound center is closed- please go to the Emergency department.  Some signs of infection:  fever,  "chills, increased redness, red streaks, increase in pain, increased drainage.  Drainage with an odor, Change in drainage color: white/milky/green/tan/yellow,  an increase in swelling, chest pain and/or shortness of breath.     Standing Status:   Future     Standing Expiration Date:   1/29/2025    Debridement Venous Ulcer Right;Lower Leg     This order was created via procedure documentation        Diagnosis ICD-10-CM Associated Orders   1. Non-pressure chronic ulcer of right lower leg with fat layer exposed (Formerly [facility])  L97.912 lidocaine (XYLOCAINE) 4 % topical solution 5 mL     Wound cleansing and dressings Venous Ulcer Right;Lower Leg     Wound compression and edema control Venous Ulcer Right;Lower Leg     Wound miscellaneous orders Venous Ulcer Right;Lower Leg     Debridement Venous Ulcer Right;Lower Leg      2. Chronic venous hypertension (idiopathic) with ulcer of right lower extremity (CODE) ([facility])  I87.311 lidocaine (XYLOCAINE) 4 % topical solution 5 mL     Wound cleansing and dressings Venous Ulcer Right;Lower Leg     Wound compression and edema control Venous Ulcer Right;Lower Leg     Wound miscellaneous orders Venous Ulcer Right;Lower Leg      3. Lymphedema of both lower extremities  I89.0 lidocaine (XYLOCAINE) 4 % topical solution 5 mL     Wound cleansing and dressings Venous Ulcer Right;Lower Leg     Wound compression and edema control Venous Ulcer Right;Lower Leg     Wound miscellaneous orders Venous Ulcer Right;Lower Leg          --  Piotr Mcgill MD    \"This note has been constructed using a voice recognition system. Therefore there may be syntax, spelling, and/or grammatical errors. Occasional wrong word or \"sound alike\" substitutions may have occurred due to the inherent limitations of voice recognition software. Read the chart carefully and recognize, using context, where substitutions have occurred. Please call if you have any questions.\"     "

## 2024-01-31 DIAGNOSIS — I48.91 NEW ONSET ATRIAL FIBRILLATION (HCC): ICD-10-CM

## 2024-01-31 DIAGNOSIS — I50.31 ACUTE DIASTOLIC CHF (CONGESTIVE HEART FAILURE) (HCC): ICD-10-CM

## 2024-01-31 RX ORDER — ATORVASTATIN CALCIUM 10 MG/1
10 TABLET, FILM COATED ORAL
Qty: 90 TABLET | Refills: 0 | Status: SHIPPED | OUTPATIENT
Start: 2024-01-31

## 2024-01-31 RX ORDER — TORSEMIDE 10 MG/1
10 TABLET ORAL DAILY
Qty: 90 TABLET | Refills: 0 | Status: SHIPPED | OUTPATIENT
Start: 2024-01-31

## 2024-01-31 RX ORDER — METOPROLOL TARTRATE 50 MG/1
50 TABLET, FILM COATED ORAL EVERY 12 HOURS
Qty: 180 TABLET | Refills: 0 | Status: SHIPPED | OUTPATIENT
Start: 2024-01-31

## 2024-02-01 ENCOUNTER — TELEPHONE (OUTPATIENT)
Dept: INPATIENT UNIT | Facility: HOSPITAL | Age: 64
End: 2024-02-01

## 2024-02-01 NOTE — TELEPHONE ENCOUNTER
CHF Evaluation/Screening Form    Patient Level Data  Encounter Level Data  Knowledge Assessment/Post-Discharge Questions  Following Diet: Foods to Limit/Avoid Salt: Yes  Daily Weights Done?: Yes  Date of last weight: 1/31/24  Weight recorded: 316  Knows name of medication/water pill?: Yes  Understands Activity/Exercise: Yes  Knows when to report symptoms?: Yes  Plan in place to call for worsening symptoms?: Yes  Does the patient have a means of transporation?: Yes

## 2024-02-02 DIAGNOSIS — I89.0 LYMPHEDEMA: ICD-10-CM

## 2024-02-02 DIAGNOSIS — R60.0 BILATERAL EDEMA OF LOWER EXTREMITY: ICD-10-CM

## 2024-02-02 DIAGNOSIS — Z79.899 MEDICATION MANAGEMENT: ICD-10-CM

## 2024-02-02 DIAGNOSIS — R03.0 ELEVATED BLOOD-PRESSURE READING WITHOUT DIAGNOSIS OF HYPERTENSION: ICD-10-CM

## 2024-02-02 RX ORDER — LOSARTAN POTASSIUM 50 MG/1
50 TABLET ORAL DAILY
Qty: 90 TABLET | Refills: 1 | Status: SHIPPED | OUTPATIENT
Start: 2024-02-02 | End: 2024-07-31

## 2024-02-02 RX ORDER — POTASSIUM CHLORIDE 1.5 G/1.58G
20 POWDER, FOR SOLUTION ORAL DAILY
Qty: 60 PACKET | Refills: 0 | Status: SHIPPED | OUTPATIENT
Start: 2024-02-02 | End: 2024-04-02

## 2024-02-08 ENCOUNTER — TELEPHONE (OUTPATIENT)
Dept: INPATIENT UNIT | Facility: HOSPITAL | Age: 64
End: 2024-02-08

## 2024-02-08 NOTE — TELEPHONE ENCOUNTER
CHF Evaluation/Screening Form    Patient Level Data  Encounter Level Data  Knowledge Assessment/Post-Discharge Questions  Following Diet: Foods to Limit/Avoid Salt: Yes  Daily Weights Done?: Yes  Knows name of medication/water pill?: Yes  Understands Activity/Exercise: Yes  Knows when to report symptoms?: Yes  Plan in place to call for worsening symptoms?: Yes  Does the patient have a means of transporation?: Yes

## 2024-02-12 ENCOUNTER — OFFICE VISIT (OUTPATIENT)
Dept: WOUND CARE | Facility: HOSPITAL | Age: 64
End: 2024-02-12
Payer: MEDICARE

## 2024-02-12 VITALS
RESPIRATION RATE: 16 BRPM | DIASTOLIC BLOOD PRESSURE: 83 MMHG | SYSTOLIC BLOOD PRESSURE: 156 MMHG | TEMPERATURE: 97.8 F | HEART RATE: 75 BPM

## 2024-02-12 DIAGNOSIS — E11.9 TYPE 2 DIABETES MELLITUS WITHOUT COMPLICATION, WITHOUT LONG-TERM CURRENT USE OF INSULIN (HCC): ICD-10-CM

## 2024-02-12 DIAGNOSIS — R60.9 LIPEDEMA: ICD-10-CM

## 2024-02-12 DIAGNOSIS — L97.912 NON-PRESSURE CHRONIC ULCER OF RIGHT LOWER LEG WITH FAT LAYER EXPOSED (HCC): Primary | ICD-10-CM

## 2024-02-12 DIAGNOSIS — I87.311 CHRONIC VENOUS HYPERTENSION (IDIOPATHIC) WITH ULCER OF RIGHT LOWER EXTREMITY (CODE) (HCC): ICD-10-CM

## 2024-02-12 DIAGNOSIS — I89.0 LYMPHEDEMA OF BOTH LOWER EXTREMITIES: ICD-10-CM

## 2024-02-12 PROCEDURE — 97597 DBRDMT OPN WND 1ST 20 CM/<: CPT | Performed by: STUDENT IN AN ORGANIZED HEALTH CARE EDUCATION/TRAINING PROGRAM

## 2024-02-12 RX ORDER — LIDOCAINE HYDROCHLORIDE 40 MG/ML
5 SOLUTION TOPICAL ONCE
Status: COMPLETED | OUTPATIENT
Start: 2024-02-12 | End: 2024-02-12

## 2024-02-12 RX ADMIN — LIDOCAINE HYDROCHLORIDE 5 ML: 40 SOLUTION TOPICAL at 14:08

## 2024-02-12 NOTE — PROGRESS NOTES
"Patient ID: Luther Vaughan is a 63 y.o. male Date of Birth 1960     Chief Complaint  Chief Complaint   Patient presents with    Follow Up Wound Care Visit     RLE wound       Allergies  Erythromycin and Fish-derived products - food allergy    Assessment:     Diagnoses and all orders for this visit:    Non-pressure chronic ulcer of right lower leg with fat layer exposed (HCC)  -     lidocaine (XYLOCAINE) 4 % topical solution 5 mL  -     Wound cleansing and dressings Venous Ulcer Right;Lower Leg; Future  -     Wound compression and edema control Venous Ulcer Right;Lower Leg; Future  -     Debridement    Chronic venous hypertension (idiopathic) with ulcer of right lower extremity (CODE) (HCC)    Lymphedema of both lower extremities    Lipedema    Type 2 diabetes mellitus without complication, without long-term current use of insulin ([facility])            Debridement   Wound 01/05/24 Venous Ulcer Leg Right;Lower    Universal Protocol:  Consent: Verbal consent obtained.  Risks and benefits: risks, benefits and alternatives were discussed  Consent given by: patient  Time out: Immediately prior to procedure a \"time out\" was called to verify the correct patient, procedure, equipment, support staff and site/side marked as required.  Patient identity confirmed: verbally with patient    Debridement Details  Performed by: physician  Debridement type: selective  Pain control: lidocaine 4%      Post-debridement measurements  Length (cm): 3.4  Width (cm): 1.8  Depth (cm): 0.1  Percent debrided: 80%  Surface Area (cm^2): 6.12  Area Debrided (cm^2): 4.9  Volume (cm^3): 0.61    Devitalized tissue debrided: biofilm, exudate and fibrin  Instrument(s) utilized: curette  Bleeding: small  Hemostasis obtained with: pressure  Procedural pain (0-10): 1  Post-procedural pain: 0   Response to treatment: procedure was tolerated well        Plan:  It was a pleasure to see Luther Vaughan for wound care follow up today  Selective " debridement performed today as above  Wound is improving   Continue plan of care as noted below with santyl  No signs or symptoms of infection today. Patient understands that if any signs of infection start (such as increased redness, drainage, pain, fever, chills, diaphoresis), they should call our office or proceed to the ER or Urgent Care.  Patient should continue a high protein diet to facilitate wound healing  Patient is advised to not submerge wound or leave wound open to air.  Follow up in 2 weeks  Given the multi-factorial nature of wound care, additional time was taken to review patient's treatment plan with other specialties and most recent pertinent lab work and imaging.   All plans of care discussed with patient at bedside who verbalized understanding with treatment plan.       Wound 01/05/24 Venous Ulcer Leg Right;Lower (Active)   Wound Image Images linked 02/12/24 1406   Wound Description Beefy red;Yellow;Slough;Granulation tissue 02/12/24 1406   Paula-wound Assessment Hyperpigmented;Edema;Dry 02/12/24 1406   Wound Length (cm) 3.4 cm 02/12/24 1406   Wound Width (cm) 1.8 cm 02/12/24 1406   Wound Depth (cm) 0.1 cm 02/12/24 1406   Wound Surface Area (cm^2) 6.12 cm^2 02/12/24 1406   Wound Volume (cm^3) 0.612 cm^3 02/12/24 1406   Calculated Wound Volume (cm^3) 0.61 cm^3 02/12/24 1406   Change in Wound Size % 97.8 02/12/24 1406   Drainage Amount Moderate 02/12/24 1406   Drainage Description Serosanguineous 02/12/24 1406   Non-staged Wound Description Full thickness 02/12/24 1406   Dressing Status Intact 02/12/24 1406       Wound 01/05/24 Venous Ulcer Leg Right;Lower (Active)   Date First Assessed/Time First Assessed: 01/05/24 1028   Primary Wound Type: Venous Ulcer  Location: Leg  Wound Location Orientation: Right;Lower       Subjective:      .    2/12/24, 1/29/24: Has been applying Santyl daily.  Happy with wound healing.  No symptoms of infection.     1/22/24: Has been applying Medihoney and alginate to the  wound.  Not wearing compression.  Notes that Santyl is too cost prohibitive with a co-pay of over $300. He denies any signs of infection today.     1/12/24: After last visit patient needed to the ER as recommended.  He was admitted for atrial fibrillation and uncontrolled hypertension.  Eventually needed a cardioversion and is now on chronic anticoagulation.  Also given IV diuretics during his hospitalization.  Patient was discharged a few days ago and is using diuretic, anticoagulation, antihypertensives as directed.  Being wound covered.  Denies any symptoms of infection today.     1/5/24: Consult - Luther is a pleasant 63-year-old male with a past medical history of hypertension and recently diagnosed type 2 diabetes mellitus here today for initial wound care consult.  Patient was referred by his PCP Dr. Maycol Arango.  Seen by PCP on 12/29/2023 at that time presenting for the first time for right lower extremity wound.  Per chart review patient that his right lower extremity on his garbage at home 3 weeks prior to that visit and wound has been getting larger.  Has been using bandages and Neosporin to cover the wound.  Increased swelling and erythema.  At that appointment PCP advised patient to increase Lasix to daily versus every other day due to fluid overload.  Was also given prescription for Bactrim x 7 days and referral to wound management.  Patient notes today that he is not taking any blood pressure medications and also did not  the Bactrim.  Denies any fever chills diaphoresis chest pain headache vision changes shortness of breath.  Does not he that he does have significant gotten lower extremity edema.  He does have a hx of chronic bilateral lower extremity wounds in the past.                   The following portions of the patient's history were reviewed and updated as appropriate: allergies, current medications, past family history, past medical history, past social history, past surgical  history, and problem list.    Review of Systems   Constitutional:  Negative for chills, diaphoresis and fever.   Skin:  Positive for wound.   All other systems reviewed and are negative.        Objective:       Wound 01/05/24 Venous Ulcer Leg Right;Lower (Active)   Wound Image Images linked 02/12/24 1406   Wound Description Beefy red;Yellow;Slough;Granulation tissue 02/12/24 1406   Paula-wound Assessment Hyperpigmented;Edema;Dry 02/12/24 1406   Wound Length (cm) 3.4 cm 02/12/24 1406   Wound Width (cm) 1.8 cm 02/12/24 1406   Wound Depth (cm) 0.1 cm 02/12/24 1406   Wound Surface Area (cm^2) 6.12 cm^2 02/12/24 1406   Wound Volume (cm^3) 0.612 cm^3 02/12/24 1406   Calculated Wound Volume (cm^3) 0.61 cm^3 02/12/24 1406   Change in Wound Size % 97.8 02/12/24 1406   Drainage Amount Moderate 02/12/24 1406   Drainage Description Serosanguineous 02/12/24 1406   Non-staged Wound Description Full thickness 02/12/24 1406   Dressing Status Intact 02/12/24 1406       /83   Pulse 75   Temp 97.8 °F (36.6 °C)   Resp 16     Physical Exam  Vitals reviewed.   Constitutional:       Appearance: Normal appearance.   HENT:      Head: Normocephalic and atraumatic.   Eyes:      Extraocular Movements: Extraocular movements intact.   Pulmonary:      Effort: Pulmonary effort is normal.   Musculoskeletal:      Cervical back: Neck supple.   Skin:     Comments: Anterior left lower extremity wound with fibrin deposition.  Overall wound base is healthier than last exam.  No signs of infection.   Neurological:      Mental Status: He is alert.   Psychiatric:         Mood and Affect: Mood normal.           Wound Instructions:  Orders Placed This Encounter   Procedures    Wound cleansing and dressings Venous Ulcer Right;Lower Leg     Right Lower Leg Wounds:     Wash your hands with soap and water.  Remove old dressing, discard into plastic bag and place in trash.  Cleanse the wound with soap and water prior to applying a clean dressing. Do not  "use tissue or cotton balls. Do not scrub the wound. Pat dry using gauze.  Shower yes   Apply moisturizer to skin surrounding wound  Apply Santyl, nickel thick to the Right leg wounds.  Cover with gauze and ABD  Secure with rolled gauze and tape.  Change dressing daily.     Applied with dry dressing today     This was done today.     Standing Status:   Future     Standing Expiration Date:   2/12/2025    Wound compression and edema control Venous Ulcer Right;Lower Leg     Elastic Tubular Stocking: Spandagrip G to right lower leg     Tubular elastic bandage: Apply from base of toes to behind the knee. Apply in AM, may remove for sleep.     Avoid prolonged standing in one place.     Elevate leg(s) above the level of the heart when sitting or as much as possible.     Standing Status:   Future     Standing Expiration Date:   2/12/2025    Debridement     This order was created via procedure documentation        Diagnosis ICD-10-CM Associated Orders   1. Non-pressure chronic ulcer of right lower leg with fat layer exposed (Former[facility])  L97.912 lidocaine (XYLOCAINE) 4 % topical solution 5 mL     Wound cleansing and dressings Venous Ulcer Right;Lower Leg     Wound compression and edema control Venous Ulcer Right;Lower Leg     Debridement      2. Chronic venous hypertension (idiopathic) with ulcer of right lower extremity (CODE) ([facility])  I87.311       3. Lymphedema of both lower extremities  I89.0       4. Lipedema  R60.9       5. Type 2 diabetes mellitus without complication, without long-term current use of insulin ([facility])  E11.9           --  Piotr Mcgill MD    \"This note has been constructed using a voice recognition system. Therefore there may be syntax, spelling, and/or grammatical errors. Occasional wrong word or \"sound alike\" substitutions may have occurred due to the inherent limitations of voice recognition software. Read the chart carefully and recognize, using context, where substitutions have occurred. Please call if you " "have any questions.\"     "

## 2024-02-12 NOTE — PATIENT INSTRUCTIONS
Orders Placed This Encounter   Procedures    Wound cleansing and dressings Venous Ulcer Right;Lower Leg     Right Lower Leg Wounds:     Wash your hands with soap and water.  Remove old dressing, discard into plastic bag and place in trash.  Cleanse the wound with soap and water prior to applying a clean dressing. Do not use tissue or cotton balls. Do not scrub the wound. Pat dry using gauze.  Shower yes   Apply moisturizer to skin surrounding wound  Apply Santyl, nickel thick to the Right leg wounds.  Cover with gauze and ABD  Secure with rolled gauze and tape.  Change dressing daily.     Applied with dry dressing today     This was done today.     Standing Status:   Future     Standing Expiration Date:   2/12/2025    Wound compression and edema control Venous Ulcer Right;Lower Leg     Elastic Tubular Stocking: Spandagrip G to right lower leg     Tubular elastic bandage: Apply from base of toes to behind the knee. Apply in AM, may remove for sleep.     Avoid prolonged standing in one place.     Elevate leg(s) above the level of the heart when sitting or as much as possible.     Standing Status:   Future     Standing Expiration Date:   2/12/2025

## 2024-02-19 DIAGNOSIS — I89.0 LYMPHEDEMA: ICD-10-CM

## 2024-02-19 DIAGNOSIS — R60.0 BILATERAL EDEMA OF LOWER EXTREMITY: ICD-10-CM

## 2024-02-19 DIAGNOSIS — Z79.899 MEDICATION MANAGEMENT: ICD-10-CM

## 2024-02-19 RX ORDER — POTASSIUM CHLORIDE 1.5 G/1
POWDER, FOR SOLUTION ORAL DAILY
Qty: 90 PACKET | Refills: 1 | Status: SHIPPED | OUTPATIENT
Start: 2024-02-19

## 2024-02-21 ENCOUNTER — RA CDI HCC (OUTPATIENT)
Dept: OTHER | Facility: HOSPITAL | Age: 64
End: 2024-02-21

## 2024-02-21 NOTE — PROGRESS NOTES
HCC coding opportunities          Chart Reviewed number of suggestions sent to Provider: 2  E66.01  E11.22     Patients Insurance     Medicare Insurance: Medicare

## 2024-02-26 ENCOUNTER — OFFICE VISIT (OUTPATIENT)
Dept: WOUND CARE | Facility: HOSPITAL | Age: 64
End: 2024-02-26
Payer: MEDICARE

## 2024-02-26 VITALS
SYSTOLIC BLOOD PRESSURE: 146 MMHG | DIASTOLIC BLOOD PRESSURE: 85 MMHG | RESPIRATION RATE: 18 BRPM | HEART RATE: 70 BPM | TEMPERATURE: 98.5 F

## 2024-02-26 DIAGNOSIS — L97.912 NON-PRESSURE CHRONIC ULCER OF RIGHT LOWER LEG WITH FAT LAYER EXPOSED (HCC): Primary | ICD-10-CM

## 2024-02-26 DIAGNOSIS — I87.311 CHRONIC VENOUS HYPERTENSION (IDIOPATHIC) WITH ULCER OF RIGHT LOWER EXTREMITY (CODE) (HCC): ICD-10-CM

## 2024-02-26 PROCEDURE — 97597 DBRDMT OPN WND 1ST 20 CM/<: CPT | Performed by: STUDENT IN AN ORGANIZED HEALTH CARE EDUCATION/TRAINING PROGRAM

## 2024-02-26 RX ORDER — LIDOCAINE HYDROCHLORIDE 40 MG/ML
5 SOLUTION TOPICAL ONCE
Status: COMPLETED | OUTPATIENT
Start: 2024-02-26 | End: 2024-02-26

## 2024-02-26 RX ADMIN — LIDOCAINE HYDROCHLORIDE 5 ML: 40 SOLUTION TOPICAL at 13:31

## 2024-02-26 NOTE — PROGRESS NOTES
"Patient ID: Luther Vaughan is a 63 y.o. male Date of Birth 1960     Chief Complaint  Chief Complaint   Patient presents with    Follow Up Wound Care Visit       Allergies  Erythromycin and Fish-derived products - food allergy    Assessment:     Diagnoses and all orders for this visit:    Non-pressure chronic ulcer of right lower leg with fat layer exposed (HCC)  -     lidocaine (XYLOCAINE) 4 % topical solution 5 mL  -     Wound cleansing and dressings; Future  -     Wound compression and edema control; Future  -     Debridement    Chronic venous hypertension (idiopathic) with ulcer of right lower extremity (CODE) (HCC)  -     lidocaine (XYLOCAINE) 4 % topical solution 5 mL  -     Wound cleansing and dressings; Future  -     Wound compression and edema control; Future          Debridement   Wound 01/05/24 Venous Ulcer Leg Right;Lower    Universal Protocol:  Consent: Verbal consent obtained.  Risks and benefits: risks, benefits and alternatives were discussed  Consent given by: patient  Time out: Immediately prior to procedure a \"time out\" was called to verify the correct patient, procedure, equipment, support staff and site/side marked as required.  Patient identity confirmed: verbally with patient    Debridement Details  Performed by: physician  Debridement type: selective  Pain control: lidocaine 4%      Post-debridement measurements  Length (cm): 3  Width (cm): 1.9  Depth (cm): 0.1  Percent debrided: 90%  Surface Area (cm^2): 5.7  Area Debrided (cm^2): 5.13  Volume (cm^3): 0.57    Devitalized tissue debrided: biofilm, exudate and fibrin  Instrument(s) utilized: curette  Bleeding: small  Hemostasis obtained with: pressure  Procedural pain (0-10): 1  Post-procedural pain: 0   Response to treatment: procedure was tolerated well        Plan:  It was a pleasure to see Luther Vaughan for wound care follow up today  Selective debridement performed today as above  Wound is improving   Continue plan of care as " noted below with santyl, spandigrip  No signs or symptoms of infection today. Patient understands that if any signs of infection start (such as increased redness, drainage, pain, fever, chills, diaphoresis), they should call our office or proceed to the ER or Urgent Care.  Patient should continue a high protein diet to facilitate wound healing  Patient is advised to not submerge wound or leave wound open to air.  Follow up in 2 weeks  Given the multi-factorial nature of wound care, additional time was taken to review patient's treatment plan with other specialties and most recent pertinent lab work and imaging.   All plans of care discussed with patient at bedside who verbalized understanding with treatment plan.       Wound 01/05/24 Venous Ulcer Leg Right;Lower (Active)   Wound Image Images linked 02/26/24 1326   Wound Description Beefy red;Yellow;Slough;Granulation tissue 02/26/24 1326   Paula-wound Assessment Hyperpigmented;Edema;Dry 02/26/24 1326   Wound Length (cm) 3 cm 02/26/24 1326   Wound Width (cm) 1.9 cm 02/26/24 1326   Wound Depth (cm) 0.1 cm 02/26/24 1326   Wound Surface Area (cm^2) 5.7 cm^2 02/26/24 1326   Wound Volume (cm^3) 0.57 cm^3 02/26/24 1326   Calculated Wound Volume (cm^3) 0.57 cm^3 02/26/24 1326   Change in Wound Size % 97.95 02/26/24 1326   Drainage Amount Moderate 02/26/24 1326   Drainage Description Serosanguineous 02/26/24 1326   Non-staged Wound Description Full thickness 02/26/24 1326   Treatments Irrigation with NSS 02/26/24 1326       Wound 01/05/24 Venous Ulcer Leg Right;Lower (Active)   Date First Assessed/Time First Assessed: 01/05/24 1028   Primary Wound Type: Venous Ulcer  Location: Leg  Wound Location Orientation: Right;Lower       Subjective:      .    2/26/24, 2/12/24, 1/29/24: Has been applying Santyl daily.  Happy with wound healing.  No symptoms of infection.     1/22/24: Has been applying Medihoney and alginate to the wound.  Not wearing compression.  Notes that Santyl is  too cost prohibitive with a co-pay of over $300. He denies any signs of infection today.     1/12/24: After last visit patient needed to the ER as recommended.  He was admitted for atrial fibrillation and uncontrolled hypertension.  Eventually needed a cardioversion and is now on chronic anticoagulation.  Also given IV diuretics during his hospitalization.  Patient was discharged a few days ago and is using diuretic, anticoagulation, antihypertensives as directed.  Being wound covered.  Denies any symptoms of infection today.     1/5/24: Consult - Luther is a pleasant 63-year-old male with a past medical history of hypertension and recently diagnosed type 2 diabetes mellitus here today for initial wound care consult.  Patient was referred by his PCP Dr. Maycol Arango.  Seen by PCP on 12/29/2023 at that time presenting for the first time for right lower extremity wound.  Per chart review patient that his right lower extremity on his garbage at home 3 weeks prior to that visit and wound has been getting larger.  Has been using bandages and Neosporin to cover the wound.  Increased swelling and erythema.  At that appointment PCP advised patient to increase Lasix to daily versus every other day due to fluid overload.  Was also given prescription for Bactrim x 7 days and referral to wound management.  Patient notes today that he is not taking any blood pressure medications and also did not  the Bactrim.  Denies any fever chills diaphoresis chest pain headache vision changes shortness of breath.  Does not he that he does have significant gotten lower extremity edema.  He does have a hx of chronic bilateral lower extremity wounds in the past.                The following portions of the patient's history were reviewed and updated as appropriate: allergies, current medications, past family history, past medical history, past social history, past surgical history, and problem list.    Review of Systems    Constitutional:  Negative for chills, diaphoresis and fever.   Skin:  Positive for wound.   All other systems reviewed and are negative.        Objective:       Wound 01/05/24 Venous Ulcer Leg Right;Lower (Active)   Wound Image Images linked 02/26/24 1326   Wound Description Beefy red;Yellow;Slough;Granulation tissue 02/26/24 1326   Paula-wound Assessment Hyperpigmented;Edema;Dry 02/26/24 1326   Wound Length (cm) 3 cm 02/26/24 1326   Wound Width (cm) 1.9 cm 02/26/24 1326   Wound Depth (cm) 0.1 cm 02/26/24 1326   Wound Surface Area (cm^2) 5.7 cm^2 02/26/24 1326   Wound Volume (cm^3) 0.57 cm^3 02/26/24 1326   Calculated Wound Volume (cm^3) 0.57 cm^3 02/26/24 1326   Change in Wound Size % 97.95 02/26/24 1326   Drainage Amount Moderate 02/26/24 1326   Drainage Description Serosanguineous 02/26/24 1326   Non-staged Wound Description Full thickness 02/26/24 1326   Treatments Irrigation with NSS 02/26/24 1326       /85   Pulse 70   Temp 98.5 °F (36.9 °C)   Resp 18     Physical Exam  Vitals reviewed.   Constitutional:       Appearance: Normal appearance.   HENT:      Head: Normocephalic and atraumatic.   Eyes:      Extraocular Movements: Extraocular movements intact.   Pulmonary:      Effort: Pulmonary effort is normal.   Musculoskeletal:      Cervical back: Neck supple.      Right lower leg: Edema (+1) present.   Skin:     Comments: Anterior right lower extremity wound smaller than last exam.  Epithelialization throughout.  Still with moderate fibrin deposition.  No signs of infection.   Neurological:      Mental Status: He is alert.   Psychiatric:         Mood and Affect: Mood normal.           Wound Instructions:  Orders Placed This Encounter   Procedures    Wound cleansing and dressings     Right Lower Leg Wounds:     Wash your hands with soap and water.  Remove old dressing, discard into plastic bag and place in trash.  Cleanse the wound with soap and water prior to applying a clean dressing. Do not use  "tissue or cotton balls. Do not scrub the wound. Pat dry using gauze.  Shower yes   Apply moisturizer to skin surrounding wound  Apply Santyl, nickel thick to the Right leg wounds.  Cover with gauze and ABD  Secure with rolled gauze and tape.  Change dressing daily.     Applied with dry dressing today     This was done today.     Standing Status:   Future     Standing Expiration Date:   2/26/2025    Wound compression and edema control     Elastic Tubular Stocking: Spandagrip G to right lower leg     Tubular elastic bandage: Apply from base of toes to behind the knee. Apply in AM, may remove for sleep.     Avoid prolonged standing in one place.     Elevate leg(s) above the level of th     Standing Status:   Future     Standing Expiration Date:   2/26/2025    Debridement     This order was created via procedure documentation        Diagnosis ICD-10-CM Associated Orders   1. Non-pressure chronic ulcer of right lower leg with fat layer exposed ([facility])  L97.912 lidocaine (XYLOCAINE) 4 % topical solution 5 mL     Wound cleansing and dressings     Wound compression and edema control     Debridement      2. Chronic venous hypertension (idiopathic) with ulcer of right lower extremity (CODE) ([facility])  I87.311 lidocaine (XYLOCAINE) 4 % topical solution 5 mL     Wound cleansing and dressings     Wound compression and edema control          --  Piotr Mcgill MD    \"This note has been constructed using a voice recognition system. Therefore there may be syntax, spelling, and/or grammatical errors. Occasional wrong word or \"sound alike\" substitutions may have occurred due to the inherent limitations of voice recognition software. Read the chart carefully and recognize, using context, where substitutions have occurred. Please call if you have any questions.\"     "

## 2024-02-26 NOTE — PATIENT INSTRUCTIONS
Orders Placed This Encounter   Procedures    Wound cleansing and dressings     Right Lower Leg Wounds:     Wash your hands with soap and water.  Remove old dressing, discard into plastic bag and place in trash.  Cleanse the wound with soap and water prior to applying a clean dressing. Do not use tissue or cotton balls. Do not scrub the wound. Pat dry using gauze.  Shower yes   Apply moisturizer to skin surrounding wound  Apply Santyl, nickel thick to the Right leg wounds.  Cover with gauze and ABD  Secure with rolled gauze and tape.  Change dressing daily.     Applied with dry dressing today     This was done today.     Standing Status:   Future     Standing Expiration Date:   2/26/2025    Wound compression and edema control     Elastic Tubular Stocking: Spandagrip G to right lower leg     Tubular elastic bandage: Apply from base of toes to behind the knee. Apply in AM, may remove for sleep.     Avoid prolonged standing in one place.     Elevate leg(s) above the level of th     Standing Status:   Future     Standing Expiration Date:   2/26/2025

## 2024-02-28 ENCOUNTER — OFFICE VISIT (OUTPATIENT)
Dept: FAMILY MEDICINE CLINIC | Facility: CLINIC | Age: 64
End: 2024-02-28
Payer: MEDICARE

## 2024-02-28 VITALS
BODY MASS INDEX: 46.65 KG/M2 | HEIGHT: 69 IN | SYSTOLIC BLOOD PRESSURE: 122 MMHG | OXYGEN SATURATION: 97 % | HEART RATE: 73 BPM | WEIGHT: 315 LBS | DIASTOLIC BLOOD PRESSURE: 62 MMHG

## 2024-02-28 DIAGNOSIS — Z12.5 SCREENING FOR MALIGNANT NEOPLASM OF PROSTATE: ICD-10-CM

## 2024-02-28 DIAGNOSIS — M16.11 PRIMARY OSTEOARTHRITIS OF RIGHT HIP: ICD-10-CM

## 2024-02-28 DIAGNOSIS — M17.12 PRIMARY OSTEOARTHRITIS OF LEFT KNEE: Primary | ICD-10-CM

## 2024-02-28 DIAGNOSIS — I48.91 NEW ONSET ATRIAL FIBRILLATION (HCC): ICD-10-CM

## 2024-02-28 DIAGNOSIS — E11.9 TYPE 2 DIABETES MELLITUS WITHOUT COMPLICATION, WITHOUT LONG-TERM CURRENT USE OF INSULIN (HCC): ICD-10-CM

## 2024-02-28 DIAGNOSIS — I10 PRIMARY HYPERTENSION: ICD-10-CM

## 2024-02-28 DIAGNOSIS — L97.911 CHRONIC ULCER OF RIGHT LEG, LIMITED TO BREAKDOWN OF SKIN (HCC): ICD-10-CM

## 2024-02-28 DIAGNOSIS — G47.33 OBSTRUCTIVE SLEEP APNEA: ICD-10-CM

## 2024-02-28 DIAGNOSIS — M17.11 PRIMARY OSTEOARTHRITIS OF RIGHT KNEE: ICD-10-CM

## 2024-02-28 DIAGNOSIS — I50.31 ACUTE DIASTOLIC CHF (CONGESTIVE HEART FAILURE) (HCC): ICD-10-CM

## 2024-02-28 PROCEDURE — 99214 OFFICE O/P EST MOD 30 MIN: CPT | Performed by: FAMILY MEDICINE

## 2024-02-28 PROCEDURE — G0402 INITIAL PREVENTIVE EXAM: HCPCS | Performed by: FAMILY MEDICINE

## 2024-02-28 NOTE — PROGRESS NOTES
Outpatient Note- Follow up     HPI:     Luther Vaughan , 63 y.o. male  presents today for chronic conditions.  And annual Medicare wellness visit.  The patient unfortunately was diagnosed with new onset atrial fibrillation.  He was cardioverted, and did convert over to normal sinus rhythm.  Despite this, he was placed on multiple medications due to the possibility of paroxysmal atrial fib and history of lower extremity swelling.  Currently he is on Xarelto 20 mg daily, Demadex 10 mg daily, metoprolol 50 mg twice daily, and losartan 50 mg daily.  He is on antihyperlipidemic medication of atorvastatin 10 mg daily.  He was previously followed up on 1/16/2024 and they recommended continuation of these medications.  Additionally he has an upcoming appointment on 3/1/2024 for follow-up.  They will discuss whether or not the patient is required to remain on medications or if there is a possibility that might allow him to discontinue or reduce medication.    Patient was also recently diagnosed with type 2 diabetes in September 2023.  He is not currently on any oral meds due to his A1c being 6.7 and the patient wanting to attempt to lose weight and decrease sugars naturally.  He is on appropriate medication of statin and ARB.  He is not currently on any hyperglycemic medication.  He does have a chronic open wound that has been healing slowly on his right lower extremity.  He is following with the wound care clinic to help improve/heal this area.    Past Medical History:   Diagnosis Date    Diabetes mellitus (HCC)     Hypertension     Migraine     Obesity     MARLA on CPAP     Sleep apnea     Vertigo     episode 1/30/2016      ROS:   Review of Systems   Constitutional:  Negative for chills and fever.   HENT:  Negative for congestion, ear discharge, ear pain, postnasal drip, rhinorrhea, sinus pressure, sinus pain and sore throat.    Respiratory:  Negative for cough, chest tightness and shortness of breath.            OBJECTIVE  Vitals:    02/28/24 1438   BP: 122/62   Pulse: 73   SpO2: 97%        Physical Exam  Constitutional:       General: He is not in acute distress.     Appearance: Normal appearance. He is obese. He is not ill-appearing, toxic-appearing or diaphoretic.   Cardiovascular:      Rate and Rhythm: Normal rate and regular rhythm.      Heart sounds: Normal heart sounds. No murmur heard.     No friction rub. No gallop.   Pulmonary:      Effort: Pulmonary effort is normal. No respiratory distress.      Breath sounds: Normal breath sounds. No stridor. No wheezing, rhonchi or rales.   Abdominal:      General: Bowel sounds are normal. There is no distension.      Palpations: Abdomen is soft.      Tenderness: There is no abdominal tenderness.   Musculoskeletal:      Right lower leg: Edema present.      Left lower leg: Edema present.   Neurological:      Mental Status: He is alert.          ASSESSMENT AND PLAN   Luther was seen today for medicare wellness visit.  Diagnoses and all orders for this visit:    Primary osteoarthritis of left knee  Primary osteoarthritis of right knee  Primary osteoarthritis of right hip  Patient has osteoarthritis of multiple regions of the body including hips and knees.  He has severe arthritis in bilateral knees.  He is hopeful be evaluated soon for surgery.  Patient will need clearance from PCP as well as cardiology with recommendations for Xarelto.    New onset atrial fibrillation (HCC)  Acute diastolic CHF (congestive heart failure) (HCC)  Recently diagnosed with new onset atrial fibrillation.  And taking his medications appropriately.  I specifically reviewed the reasoning for each of his medications, and understands the importance of remaining on them for his health and maintenance of chronic conditions.    Primary hypertension  Blood pressure within normal limits.  Currently on torsemide 10 mg daily, metoprolol 50 mg twice daily, and losartan 50 mg daily.  On presentation BP is  122/62.  No significant lightheadedness, dizziness, tiredness, fatigue.  Recommend patient continue to follow with cardiology.  Appreciate recommendations.      Obstructive sleep apnea  Stable.  Currently being treated with CPAP.    Type 2 diabetes mellitus without complication, without long-term current use of insulin ([facility])  Patient recently diagnosed with type 2 diabetes with an A1c of 6.7.  Previously in the prediabetic range.  Not currently on any medications due to patient wanting to lose weight naturally and avoid any medications at this time.  He has no significant complications of numbness and tingling or polyneuropathy.  Kidney function within normal limits.  Will need to perform urine microalbumin with next labs.    Chronic ulcer of right leg, limited to breakdown of skin (HCC)  Improving.  Patient actively working with wound care to improve/heal area.  It is a slow and steady process, but the area is improving.    Screening for malignant neoplasm of prostate  After review with Medicare annual wellness visit, patient is due for PSA testing.  Order placed.  -     PSA, Total Screen; Future             DO Ron Gordon [facility]  3/1/2024 6:56 AM

## 2024-02-28 NOTE — PROGRESS NOTES
Assessment and Plan:     Problem List Items Addressed This Visit    None  Visit Diagnoses       Encounter for immunization    -  Primary             Preventive health issues were discussed with patient, and age appropriate screening tests were ordered as noted in patient's After Visit Summary.  Personalized health advice and appropriate referrals for health education or preventive services given if needed, as noted in patient's After Visit Summary.     History of Present Illness:     Patient presents for a Medicare Wellness Visit    HPI   See other note for details  Patient Care Team:  Maycol Arango DO as PCP - General (Family Medicine)     Review of Systems:     Review of Systems  See other note for details     Problem List:     Patient Active Problem List   Diagnosis    Elevated blood-pressure reading without diagnosis of hypertension    Incarcerated umbilical hernia    Obesity, unspecified    Primary osteoarthritis of left knee    Primary osteoarthritis of right knee    Primary osteoarthritis of right hip    BMI 45.0-49.9, adult (HCC)    New onset atrial fibrillation (HCC)    Hypertension    Obstructive sleep apnea    Acute diastolic CHF (congestive heart failure) (HCC)    Type 2 diabetes mellitus (HCC)    Dyslipidemia    Chronic ulcer of lower extremity (HCC)      Past Medical and Surgical History:     Past Medical History:   Diagnosis Date    Diabetes mellitus (HCC)     Hypertension     Migraine     Obesity     MARLA on CPAP     Sleep apnea     Vertigo     episode 1/30/2016     Past Surgical History:   Procedure Laterality Date    HAND SURGERY      football injury    HERNIA REPAIR      KNEE SURGERY Left     left meniscus     MENISCECTOMY Left       Family History:     Family History   Problem Relation Age of Onset    No Known Problems Mother     Heart attack Father     Heart disease Father     Prostate cancer Neg Hx     Testicular cancer Neg Hx     Colon cancer Neg Hx       Social History:     Social  History     Socioeconomic History    Marital status: /Civil Union     Spouse name: Esthela    Number of children: 3    Years of education: 16    Highest education level: Bachelor's degree (e.g., BA, AB, BS)   Occupational History    None   Tobacco Use    Smoking status: Never    Smokeless tobacco: Never    Tobacco comments:     never smoker- per Lisa    Vaping Use    Vaping status: Never Used   Substance and Sexual Activity    Alcohol use: Not Currently     Alcohol/week: 14.0 standard drinks of alcohol     Types: 14 Cans of beer per week    Drug use: Never    Sexual activity: Yes     Partners: Female     Birth control/protection: Post-menopausal   Other Topics Concern    None   Social History Narrative    · Most recent tobacco use screenin2018      · Alcohol-years of use:   40      · Able to care for self:   Yes      · Live alone or with others:   with others      · Advance directive:   No      · Diet:   Regular      Social Determinants of Health     Financial Resource Strain: Not on file   Food Insecurity: No Food Insecurity (2024)    Hunger Vital Sign     Worried About Running Out of Food in the Last Year: Never true     Ran Out of Food in the Last Year: Never true   Transportation Needs: No Transportation Needs (2024)    PRAPARE - Transportation     Lack of Transportation (Medical): No     Lack of Transportation (Non-Medical): No   Physical Activity: Not on file   Stress: Not on file   Social Connections: Not on file   Intimate Partner Violence: Not on file   Housing Stability: Low Risk  (2024)    Housing Stability Vital Sign     Unable to Pay for Housing in the Last Year: No     Number of Places Lived in the Last Year: 1     Unstable Housing in the Last Year: No      Medications and Allergies:     Current Outpatient Medications   Medication Sig Dispense Refill    Ascorbic Acid, Vitamin C, (VITAMIN C) 100 MG tablet Take by mouth daily      atorvastatin (LIPITOR) 10 mg tablet TAKE  "1 TABLET BY MOUTH DAILY WITH DINNER 90 tablet 0    cholecalciferol (VITAMIN D3) 400 units tablet Take 400 Units by mouth daily      collagenase (SANTYL) ointment Apply topically daily 30 g 1    Klor-Con 20 MEQ packet TAKE 1 TABLET BY MOUTH DAILY 90 packet 1    losartan (COZAAR) 50 mg tablet Take 1 tablet (50 mg total) by mouth daily 90 tablet 1    meloxicam (MOBIC) 15 mg tablet 15 mg daily States does not take daily.      metoprolol tartrate (LOPRESSOR) 50 mg tablet TAKE 1 TABLET BY MOUTH EVERY 12 HOURS. 180 tablet 0    torsemide (DEMADEX) 10 mg tablet TAKE 1 TABLET (10 MG TOTAL) BY MOUTH DAILY. DO NOT START BEFORE JANUARY 9, 2024. 90 tablet 0    clotrimazole-betamethasone (LOTRISONE) 1-0.05 % cream Apply topically 2 (two) times a day for 7 days Available over the counter      nystatin (MYCOSTATIN) powder Apply topically 2 (two) times a day for 7 days 60 g 1    Xarelto 20 MG tablet Take 1 tablet (20 mg total) by mouth daily with breakfast Will be started on xarelto once supply of pradaxa is depleted 30 tablet 0     No current facility-administered medications for this visit.     Allergies   Allergen Reactions    Erythromycin Diarrhea    Fish-Derived Products - Food Allergy Diarrhea     States \"cartilage fish\" cause sweating, diarrhea, vomiting      Immunizations:     Immunization History   Administered Date(s) Administered    Tdap 03/10/2011      Health Maintenance:         Topic Date Due    HIV Screening  Never done    Colorectal Cancer Screening  Never done    Hepatitis C Screening  Completed         Topic Date Due    Pneumococcal Vaccine: 65+ Years (1 of 2 - PCV) Never done    Hepatitis A Vaccine (1 of 2 - Risk 2-dose series) Never done    COVID-19 Vaccine (1 - 2023-24 season) Never done      Medicare Screening Tests and Risk Assessments:     Luther is here for his Welcome to Medicare visit.     Health Risk Assessment:   Patient rates overall health as good. Patient feels that their physical health rating is " same. Patient is satisfied with their life. Eyesight was rated as same. Hearing was rated as same. Patient feels that their emotional and mental health rating is same. Patients states they are never, rarely angry. Patient states they are sometimes unusually tired/fatigued. Pain experienced in the last 7 days has been a lot. Patient's pain rating has been 6/10. Patient states that he has experienced no weight loss or gain in last 6 months.     Depression Screening:   PHQ-2 Score: 0      Fall Risk Screening:   In the past year, patient has experienced: history of falling in past year    Number of falls: 2 or more  Injured during fall?: No    Feels unsteady when standing or walking?: Yes    Worried about falling?: Yes      Home Safety:  Patient has trouble with stairs inside or outside of their home. Patient has working smoke alarms and has working carbon monoxide detector. Home safety hazards include: none.     Nutrition:   Current diet is Diabetic.     Medications:   Patient is not currently taking any over-the-counter supplements. Patient is able to manage medications.     Activities of Daily Living (ADLs)/Instrumental Activities of Daily Living (IADLs):   Walk and transfer into and out of bed and chair?: Yes  Dress and groom yourself?: Yes    Bathe or shower yourself?: Yes    Feed yourself? Yes  Do your laundry/housekeeping?: Yes  Manage your money, pay your bills and track your expenses?: Yes  Make your own meals?: Yes    Do your own shopping?: Yes    Previous Hospitalizations:   Any hospitalizations or ED visits within the last 12 months?: No      Advance Care Planning:   Living will: Yes    Durable POA for healthcare: Yes    Advanced directive: Yes    Advanced directive counseling given: Yes      PREVENTIVE SCREENINGS      Cardiovascular Screening:    General: History Lipid Disorder and Screening Current      Diabetes Screening:     General: History Diabetes and Risks and Benefits Discussed      Colorectal  "Cancer Screening:     General: Risks and Benefits Discussed    Due for: Cologuard      Osteoporosis Screening:    General: Screening Not Indicated      Abdominal Aortic Aneurysm (AAA) Screening:    Risk factors include: age between 65-74 yo        General: Screening Not Indicated      Lung Cancer Screening:     General: Screening Not Indicated      Hepatitis C Screening:    General: Screening Current    Screening, Brief Intervention, and Referral to Treatment (SBIRT)    Screening  Typical number of drinks in a day: 0  Typical number of drinks in a week: 0  Interpretation: Low risk drinking behavior.    I personally reviewed the full questionnaire with the patient.  Any flagged items or concerning findings were discussed at length.  I placed emphasis on end-of-life care/POA and preventative screenings.  Any questions posed by the patient were as answered to the best of my ability.    No results found.     Physical Exam:     /62   Pulse 73   Ht 5' 8.5\" (1.74 m)   Wt (!) 144 kg (318 lb)   SpO2 97%   BMI 47.65 kg/m²     Physical Exam   No PE performed    Maycol Arango, DO  "

## 2024-02-28 NOTE — PATIENT INSTRUCTIONS
Medicare Preventive Visit Patient Instructions  Thank you for completing your Welcome to Medicare Visit or Medicare Annual Wellness Visit today. Your next wellness visit will be due in one year (2/28/2025).  The screening/preventive services that you may require over the next 5-10 years are detailed below. Some tests may not apply to you based off risk factors and/or age. Screening tests ordered at today's visit but not completed yet may show as past due. Also, please note that scanned in results may not display below.  Preventive Screenings:  Service Recommendations Previous Testing/Comments   Colorectal Cancer Screening  Colonoscopy    Fecal Occult Blood Test (FOBT)/Fecal Immunochemical Test (FIT)  Fecal DNA/Cologuard Test  Flexible Sigmoidoscopy Age: 45-75 years old   Colonoscopy: every 10 years (May be performed more frequently if at higher risk)  OR  FOBT/FIT: every 1 year  OR  Cologuard: every 3 years  OR  Sigmoidoscopy: every 5 years  Screening may be recommended earlier than age 45 if at higher risk for colorectal cancer. Also, an individualized decision between you and your healthcare provider will decide whether screening between the ages of 76-85 would be appropriate. Colonoscopy: Not on file  FOBT/FIT: Not on file  Cologuard: Not on file  Sigmoidoscopy: Not on file          Prostate Cancer Screening Individualized decision between patient and health care provider in men between ages of 55-69   Medicare will cover every 12 months beginning on the day after your 50th birthday PSA: No results in last 5 years           Hepatitis C Screening Once for adults born between 1945 and 1965  More frequently in patients at high risk for Hepatitis C Hep C Antibody: 06/20/2023    Screening Current   Diabetes Screening 1-2 times per year if you're at risk for diabetes or have pre-diabetes Fasting glucose: 120 mg/dL (9/26/2023)  A1C: 6.7 % (9/26/2023)  Screening Not Indicated  History Diabetes   Cholesterol Screening  Once every 5 years if you don't have a lipid disorder. May order more often based on risk factors. Lipid panel: 01/06/2024  Screening Not Indicated  History Lipid Disorder      Other Preventive Screenings Covered by Medicare:  Abdominal Aortic Aneurysm (AAA) Screening: covered once if your at risk. You're considered to be at risk if you have a family history of AAA or a male between the age of 65-75 who smoking at least 100 cigarettes in your lifetime.  Lung Cancer Screening: covers low dose CT scan once per year if you meet all of the following conditions: (1) Age 55-77; (2) No signs or symptoms of lung cancer; (3) Current smoker or have quit smoking within the last 15 years; (4) You have a tobacco smoking history of at least 20 pack years (packs per day x number of years you smoked); (5) You get a written order from a healthcare provider.  Glaucoma Screening: covered annually if you're considered high risk: (1) You have diabetes OR (2) Family history of glaucoma OR (3)  aged 50 and older OR (4)  American aged 65 and older  Osteoporosis Screening: covered every 2 years if you meet one of the following conditions: (1) Have a vertebral abnormality; (2) On glucocorticoid therapy for more than 3 months; (3) Have primary hyperparathyroidism; (4) On osteoporosis medications and need to assess response to drug therapy.  HIV Screening: covered annually if you're between the age of 15-65. Also covered annually if you are younger than 15 and older than 65 with risk factors for HIV infection. For pregnant patients, it is covered up to 3 times per pregnancy.    Immunizations:  Immunization Recommendations   Influenza Vaccine Annual influenza vaccination during flu season is recommended for all persons aged >= 6 months who do not have contraindications   Pneumococcal Vaccine   * Pneumococcal conjugate vaccine = PCV13 (Prevnar 13), PCV15 (Vaxneuvance), PCV20 (Prevnar 20)  * Pneumococcal polysaccharide  vaccine = PPSV23 (Pneumovax) Adults 19-65 yo with certain risk factors or if 65+ yo  If never received any pneumonia vaccine: recommend Prevnar 20 (PCV20)  Give PCV20 if previously received 1 dose of PCV13 or PPSV23   Hepatitis B Vaccine 3 dose series if at intermediate or high risk (ex: diabetes, end stage renal disease, liver disease)   Respiratory syncytial virus (RSV) Vaccine - COVERED BY MEDICARE PART D  * RSVPreF3 (Arexvy) CDC recommends that adults 60 years of age and older may receive a single dose of RSV vaccine using shared clinical decision-making (SCDM)   Tetanus (Td) Vaccine - COST NOT COVERED BY MEDICARE PART B Following completion of primary series, a booster dose should be given every 10 years to maintain immunity against tetanus. Td may also be given as tetanus wound prophylaxis.   Tdap Vaccine - COST NOT COVERED BY MEDICARE PART B Recommended at least once for all adults. For pregnant patients, recommended with each pregnancy.   Shingles Vaccine (Shingrix) - COST NOT COVERED BY MEDICARE PART B  2 shot series recommended in those 19 years and older who have or will have weakened immune systems or those 50 years and older     Health Maintenance Due:      Topic Date Due   • HIV Screening  Never done   • Colorectal Cancer Screening  Never done   • Hepatitis C Screening  Completed     Immunizations Due:      Topic Date Due   • Pneumococcal Vaccine: 65+ Years (1 of 2 - PCV) Never done   • Hepatitis A Vaccine (1 of 2 - Risk 2-dose series) Never done   • COVID-19 Vaccine (1 - 2023-24 season) Never done     Advance Directives   What are advance directives?  Advance directives are legal documents that state your wishes and plans for medical care. These plans are made ahead of time in case you lose your ability to make decisions for yourself. Advance directives can apply to any medical decision, such as the treatments you want, and if you want to donate organs.   What are the types of advance directives?   There are many types of advance directives, and each state has rules about how to use them. You may choose a combination of any of the following:  Living will:  This is a written record of the treatment you want. You can also choose which treatments you do not want, which to limit, and which to stop at a certain time. This includes surgery, medicine, IV fluid, and tube feedings.   Durable power of  for healthcare (DPAHC):  This is a written record that states who you want to make healthcare choices for you when you are unable to make them for yourself. This person, called a proxy, is usually a family member or a friend. You may choose more than 1 proxy.  Do not resuscitate (DNR) order:  A DNR order is used in case your heart stops beating or you stop breathing. It is a request not to have certain forms of treatment, such as CPR. A DNR order may be included in other types of advance directives.  Medical directive:  This covers the care that you want if you are in a coma, near death, or unable to make decisions for yourself. You can list the treatments you want for each condition. Treatment may include pain medicine, surgery, blood transfusions, dialysis, IV or tube feedings, and a ventilator (breathing machine).  Values history:  This document has questions about your views, beliefs, and how you feel and think about life. This information can help others choose the care that you would choose.  Why are advance directives important?  An advance directive helps you control your care. Although spoken wishes may be used, it is better to have your wishes written down. Spoken wishes can be misunderstood, or not followed. Treatments may be given even if you do not want them. An advance directive may make it easier for your family to make difficult choices about your care.   Fall Prevention    Fall prevention  includes ways to make your home and other areas safer. It also includes ways you can move more carefully to  prevent a fall. Health conditions that cause changes in your blood pressure, vision, or muscle strength and coordination may increase your risk for falls. Medicines may also increase your risk for falls if they make you dizzy, weak, or sleepy.   Fall prevention tips:   Stand or sit up slowly.    Use assistive devices as directed.    Wear shoes that fit well and have soles that .    Wear a personal alarm.    Stay active.    Manage your medical conditions.    Home Safety Tips:  Add items to prevent falls in the bathroom.    Keep paths clear.    Install bright lights in your home.    Keep items you use often on shelves within reach.    Paint or place reflective tape on the edges of your stairs.    Weight Management   Why it is important to manage your weight:  Being overweight increases your risk of health conditions such as heart disease, high blood pressure, type 2 diabetes, and certain types of cancer. It can also increase your risk for osteoarthritis, sleep apnea, and other respiratory problems. Aim for a slow, steady weight loss. Even a small amount of weight loss can lower your risk of health problems.  How to lose weight safely:  A safe and healthy way to lose weight is to eat fewer calories and get regular exercise. You can lose up about 1 pound a week by decreasing the number of calories you eat by 500 calories each day.   Healthy meal plan for weight management:  A healthy meal plan includes a variety of foods, contains fewer calories, and helps you stay healthy. A healthy meal plan includes the following:  Eat whole-grain foods more often.  A healthy meal plan should contain fiber. Fiber is the part of grains, fruits, and vegetables that is not broken down by your body. Whole-grain foods are healthy and provide extra fiber in your diet. Some examples of whole-grain foods are whole-wheat breads and pastas, oatmeal, brown rice, and bulgur.  Eat a variety of vegetables every day.  Include dark, leafy greens  such as spinach, kale, jaymie greens, and mustard greens. Eat yellow and orange vegetables such as carrots, sweet potatoes, and winter squash.   Eat a variety of fruits every day.  Choose fresh or canned fruit (canned in its own juice or light syrup) instead of juice. Fruit juice has very little or no fiber.  Eat low-fat dairy foods.  Drink fat-free (skim) milk or 1% milk. Eat fat-free yogurt and low-fat cottage cheese. Try low-fat cheeses such as mozzarella and other reduced-fat cheeses.  Choose meat and other protein foods that are low in fat.  Choose beans or other legumes such as split peas or lentils. Choose fish, skinless poultry (chicken or turkey), or lean cuts of red meat (beef or pork). Before you cook meat or poultry, cut off any visible fat.   Use less fat and oil.  Try baking foods instead of frying them. Add less fat, such as margarine, sour cream, regular salad dressing and mayonnaise to foods. Eat fewer high-fat foods. Some examples of high-fat foods include french fries, doughnuts, ice cream, and cakes.  Eat fewer sweets.  Limit foods and drinks that are high in sugar. This includes candy, cookies, regular soda, and sweetened drinks.  Exercise:  Exercise at least 30 minutes per day on most days of the week. Some examples of exercise include walking, biking, dancing, and swimming. You can also fit in more physical activity by taking the stairs instead of the elevator or parking farther away from stores. Ask your healthcare provider about the best exercise plan for you.      © Copyright [employer] 2018 Information is for End User's use only and may not be sold, redistributed or otherwise used for commercial purposes. All illustrations and images included in CareNotes® are the copyrighted property of [employer] or [employer]

## 2024-02-29 NOTE — PROGRESS NOTES
Progress Note - Cardiology Office  [facility]    Luther Vaughan 63 y.o. male MRN: [id number]  : 1960  Encounter: [id number]      ASSESSMENT:   PAF  [id number] score is 4  S/p cardioversion on 2024  On Lopressor and Xarelto    Chronic diastolic heart failure  Dyspnea on exertion  Currently euvolemic  On losartan, metoprolol and Demadex    PFO  ELISA, 2024  Small, PFO with predominant right to left shunt    TTE, 2024:  EF 55% diastolic function could not be evaluated  Mild to moderate aortic stenosis  Mild MR and TR, PAP 30 mmhg    Essential hypertension  BP today is 120/70 mmHg with heart rate of 85/min    Dyslipidemia  2024: LDL 79, TG 97, HDL 37  On Lipitor 10 mg    Type 2 diabetes mellitus    Bilateral lower extremity lymphedema with chronic right lower extremity venous ulcer.  Follows with [facility] care as outpatient    Obesity, BMI 47.50  MARLA, on CPAP    Bilateral knee arthritis  Follows with [facility] orthopedic    RECOMMENDATIONS:  Pharmacologic nuclear stress test  Continue current cardiac medications including Xarelto, Demadex, metoprolol, losartan and atorvastatin  Low-salt, low-cholesterol diet  Weight loss  Patient is going to see his orthopedic doctor at [facility] for possible knee replacement      Please call [phone] if any questions.    HPI :     Luther Vaughan is a 63 y.o. year old male who came for follow up.  He has morbid obesity, paroxysmal atrial fibrillation s/p cardioversion.  He is currently in sinus rhythm.  He also has well-controlled hypertension.  His dyslipidemia is also controlled  He has morbid obesity, MARLA and has been on CPAP for 20 years.  He has dyspnea on exertion but denies any chest pain, palpitations, or syncope      REVIEW OF SYSTEMS:  Review of Systems   Respiratory:  Positive for shortness of breath.    Musculoskeletal:  Positive for arthralgias and gait problem.   All other systems reviewed  "and are negative.        Historical Information   Past Medical History:   Diagnosis Date    Diabetes mellitus (HCC)     Hypertension     Migraine     Obesity     MARLA on CPAP     Sleep apnea     Vertigo     episode 1/30/2016     Past Surgical History:   Procedure Laterality Date    HAND SURGERY      football injury    HERNIA REPAIR      KNEE SURGERY Left     left meniscus     MENISCECTOMY Left      Social History     Substance and Sexual Activity   Alcohol Use Not Currently    Alcohol/week: 14.0 standard drinks of alcohol    Types: 14 Cans of beer per week     Social History     Substance and Sexual Activity   Drug Use Never     Social History     Tobacco Use   Smoking Status Never   Smokeless Tobacco Never   Tobacco Comments    never smoker- per Lisa      Family History:   Family History   Problem Relation Age of Onset    No Known Problems Mother     Heart attack Father     Heart disease Father     Prostate cancer Neg Hx     Testicular cancer Neg Hx     Colon cancer Neg Hx        Meds/Allergies     Allergies   Allergen Reactions    Erythromycin Diarrhea    Fish-Derived Products - Food Allergy Diarrhea     States \"cartilage fish\" cause sweating, diarrhea, vomiting       Current Outpatient Medications:     Ascorbic Acid, Vitamin C, (VITAMIN C) 100 MG tablet, Take by mouth daily, Disp: , Rfl:     atorvastatin (LIPITOR) 10 mg tablet, TAKE 1 TABLET BY MOUTH DAILY WITH DINNER, Disp: 90 tablet, Rfl: 0    cholecalciferol (VITAMIN D3) 400 units tablet, Take 400 Units by mouth daily, Disp: , Rfl:     collagenase (SANTYL) ointment, Apply topically daily, Disp: 30 g, Rfl: 1    Klor-Con 20 MEQ packet, TAKE 1 TABLET BY MOUTH DAILY, Disp: 90 packet, Rfl: 1    losartan (COZAAR) 50 mg tablet, Take 1 tablet (50 mg total) by mouth daily, Disp: 90 tablet, Rfl: 1    meloxicam (MOBIC) 15 mg tablet, 15 mg daily States does not take daily., Disp: , Rfl:     metoprolol tartrate (LOPRESSOR) 50 mg tablet, TAKE 1 TABLET BY MOUTH EVERY 12 " "HOURS., Disp: 180 tablet, Rfl: 0    torsemide (DEMADEX) 10 mg tablet, TAKE 1 TABLET (10 MG TOTAL) BY MOUTH DAILY. DO NOT START BEFORE JANUARY 9, 2024., Disp: 90 tablet, Rfl: 0    clotrimazole-betamethasone (LOTRISONE) 1-0.05 % cream, Apply topically 2 (two) times a day for 7 days Available over the counter, Disp: , Rfl:     nystatin (MYCOSTATIN) powder, Apply topically 2 (two) times a day for 7 days, Disp: 60 g, Rfl: 1    Xarelto 20 MG tablet, Take 1 tablet (20 mg total) by mouth daily with breakfast Will be started on xarelto once supply of pradaxa is depleted, Disp: 30 tablet, Rfl: 0    Vitals: Blood pressure 120/70, pulse 85, height 5' 8.5\" (1.74 m), weight (!) 144 kg (317 lb), SpO2 96%.    Body mass index is 47.5 kg/m².  Vitals:    03/01/24 1422   Weight: (!) 144 kg (317 lb)     BP Readings from Last 3 Encounters:   03/01/24 120/70   02/28/24 122/62   02/26/24 146/85       Physical Exam:  Physical Exam    Neurologic:  Alert & oriented x 3, no new focal deficits, Not in any acute distress,  Constitutional: Morbidly obese  Eyes:  Pupil equal and reacting to light, conjunctiva normal,   HENT:  Atraumatic, oropharynx moist, Neck- normal range of motion, no tenderness,  Neck supple, No JVP, No LNP   Respiratory:  Bilateral air entry, mostly clear to auscultation  Cardiovascular: S1-S2 regular with a I/VI systolic murmur   GI:  Soft, nondistended, normal bowel sounds, nontender, no hepatosplenomegaly appreciated.  Musculoskeletal: Tenderness of knees  Skin:  Well hydrated, no rash   Extremities: Lymphedema of lower extremities        Diagnostic Studies Review Cardio:      EKG: Normal sinus rhythm, heart rate 85/min, left anterior fascicular block, possible inferior infarct of undetermined age    Cardiac testing:       Results for orders placed during the hospital encounter of 01/05/24    Echo complete w/ contrast if indicated    Interpretation Summary    Left Ventricle: Left ventricular cavity size is normal. Wall " thickness is moderately increased. There is mild to moderate concentric hypertrophy. The left ventricular ejection fraction is around 55% by visual estimation. Systolic function is normal.  Hard to assess accurately EF due to tachycardia and atrial fibrillation. Although no diagnostic regional wall motion abnormality was identified, this possibility cannot be completely excluded on the basis of this study.    Left Atrium: The atrium is mildly dilated by 2D.    Aortic Valve: The aortic valve is trileaflet. The leaflets are moderately thickened. The leaflets are moderately calcified. There is moderately reduced mobility. There is mild to moderate stenosis by 2D, it was a limited study velocities were not measured..    Mitral Valve: There is mild regurgitation.    Tricuspid Valve: There is mild regurgitation.  Pulmonary artery pressure around 30 mmHg.    IVC/SVC: The inferior vena cava is upper normal in size.  It has around 50% collapse with inspiration.    Prior TTE study available for comparison. Prior study date: 7/3/2023.  As compared to that study EF remained the same.  Aortic valve appears to have at least mild stenosis.    Results for orders placed during the hospital encounter of 01/05/24    ELISA    Interpretation Summary    Left Ventricle: Left ventricular cavity size is normal. Wall thickness is increased. The left ventricular ejection fraction is 55% by visual estimation. Systolic function is low normal.    Atrial Septum: There is a small and patent foramen ovale confirmed at rest and with provocation (abdominal compression) with predominant right to left shunting using saline contrast.    Left Atrial Appendage: There is no thrombus.    Aorta: .3cm sessile atheroma noted in descending aorta        Results for orders placed during the hospital encounter of 01/05/24    Cardioversion    Interpretation Summary  ELISA/Cardioversion    Luther Vaughan  [id number]  1/8/24    PRE-OP DIAGNOSIS: Atrial  "fibrillation    POST-OP DIAGNOSIS: Successful electrical cardioversion of atrial fibrillation to normal sinus rhythm with PACs    : Sarwat Coyle MD [facility]    ANESTHESIA: Propofol given by anesthesiology.    COMPLICATIONS: None.    OPERATIVE TERM: DC cardioversion.    The nature of the procedure, risks and alternatives were discussed with the patient who gave informed consent.    OPERATIVE TECHNIQUE: The patient was sedated with propofol. Once adequately sedated, ELISA was performed.  (Please see full ELISA report for details). Once no thrombus confirmed,  DC cardioversion was performed with 200 J biphasically and synchronously. The patient was then monitored until fully alert and left the procedure area in stable condition.    Impression  Successful DC cardioversion of atrial fibrillation to normal sinus rhythm with PACs.      Imaging:  Chest X-Ray:   No Chest XR results available for this patient.    CT-scan of the chest:     No CTA results available for this patient.  Lab Review   Lab Results   Component Value Date    WBC 10.05 01/09/2024    HGB 14.6 01/09/2024    HCT 44.4 01/09/2024    MCV 96 01/09/2024    RDW 13.3 01/09/2024     01/09/2024     BMP:  Lab Results   Component Value Date    SODIUM 137 01/09/2024    K 4.3 01/09/2024     01/09/2024    CO2 25 01/09/2024    BUN 17 01/09/2024    CREATININE 0.91 01/09/2024    GLUC 111 01/09/2024    GLUF 120 (H) 09/26/2023    CALCIUM 8.7 01/09/2024    EGFR 88 01/09/2024    MG 2.0 01/09/2024     LFT:  Lab Results   Component Value Date    AST 22 01/05/2024    ALT 22 01/05/2024    ALKPHOS 60 01/05/2024    TP 7.1 01/05/2024    ALB 3.9 01/05/2024      No components found for: \"TSH3\"  Lab Results   Component Value Date    RDF1ZMJGZGJS 2.657 06/20/2023     Lab Results   Component Value Date    HGBA1C 6.7 (H) 09/26/2023     Lipid Profile:   Lab Results   Component Value Date    CHOLESTEROL 135 01/06/2024    HDL 37 (L) 01/06/2024    LDLCALC 79 01/06/2024    TRIG 97 " "01/06/2024     Lab Results   Component Value Date    CHOLESTEROL 135 01/06/2024    CHOLESTEROL 179 09/26/2023     No results found for: \"CKTOTAL\", \"CKMB\", \"CKMBINDEX\", \"TROPONINI\"  No results found for: \"NTBNP\"   No results found for this or any previous visit (from the past 672 hour(s)).          Dr. Akosua Maher MD, FACC      \"This note has been constructed using a voice recognition system.Therefore there may be syntax, spelling, and/or grammatical errors. Please call if you have any questions. \"  "

## 2024-03-01 ENCOUNTER — OFFICE VISIT (OUTPATIENT)
Dept: CARDIOLOGY CLINIC | Facility: CLINIC | Age: 64
End: 2024-03-01
Payer: MEDICARE

## 2024-03-01 VITALS
DIASTOLIC BLOOD PRESSURE: 70 MMHG | BODY MASS INDEX: 46.65 KG/M2 | SYSTOLIC BLOOD PRESSURE: 120 MMHG | HEART RATE: 85 BPM | HEIGHT: 69 IN | WEIGHT: 315 LBS | OXYGEN SATURATION: 96 %

## 2024-03-01 DIAGNOSIS — E78.5 DYSLIPIDEMIA: ICD-10-CM

## 2024-03-01 DIAGNOSIS — I50.31 ACUTE DIASTOLIC CHF (CONGESTIVE HEART FAILURE) (HCC): ICD-10-CM

## 2024-03-01 DIAGNOSIS — I10 HYPERTENSION, UNSPECIFIED TYPE: ICD-10-CM

## 2024-03-01 DIAGNOSIS — R06.02 SHORTNESS OF BREATH ON EXERTION: ICD-10-CM

## 2024-03-01 DIAGNOSIS — I48.91 NEW ONSET ATRIAL FIBRILLATION (HCC): Primary | ICD-10-CM

## 2024-03-01 DIAGNOSIS — R94.31 ABNORMAL ELECTROCARDIOGRAM (ECG) (EKG): ICD-10-CM

## 2024-03-01 PROCEDURE — 93000 ELECTROCARDIOGRAM COMPLETE: CPT | Performed by: INTERNAL MEDICINE

## 2024-03-01 PROCEDURE — 99215 OFFICE O/P EST HI 40 MIN: CPT | Performed by: INTERNAL MEDICINE

## 2024-03-06 NOTE — WOUND OSTOMY CARE
Patient initiated refill request.   Progress Note - Wound   Luther Vaughan 63 y.o. male MRN: [id number]  Unit/Bed#: [address] [place] [address] Encounter: [id number]      Assessment:   This is a 63 year old male patient admitted on 1/5/24 with new onset atrial fibrillation. He has a history of HTN, MARLA, DM 2, venous stasis ulcer and morbid obesity. He was AAO x 3 and agreeable to have wound visit done with wife at bedside. He is continent of urine and stool and ambulates to bathroom independently.    Assessment Findings:  1-Full thickness venous ulcer to right lower leg with pink granulation tissue, yellow slough & moderate serosanguinous drainage. There is edema and erythema to periwound. Orders in place for wound care.  2-Fungal rashes to intergluteal fold/sacrobuttocks, left dorsal foot, left medial foot and left knee. Orders in place for skin care.    Plan:  Cleanse wound to right lower leg with NSS & pat dry. Apply Melgisorb Ag+ silver alginate to open areas, cover with gauze or ABD pad, jocelynn & tape. Change every other day & prn soilage/dislodgement.  Cleanse sacrobuttocks with foaming cleanser & pat dry. Apply light dusting of Nystatin powder 2x/day and gently remove excess to prevent caking. Cleanse area and pat dry in-between applications.  Apply Lotrisone cream to left medial foot, left dorsum and left knee 2x/day.  4.  Apply hydraguard to b/l heels BID and PRN for prevention and protection  5.  Apply skin nourishing cream to the entire skin daily for moisture  6.  Turn and reposition patient every  2 hours   7.  Float heels off of bed with 2 pillows to offload pressure   8.  Apply EHOB waffle cushion to chair when OOB - avoid prolonged sitting.     Fungal rash to left dorsal foot     Fungal rash to left knee     Fungal rash to left medial foot     Fungal rash to intergluteal fold and sacrobuttocks      Wound 01/05/24 Venous Ulcer Leg Right;Lower (Active)   Wound Image   01/08/24 6868   Wound Description Granulation tissue;Pink;Slough;Yellow 01/08/24  1429   Paula-wound Assessment Edema;Erythema 01/08/24 1429   Wound Length (cm) 4.1 cm 01/08/24 1429   Wound Width (cm) 1.5 cm 01/08/24 1429   Wound Depth (cm) 0.1 cm 01/08/24 1429   Wound Surface Area (cm^2) 6.15 cm^2 01/08/24 1429   Wound Volume (cm^3) 0.615 cm^3 01/08/24 1429   Calculated Wound Volume (cm^3) 0.62 cm^3 01/08/24 1429   Change in Wound Size % 97.77 01/08/24 1429   Drainage Amount Moderate 01/08/24 1429   Drainage Description Serosanguineous 01/08/24 1429   Non-staged Wound Description Full thickness 01/08/24 1429   Treatments Cleansed 01/08/24 1429   Dressing Calcium Alginate with Silver;ABD;Dry dressing 01/08/24 1429   Wound packed? No 01/08/24 1429   Dressing Changed Changed 01/08/24 1429   Dressing Status Clean;Dry;Intact 01/08/24 1429     Discussed assessment findings, and plan of care/recommendations with Bethany HAGAN and Rosy CHAVEZ.    Wound care will follow along with patient throughout admission, please call or tiger text with questions and concerns.    Recommendations written as orders.  Loni Lynch MSN, RN, CWON

## 2024-03-08 ENCOUNTER — HOSPITAL ENCOUNTER (OUTPATIENT)
Dept: NON INVASIVE DIAGNOSTICS | Facility: HOSPITAL | Age: 64
Discharge: HOME | End: 2024-03-08
Attending: INTERNAL MEDICINE
Payer: MEDICARE

## 2024-03-08 ENCOUNTER — HOSPITAL ENCOUNTER (OUTPATIENT)
Dept: RADIOLOGY | Facility: HOSPITAL | Age: 64
Discharge: HOME | End: 2024-03-08
Attending: INTERNAL MEDICINE
Payer: MEDICARE

## 2024-03-08 ENCOUNTER — TELEPHONE (OUTPATIENT)
Dept: CARDIOLOGY CLINIC | Facility: CLINIC | Age: 64
End: 2024-03-08

## 2024-03-08 VITALS
OXYGEN SATURATION: 96 % | RESPIRATION RATE: 20 BRPM | HEART RATE: 63 BPM | SYSTOLIC BLOOD PRESSURE: 133 MMHG | DIASTOLIC BLOOD PRESSURE: 65 MMHG

## 2024-03-08 DIAGNOSIS — I50.31 ACUTE DIASTOLIC CHF (CONGESTIVE HEART FAILURE) (HCC): ICD-10-CM

## 2024-03-08 DIAGNOSIS — I48.91 NEW ONSET ATRIAL FIBRILLATION (HCC): Primary | ICD-10-CM

## 2024-03-08 DIAGNOSIS — I48.91 NEW ONSET ATRIAL FIBRILLATION (HCC): ICD-10-CM

## 2024-03-08 DIAGNOSIS — R06.02 SHORTNESS OF BREATH ON EXERTION: ICD-10-CM

## 2024-03-08 DIAGNOSIS — E78.5 DYSLIPIDEMIA: ICD-10-CM

## 2024-03-08 DIAGNOSIS — R94.31 ABNORMAL ELECTROCARDIOGRAM (ECG) (EKG): ICD-10-CM

## 2024-03-08 LAB
CHEST PAIN STATEMENT: NORMAL
MAX DIASTOLIC BP: 65 MMHG
MAX HR PERCENT: 53 %
MAX HR: 83 BPM
MAX PREDICTED HEART RATE: 155 BPM
PROTOCOL NAME: NORMAL
RATE PRESSURE PRODUCT: 9440
REASON FOR TERMINATION: NORMAL
SL CV REST NUCLEAR ISOTOPE DOSE: 16.5 MCI
SL CV STRESS NUCLEAR ISOTOPE DOSE: 49.2 MCI
SL CV STRESS RECOVERY BP: NORMAL MMHG
SL CV STRESS RECOVERY HR: 70 BPM
SL CV STRESS RECOVERY O2 SAT: 99 %
STRESS ANGINA INDEX: 0
STRESS BASELINE BP: NORMAL MMHG
STRESS BASELINE HR: 63 BPM
STRESS O2 SAT REST: 96 %
STRESS PEAK HR: 80 BPM
STRESS POST ESTIMATED WORKLOAD: 1 METS
STRESS POST EXERCISE DUR MIN: 1 MIN
STRESS POST EXERCISE DUR MIN: 1 MIN
STRESS POST EXERCISE DUR SEC: 0 SEC
STRESS POST O2 SAT PEAK: 99 %
STRESS POST PEAK BP: 118 MMHG
STRESS POST PEAK HR: 83 BPM
STRESS POST PEAK SYSTOLIC BP: 133 MMHG
TARGET HR FORMULA: NORMAL
TEST INDICATION: NORMAL

## 2024-03-08 PROCEDURE — 78452 HT MUSCLE IMAGE SPECT MULT: CPT | Performed by: INTERNAL MEDICINE

## 2024-03-08 PROCEDURE — 93016 CV STRESS TEST SUPVJ ONLY: CPT | Performed by: INTERNAL MEDICINE

## 2024-03-08 PROCEDURE — 78452 HT MUSCLE IMAGE SPECT MULT: CPT

## 2024-03-08 PROCEDURE — 93018 CV STRESS TEST I&R ONLY: CPT | Performed by: INTERNAL MEDICINE

## 2024-03-08 PROCEDURE — A9502 TC99M TETROFOSMIN: HCPCS

## 2024-03-08 PROCEDURE — 93017 CV STRESS TEST TRACING ONLY: CPT

## 2024-03-08 RX ORDER — REGADENOSON 0.08 MG/ML
0.4 INJECTION, SOLUTION INTRAVENOUS ONCE
Status: COMPLETED | OUTPATIENT
Start: 2024-03-08 | End: 2024-03-08

## 2024-03-08 RX ADMIN — REGADENOSON 0.4 MG: 0.08 INJECTION, SOLUTION INTRAVENOUS at 10:34

## 2024-03-08 NOTE — TELEPHONE ENCOUNTER
----- Message from Akosua Maher MD sent at 3/8/2024  3:23 PM EST -----  Please inform the patient that the stress test showed NO evidence of any significant blockage in the blood vessels of your heart.

## 2024-03-11 ENCOUNTER — OFFICE VISIT (OUTPATIENT)
Dept: WOUND CARE | Facility: HOSPITAL | Age: 64
End: 2024-03-11
Payer: MEDICARE

## 2024-03-11 VITALS
RESPIRATION RATE: 20 BRPM | SYSTOLIC BLOOD PRESSURE: 177 MMHG | HEART RATE: 76 BPM | TEMPERATURE: 97.6 F | DIASTOLIC BLOOD PRESSURE: 94 MMHG

## 2024-03-11 DIAGNOSIS — I87.311 CHRONIC VENOUS HYPERTENSION (IDIOPATHIC) WITH ULCER OF RIGHT LOWER EXTREMITY (CODE) (HCC): ICD-10-CM

## 2024-03-11 DIAGNOSIS — I89.0 LYMPHEDEMA OF BOTH LOWER EXTREMITIES: ICD-10-CM

## 2024-03-11 DIAGNOSIS — L97.912 NON-PRESSURE CHRONIC ULCER OF RIGHT LOWER LEG WITH FAT LAYER EXPOSED (HCC): Primary | ICD-10-CM

## 2024-03-11 PROCEDURE — 97597 DBRDMT OPN WND 1ST 20 CM/<: CPT | Performed by: STUDENT IN AN ORGANIZED HEALTH CARE EDUCATION/TRAINING PROGRAM

## 2024-03-11 NOTE — PROGRESS NOTES
"Patient ID: Luther Vaughan is a 63 y.o. male Date of Birth 1960     Chief Complaint  Chief Complaint   Patient presents with    Follow Up Wound Care Visit       Allergies  Erythromycin and Fish-derived products - food allergy    Assessment:     Diagnoses and all orders for this visit:    Non-pressure chronic ulcer of right lower leg with fat layer exposed (HCC)  -     Wound cleansing and dressings; Future  -     Wound compression and edema control; Future  -     Debridement    Chronic venous hypertension (idiopathic) with ulcer of right lower extremity (CODE) (HCC)  -     Wound cleansing and dressings; Future  -     Wound compression and edema control; Future    Lymphedema of both lower extremities  -     Wound cleansing and dressings; Future  -     Wound compression and edema control; Future              Debridement   Wound 01/05/24 Venous Ulcer Leg Right;Lower    Universal Protocol:  Consent: Verbal consent obtained.  Risks and benefits: risks, benefits and alternatives were discussed  Consent given by: patient  Time out: Immediately prior to procedure a \"time out\" was called to verify the correct patient, procedure, equipment, support staff and site/side marked as required.  Patient identity confirmed: verbally with patient    Debridement Details  Performed by: physician  Debridement type: selective  Pain control: lidocaine 4%      Post-debridement measurements  Length (cm): 1.2  Width (cm): 0.8  Depth (cm): 0.1  Percent debrided: 90%  Surface Area (cm^2): 0.96  Area Debrided (cm^2): 0.86  Volume (cm^3): 0.1    Devitalized tissue debrided: biofilm, exudate and fibrin  Instrument(s) utilized: curette  Bleeding: small  Hemostasis obtained with: pressure  Procedural pain (0-10): 1  Post-procedural pain: 0   Response to treatment: procedure was tolerated well        Plan:  It was a pleasure to see Luther Vaughan for wound care follow up today  Selective debridement performed today as above  Wound is " improving   Continue plan of care as noted below with santyl, spangrigrip  No signs or symptoms of infection today. Patient understands that if any signs of infection start (such as increased redness, drainage, pain, fever, chills, diaphoresis), they should call our office or proceed to the ER or Urgent Care.  Patient should continue a high protein diet to facilitate wound healing  Patient is advised to not submerge wound or leave wound open to air.  Follow up in 2 weeks  Given the multi-factorial nature of wound care, additional time was taken to review patient's treatment plan with other specialties and most recent pertinent lab work and imaging.   All plans of care discussed with patient at bedside who verbalized understanding with treatment plan.       Wound 01/05/24 Venous Ulcer Leg Right;Lower (Active)   Wound Image Images linked 03/11/24 1318   Wound Description Beefy red;Yellow;Slough;Granulation tissue 03/11/24 1318   Paula-wound Assessment Hyperpigmented;Edema;Dry 03/11/24 1318   Wound Length (cm) 1.2 cm 03/11/24 1318   Wound Width (cm) 0.8 cm 03/11/24 1318   Wound Depth (cm) 0.1 cm 03/11/24 1318   Wound Surface Area (cm^2) 0.96 cm^2 03/11/24 1318   Wound Volume (cm^3) 0.096 cm^3 03/11/24 1318   Calculated Wound Volume (cm^3) 0.1 cm^3 03/11/24 1318   Change in Wound Size % 99.64 03/11/24 1318   Drainage Amount Small 03/11/24 1318   Drainage Description Serosanguineous 03/11/24 1318   Non-staged Wound Description Full thickness 03/11/24 1318   Treatments Irrigation with NSS 03/11/24 1318       Wound 01/05/24 Venous Ulcer Leg Right;Lower (Active)   Date First Assessed/Time First Assessed: 01/05/24 1028   Primary Wound Type: Venous Ulcer  Location: Leg  Wound Location Orientation: Right;Lower       Subjective:      .    3/11/24, 2/26/24, 2/12/24, 1/29/24: Has been applying Santyl daily.  Happy with wound healing.  No symptoms of infection.     1/22/24: Has been applying Medihoney and alginate to the wound.   Not wearing compression.  Notes that Santyl is too cost prohibitive with a co-pay of over $300. He denies any signs of infection today.     1/12/24: After last visit patient needed to the ER as recommended.  He was admitted for atrial fibrillation and uncontrolled hypertension.  Eventually needed a cardioversion and is now on chronic anticoagulation.  Also given IV diuretics during his hospitalization.  Patient was discharged a few days ago and is using diuretic, anticoagulation, antihypertensives as directed.  Being wound covered.  Denies any symptoms of infection today.     1/5/24: Consult - Luther is a pleasant 63-year-old male with a past medical history of hypertension and recently diagnosed type 2 diabetes mellitus here today for initial wound care consult.  Patient was referred by his PCP Dr. Maycol Arango.  Seen by PCP on 12/29/2023 at that time presenting for the first time for right lower extremity wound.  Per chart review patient that his right lower extremity on his garbage at home 3 weeks prior to that visit and wound has been getting larger.  Has been using bandages and Neosporin to cover the wound.  Increased swelling and erythema.  At that appointment PCP advised patient to increase Lasix to daily versus every other day due to fluid overload.  Was also given prescription for Bactrim x 7 days and referral to wound management.  Patient notes today that he is not taking any blood pressure medications and also did not  the Bactrim.  Denies any fever chills diaphoresis chest pain headache vision changes shortness of breath.  Does not he that he does have significant gotten lower extremity edema.  He does have a hx of chronic bilateral lower extremity wounds in the past.                The following portions of the patient's history were reviewed and updated as appropriate: allergies, current medications, past family history, past medical history, past social history, past surgical history, and  problem list.    Review of Systems   Constitutional:  Negative for chills, diaphoresis and fever.   Skin:  Positive for wound.   All other systems reviewed and are negative.        Objective:       Wound 01/05/24 Venous Ulcer Leg Right;Lower (Active)   Wound Image Images linked 03/11/24 1318   Wound Description Beefy red;Yellow;Slough;Granulation tissue 03/11/24 1318   Paula-wound Assessment Hyperpigmented;Edema;Dry 03/11/24 1318   Wound Length (cm) 1.2 cm 03/11/24 1318   Wound Width (cm) 0.8 cm 03/11/24 1318   Wound Depth (cm) 0.1 cm 03/11/24 1318   Wound Surface Area (cm^2) 0.96 cm^2 03/11/24 1318   Wound Volume (cm^3) 0.096 cm^3 03/11/24 1318   Calculated Wound Volume (cm^3) 0.1 cm^3 03/11/24 1318   Change in Wound Size % 99.64 03/11/24 1318   Drainage Amount Small 03/11/24 1318   Drainage Description Serosanguineous 03/11/24 1318   Non-staged Wound Description Full thickness 03/11/24 1318   Treatments Irrigation with NSS 03/11/24 1318       BP (!) 177/94   Pulse 76   Temp 97.6 °F (36.4 °C)   Resp 20     Physical Exam  Vitals reviewed.   Constitutional:       Appearance: Normal appearance. He is obese.   HENT:      Head: Normocephalic and atraumatic.   Eyes:      Extraocular Movements: Extraocular movements intact.   Pulmonary:      Effort: Pulmonary effort is normal.   Musculoskeletal:      Cervical back: Neck supple.      Right lower leg: Edema present.   Skin:     Comments: Anterior right lower extremity wound smaller than last exam.  No signs of infection. still with fibrin deposition in the wound bed.   Neurological:      Mental Status: He is alert.   Psychiatric:         Mood and Affect: Mood normal.           Wound Instructions:  Orders Placed This Encounter   Procedures    Wound cleansing and dressings     Right Lower Leg Wounds:     Wash your hands with soap and water.  Remove old dressing, discard into plastic bag and place in trash.  Cleanse the wound with soap and water prior to applying a clean  "dressing. Do not use tissue or cotton balls. Do not scrub the wound. Pat dry using gauze.  Shower yes   Apply moisturizer to skin surrounding wound  Apply Santyl, nickel thick to the Right leg wounds.  Cover with gauze and ABD  Secure with rolled gauze and tape.  Change dressing daily.     Applied with dry dressing today     Standing Status:   Future     Standing Expiration Date:   3/11/2025    Wound compression and edema control     Elastic Tubular Stocking: Spandagrip G to right lower leg     Tubular elastic bandage: Apply from base of toes to behind the knee. Apply in AM, may remove for sleep.     Avoid prolonged standing in one place.     Elevate leg(s) above the level of th     Standing Status:   Future     Standing Expiration Date:   3/11/2025    Debridement     This order was created via procedure documentation        Diagnosis ICD-10-CM Associated Orders   1. Non-pressure chronic ulcer of right lower leg with fat layer exposed ([facility])  L97.912 Wound cleansing and dressings     Wound compression and edema control     Debridement      2. Chronic venous hypertension (idiopathic) with ulcer of right lower extremity (CODE) ([facility])  I87.311 Wound cleansing and dressings     Wound compression and edema control      3. Lymphedema of both lower extremities  I89.0 Wound cleansing and dressings     Wound compression and edema control          --  Piotr Mcgill MD    \"This note has been constructed using a voice recognition system. Therefore there may be syntax, spelling, and/or grammatical errors. Occasional wrong word or \"sound alike\" substitutions may have occurred due to the inherent limitations of voice recognition software. Read the chart carefully and recognize, using context, where substitutions have occurred. Please call if you have any questions.\"     "

## 2024-03-11 NOTE — PATIENT INSTRUCTIONS
Orders Placed This Encounter   Procedures    Wound cleansing and dressings     Right Lower Leg Wounds:     Wash your hands with soap and water.  Remove old dressing, discard into plastic bag and place in trash.  Cleanse the wound with soap and water prior to applying a clean dressing. Do not use tissue or cotton balls. Do not scrub the wound. Pat dry using gauze.  Shower yes   Apply moisturizer to skin surrounding wound  Apply Santyl, nickel thick to the Right leg wounds.  Cover with gauze and ABD  Secure with rolled gauze and tape.  Change dressing daily.     Applied with dry dressing today     Standing Status:   Future     Standing Expiration Date:   3/11/2025    Wound compression and edema control     Elastic Tubular Stocking: Spandagrip G to right lower leg     Tubular elastic bandage: Apply from base of toes to behind the knee. Apply in AM, may remove for sleep.     Avoid prolonged standing in one place.     Elevate leg(s) above the level of th     Standing Status:   Future     Standing Expiration Date:   3/11/2025

## 2024-03-25 ENCOUNTER — OFFICE VISIT (OUTPATIENT)
Dept: WOUND CARE | Facility: HOSPITAL | Age: 64
End: 2024-03-25
Payer: MEDICARE

## 2024-03-25 VITALS
TEMPERATURE: 97.6 F | DIASTOLIC BLOOD PRESSURE: 82 MMHG | HEART RATE: 80 BPM | SYSTOLIC BLOOD PRESSURE: 153 MMHG | RESPIRATION RATE: 18 BRPM

## 2024-03-25 DIAGNOSIS — E11.9 TYPE 2 DIABETES MELLITUS WITHOUT COMPLICATION, WITHOUT LONG-TERM CURRENT USE OF INSULIN (HCC): ICD-10-CM

## 2024-03-25 DIAGNOSIS — R60.9 LIPEDEMA: ICD-10-CM

## 2024-03-25 DIAGNOSIS — I87.311 CHRONIC VENOUS HYPERTENSION (IDIOPATHIC) WITH ULCER OF RIGHT LOWER EXTREMITY (CODE) (HCC): ICD-10-CM

## 2024-03-25 DIAGNOSIS — L97.912 NON-PRESSURE CHRONIC ULCER OF RIGHT LOWER LEG WITH FAT LAYER EXPOSED (HCC): Primary | ICD-10-CM

## 2024-03-25 DIAGNOSIS — I89.0 LYMPHEDEMA OF BOTH LOWER EXTREMITIES: ICD-10-CM

## 2024-03-25 PROCEDURE — 97597 DBRDMT OPN WND 1ST 20 CM/<: CPT | Performed by: STUDENT IN AN ORGANIZED HEALTH CARE EDUCATION/TRAINING PROGRAM

## 2024-03-25 NOTE — PROGRESS NOTES
"Patient ID: Luther Vaughan is a 63 y.o. 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  Risks and benefits: risks, benefits and alternatives were discussed  Consent given by: patient  Time out: Immediately prior to procedure a \"time out\" was called to verify the correct patient, procedure, equipment, support staff and site/side marked as required.  Patient identity confirmed: verbally with patient    Debridement Details  Performed by: physician  Debridement type: selective  Pain control: lidocaine 4%      Post-debridement measurements  Length (cm): 0.2  Width (cm): 0.1  Depth (cm): 0.1  Percent debrided: 90%  Surface Area (cm^2): 0.02  Area Debrided (cm^2): 0.02  Volume (cm^3): 0    Devitalized tissue debrided: biofilm and exudate  Instrument(s) utilized: curette  Bleeding: small  Hemostasis obtained with: pressure  Procedural pain (0-10): 1  Post-procedural pain: 0   Response to treatment: procedure was tolerated well        Plan:  It was a pleasure to see Luther Vaughan for wound care follow up today  Selective debridement performed today as above  Wound is " improving   Continue plan of care as noted below with change to dermagran  No signs or symptoms of infection today. Patient understands that if any signs of infection start (such as increased redness, drainage, pain, fever, chills, diaphoresis), they should call our office or proceed to the ER or Urgent Care.  Patient should continue a high protein diet to facilitate wound healing  Patient is advised to not submerge wound or leave wound open to air.  Follow up in 1 weeks  Given the multi-factorial nature of wound care, additional time was taken to review patient's treatment plan with other specialties and most recent pertinent lab work and imaging.   All plans of care discussed with patient at bedside who verbalized understanding with treatment plan.       Wound 01/05/24 Venous Ulcer Leg Right;Lower (Active)   Wound Image Images linked 03/25/24 1322   Wound Description [place] 03/25/24 1322   Paula-wound Assessment Hyperpigmented;Edema;Dry 03/25/24 1322   Wound Length (cm) 0.1 cm 03/25/24 1322   Wound Width (cm) 0.1 cm 03/25/24 1322   Wound Depth (cm) 0.1 cm 03/25/24 1322   Wound Surface Area (cm^2) 0.01 cm^2 03/25/24 1322   Wound Volume (cm^3) 0.001 cm^3 03/25/24 1322   Calculated Wound Volume (cm^3) 0 cm^3 03/25/24 1322   Change in Wound Size % 100 03/25/24 1322   Drainage Amount Scant 03/25/24 1322   Drainage Description Serosanguineous 03/25/24 1322   Non-staged Wound Description Full thickness 03/25/24 1322   Treatments Irrigation with NSS 03/25/24 1322       Wound 01/05/24 Venous Ulcer Leg Right;Lower (Active)   Date First Assessed/Time First Assessed: 01/05/24 1028   Primary Wound Type: Venous Ulcer  Location: Leg  Wound Location Orientation: Right;Lower       Subjective:      .    3/25/24, 3/11/24, 2/26/24, 2/12/24, 1/29/24: Has been applying Santyl daily.  Happy with wound healing.  No symptoms of infection.     1/22/24: Has been applying Medihoney and alginate to the wound.  Not wearing compression.  Notes  that Santyl is too cost prohibitive with a co-pay of over $300. He denies any signs of infection today.     1/12/24: After last visit patient needed to the ER as recommended.  He was admitted for atrial fibrillation and uncontrolled hypertension.  Eventually needed a cardioversion and is now on chronic anticoagulation.  Also given IV diuretics during his hospitalization.  Patient was discharged a few days ago and is using diuretic, anticoagulation, antihypertensives as directed.  Being wound covered.  Denies any symptoms of infection today.     1/5/24: Consult - Luther is a pleasant 63-year-old male with a past medical history of hypertension and recently diagnosed type 2 diabetes mellitus here today for initial wound care consult.  Patient was referred by his PCP Dr. Maycol Arango.  Seen by PCP on 12/29/2023 at that time presenting for the first time for right lower extremity wound.  Per chart review patient that his right lower extremity on his garbage at home 3 weeks prior to that visit and wound has been getting larger.  Has been using bandages and Neosporin to cover the wound.  Increased swelling and erythema.  At that appointment PCP advised patient to increase Lasix to daily versus every other day due to fluid overload.  Was also given prescription for Bactrim x 7 days and referral to wound management.  Patient notes today that he is not taking any blood pressure medications and also did not  the Bactrim.  Denies any fever chills diaphoresis chest pain headache vision changes shortness of breath.  Does not he that he does have significant gotten lower extremity edema.  He does have a hx of chronic bilateral lower extremity wounds in the past.          The following portions of the patient's history were reviewed and updated as appropriate: allergies, current medications, past family history, past medical history, past social history, past surgical history, and problem list.    Review of Systems    Constitutional:  Negative for chills, diaphoresis and fever.   Skin:  Positive for wound.   All other systems reviewed and are negative.        Objective:       Wound 01/05/24 Venous Ulcer Leg Right;Lower (Active)   Wound Image Images linked 03/25/24 1322   Wound Description [place] 03/25/24 1322   Paula-wound Assessment Hyperpigmented;Edema;Dry 03/25/24 1322   Wound Length (cm) 0.1 cm 03/25/24 1322   Wound Width (cm) 0.1 cm 03/25/24 1322   Wound Depth (cm) 0.1 cm 03/25/24 1322   Wound Surface Area (cm^2) 0.01 cm^2 03/25/24 1322   Wound Volume (cm^3) 0.001 cm^3 03/25/24 1322   Calculated Wound Volume (cm^3) 0 cm^3 03/25/24 1322   Change in Wound Size % 100 03/25/24 1322   Drainage Amount Scant 03/25/24 1322   Drainage Description Serosanguineous 03/25/24 1322   Non-staged Wound Description Full thickness 03/25/24 1322   Treatments Irrigation with NSS 03/25/24 1322       /82   Pulse 80   Temp 97.6 °F (36.4 °C)   Resp 18     Physical Exam  Vitals reviewed.   Constitutional:       Appearance: Normal appearance.   HENT:      Head: Normocephalic and atraumatic.   Eyes:      Extraocular Movements: Extraocular movements intact.   Pulmonary:      Effort: Pulmonary effort is normal.   Musculoskeletal:      Cervical back: Neck supple.      Right lower leg: Edema present.   Skin:     Comments: Right lower extremity wound almost fully epithelialized today.  No signs of infection   Neurological:      Mental Status: He is alert.   Psychiatric:         Mood and Affect: Mood normal.           Wound Instructions:  Orders Placed This Encounter   Procedures    Wound cleansing and dressings     Right Lower Leg Wounds:     Wash your hands with soap and water.  Remove old dressing, discard into plastic bag and place in trash.  Cleanse the wound with soap and water prior to applying a clean dressing. Do not use tissue or cotton balls. Do not scrub the wound. Pat dry using gauze.  Shower yes   Apply moisturizer to skin surrounding  "wound  Apply dermagran to the Right leg wounds.  Cover with gauze   Secure with rolled gauze and tape.  Change dressing 3 times a week,     Applied with dry dressing today     Standing Status:   Future     Standing Expiration Date:   3/25/2025    Wound compression and edema control     Tubular elastic bandage: Apply from base of toes to behind the knee. Apply in AM, may remove for sleep.     Avoid prolonged standing in one place.     Standing Status:   Future     Standing Expiration Date:   3/25/2025    Debridement     This order was created via procedure documentation        Diagnosis ICD-10-CM Associated Orders   1. Non-pressure chronic ulcer of right lower leg with fat layer exposed ([facility])  L97.912 Wound cleansing and dressings     Wound compression and edema control     Debridement      2. Chronic venous hypertension (idiopathic) with ulcer of right lower extremity (CODE) ([facility])  I87.311 Wound cleansing and dressings     Wound compression and edema control      3. Lymphedema of both lower extremities  I89.0       4. Lipedema  R60.9       5. Type 2 diabetes mellitus without complication, without long-term current use of insulin ([facility])  E11.9           --  Piotr Mcgill MD    \"This note has been constructed using a voice recognition system. Therefore there may be syntax, spelling, and/or grammatical errors. Occasional wrong word or \"sound alike\" substitutions may have occurred due to the inherent limitations of voice recognition software. Read the chart carefully and recognize, using context, where substitutions have occurred. Please call if you have any questions.\"     "

## 2024-03-25 NOTE — PATIENT INSTRUCTIONS
Orders Placed This Encounter   Procedures    Wound cleansing and dressings     Right Lower Leg Wounds:     Wash your hands with soap and water.  Remove old dressing, discard into plastic bag and place in trash.  Cleanse the wound with soap and water prior to applying a clean dressing. Do not use tissue or cotton balls. Do not scrub the wound. Pat dry using gauze.  Shower yes   Apply moisturizer to skin surrounding wound  Apply dermagran to the Right leg wounds.  Cover with gauze   Secure with rolled gauze and tape.  Change dressing 3 times a week,     Applied with dry dressing today     Standing Status:   Future     Standing Expiration Date:   3/25/2025    Wound compression and edema control     Tubular elastic bandage: Apply from base of toes to behind the knee. Apply in AM, may remove for sleep.     Avoid prolonged standing in one place.     Standing Status:   Future     Standing Expiration Date:   3/25/2025

## 2024-04-01 ENCOUNTER — OFFICE VISIT (OUTPATIENT)
Dept: WOUND CARE | Facility: HOSPITAL | Age: 64
End: 2024-04-01
Payer: MEDICARE

## 2024-04-01 VITALS
RESPIRATION RATE: 16 BRPM | DIASTOLIC BLOOD PRESSURE: 85 MMHG | HEART RATE: 82 BPM | TEMPERATURE: 97.6 F | SYSTOLIC BLOOD PRESSURE: 176 MMHG

## 2024-04-01 DIAGNOSIS — I87.311 CHRONIC VENOUS HYPERTENSION (IDIOPATHIC) WITH ULCER OF RIGHT LOWER EXTREMITY (CODE) (HCC): ICD-10-CM

## 2024-04-01 DIAGNOSIS — L97.912 NON-PRESSURE CHRONIC ULCER OF RIGHT LOWER LEG WITH FAT LAYER EXPOSED (HCC): Primary | ICD-10-CM

## 2024-04-01 PROCEDURE — 99212 OFFICE O/P EST SF 10 MIN: CPT | Performed by: STUDENT IN AN ORGANIZED HEALTH CARE EDUCATION/TRAINING PROGRAM

## 2024-04-01 PROCEDURE — 99213 OFFICE O/P EST LOW 20 MIN: CPT | Performed by: STUDENT IN AN ORGANIZED HEALTH CARE EDUCATION/TRAINING PROGRAM

## 2024-04-01 NOTE — PATIENT INSTRUCTIONS
Orders Placed This Encounter   Procedures    Wound cleansing and dressings Venous Ulcer Right;Lower Leg     Your right leg wound is healed.    Shower: Yes  Moisturize your leg daily.  Wear compression sock to leg daily (15-20 mm Hg)  Spandagrip was applied today.    You are discharged from the Wound Center.  If you have any issues/concerns, please call the Wound Center.     Standing Status:   Future     Standing Expiration Date:   4/1/2025    Wound compression and edema control Venous Ulcer Right;Lower Leg     Compression Stocking: 15-20 mm Hg to leg    Remove compression stockings every night at bedtime and re-apply first thing every morning. Follow daily skin care as instructed.    Avoid prolonged standing in one place.    Elevate leg(s) above the level of the heart when sitting or as much as possible.     Standing Status:   Future     Standing Expiration Date:   4/1/2025

## 2024-04-01 NOTE — PROGRESS NOTES
Patient ID: Luther Vaughan is a 63 y.o. male Date of Birth 1960     Chief Complaint  Chief Complaint   Patient presents with    Follow Up Wound Care Visit     RLE       Allergies  Erythromycin and Fish-derived products - food allergy    Assessment:     Diagnoses and all orders for this visit:    Non-pressure chronic ulcer of right lower leg with fat layer exposed (HCC)  -     Wound cleansing and dressings Venous Ulcer Right;Lower Leg; Future  -     Wound compression and edema control Venous Ulcer Right;Lower Leg; Future    Chronic venous hypertension (idiopathic) with ulcer of right lower extremity (CODE) (HCC)  -     Wound cleansing and dressings Venous Ulcer Right;Lower Leg; Future  -     Wound compression and edema control Venous Ulcer Right;Lower Leg; Future            Plan:   It was a pleasure to see Luther Vaughan for wound care follow up today  Patient's wound is fully epithelialized today.  Patient advised to pick newly healed skin with moisturization and edema control using compression stockings.  Discharge from wound management today with follow-up as needed in the future.  All plans of care discussed with patient at bedside who verbalized understanding with treatment plan.    [REMOVED] Wound 01/05/24 Venous Ulcer Leg Right;Lower (Removed)   Wound Image Images linked 04/01/24 0854   Wound Description Dry;Epithelialization;Pink 04/01/24 0853   Paula-wound Assessment Hyperpigmented;Edema;Dry 04/01/24 0853   Wound Length (cm) 0 cm 04/01/24 0853   Wound Width (cm) 0 cm 04/01/24 0853   Wound Depth (cm) 0 cm 04/01/24 0853   Wound Surface Area (cm^2) 0 cm^2 04/01/24 0853   Wound Volume (cm^3) 0 cm^3 04/01/24 0853   Calculated Wound Volume (cm^3) 0 cm^3 04/01/24 0853   Change in Wound Size % 100 04/01/24 0853   Drainage Amount None 04/01/24 0853   Non-staged Wound Description Not applicable 04/01/24 0853   Dressing Status Other (Comment) (no dressing upon arrival) 04/01/24 0853       [REMOVED] Wound  01/05/24 Venous Ulcer Leg Right;Lower (Removed)   Resolved Date/Resolved Time: 04/01/24 0901  Date First Assessed/Time First Assessed: 01/05/24 1028   Primary Wound Type: Venous Ulcer  Location: Leg  Wound Location Orientation: Right;Lower  Wound Outcome: Healed       Subjective:      .    4/1/24: Applying Dermagran as directed for few days however presents with wound open as he states that the wound is fully healed today    3/25/24, 3/11/24, 2/26/24, 2/12/24, 1/29/24: Has been applying Santyl daily.  Happy with wound healing.  No symptoms of infection.     1/22/24: Has been applying Medihoney and alginate to the wound.  Not wearing compression.  Notes that Santyl is too cost prohibitive with a co-pay of over $300. He denies any signs of infection today.     1/12/24: After last visit patient needed to the ER as recommended.  He was admitted for atrial fibrillation and uncontrolled hypertension.  Eventually needed a cardioversion and is now on chronic anticoagulation.  Also given IV diuretics during his hospitalization.  Patient was discharged a few days ago and is using diuretic, anticoagulation, antihypertensives as directed.  Being wound covered.  Denies any symptoms of infection today.     1/5/24: Consult - Luther is a pleasant 63-year-old male with a past medical history of hypertension and recently diagnosed type 2 diabetes mellitus here today for initial wound care consult.  Patient was referred by his PCP Dr. Maycol Arango.  Seen by PCP on 12/29/2023 at that time presenting for the first time for right lower extremity wound.  Per chart review patient that his right lower extremity on his garbage at home 3 weeks prior to that visit and wound has been getting larger.  Has been using bandages and Neosporin to cover the wound.  Increased swelling and erythema.  At that appointment PCP advised patient to increase Lasix to daily versus every other day due to fluid overload.  Was also given prescription for Bactrim  x 7 days and referral to wound management.  Patient notes today that he is not taking any blood pressure medications and also did not  the Bactrim.  Denies any fever chills diaphoresis chest pain headache vision changes shortness of breath.  Does not he that he does have significant gotten lower extremity edema.  He does have a hx of chronic bilateral lower extremity wounds in the past          The following portions of the patient's history were reviewed and updated as appropriate: allergies, current medications, past family history, past medical history, past social history, past surgical history, and problem list.    Review of Systems   Constitutional:  Negative for chills, diaphoresis and fever.   Skin:  Negative for wound.   All other systems reviewed and are negative.        Objective:       [REMOVED] Wound 01/05/24 Venous Ulcer Leg Right;Lower (Removed)   Wound Image Images linked 04/01/24 0854   Wound Description Dry;Epithelialization;Pink 04/01/24 0853   Paula-wound Assessment Hyperpigmented;Edema;Dry 04/01/24 0853   Wound Length (cm) 0 cm 04/01/24 0853   Wound Width (cm) 0 cm 04/01/24 0853   Wound Depth (cm) 0 cm 04/01/24 0853   Wound Surface Area (cm^2) 0 cm^2 04/01/24 0853   Wound Volume (cm^3) 0 cm^3 04/01/24 0853   Calculated Wound Volume (cm^3) 0 cm^3 04/01/24 0853   Change in Wound Size % 100 04/01/24 0853   Drainage Amount None 04/01/24 0853   Non-staged Wound Description Not applicable 04/01/24 0853   Dressing Status Other (Comment) (no dressing upon arrival) 04/01/24 0853       BP (!) 176/85   Pulse 82   Temp 97.6 °F (36.4 °C)   Resp 16     Physical Exam  Vitals reviewed.   Constitutional:       Appearance: Normal appearance.   HENT:      Head: Normocephalic and atraumatic.   Eyes:      Extraocular Movements: Extraocular movements intact.   Pulmonary:      Effort: Pulmonary effort is normal.   Musculoskeletal:      Cervical back: Neck supple.      Right lower leg: Edema present.   Skin:    "  Comments: Right anterior lower extremity wound fully epithelialized today.  No open wound or exudate.   Neurological:      Mental Status: He is alert.   Psychiatric:         Mood and Affect: Mood normal.           Wound Instructions:  Orders Placed This Encounter   Procedures    Wound cleansing and dressings Venous Ulcer Right;Lower Leg     Your right leg wound is healed.    Shower: Yes  Moisturize your leg daily.  Wear compression sock to leg daily (15-20 mm Hg)  Spandagrip was applied today.    You are discharged from the Wound Center.  If you have any issues/concerns, please call the Wound Center.     Standing Status:   Future     Standing Expiration Date:   4/1/2025    Wound compression and edema control Venous Ulcer Right;Lower Leg     Compression Stocking: 15-20 mm Hg to leg    Remove compression stockings every night at bedtime and re-apply first thing every morning. Follow daily skin care as instructed.    Avoid prolonged standing in one place.    Elevate leg(s) above the level of the heart when sitting or as much as possible.     Standing Status:   Future     Standing Expiration Date:   4/1/2025        Diagnosis ICD-10-CM Associated Orders   1. Non-pressure chronic ulcer of right lower leg with fat layer exposed ([facility])  L97.912 Wound cleansing and dressings Venous Ulcer Right;Lower Leg     Wound compression and edema control Venous Ulcer Right;Lower Leg      2. Chronic venous hypertension (idiopathic) with ulcer of right lower extremity (CODE) ([facility])  I87.311 Wound cleansing and dressings Venous Ulcer Right;Lower Leg     Wound compression and edema control Venous Ulcer Right;Lower Leg          --  Piotr Mcgill MD    \"This note has been constructed using a voice recognition system. Therefore there may be syntax, spelling, and/or grammatical errors. Occasional wrong word or \"sound alike\" substitutions may have occurred due to the inherent limitations of voice recognition software. Read the chart carefully " "and recognize, using context, where substitutions have occurred. Please call if you have any questions.\"     "

## 2024-04-08 ENCOUNTER — TELEPHONE (OUTPATIENT)
Dept: CARDIOLOGY CLINIC | Facility: CLINIC | Age: 64
End: 2024-04-08

## 2024-04-08 ENCOUNTER — TRANSCRIBE ORDERS (OUTPATIENT)
Dept: LAB | Facility: CLINIC | Age: 64
End: 2024-04-08

## 2024-04-08 ENCOUNTER — APPOINTMENT (OUTPATIENT)
Dept: LAB | Facility: CLINIC | Age: 64
End: 2024-04-08
Payer: MEDICARE

## 2024-04-08 DIAGNOSIS — Z01.818 OTHER SPECIFIED PRE-OPERATIVE EXAMINATION: ICD-10-CM

## 2024-04-08 DIAGNOSIS — Z51.81 ENCOUNTER FOR THERAPEUTIC DRUG MONITORING: Primary | ICD-10-CM

## 2024-04-08 DIAGNOSIS — Z79.899 MEDICATION MANAGEMENT: ICD-10-CM

## 2024-04-08 DIAGNOSIS — E11.9 TYPE 2 DIABETES MELLITUS WITHOUT COMPLICATION, WITHOUT LONG-TERM CURRENT USE OF INSULIN (HCC): ICD-10-CM

## 2024-04-08 DIAGNOSIS — E11.9 NEWLY DIAGNOSED DIABETES (HCC): ICD-10-CM

## 2024-04-08 DIAGNOSIS — E11.65 TYPE 2 DIABETES MELLITUS WITH HYPERGLYCEMIA, WITHOUT LONG-TERM CURRENT USE OF INSULIN (HCC): ICD-10-CM

## 2024-04-08 DIAGNOSIS — I10 PRIMARY HYPERTENSION: ICD-10-CM

## 2024-04-08 DIAGNOSIS — Z51.81 ENCOUNTER FOR THERAPEUTIC DRUG MONITORING: ICD-10-CM

## 2024-04-08 LAB
ALBUMIN SERPL BCP-MCNC: 3.6 G/DL (ref 3.5–5)
ALP SERPL-CCNC: 75 U/L (ref 34–104)
ALT SERPL W P-5'-P-CCNC: 15 U/L (ref 7–52)
ANION GAP SERPL CALCULATED.3IONS-SCNC: 8 MMOL/L (ref 4–13)
APTT PPP: 30 SECONDS (ref 23–37)
AST SERPL W P-5'-P-CCNC: 17 U/L (ref 13–39)
BASOPHILS # BLD AUTO: 0.05 THOUSANDS/ÂΜL (ref 0–0.1)
BASOPHILS NFR BLD AUTO: 1 % (ref 0–1)
BILIRUB SERPL-MCNC: 0.58 MG/DL (ref 0.2–1)
BUN SERPL-MCNC: 17 MG/DL (ref 5–25)
CALCIUM SERPL-MCNC: 9.2 MG/DL (ref 8.4–10.2)
CHLORIDE SERPL-SCNC: 101 MMOL/L (ref 96–108)
CO2 SERPL-SCNC: 30 MMOL/L (ref 21–32)
CREAT SERPL-MCNC: 0.85 MG/DL (ref 0.6–1.3)
EOSINOPHIL # BLD AUTO: 0.07 THOUSAND/ÂΜL (ref 0–0.61)
EOSINOPHIL NFR BLD AUTO: 1 % (ref 0–6)
ERYTHROCYTE [DISTWIDTH] IN BLOOD BY AUTOMATED COUNT: 13.7 % (ref 11.6–15.1)
ERYTHROCYTE [SEDIMENTATION RATE] IN BLOOD: 33 MM/HOUR (ref 0–19)
GFR SERPL CREATININE-BSD FRML MDRD: 91 ML/MIN/1.73SQ M
GLUCOSE P FAST SERPL-MCNC: 104 MG/DL (ref 65–99)
HCT VFR BLD AUTO: 46 % (ref 36.5–49.3)
HGB BLD-MCNC: 14.8 G/DL (ref 12–17)
IMM GRANULOCYTES # BLD AUTO: 0.04 THOUSAND/UL (ref 0–0.2)
IMM GRANULOCYTES NFR BLD AUTO: 0 % (ref 0–2)
INR PPP: 0.98 (ref 0.84–1.19)
LYMPHOCYTES # BLD AUTO: 2.59 THOUSANDS/ÂΜL (ref 0.6–4.47)
LYMPHOCYTES NFR BLD AUTO: 28 % (ref 14–44)
MCH RBC QN AUTO: 30.2 PG (ref 26.8–34.3)
MCHC RBC AUTO-ENTMCNC: 32.2 G/DL (ref 31.4–37.4)
MCV RBC AUTO: 94 FL (ref 82–98)
MONOCYTES # BLD AUTO: 0.58 THOUSAND/ÂΜL (ref 0.17–1.22)
MONOCYTES NFR BLD AUTO: 6 % (ref 4–12)
NEUTROPHILS # BLD AUTO: 6.01 THOUSANDS/ÂΜL (ref 1.85–7.62)
NEUTS SEG NFR BLD AUTO: 64 % (ref 43–75)
NRBC BLD AUTO-RTO: 0 /100 WBCS
PLATELET # BLD AUTO: 246 THOUSANDS/UL (ref 149–390)
PMV BLD AUTO: 10.3 FL (ref 8.9–12.7)
POTASSIUM SERPL-SCNC: 4.1 MMOL/L (ref 3.5–5.3)
PROT SERPL-MCNC: 6.8 G/DL (ref 6.4–8.4)
PROTHROMBIN TIME: 12.9 SECONDS (ref 11.6–14.5)
RBC # BLD AUTO: 4.9 MILLION/UL (ref 3.88–5.62)
SODIUM SERPL-SCNC: 139 MMOL/L (ref 135–147)
WBC # BLD AUTO: 9.34 THOUSAND/UL (ref 4.31–10.16)

## 2024-04-08 PROCEDURE — 85025 COMPLETE CBC W/AUTO DIFF WBC: CPT

## 2024-04-08 PROCEDURE — 85652 RBC SED RATE AUTOMATED: CPT

## 2024-04-08 PROCEDURE — 80053 COMPREHEN METABOLIC PANEL: CPT

## 2024-04-08 PROCEDURE — 85610 PROTHROMBIN TIME: CPT

## 2024-04-08 PROCEDURE — 36415 COLL VENOUS BLD VENIPUNCTURE: CPT

## 2024-04-08 PROCEDURE — 85730 THROMBOPLASTIN TIME PARTIAL: CPT

## 2024-04-08 NOTE — TELEPHONE ENCOUNTER
Hi dr. Maher, can last office visit note be addended if pt is cleared for his ortho surgery 5/6/24, right knee replacement, surgeon dr. Willam butler. Will fax addended note to [phone]

## 2024-04-11 ENCOUNTER — CONSULT (OUTPATIENT)
Dept: FAMILY MEDICINE CLINIC | Facility: CLINIC | Age: 64
End: 2024-04-11

## 2024-04-11 ENCOUNTER — TELEPHONE (OUTPATIENT)
Dept: FAMILY MEDICINE CLINIC | Facility: CLINIC | Age: 64
End: 2024-04-11

## 2024-04-11 VITALS
DIASTOLIC BLOOD PRESSURE: 80 MMHG | HEART RATE: 80 BPM | SYSTOLIC BLOOD PRESSURE: 125 MMHG | WEIGHT: 315 LBS | BODY MASS INDEX: 46.65 KG/M2 | OXYGEN SATURATION: 98 % | HEIGHT: 69 IN

## 2024-04-11 DIAGNOSIS — I48.91 NEW ONSET ATRIAL FIBRILLATION (HCC): ICD-10-CM

## 2024-04-11 DIAGNOSIS — E11.9 TYPE 2 DIABETES MELLITUS WITHOUT COMPLICATION, WITHOUT LONG-TERM CURRENT USE OF INSULIN (HCC): ICD-10-CM

## 2024-04-11 DIAGNOSIS — M17.12 PRIMARY OSTEOARTHRITIS OF LEFT KNEE: Primary | ICD-10-CM

## 2024-04-11 DIAGNOSIS — M16.11 PRIMARY OSTEOARTHRITIS OF RIGHT HIP: ICD-10-CM

## 2024-04-11 DIAGNOSIS — G47.33 OBSTRUCTIVE SLEEP APNEA: ICD-10-CM

## 2024-04-11 DIAGNOSIS — M17.11 PRIMARY OSTEOARTHRITIS OF RIGHT KNEE: ICD-10-CM

## 2024-04-11 DIAGNOSIS — I10 PRIMARY HYPERTENSION: ICD-10-CM

## 2024-04-11 DIAGNOSIS — I89.0 LYMPHEDEMA: ICD-10-CM

## 2024-04-11 NOTE — TELEPHONE ENCOUNTER
----- Message from Akosua Maher MD sent at 4/11/2024  1:56 PM EDT -----  48-hour hold for both Eliquis or Xarelto  ----- Message -----  From: Maycol Arango DO  Sent: 4/11/2024   1:14 PM EDT  To: Akosua Maher MD    Good afternoon Dr. Maher,     I know that you cleared this patient for surgery in your 3/1/2024 note.  Thank you for addending it.  I am just curious if there is any change to the timing of stopping anticoagulant between xarelto and eliquis.  Patient was unable to afford the Xarelto 20mg, I had samples in office with eliquis and started 5 mg BID. We are attempting to have insurance cover the Eliquis. Currently you have 42 hours prior to surgery but wanted to confirm and I will inform patient.     Sincerely,     Dr. Arango DO

## 2024-04-11 NOTE — TELEPHONE ENCOUNTER
Appreciate cardiac recommendations from Dr. Maher.  I called the patient and informed him not to take the Eliquis for 48 hours prior to his surgery.  He acknowledged timing and will stop at proper timing.

## 2024-04-11 NOTE — PROGRESS NOTES
[facility] MEDICINE PRE-OPERATIVE EVALUATION  [facility] PHYSICIAN GROUP - [facility]    NAME: Luther Vaughan  AGE: 63 y.o. SEX: male  : 1960     DATE: 2024     Internal Medicine Pre-Operative Evaluation:     Chief Complaint: Pre-operative Evaluation     Surgery: AYDIN Right Knee replacement  Anticipated Date of Surgery: 2024  Referring Provider: Dr. Weston      History of Present Illness:     Luther Vaughan is a 63 y.o. male who presents to the office today for a preoperative consultation at the request of surgeon, Dr. Weston, who plans on performing AYDIN Right Knee Surgery on Right Knee. Planned anesthesia is general. Patient has a bleeding risk of: no recent abnormal bleeding. Patient does not have objections to receiving blood products if needed. Current anti-platelet/anti-coagulation medications that the patient is prescribed includes: Rivaroxaban (Xarelto). The patient according to the Cardiology clearance on 3/1/2024 needs to discontinue the Eliquis 42 hours prior to the surgery     Assessment of Chronic Conditions:   - Diabetes Mellitus: stable, last A1c 2023 @ 6.7   - Congestive Heart Failure: stable, currently following with cardiology.  No changes to medications or shortness of breath/swelling  - Hypertension: stable, 125/80 at current visit.     Assessment of Cardiac Risk:  Denies unstable or severe angina or MI in the last 6 weeks or history of stent placement in the last year   Denies decompensated heart failure (e.g. New onset heart failure, NYHA functional class IV heart failure, or worsening existing heart failure)  Denies significant arrhythmias such as high grade AV block, symptomatic ventricular arrhythmia, newly recognized ventricular tachycardia, supraventricular tachycardia with resting heart rate >100, or symptomatic bradycardia  Reports severe heart valve disease including aortic stenosis or symptomatic mitral stenosis-  PATIENT HAS HISTORY OF  MODERATE CALIFICATION OF AORTIC VALVE.  No current complications.     Exercise Capacity:  Able to walk 4 blocks without symptoms?: No, due to pain in lower extremities  Able to walk 2 flights without symptoms?: No, No, due to pain in lower extremities    Prior Anesthesia Reactions: No     Personal history of venous thromboembolic disease? No    History of steroid use for >2 weeks within last year? No    STOP-BANG Sleep Apnea Screening Questionnaire:    PATIENT IS POSITIVE FOR SLEEP APNEA ON CPAP      Review of Systems:     Review of Systems   Constitutional:  Negative for chills, fever and unexpected weight change.   HENT:  Negative for congestion, ear discharge, ear pain, hearing loss, postnasal drip, rhinorrhea, sinus pressure, sinus pain and sore throat.    Eyes:  Negative for visual disturbance.   Respiratory:  Negative for cough, chest tightness and shortness of breath.    Cardiovascular:  Negative for chest pain and palpitations.   Gastrointestinal:  Negative for abdominal pain, constipation, diarrhea, nausea and vomiting.   Genitourinary:  Negative for dysuria and flank pain.   Skin:  Positive for wound (healing chronic lower extremity wound). Negative for rash.   Neurological:  Negative for dizziness, light-headedness and headaches.   Psychiatric/Behavioral:  Negative for self-injury and suicidal ideas.         Problem List:     Patient Active Problem List   Diagnosis    Elevated blood-pressure reading without diagnosis of hypertension    Incarcerated umbilical hernia    Obesity, unspecified    Primary osteoarthritis of left knee    Primary osteoarthritis of right knee    Primary osteoarthritis of right hip    BMI 45.0-49.9, adult (HCC)    New onset atrial fibrillation (HCC)    Hypertension    Obstructive sleep apnea    Acute diastolic CHF (congestive heart failure) (HCC)    Type 2 diabetes mellitus (HCC)    Dyslipidemia    Chronic ulcer of lower extremity (HCC)        Allergies:     Allergies   Allergen  "Reactions    Erythromycin Diarrhea    Fish-Derived Products - Food Allergy Diarrhea     States \"cartilage fish\" cause sweating, diarrhea, vomiting        Current Medications:       Current Outpatient Medications:     Ascorbic Acid, Vitamin C, (VITAMIN C) 100 MG tablet, Take by mouth daily, Disp: , Rfl:     atorvastatin (LIPITOR) 10 mg tablet, TAKE 1 TABLET BY MOUTH DAILY WITH DINNER, Disp: 90 tablet, Rfl: 0    cholecalciferol (VITAMIN D3) 400 units tablet, Take 400 Units by mouth daily, Disp: , Rfl:     collagenase (SANTYL) ointment, Apply topically daily, Disp: 30 g, Rfl: 1    Klor-Con 20 MEQ packet, TAKE 1 TABLET BY MOUTH DAILY, Disp: 90 packet, Rfl: 1    losartan (COZAAR) 50 mg tablet, Take 1 tablet (50 mg total) by mouth daily, Disp: 90 tablet, Rfl: 1    meloxicam (MOBIC) 15 mg tablet, 15 mg daily States does not take daily., Disp: , Rfl:     metoprolol tartrate (LOPRESSOR) 50 mg tablet, TAKE 1 TABLET BY MOUTH EVERY 12 HOURS., Disp: 180 tablet, Rfl: 0    rivaroxaban (XARELTO) 20 mg tablet, Take 1 tablet (20 mg total) by mouth daily with breakfast, Disp: 90 tablet, Rfl: 3    torsemide (DEMADEX) 10 mg tablet, TAKE 1 TABLET (10 MG TOTAL) BY MOUTH DAILY. DO NOT START BEFORE JANUARY 9, 2024., Disp: 90 tablet, Rfl: 0    clotrimazole-betamethasone (LOTRISONE) 1-0.05 % cream, Apply topically 2 (two) times a day for 7 days Available over the counter, Disp: , Rfl:     nystatin (MYCOSTATIN) powder, Apply topically 2 (two) times a day for 7 days, Disp: 60 g, Rfl: 1     Past History:     Past Medical History:   Diagnosis Date    Diabetes mellitus (HCC)     Hypertension     Migraine     Obesity     MARLA on CPAP     Sleep apnea     Vertigo     episode 1/30/2016        Past Surgical History:   Procedure Laterality Date    HAND SURGERY      football injury    HERNIA REPAIR      KNEE SURGERY Left     left meniscus     MENISCECTOMY Left         Family History   Problem Relation Age of Onset    No Known Problems Mother     Heart " attack Father     Heart disease Father     Prostate cancer Neg Hx     Testicular cancer Neg Hx     Colon cancer Neg Hx         Social History     Socioeconomic History    Marital status: /Civil Union     Spouse name: Esthela    Number of children: 3    Years of education: 16    Highest education level: Bachelor's degree (e.g., BA, AB, BS)   Occupational History    Not on file   Tobacco Use    Smoking status: Never    Smokeless tobacco: Never    Tobacco comments:     never smoker- per [place]    Vaping Use    Vaping status: Never Used   Substance and Sexual Activity    Alcohol use: Not Currently     Alcohol/week: 14.0 standard drinks of alcohol     Types: 14 Cans of beer per week    Drug use: Never    Sexual activity: Yes     Partners: Female     Birth control/protection: Post-menopausal   Other Topics Concern    Not on file   Social History Narrative    · Most recent tobacco use screenin2018      · Alcohol-years of use:   40      · Able to care for self:   Yes      · Live alone or with others:   with others      · Advance directive:   No      · Diet:   Regular      Social Determinants of Health     Financial Resource Strain: Low Risk  (2024)    Overall Financial Resource Strain (CARDIA)     Difficulty of Paying Living Expenses: Not hard at all   Food Insecurity: No Food Insecurity (2024)    Hunger Vital Sign     Worried About Running Out of Food in the Last Year: Never true     Ran Out of Food in the Last Year: Never true   Transportation Needs: No Transportation Needs (2024)    PRAPARE - Transportation     Lack of Transportation (Medical): No     Lack of Transportation (Non-Medical): No   Physical Activity: Not on file   Stress: Not on file   Social Connections: Not on file   Intimate Partner Violence: Not on file   Housing Stability: Low Risk  (2024)    Housing Stability Vital Sign     Unable to Pay for Housing in the Last Year: No     Number of Places Lived in the Last Year: 1  "    Unstable Housing in the Last Year: No        Physical Exam:      /80   Pulse 80   Ht 5' 8.5\" (1.74 m)   Wt (!) 143 kg (316 lb)   SpO2 98%   BMI 47.35 kg/m²     Physical Exam  Constitutional:       General: He is not in acute distress.     Appearance: Normal appearance. He is obese. He is not ill-appearing, toxic-appearing or diaphoretic.   HENT:      Head: Normocephalic and atraumatic.      Right Ear: Tympanic membrane, ear canal and external ear normal. There is no impacted cerumen.      Left Ear: Tympanic membrane, ear canal and external ear normal. There is no impacted cerumen.      Nose: Nose normal. No congestion or rhinorrhea.      Mouth/Throat:      Mouth: Mucous membranes are moist.      Pharynx: Oropharynx is clear. No oropharyngeal exudate or posterior oropharyngeal erythema.   Eyes:      General:         Right eye: No discharge.         Left eye: No discharge.      Extraocular Movements: Extraocular movements intact.      Pupils: Pupils are equal, round, and reactive to light.   Cardiovascular:      Rate and Rhythm: Normal rate and regular rhythm.      Heart sounds: Normal heart sounds. No murmur heard.     No friction rub. No gallop.   Pulmonary:      Effort: Pulmonary effort is normal. No respiratory distress.      Breath sounds: Normal breath sounds. No stridor. No wheezing, rhonchi or rales.   Abdominal:      General: Bowel sounds are normal. There is no distension.      Palpations: Abdomen is soft.      Tenderness: There is no abdominal tenderness.   Musculoskeletal:         General: Swelling (bilateral lower extremity knee and extremity swelling) and tenderness (bilateral low extremity and knee swelling) present.      Cervical back: Neck supple. No tenderness.   Lymphadenopathy:      Cervical: No cervical adenopathy.   Neurological:      Mental Status: He is alert.           Data:     Pre-operative work-up    Laboratory Results: I have personally reviewed the pertinent laboratory " results/reports . Labs are normal outside ESR.  ESR elevation likely 2/2 to chronic inflammation associated with knees.    EKG: I have personally reviewed pertinent reports.   and last obtained on 1/8/2024 that had results under cardiology tab.  Atrial fibrillation present with right axis deviation    Chest x-ray:  1/5/24 was last performed.  No abnormalities or acute cardiopulmonary disease.     Previous cardiopulmonary studies within the past year:  Echocardiogram: Mild to moderate concentric hypertrophy, ejection fraction 55 by visual estimation.  Systolic function normal.  Left atrium mildly dilated.  Aortic valve showed moderate thickening with moderately calcified leaflets and reduced mobility.  There is also mild mitral regurgitation and tricuspid regurgitation.  Cardiac Catheterization: None  Stress Test: No evidence of stress-induced myocardial ischemia  Pulmonary Function Testing: None       Assessment:     No diagnosis found.     Plan:     63 y.o. male with planned surgery: Right Aj knee replacement.  Patient has been dealing with severe lower extremity pain in the knees with antalgic gait and poor ambulation.  He has upcoming surgery.      Cardiac Risk Estimation: per the Revised Cardiac Risk Index (Circ. 100:1043, 1999), the patient's risk factors for cardiac complications include history of congestive heart failure, putting him in: RCI RISK CLASS III (2 risk factors, risk of major cardiac compl. appr. 3.6%).    1. Further preoperative workup as follows:   - None; no further preoperative work-up is required    2. Medication Management/Recommendations:   - Patient should continue antihypertensive medications up through and including the day of surgery.     3. Prophylaxis for cardiac events with perioperative beta-blockers: not indicated.    4. Patient requires further consultation with: None    Clearance  Patient is CLEARED for surgery without any additional cardiac testing.  Will need to discuss with  cardiology the timing of anticoagulant.  The patient was previously on Xarelto but is currently on Eliquis 5 mg twice daily.  I will need to discuss the proper timing with Dr. Maher.  Once this is done the patient is cleared     Maycol Arango DO  [address] [address]  Phone#  [phone]  Fax#  [phone]

## 2024-04-14 LAB
LEFT EYE DIABETIC RETINOPATHY: NORMAL
LEFT EYE IMAGE QUALITY: NORMAL
LEFT EYE MACULAR EDEMA: NORMAL
LEFT EYE OTHER RETINOPATHY: NORMAL
RIGHT EYE DIABETIC RETINOPATHY: NORMAL
RIGHT EYE IMAGE QUALITY: NORMAL
RIGHT EYE MACULAR EDEMA: NORMAL
RIGHT EYE OTHER RETINOPATHY: NORMAL
SEVERITY (EYE EXAM): NORMAL

## 2024-04-22 ENCOUNTER — TELEPHONE (OUTPATIENT)
Age: 64
End: 2024-04-22

## 2024-04-22 NOTE — TELEPHONE ENCOUNTER
Ava from [facility] calls requesting patient's cardiac clearance.  Faxed updated visit note with addendum from 3/1 to [phone].

## 2024-04-30 DIAGNOSIS — I48.91 NEW ONSET ATRIAL FIBRILLATION (HCC): ICD-10-CM

## 2024-04-30 DIAGNOSIS — I50.31 ACUTE DIASTOLIC CHF (CONGESTIVE HEART FAILURE) (HCC): ICD-10-CM

## 2024-05-01 RX ORDER — ATORVASTATIN CALCIUM 10 MG/1
10 TABLET, FILM COATED ORAL
Qty: 90 TABLET | Refills: 1 | Status: SHIPPED | OUTPATIENT
Start: 2024-05-01

## 2024-05-01 RX ORDER — METOPROLOL TARTRATE 50 MG/1
50 TABLET, FILM COATED ORAL EVERY 12 HOURS
Qty: 180 TABLET | Refills: 1 | Status: SHIPPED | OUTPATIENT
Start: 2024-05-01

## 2024-05-01 RX ORDER — TORSEMIDE 10 MG/1
10 TABLET ORAL DAILY
Qty: 90 TABLET | Refills: 1 | Status: SHIPPED | OUTPATIENT
Start: 2024-05-01

## 2024-05-08 ENCOUNTER — PATIENT MESSAGE (OUTPATIENT)
Dept: FAMILY MEDICINE CLINIC | Facility: CLINIC | Age: 64
End: 2024-05-08

## 2024-05-09 ENCOUNTER — OFFICE VISIT (OUTPATIENT)
Dept: PHYSICAL THERAPY | Facility: CLINIC | Age: 64
End: 2024-05-09
Payer: MEDICARE

## 2024-05-09 DIAGNOSIS — M17.11 PRIMARY OSTEOARTHRITIS OF RIGHT KNEE: Primary | ICD-10-CM

## 2024-05-09 DIAGNOSIS — Z96.651 S/P TOTAL KNEE REPLACEMENT, RIGHT: ICD-10-CM

## 2024-05-09 PROCEDURE — 97162 PT EVAL MOD COMPLEX 30 MIN: CPT | Performed by: PHYSICAL THERAPIST

## 2024-05-09 NOTE — LETTER
May 9, 2024    Augustine Weston DO  [address]  [address]  [facility] [address]    Patient: Luther Vaughan   YOB: 1960   Date of Visit: 2024     Encounter Diagnosis     ICD-10-CM    1. Primary osteoarthritis of right knee  M17.11       2. S/P total knee replacement, right  Z96.651           Dear Dr. Weston:    Thank you for your recent referral of Luther Vaughan. Please review the attached evaluation summary from Luther's recent visit.     Please verify that you agree with the plan of care by signing the attached order.     If you have any questions or concerns, please do not hesitate to call.     I sincerely appreciate the opportunity to share in the care of one of your patients and hope to have another opportunity to work with you in the near future.       Sincerely,    Derrek Montanez, PT      Referring Provider:      I certify that I have read the below Plan of Care and certify the need for these services furnished under this plan of treatment while under my care.                    Augustine Weston DO  [address]  [address]  [facility] [address]  Via Fax: [phone]          PT Evaluation     Today's date: 2024  Patient name: Luther Vaughan  : 1960  MRN: [id number]  Referring provider: Augustine Weston DO  Dx:   Encounter Diagnosis     ICD-10-CM    1. Primary osteoarthritis of right knee  M17.11       2. S/P total knee replacement, right  Z96.651                      Assessment  Assessment details: Luther Vaughan is a 64 y.o. male who presents to the clinic s/p right knee replacement on 24.  The patient presents with the above listed impairments.  He demonstrates decreased ROM and strength.  He is limited with ambulation and ADLs.  He is eager to improve his functional performance and should benefit from skilled physical therapy.  Thank you for the referral.      Impairments: abnormal muscle firing, abnormal or restricted ROM, activity  "intolerance, impaired physical strength, lacks appropriate home exercise program, pain with function, poor posture  and poor body mechanics  Understanding of Dx/Px/POC: good   Prognosis: good    Goals  Impairment Goals  - Decrease pain by 50% in 8 weeks  - Increase right flexibility by 50% in 8 weeks  - Increase right lower extremity strength golbally to 4+/5 in 8 weeks    Functional Goals  - Return to Prior Level of Function in 8 weeks  - Patient will be independent with HEP in 8 weeks  - Patient will be able to ambulate with decreased pain in 8 weeks  - Patient will be able to ascend/descend stairs with decreased pain in 8 weeks       Plan  Patient would benefit from: skilled physical therapy  Planned modality interventions: cryotherapy, thermotherapy: hydrocollator packs and TENS  Planned therapy interventions: home exercise program, graded exercise, functional ROM exercises, flexibility, body mechanics training, postural training, patient education, therapeutic activities, therapeutic exercise, manual therapy, joint mobilization and neuromuscular re-education  Frequency: 2x week  Duration in weeks: 8  Treatment plan discussed with: patient        Subjective Evaluation    History of Present Illness  Mechanism of injury: HPI:  Patient notes having knee pain for \"a long time\".  He does note have shots in his knees and hip prior.  He then underwent a right knee replacement on 24.  He now presents for his PT evaluation.      Pain Location:  R Knee   Occupation:  Retired   Prior Functional Limitations:  Independent piror   AGG:  Stairs, ambulation, bending/moving knee  Ease:  Loosen up knee, ice   Patient Goals:  \"I just want to be mobile again\"     Pain  Current pain ratin  At best pain ratin  At worst pain ratin  Quality: dull ache          Objective     Observations     Additional Observation Details  Slight drainage noted at bottom of incision with slight rubor.  Instructed patient to provide " picture to surgeon.      Passive Range of Motion   Left Knee   Flexion: 105 degrees with pain  Extension: -5 degrees     Right Knee   Flexion: 90 degrees with pain  Extension: -7 degrees with pain    Strength/Myotome Testing     Left Knee   Flexion: 4+  Extension: 4+    Right Knee   Flexion: 3  Extension: 3               Diagnosis:    Precautions:    POC Expires Auth Status Start Date Expiration Date PT Visit Limit    6/7/24 NO AUTH NA NA NA   Date 5/9 - IE       Used        Remaining        Manuals        PROM        Mobs                                There Ex        Bike        Heel Slides        Gastroc Stretch        Hamstring Stretch                        Neruo Re-Ed        Quad Set        SLR        Clamshell        Step-Up                                                                                      Gait Training                                          Modalities             CP PRN

## 2024-05-09 NOTE — PROGRESS NOTES
PT Evaluation     Today's date: 2024  Patient name: Luther Vaughan  : 1960  MRN: [id number]  Referring provider: Augustine Weston DO  Dx:   Encounter Diagnosis     ICD-10-CM    1. Primary osteoarthritis of right knee  M17.11       2. S/P total knee replacement, right  Z96.651                      Assessment  Assessment details: Luther Vaughan is a 64 y.o. male who presents to the clinic s/p right knee replacement on 24.  The patient presents with the above listed impairments.  He demonstrates decreased ROM and strength.  He is limited with ambulation and ADLs.  He is eager to improve his functional performance and should benefit from skilled physical therapy.  Thank you for the referral.      Impairments: abnormal muscle firing, abnormal or restricted ROM, activity intolerance, impaired physical strength, lacks appropriate home exercise program, pain with function, poor posture  and poor body mechanics  Understanding of Dx/Px/POC: good   Prognosis: good    Goals  Impairment Goals  - Decrease pain by 50% in 8 weeks  - Increase right flexibility by 50% in 8 weeks  - Increase right lower extremity strength [employer] to 4+/5 in 8 weeks    Functional Goals  - Return to Prior Level of Function in 8 weeks  - Patient will be independent with HEP in 8 weeks  - Patient will be able to ambulate with decreased pain in 8 weeks  - Patient will be able to ascend/descend stairs with decreased pain in 8 weeks       Plan  Patient would benefit from: skilled physical therapy  Planned modality interventions: cryotherapy, thermotherapy: hydrocollator packs and TENS  Planned therapy interventions: home exercise program, graded exercise, functional ROM exercises, flexibility, body mechanics training, postural training, patient education, therapeutic activities, therapeutic exercise, manual therapy, joint mobilization and neuromuscular re-education  Frequency: 2x week  Duration in weeks: 8  Treatment plan discussed  "with: patient        Subjective Evaluation    History of Present Illness  Mechanism of injury: HPI:  Patient notes having knee pain for \"a long time\".  He does note have shots in his knees and hip prior.  He then underwent a right knee replacement on 24.  He now presents for his PT evaluation.      Pain Location:  R Knee   Occupation:  Retired   Prior Functional Limitations:  Independent piror   AGG:  Stairs, ambulation, bending/moving knee  Ease:  Loosen up knee, ice   Patient Goals:  \"I just want to be mobile again\"     Pain  Current pain ratin  At best pain ratin  At worst pain ratin  Quality: dull ache          Objective     Observations     Additional Observation Details  Slight drainage noted at bottom of incision with slight rubor.  Instructed patient to provide picture to surgeon.      Passive Range of Motion   Left Knee   Flexion: 105 degrees with pain  Extension: -5 degrees     Right Knee   Flexion: 90 degrees with pain  Extension: -7 degrees with pain    Strength/Myotome Testing     Left Knee   Flexion: 4+  Extension: 4+    Right Knee   Flexion: 3  Extension: 3               Diagnosis:    Precautions:    POC Expires Auth Status Start Date Expiration Date PT Visit Limit    24 NO AUTH NA NA NA   Date  - IE       Used        Remaining        Manuals        PROM        Mobs                                There Ex        Bike        Heel Slides        Gastroc Stretch        Hamstring Stretch                        Neruo Re-Ed        Quad Set        SLR        Clamshell        Step-Up                                                                                      Gait Training                                          Modalities             CP PRN                                        "

## 2024-05-13 ENCOUNTER — OFFICE VISIT (OUTPATIENT)
Dept: PHYSICAL THERAPY | Facility: CLINIC | Age: 64
End: 2024-05-13
Payer: MEDICARE

## 2024-05-13 DIAGNOSIS — M17.11 PRIMARY OSTEOARTHRITIS OF RIGHT KNEE: Primary | ICD-10-CM

## 2024-05-13 DIAGNOSIS — Z96.651 S/P TOTAL KNEE REPLACEMENT, RIGHT: ICD-10-CM

## 2024-05-13 PROCEDURE — 97110 THERAPEUTIC EXERCISES: CPT | Performed by: PHYSICAL THERAPIST

## 2024-05-13 PROCEDURE — 97140 MANUAL THERAPY 1/> REGIONS: CPT | Performed by: PHYSICAL THERAPIST

## 2024-05-13 PROCEDURE — 97112 NEUROMUSCULAR REEDUCATION: CPT | Performed by: PHYSICAL THERAPIST

## 2024-05-13 RX ORDER — OXYCODONE HYDROCHLORIDE 5 MG/1
TABLET ORAL
COMMUNITY
Start: 2024-05-06 | End: 2024-05-13

## 2024-05-13 RX ORDER — CELECOXIB 200 MG/1
200 CAPSULE ORAL 2 TIMES DAILY
COMMUNITY
Start: 2024-05-07 | End: 2024-06-06

## 2024-05-13 NOTE — PROGRESS NOTES
"Daily Note     Today's date: 2024  Patient name: Luther Vaughan  : 1960  MRN: [id number]  Referring provider: Augustine Weston DO  Dx:   Encounter Diagnosis     ICD-10-CM    1. Primary osteoarthritis of right knee  M17.11       2. S/P total knee replacement, right  Z96.651                      Subjective: Patient noted that he had a lot of drainage with his knee over the weekend.        Objective: See treatment diary below      Assessment: Tolerated treatment well. Patient would benefit from continued PT.  Patient able to attain full revolution on recumbent bike toady.  Dressing was removed - did have some drainage at his last incision which was still actively draining.  ROM improved compared to his evaluation.  Able to tolerate work on steps today.  Continue to progress as able.        Plan: Progress treatment as tolerated.       Diagnosis:    Precautions:    POC Expires Auth Status Start Date Expiration Date PT Visit Limit    24 NO AUTH NA NA NA   Date  - IE       Used  2      Remaining        Manuals        PROM        Mobs                Bandage Removal  RT              There Ex        Bike  5 min ROM       Heel Slides  x12      Gastroc Stretch  20\" x 4       Hamstring Stretch  20\" x 4              ROM  Flex:  100  Ext:  -3      Neruo Re-Ed        Quad Set        SLR        Clamshell        Step-Up  4\" up x 12  Lateral Tap 4\" x 12                                                                                     Gait Training                                          Modalities             CP PRN                                             "

## 2024-05-16 ENCOUNTER — OFFICE VISIT (OUTPATIENT)
Dept: PHYSICAL THERAPY | Facility: CLINIC | Age: 64
End: 2024-05-16
Payer: MEDICARE

## 2024-05-16 DIAGNOSIS — Z96.651 S/P TOTAL KNEE REPLACEMENT, RIGHT: ICD-10-CM

## 2024-05-16 DIAGNOSIS — M17.11 PRIMARY OSTEOARTHRITIS OF RIGHT KNEE: Primary | ICD-10-CM

## 2024-05-16 PROCEDURE — 97110 THERAPEUTIC EXERCISES: CPT

## 2024-05-16 PROCEDURE — 97112 NEUROMUSCULAR REEDUCATION: CPT

## 2024-05-16 PROCEDURE — 97140 MANUAL THERAPY 1/> REGIONS: CPT

## 2024-05-16 NOTE — PROGRESS NOTES
"Daily Note     Today's date: 2024  Patient name: Luther Vaughan  : 1960  MRN: [id number]  Referring provider: Augustine Weston DO  Dx:   Encounter Diagnosis     ICD-10-CM    1. Primary osteoarthritis of right knee  M17.11       2. S/P total knee replacement, right  Z96.651                      Subjective: Patient noted that's the knee pain has been pretty good, but his hip has been killing him      Objective: See treatment diary below      Assessment: Tolerated treatment well. Patient would benefit from continued PT.  Patient did have some minor drainage from incision today. Bending improved to 105. Patient able to progress to SPC for short distances to this date with step through gait pattern. He demonstrates slight reliance on upper extremity for balance with wobble board exercises.       Plan: Progress treatment as tolerated.       Diagnosis:    Precautions:    POC Expires Auth Status Start Date Expiration Date PT Visit Limit    24 NO AUTH NA NA NA   Date  - IE      Used  2 3     Remaining        Manuals        PROM   SP     Mobs                Bandage Removal  RT              There Ex        Bike  5 min ROM  5 min ROM     Heel Slides  x12 x15     Gastroc Stretch  20\" x 4  20\"x4     Hamstring Stretch  20\" x 4              ROM  Flex:  100  Ext:  -3 Flex 10  Ext: -1     Neruo Re-Ed        Quad Set        SLR   2x5 with min A     Clamshell        Step-Up  4\" up x 12  Lateral Tap 4\" x 12  6\" up 2x8     LAQ   2x10     Wobble board   1' ea                                                                   Gait Training               SPC      1 lap around clinic                      Modalities             CP PRN                                             "

## 2024-05-20 ENCOUNTER — OFFICE VISIT (OUTPATIENT)
Dept: PHYSICAL THERAPY | Facility: CLINIC | Age: 64
End: 2024-05-20
Payer: MEDICARE

## 2024-05-20 DIAGNOSIS — Z96.651 S/P TOTAL KNEE REPLACEMENT, RIGHT: ICD-10-CM

## 2024-05-20 DIAGNOSIS — M17.11 PRIMARY OSTEOARTHRITIS OF RIGHT KNEE: Primary | ICD-10-CM

## 2024-05-20 PROCEDURE — 97112 NEUROMUSCULAR REEDUCATION: CPT | Performed by: PHYSICAL THERAPIST

## 2024-05-20 PROCEDURE — 97140 MANUAL THERAPY 1/> REGIONS: CPT | Performed by: PHYSICAL THERAPIST

## 2024-05-20 PROCEDURE — 97110 THERAPEUTIC EXERCISES: CPT | Performed by: PHYSICAL THERAPIST

## 2024-05-20 NOTE — PROGRESS NOTES
"Daily Note     Today's date: 2024  Patient name: Luther Vaughan  : 1960  MRN: [id number]  Referring provider: Augustine Weston DO  Dx:   Encounter Diagnosis     ICD-10-CM    1. Primary osteoarthritis of right knee  M17.11       2. S/P total knee replacement, right  Z96.651                      Subjective: Patient notes that his knee can really click and pop.        Objective: See treatment diary below      Assessment: Tolerated treatment well. Patient would benefit from continued PT.  Patient has primarily been using his SPC at this time.  He has been trying to do more around the house.  ROM is improving nicely.  Can hear and feel clicking/popping with knee extension stretching.  Patient with audible clicking with ambulation as well.  Continue to progress strength and ROM as able.        Plan: Progress treatment as tolerated.       Diagnosis:    Precautions:    POC Expires Auth Status Start Date Expiration Date PT Visit Limit    24 NO AUTH NA NA NA   Date  - IE     Used  2 3 4    Remaining        Manuals        PROM   SP RT    Mobs                Bandage Removal  RT              There Ex        Bike  5 min ROM  5 min ROM 4 min ROM     Heel Slides  x12 x15 x12    Gastroc Stretch  20\" x 4  20\"x4 20\" x4    Hamstring Stretch  20\" x 4  20\" x 4            ROM  Flex:  100  Ext:  -3 Flex 10  Ext: -1 Flex: 112  Ext:  -1    Neruo Re-Ed        Quad Set        SLR   2x5 with min A     Clamshell        Step-Up  4\" up x 12  Lateral Tap 4\" x 12  6\" up 2x8 6\" up and over x10  Lateral Tap Down 6\" 2x10    LAQ   2x10 SAQ 4# 3\" 2x10     Wobble board   1' ea     Leg Press    R LE 50# 2x10                                                          Gait Training               SPC      1 lap around clinic                      Modalities             CP PRN                                               "

## 2024-05-23 ENCOUNTER — OFFICE VISIT (OUTPATIENT)
Dept: PHYSICAL THERAPY | Facility: CLINIC | Age: 64
End: 2024-05-23
Payer: MEDICARE

## 2024-05-23 DIAGNOSIS — M17.11 PRIMARY OSTEOARTHRITIS OF RIGHT KNEE: Primary | ICD-10-CM

## 2024-05-23 DIAGNOSIS — Z96.651 S/P TOTAL KNEE REPLACEMENT, RIGHT: ICD-10-CM

## 2024-05-23 PROCEDURE — 97112 NEUROMUSCULAR REEDUCATION: CPT | Performed by: PHYSICAL THERAPIST

## 2024-05-23 PROCEDURE — 97140 MANUAL THERAPY 1/> REGIONS: CPT | Performed by: PHYSICAL THERAPIST

## 2024-05-23 PROCEDURE — 97110 THERAPEUTIC EXERCISES: CPT | Performed by: PHYSICAL THERAPIST

## 2024-05-23 NOTE — PROGRESS NOTES
"Daily Note     Today's date: 2024  Patient name: Luther Vaughan  : 1960  MRN: [id number]  Referring provider: Augustine Weston DO  Dx:   Encounter Diagnosis     ICD-10-CM    1. Primary osteoarthritis of right knee  M17.11       2. S/P total knee replacement, right  Z96.651                      Subjective: Patient notes that his knee is still popping when he walks.        Objective: See treatment diary below      Assessment: Tolerated treatment well. Patient would benefit from continued PT.  Patient doing well overall.  Motion is improving - does demonstrate good passive and active extension while flexion is improving.  Patient does not note pain at this time, just some soreness/ache.  Patient still with audible clicking/popping during ambulation.  Working on improving strength overall to help with his ambulation and function.  Progress as able.        Plan: Progress treatment as tolerated.       Diagnosis:    Precautions:    POC Expires Auth Status Start Date Expiration Date PT Visit Limit    24 NO AUTH NA NA NA   Date  - IE    Used  2 3 4 5   Remaining        Manuals        PROM   SP RT    Mobs                Bandage Removal  RT              There Ex        Bike  5 min ROM  5 min ROM 4 min ROM  5 min ROM   Heel Slides  x12 x15 x12    Gastroc Stretch  20\" x 4  20\"x4 20\" x4 20\" x 3   Hamstring Stretch  20\" x 4  20\" x 4 20\" x 3           ROM  Flex:  100  Ext:  -3 Flex 10  Ext: -1 Flex: 112  Ext:  -1 Flex:  109  Ext:  -1   Neruo Re-Ed        Quad Set        SLR   2x5 with min A  3x5    Clamshell        Step-Up  4\" up x 12  Lateral Tap 4\" x 12  6\" up 2x8 6\" up and over x10  Lateral Tap Down 6\" 2x10    LAQ   2x10 SAQ 4# 3\" 2x10  SAQ 4# 3\" 2x10   Wobble board   1' ea     Leg Press    R LE 50# 2x10 R LE 70# 3x10    HS Curls     OSB 2x10  Seated Black TB 3x10                                                  Gait Training               SPC      1 lap around clinic                    "   Modalities             CP PRN

## 2024-05-29 ENCOUNTER — OFFICE VISIT (OUTPATIENT)
Dept: PHYSICAL THERAPY | Facility: CLINIC | Age: 64
End: 2024-05-29
Payer: MEDICARE

## 2024-05-29 DIAGNOSIS — Z96.651 S/P TOTAL KNEE REPLACEMENT, RIGHT: ICD-10-CM

## 2024-05-29 DIAGNOSIS — M17.11 PRIMARY OSTEOARTHRITIS OF RIGHT KNEE: Primary | ICD-10-CM

## 2024-05-29 PROCEDURE — 97110 THERAPEUTIC EXERCISES: CPT | Performed by: PHYSICAL THERAPIST

## 2024-05-29 PROCEDURE — 97112 NEUROMUSCULAR REEDUCATION: CPT | Performed by: PHYSICAL THERAPIST

## 2024-05-29 PROCEDURE — 97140 MANUAL THERAPY 1/> REGIONS: CPT | Performed by: PHYSICAL THERAPIST

## 2024-05-29 NOTE — PROGRESS NOTES
"Daily Note     Today's date: 2024  Patient name: Luther Vaughan  : 1960  MRN: [id number]  Referring provider: Augustine Weston DO  Dx:   Encounter Diagnosis     ICD-10-CM    1. Primary osteoarthritis of right knee  M17.11       2. S/P total knee replacement, right  Z96.651                      Subjective: Patient notes that he is feeling OK.        Objective: See treatment diary below      Assessment: Tolerated treatment well. Patient would benefit from continued PT.  Patient had his best day yet with ROM.  Still have very audible clicking and popping with PREs and ambulation.  Continue to work on ROM and strength.         Plan: Progress treatment as tolerated.       Diagnosis:    Precautions:    POC Expires Auth Status Start Date Expiration Date PT Visit Limit    24 NO AUTH NA NA NA   Date    Used 6  3 4 5   Remaining        Manuals        PROM   SP RT    Mobs                Bandage Removal                There Ex        Bike 5 min ROM   5 min ROM 4 min ROM  5 min ROM   Heel Slides   x15 x12    Gastroc Stretch 20\" x 3  20\"x4 20\" x4 20\" x 3   Hamstring Stretch 20\" x 3   20\" x 4 20\" x 3           ROM Flex:    Ext:    Flex 10  Ext: -1 Flex: 112  Ext:  -1 Flex:  109  Ext:  -1   Neruo Re-Ed        Quad Set        SLR 1x12  1x10  2x5 with min A  3x5    Clamshell        Step-Up Lateral Tap Down 6# 2x10   6\" up 2x8 6\" up and over x10  Lateral Tap Down 6\" 2x10    LAQ SAQ 4# 3\" 2x12   2x10 SAQ 4# 3\" 2x10  SAQ 4# 3\" 2x10   Wobble board   1' ea     Leg Press R LE 70# 2x10  R LE 75# 2x10    R LE 50# 2x10 R LE 70# 3x10    HS Curls     OSB 2x10  Seated Black TB 3x10                                                Gait Training             SPC    1 lap around clinic                    Modalities           CP PRN                                               "

## 2024-05-31 ENCOUNTER — OFFICE VISIT (OUTPATIENT)
Dept: PHYSICAL THERAPY | Facility: CLINIC | Age: 64
End: 2024-05-31
Payer: MEDICARE

## 2024-05-31 DIAGNOSIS — Z96.651 S/P TOTAL KNEE REPLACEMENT, RIGHT: ICD-10-CM

## 2024-05-31 DIAGNOSIS — M17.11 PRIMARY OSTEOARTHRITIS OF RIGHT KNEE: Primary | ICD-10-CM

## 2024-05-31 PROCEDURE — 97140 MANUAL THERAPY 1/> REGIONS: CPT | Performed by: PHYSICAL THERAPIST

## 2024-05-31 PROCEDURE — 97112 NEUROMUSCULAR REEDUCATION: CPT | Performed by: PHYSICAL THERAPIST

## 2024-05-31 PROCEDURE — 97110 THERAPEUTIC EXERCISES: CPT | Performed by: PHYSICAL THERAPIST

## 2024-05-31 NOTE — PROGRESS NOTES
"Daily Note     Today's date: 2024  Patient name: Luther Vaughan  : 1960  MRN: [id number]  Referring provider: Augustine Weston DO  Dx:   Encounter Diagnosis     ICD-10-CM    1. Primary osteoarthritis of right knee  M17.11       2. S/P total knee replacement, right  Z96.651                      Subjective: Patient with no new complaints today.        Objective: See treatment diary below      Assessment: Tolerated treatment well. Patient would benefit from continued PT.  Patient doing well with ROM.  Still with audible and clicking with walking.  Continuing to focus LE strength - did add in balance exercises.  Continue to progress as able.        Plan: Progress treatment as tolerated.       Diagnosis:    Precautions:    POC Expires Auth Status Start Date Expiration Date PT Visit Limit    24 NO AUTH NA NA NA   Date    Used 6   4 5   Remaining        Manuals        PROM    RT    Mobs                Bandage Removal                There Ex        Bike 5 min ROM  5 min ROM upright  4 min ROM  5 min ROM   Heel Slides    x12    Gastroc Stretch 20\" x 3 20\" x 3  20\" x4 20\" x 3   Hamstring Stretch 20\" x 3 20\" x 3  20\" x 4 20\" x 3           ROM Flex:    Ext:   Flex:  117  Ext:  0  Flex: 112  Ext:  -1 Flex:  109  Ext:  -1   Neruo Re-Ed        Quad Set        SLR 1x12  1x10 0.5# 3x5   3x5    Clamshell        Step-Up Lateral Tap Down 6# 2x10  6\" step up 2x10   6\" up and over x10  Lateral Tap Down 6\" 2x10    LAQ SAQ 4# 3\" 2x12    SAQ 4# 3\" 2x10  SAQ 4# 3\" 2x10   Wobble board  1' fwd       Leg Press R LE 70# 2x10  R LE 75# 2x10  RLE 75# x10  RLE 80# x5  R LE 50# 2x10 R LE 70# 3x10    HS Curls     OSB 2x10  Seated Black TB 3x10    SLS  March w/ 3\" hold x10 firm & x10 foam                                          Gait Training            SPC                      Modalities          CP PRN                                                 "

## 2024-06-03 ENCOUNTER — EVALUATION (OUTPATIENT)
Dept: PHYSICAL THERAPY | Facility: CLINIC | Age: 64
End: 2024-06-03
Payer: MEDICARE

## 2024-06-03 DIAGNOSIS — M17.11 PRIMARY OSTEOARTHRITIS OF RIGHT KNEE: Primary | ICD-10-CM

## 2024-06-03 DIAGNOSIS — Z96.651 S/P TOTAL KNEE REPLACEMENT, RIGHT: ICD-10-CM

## 2024-06-03 PROCEDURE — 97140 MANUAL THERAPY 1/> REGIONS: CPT | Performed by: PHYSICAL THERAPIST

## 2024-06-03 PROCEDURE — 97112 NEUROMUSCULAR REEDUCATION: CPT | Performed by: PHYSICAL THERAPIST

## 2024-06-03 PROCEDURE — 97110 THERAPEUTIC EXERCISES: CPT | Performed by: PHYSICAL THERAPIST

## 2024-06-03 NOTE — PROGRESS NOTES
PT Re-Evaluation     Today's date: 6/3/2024  Patient name: Luther Vaughan  : 1960  MRN: [id number]  Referring provider: Augustine Weston DO  Dx:   Encounter Diagnosis     ICD-10-CM    1. Primary osteoarthritis of right knee  M17.11       2. S/P total knee replacement, right  Z96.651                        Assessment  Impairments: abnormal muscle firing, abnormal or restricted ROM, activity intolerance, impaired physical strength, lacks appropriate home exercise program, pain with function, poor posture  and poor body mechanics    Assessment details: Luther Vaughan is a 64 y.o. male who has been consistent with attending and participating in skilled PT sessions.  The patient has been doing well with PT overall.  He has been able to demonstrate improved ROM in both flexion and extension.  He is still lacking full strength as well as full ambulation ability.  The patient still has audible clicking with ambulation.  He will continue to benefit from further PT sessions to increase his strength and overall functional mobility.      Understanding of Dx/Px/POC: good     Prognosis: good    Goals  Impairment Goals  - Decrease pain by 50% in 8 weeks - PARTIALLY MET   - Increase right flexibility by 50% in 8 weeks - MET  - Increase right lower extremity strength golbally to 4+/5 in 8 weeks - PARTIALLY MET    Functional Goals  - Return to Prior Level of Function in 8 weeks - PARTIALLY MET  - Patient will be independent with HEP in 8 weeks - PARTIALLY MET   - Patient will be able to ambulate with decreased pain in 8 weeks - PARTIALLY MET   - Patient will be able to ascend/descend stairs with decreased pain in 8 weeks - PARTIALLY MET      Plan  Patient would benefit from: skilled physical therapy  Planned modality interventions: cryotherapy, thermotherapy: hydrocollator packs and TENS    Planned therapy interventions: home exercise program, graded exercise, functional ROM exercises, flexibility, body mechanics  "training, postural training, patient education, therapeutic activities, therapeutic exercise, manual therapy, joint mobilization and neuromuscular re-education    Frequency: 2x week  Duration in weeks: 8  Treatment plan discussed with: patient        Subjective Evaluation    History of Present Illness  Mechanism of injury: Patient notes that he has improved with his \"right knee\".  He notes that his pain is primarily now in his right hip.  He notes that he has been able to ascend/descend stairs and has been able to do some intermittent reciprocal gait pattern.  He notes his biggest issue currently is his strength and endurance.      HPI:  Patient notes having knee pain for \"a long time\".  He does note have shots in his knees and hip prior.  He then underwent a right knee replacement on 24.  He now presents for his PT evaluation.      Pain Location:  R Knee   Occupation:  Retired   Prior Functional Limitations:  Independent piror   AGG:  Stairs, ambulation, bending/moving knee  Ease:  Loosen up knee, ice   Patient Goals:  \"I just want to be mobile again\"     Pain  Current pain ratin  At best pain ratin  At worst pain ratin  Quality: dull ache          Objective     Observations     Additional Observation Details  Slight drainage noted at bottom of incision with slight rubor.  Instructed patient to provide picture to surgeon.      Passive Range of Motion   Left Knee   Flexion: 105 degrees with pain  Extension: -5 degrees     Right Knee   Flexion: 120 degrees   Extension: 0 degrees with pain    Strength/Myotome Testing     Left Hip   Planes of Motion   Flexion: 4+    Right Hip   Planes of Motion   Flexion: 4+    Left Knee   Flexion: 4+  Extension: 4+    Right Knee   Flexion: 3+  Extension: 3+    Ambulation     Comments   Currently ambulating with SPC, decreased stance time on the right, antalgic gait pattern                Diagnosis:    Precautions:    POC Expires Auth Status Start Date Expiration Date " "PT Visit Limit    6/7/24 NO AUTH NA NA NA   Date 5/29 5/31 6/3  5/23   Used 6  8  5   Remaining        Manuals        PROM   Flex / Ext      Mobs                Bandage Removal                There Ex        Bike 5 min ROM  5 min ROM upright 5 min ROM upright   5 min ROM   Heel Slides        Gastroc Stretch 20\" x 3 20\" x 3   20\" x 3   Hamstring Stretch 20\" x 3 20\" x 3   20\" x 3   Patient Education    RT     ROM Flex:    Ext:   Flex:  117  Ext:  0 Flex:  125  Ext:  0  Flex:  109  Ext:  -1   Neruo Re-Ed        Quad Set        SLR 1x12  1x10 0.5# 3x5   3x5    Clamshell        Step-Up Lateral Tap Down 6# 2x10  6\" step up 2x10       LAQ SAQ 4# 3\" 2x12     SAQ 4# 3\" 2x10   Wobble board  1' fwd       Leg Press R LE 70# 2x10  R LE 75# 2x10  RLE 75# x10  RLE 80# x5 # 2x10  SL 80# x10 x5  R LE 70# 3x10    HS Curls     OSB 2x10  Seated Black TB 3x10    SLS  March w/ 3\" hold x10 firm & x10 foam                                         Gait Training           SPC                    Modalities         CP PRN                                 "

## 2024-06-03 NOTE — LETTER
Janna 3, 2024    Augustine Weston DO  [address]  [address]  [facility] [address]    Patient: Luther Vaughan   YOB: 1960   Date of Visit: 6/3/2024     Encounter Diagnosis     ICD-10-CM    1. Primary osteoarthritis of right knee  M17.11       2. S/P total knee replacement, right  Z96.651           Dear Dr. Weston:    Thank you for your recent referral of Luther Vaughan. Please review the attached evaluation summary from Luther's recent visit.     Please verify that you agree with the plan of care by signing the attached order.     If you have any questions or concerns, please do not hesitate to call.     I sincerely appreciate the opportunity to share in the care of one of your patients and hope to have another opportunity to work with you in the near future.       Sincerely,    Derrek Montanez, PT      Referring Provider:      I certify that I have read the below Plan of Care and certify the need for these services furnished under this plan of treatment while under my care.                    Augustine Weston DO  [address]  [address]  [facility] [address]  Via Fax: [phone]          PT Re-Evaluation     Today's date: 6/3/2024  Patient name: Luther Vaughan  : 1960  MRN: [id number]  Referring provider: Augustine Westno DO  Dx:   Encounter Diagnosis     ICD-10-CM    1. Primary osteoarthritis of right knee  M17.11       2. S/P total knee replacement, right  Z96.651                        Assessment  Impairments: abnormal muscle firing, abnormal or restricted ROM, activity intolerance, impaired physical strength, lacks appropriate home exercise program, pain with function, poor posture  and poor body mechanics    Assessment details: Luther Vaughan is a 64 y.o. male who has been consistent with attending and participating in skilled PT sessions.  The patient has been doing well with PT overall.  He has been able to demonstrate improved ROM in both flexion and  "extension.  He is still lacking full strength as well as full ambulation ability.  The patient still has audible clicking with ambulation.  He will continue to benefit from further PT sessions to increase his strength and overall functional mobility.      Understanding of Dx/Px/POC: good     Prognosis: good    Goals  Impairment Goals  - Decrease pain by 50% in 8 weeks - PARTIALLY MET   - Increase right flexibility by 50% in 8 weeks - MET  - Increase right lower extremity strength golbally to 4+/5 in 8 weeks - PARTIALLY MET    Functional Goals  - Return to Prior Level of Function in 8 weeks - PARTIALLY MET  - Patient will be independent with HEP in 8 weeks - PARTIALLY MET   - Patient will be able to ambulate with decreased pain in 8 weeks - PARTIALLY MET   - Patient will be able to ascend/descend stairs with decreased pain in 8 weeks - PARTIALLY MET      Plan  Patient would benefit from: skilled physical therapy  Planned modality interventions: cryotherapy, thermotherapy: hydrocollator packs and TENS    Planned therapy interventions: home exercise program, graded exercise, functional ROM exercises, flexibility, body mechanics training, postural training, patient education, therapeutic activities, therapeutic exercise, manual therapy, joint mobilization and neuromuscular re-education    Frequency: 2x week  Duration in weeks: 8  Treatment plan discussed with: patient        Subjective Evaluation    History of Present Illness  Mechanism of injury: Patient notes that he has improved with his \"right knee\".  He notes that his pain is primarily now in his right hip.  He notes that he has been able to ascend/descend stairs and has been able to do some intermittent reciprocal gait pattern.  He notes his biggest issue currently is his strength and endurance.      HPI:  Patient notes having knee pain for \"a long time\".  He does note have shots in his knees and hip prior.  He then underwent a right knee replacement on 5/6/24.  " "He now presents for his PT evaluation.      Pain Location:  R Knee   Occupation:  Retired   Prior Functional Limitations:  Independent piror   AGG:  Stairs, ambulation, bending/moving knee  Ease:  Loosen up knee, ice   Patient Goals:  \"I just want to be mobile again\"     Pain  Current pain ratin  At best pain ratin  At worst pain ratin  Quality: dull ache          Objective     Observations     Additional Observation Details  Slight drainage noted at bottom of incision with slight rubor.  Instructed patient to provide picture to surgeon.      Passive Range of Motion   Left Knee   Flexion: 105 degrees with pain  Extension: -5 degrees     Right Knee   Flexion: 120 degrees   Extension: 0 degrees with pain    Strength/Myotome Testing     Left Hip   Planes of Motion   Flexion: 4+    Right Hip   Planes of Motion   Flexion: 4+    Left Knee   Flexion: 4+  Extension: 4+    Right Knee   Flexion: 3+  Extension: 3+    Ambulation     Comments   Currently ambulating with SPC, decreased stance time on the right, antalgic gait pattern                Diagnosis:    Precautions:    POC Expires Auth Status Start Date Expiration Date PT Visit Limit    24 NO AUTH NA NA NA   Date 5/29 5/31 6/3  5/23   Used 6  8  5   Remaining        Manuals        PROM   Flex / Ext      Mobs                Bandage Removal                There Ex        Bike 5 min ROM  5 min ROM upright 5 min ROM upright   5 min ROM   Heel Slides        Gastroc Stretch 20\" x 3 20\" x 3   20\" x 3   Hamstring Stretch 20\" x 3 20\" x 3   20\" x 3   Patient Education    RT     ROM Flex:    Ext:   Flex:  117  Ext:  0 Flex:  125  Ext:  0  Flex:  109  Ext:  -1   Neruo Re-Ed        Quad Set        SLR 1x12  1x10 0.5# 3x5   3x5    Clamshell        Step-Up Lateral Tap Down 6# 2x10  6\" step up 2x10       LAQ SAQ 4# 3\" 2x12     SAQ 4# 3\" 2x10   Wobble board  1' fwd       Leg Press R LE 70# 2x10  R LE 75# 2x10  RLE 75# x10  RLE 80# x5 # 2x10  SL 80# x10 x5  R LE 70# " "3x10    HS Curls     OSB 2x10  Seated Black TB 3x10    SLS  March w/ 3\" hold x10 firm & x10 foam                                         Gait Training           SPC                    Modalities         CP PRN                                                 "

## 2024-06-06 ENCOUNTER — OFFICE VISIT (OUTPATIENT)
Dept: PHYSICAL THERAPY | Facility: CLINIC | Age: 64
End: 2024-06-06
Payer: MEDICARE

## 2024-06-06 DIAGNOSIS — M17.11 PRIMARY OSTEOARTHRITIS OF RIGHT KNEE: Primary | ICD-10-CM

## 2024-06-06 DIAGNOSIS — Z96.651 S/P TOTAL KNEE REPLACEMENT, RIGHT: ICD-10-CM

## 2024-06-06 PROCEDURE — 97140 MANUAL THERAPY 1/> REGIONS: CPT | Performed by: PHYSICAL THERAPIST

## 2024-06-06 PROCEDURE — 97112 NEUROMUSCULAR REEDUCATION: CPT | Performed by: PHYSICAL THERAPIST

## 2024-06-06 PROCEDURE — 97110 THERAPEUTIC EXERCISES: CPT | Performed by: PHYSICAL THERAPIST

## 2024-06-06 NOTE — PROGRESS NOTES
"Daily Note     Today's date: 2024  Patient name: Luther Vaughan  : 1960  MRN: [id number]  Referring provider: Augustine Weston DO  Dx:   Encounter Diagnosis     ICD-10-CM    1. Primary osteoarthritis of right knee  M17.11       2. S/P total knee replacement, right  Z96.651                      Subjective: Patient notes that he had a good follow-up with his surgeon.        Objective: See treatment diary below      Assessment: Tolerated treatment well. Patient would benefit from continued PT.  Patient continues to demonstrate good knee ROM.  Focuisng on improved LE strength overall - patient still with audible clicking during muscle activation.  Look to add in more strengthening moving forward.  Progress as able.        Plan: Progress treatment as tolerated.       Diagnosis:    Precautions:    POC Expires Auth Status Start Date Expiration Date PT Visit Limit    24 NO AUTH NA NA NA   Date 5/29 5/31 6/3 6/6    Used 6  8 9    Remaining        Manuals        PROM   Flex / Ext  Flex / Ext     Mobs                Bandage Removal                There Ex        Bike 5 min ROM  5 min ROM upright 5 min ROM upright  5 min ROM upright     Heel Slides        Gastroc Stretch 20\" x 3 20\" x 3      Hamstring Stretch 20\" x 3 20\" x 3      Patient Education    RT     ROM Flex:    Ext:   Flex:  117  Ext:  0 Flex:  125  Ext:  0 Flex:  126  Ext:  0     Neruo Re-Ed        Quad Set        SLR 1x12  1x10 0.5# 3x5      Clamshell        Step-Up Lateral Tap Down 6# 2x10  6\" step up 2x10   6\" lateral tap down 3x10     LAQ SAQ 4# 3\" 2x12        Wobble board  1' fwd       Leg Press R LE 70# 2x10  R LE 75# 2x10  RLE 75# x10  RLE 80# x5 # 2x10  SL 80# x10 x5 # 2x10  SL 80# 2x10    HS Curls        SLS  March w/ 3\" hold x10 firm & x10 foam                                        Gait Training          SPC                  Modalities        CP PRN                                     "

## 2024-06-10 ENCOUNTER — OFFICE VISIT (OUTPATIENT)
Dept: PHYSICAL THERAPY | Facility: CLINIC | Age: 64
End: 2024-06-10
Payer: MEDICARE

## 2024-06-10 DIAGNOSIS — Z96.651 S/P TOTAL KNEE REPLACEMENT, RIGHT: ICD-10-CM

## 2024-06-10 DIAGNOSIS — M17.11 PRIMARY OSTEOARTHRITIS OF RIGHT KNEE: Primary | ICD-10-CM

## 2024-06-10 PROCEDURE — 97112 NEUROMUSCULAR REEDUCATION: CPT

## 2024-06-10 PROCEDURE — 97110 THERAPEUTIC EXERCISES: CPT

## 2024-06-10 PROCEDURE — 97140 MANUAL THERAPY 1/> REGIONS: CPT

## 2024-06-10 NOTE — PROGRESS NOTES
"Daily Note     Today's date: 6/10/2024  Patient name: Luther Vaughan  : 1960  MRN: [id number]  Referring provider: Augustine Weston DO  Dx:   Encounter Diagnosis     ICD-10-CM    1. Primary osteoarthritis of right knee  M17.11       2. S/P total knee replacement, right  Z96.651           Start Time: 739  Stop Time: 833  Total time in clinic (min): 54 minutes    Subjective: Reports that he is feeling a little sore. Weekend was boring, nothing new. No change in activity. Feels stiff sometimes as well. Is doing his best to try and keep moving throughout the day.       Objective: See treatment diary below      Assessment: Tolerated treatment well. Continued per primary therapist POC. Motion is progressing pretty solidly. R knee flexion continues to be greater than 120 degrees passive range. R hip has been bothering him more the past couple days, that limited ability to perform some of the exercises today. Continues to have some swelling throughout BLE but R > L. Added some elevated sit <> stands for some power generation. Good motivation to participate throughout session. Patient demonstrated fatigue post treatment, exhibited good technique with therapeutic exercises, and would benefit from continued PT      Plan: Continue per plan of care.  Progress treatment as tolerated.       Diagnosis:    Precautions:    POC Expires Auth Status Start Date Expiration Date PT Visit Limit    24 NO AUTH NA NA NA   Date 5/29 5/31 6/3 6/6 6/10   Used 6  8 9    Remaining        Manuals        PROM   Flex / Ext  Flex / Ext  Flex/Ext    Mobs                Bandage Removal                There Ex        Bike 5 min ROM  5 min ROM upright 5 min ROM upright  5 min ROM upright  5 min ROM upright    Heel Slides        Gastroc Stretch 20\" x 3 20\" x 3      Hamstring Stretch 20\" x 3 20\" x 3      Step lunge stretch      10x 5\"    Patient Education    RT     ROM Flex:    Ext:   Flex:  117  Ext:  0 Flex:  125  Ext:  0 Flex:  126  Ext: " " 0  Flex: 123  Ext: 0    Neruo Re-Ed        Quad Set        SLR 1x12  1x10 0.5# 3x5   3x5 0# ea   Clamshell        Step-Up Lateral Tap Down 6# 2x10  6\" step up 2x10   6\" lateral tap down 3x10  6\" lateral tap down 3x10    LAQ SAQ 4# 3\" 2x12     2x10 LAQ 4# ea    Wobble board  1' fwd       Leg Press R LE 70# 2x10  R LE 75# 2x10  RLE 75# x10  RLE 80# x5 # 2x10  SL 80# x10 x5 # 2x10  SL 80# 2x10 # 2x10     SL 80# 2x10 ea    HS Curls        SLS  March w/ 3\" hold x10 firm & x10 foam       Sit <> Stand      Foam 2x5 no UE                             Gait Training          SPC                  Modalities        CP PRN                                       "

## 2024-06-13 ENCOUNTER — OFFICE VISIT (OUTPATIENT)
Dept: PHYSICAL THERAPY | Facility: CLINIC | Age: 64
End: 2024-06-13
Payer: MEDICARE

## 2024-06-13 DIAGNOSIS — M17.11 PRIMARY OSTEOARTHRITIS OF RIGHT KNEE: Primary | ICD-10-CM

## 2024-06-13 DIAGNOSIS — Z96.651 S/P TOTAL KNEE REPLACEMENT, RIGHT: ICD-10-CM

## 2024-06-13 PROCEDURE — 97112 NEUROMUSCULAR REEDUCATION: CPT

## 2024-06-13 PROCEDURE — 97110 THERAPEUTIC EXERCISES: CPT

## 2024-06-13 PROCEDURE — 97140 MANUAL THERAPY 1/> REGIONS: CPT

## 2024-06-13 NOTE — PROGRESS NOTES
Daily Note     Today's date: 2024  Patient name: Luther Vaughan  : 1960  MRN: [id number]  Referring provider: Augustine Weston DO  Dx:   Encounter Diagnosis     ICD-10-CM    1. Primary osteoarthritis of right knee  M17.11       2. S/P total knee replacement, right  Z96.651             Start Time: 0700  Stop Time: 07  Total time in clinic (min): 42 minutes    Subjective: Patient reports no new complaints or major changes since last session, currently has increased R knee stiffness/tightness and notes R hip has still been very symptomatic and bothersome and makes daily tasks even more difficult to perform.     Objective: See treatment diary below      Assessment: Tolerated treatment well. Patient demonstrated an appropriate level of challenge with all exercises performed today and did not experience any worsening of R knee symptoms throughout session. Patient continues to have R hip pain and tightness that limits the amount of active and passive knee ROM that he is able to achieve. Progressed SLR exercise today by increasing reps which was moderately challenging and fatiguing for patient, but did not cause any adverse response throughout. Good tolerance displayed during PROM today, with motion being more restricted with knee flex vs knee ext. Patient exhibited good technique with therapeutic exercises and would benefit from continued PT to reduce symptoms, restore L knee mobility, stability, and strength and to maximize overall functional ability.      Plan: Continue per plan of care.  Progress treatment as tolerated.       Diagnosis:    Precautions:    POC Expires Auth Status Start Date Expiration Date PT Visit Limit    24 NO AUTH NA NA NA   Date 6/12 5/31 6/3 6/6 6/10   Used   8 9    Remaining        Manuals        PROM Flex/ext  Flex / Ext  Flex / Ext  Flex/Ext    Mobs                Bandage Removal                There Ex        Bike 5 min ROM  5 min ROM upright 5 min ROM upright  5 min ROM  "upright  5 min ROM upright    Heel Slides        Gastroc Stretch  20\" x 3      Hamstring Stretch  20\" x 3      Step lunge stretch  10x5\"    10x 5\"    Patient Education    RT     ROM  Flex:  117  Ext:  0 Flex:  125  Ext:  0 Flex:  126  Ext:  0  Flex: 123  Ext: 0    Neruo Re-Ed        Quad Set        SLR 4x5 0# ea 0.5# 3x5   3x5 0# ea   Clamshell        Step-Up 6\" lateral tap down 3x10  6\" step up 2x10   6\" lateral tap down 3x10  6\" lateral tap down 3x10    LAQ 2x10 LAQ 4#     2x10 LAQ 4# ea    Wobble board  1' fwd       Leg Press # 2x10  SL 80# 2x10 ea RLE 75# x10  RLE 80# x5 # 2x10  SL 80# x10 x5 # 2x10  SL 80# 2x10 # 2x10     SL 80# 2x10 ea    HS Curls        SLS  March w/ 3\" hold x10 firm & x10 foam       Sit <> Stand  Held (fatigue)    Foam 2x5 no UE                             Gait Training          SPC                  Modalities        CP PRN                                       "

## 2024-06-17 ENCOUNTER — OFFICE VISIT (OUTPATIENT)
Dept: PHYSICAL THERAPY | Facility: CLINIC | Age: 64
End: 2024-06-17
Payer: MEDICARE

## 2024-06-17 DIAGNOSIS — M17.11 PRIMARY OSTEOARTHRITIS OF RIGHT KNEE: Primary | ICD-10-CM

## 2024-06-17 DIAGNOSIS — Z96.651 S/P TOTAL KNEE REPLACEMENT, RIGHT: ICD-10-CM

## 2024-06-17 PROCEDURE — 97140 MANUAL THERAPY 1/> REGIONS: CPT | Performed by: PHYSICAL THERAPIST

## 2024-06-17 PROCEDURE — 97110 THERAPEUTIC EXERCISES: CPT | Performed by: PHYSICAL THERAPIST

## 2024-06-17 PROCEDURE — 97112 NEUROMUSCULAR REEDUCATION: CPT | Performed by: PHYSICAL THERAPIST

## 2024-06-17 NOTE — PROGRESS NOTES
"Daily Note     Today's date: 2024  Patient name: Luther Vaughna  : 1960  MRN: [id number]  Referring provider: Augustine Weston DO  Dx:   Encounter Diagnosis     ICD-10-CM    1. Primary osteoarthritis of right knee  M17.11       2. S/P total knee replacement, right  Z96.651                      Subjective: Patient feels that he has plateaued some at this point.        Objective: See treatment diary below      Assessment: Tolerated treatment well. Patient would benefit from continued PT.  Worked on more strengthening today to the LE.  Did note some muscular fatigue.  Still with audible clicking in his knee.  Has most of his pain complaints in his right hip.  Still ambulating with an antalgic gait pattern - notes that he is not using his cane at home.  Progress as able.        Plan: Progress treatment as tolerated.       Diagnosis:    Precautions:    POC Expires Auth Status Start Date Expiration Date PT Visit Limit    24 NO AUTH NA NA NA   Date 6/12 6/17  6/6 6/10   Used  12  9    Remaining        Manuals        PROM Flex/ext RT  Flex / Ext  Flex/Ext    Mobs                Bandage Removal                There Ex        Bike 5 min ROM  5 min ROM  5 min ROM upright  5 min ROM upright    Heel Slides  X12       Gastroc Stretch  20\" x 3       Hamstring Stretch  20\" x 3       Step lunge stretch  10x5\"    10x 5\"    Patient Education         ROM    Flex:  126  Ext:  0  Flex: 123  Ext: 0    Neruo Re-Ed        Quad Set        SLR 4x5 0# ea 4x5   3x5 0# ea   Clamshell  Supine Black 3x10       Step-Up 6\" lateral tap down 3x10    6\" lateral tap down 3x10  6\" lateral tap down 3x10    LAQ 2x10 LAQ 4#  5# LAQ 2x15   2x10 LAQ 4# ea    Wobble board        Leg Press # 2x10  SL 80# 2x10 ea # 2x10  SL 85# 2x10   # 2x10  SL 80# 2x10 # 2x10     SL 80# 2x10 ea    HS Curls        SLS        Sit <> Stand  Held (fatigue)    Foam 2x5 no UE    Bridge   2x10       Hip ABD  2x10 ea                Gait " Training          SPC                  Modalities        CP PRN

## 2024-06-20 ENCOUNTER — OFFICE VISIT (OUTPATIENT)
Dept: PHYSICAL THERAPY | Facility: CLINIC | Age: 64
End: 2024-06-20
Payer: MEDICARE

## 2024-06-20 DIAGNOSIS — M17.11 PRIMARY OSTEOARTHRITIS OF RIGHT KNEE: Primary | ICD-10-CM

## 2024-06-20 DIAGNOSIS — Z96.651 S/P TOTAL KNEE REPLACEMENT, RIGHT: ICD-10-CM

## 2024-06-20 PROCEDURE — 97112 NEUROMUSCULAR REEDUCATION: CPT | Performed by: PHYSICAL THERAPIST

## 2024-06-20 PROCEDURE — 97110 THERAPEUTIC EXERCISES: CPT | Performed by: PHYSICAL THERAPIST

## 2024-06-20 NOTE — PROGRESS NOTES
"Daily Note     Today's date: 2024  Patient name: Luther Vaughan  : 1960  MRN: [id number]  Referring provider: Augustine Weston DO  Dx:   Encounter Diagnosis     ICD-10-CM    1. Primary osteoarthritis of right knee  M17.11       2. S/P total knee replacement, right  Z96.651                      Subjective: Patient continues to note that his biggest issue is his lateral hip pain.        Objective: See treatment diary below      Assessment: Tolerated treatment well. Patient would benefit from continued PT.  Patient's knee continues to do very well.  ROM continues to do very well and strength is improving.  Patient's biggest issue is his hip / lateral hip pain.  Patient did note feeling better s/p session.  Continue to progress as able.        Plan: Progress treatment as tolerated.       Diagnosis:    Precautions:    POC Expires Auth Status Start Date Expiration Date PT Visit Limit    24 NO AUTH NA NA NA   Date 6/12 6/17 6/20  6/10   Used  12 13     Remaining        Manuals        PROM Flex/ext RT RT  Flex/Ext    Mobs                Bandage Removal                There Ex        Bike 5 min ROM  5 min ROM 5 min ROM   5 min ROM upright    Heel Slides  X12  x12     Gastroc Stretch  20\" x 3  20\" x 4     Hamstring Stretch  20\" x 3  20\" x 4     Step lunge stretch  10x5\"    10x 5\"    Patient Education         ROM     Flex: 123  Ext: 0    Neruo Re-Ed        Quad Set        SLR 4x5 0# ea 4x5 2x10 w/ strap assist   3x5 0# ea   Clamshell  Supine Black 3x10       Step-Up 6\" lateral tap down 3x10     6\" lateral tap down 3x10    LAQ 2x10 LAQ 4#  5# LAQ 2x15 5# LAQ 3x10  5# SAQ 2x10  2x10 LAQ 4# ea    Wobble board        Leg Press # 2x10  SL 80# 2x10 ea # 2x10  SL 85# 2x10  # 2x10  SL 90# 3x10  # 2x10     SL 80# 2x10 ea    HS Curls        SLS        Sit <> Stand  Held (fatigue)    Foam 2x5 no UE    Bridge   2x10       Hip ABD  2x10 ea  X10 ea  Side-stepping Red TB 3 laps @ rail             "   Gait Training          SPC                  Modalities        CP PRN

## 2024-06-24 ENCOUNTER — OFFICE VISIT (OUTPATIENT)
Dept: PHYSICAL THERAPY | Facility: CLINIC | Age: 64
End: 2024-06-24
Payer: MEDICARE

## 2024-06-24 DIAGNOSIS — M17.11 PRIMARY OSTEOARTHRITIS OF RIGHT KNEE: Primary | ICD-10-CM

## 2024-06-24 DIAGNOSIS — Z96.651 S/P TOTAL KNEE REPLACEMENT, RIGHT: ICD-10-CM

## 2024-06-24 PROCEDURE — 97140 MANUAL THERAPY 1/> REGIONS: CPT | Performed by: PHYSICAL THERAPIST

## 2024-06-24 PROCEDURE — 97110 THERAPEUTIC EXERCISES: CPT | Performed by: PHYSICAL THERAPIST

## 2024-06-24 PROCEDURE — 97112 NEUROMUSCULAR REEDUCATION: CPT | Performed by: PHYSICAL THERAPIST

## 2024-06-24 NOTE — PROGRESS NOTES
"Daily Note     Today's date: 2024  Patient name: Luther Vaughan  : 1960  MRN: [id number]  Referring provider: Augustine Weston DO  Dx:   Encounter Diagnosis     ICD-10-CM    1. Primary osteoarthritis of right knee  M17.11       2. S/P total knee replacement, right  Z96.651                      Subjective: Patient notes his biggest pain is his hip currently.        Objective: See treatment diary below      Assessment: Tolerated treatment well. Patient would benefit from continued PT.  Patient's knee continues to do very well.  Able to tolerate all stretches.  PREs limited due to his hip pain.  Patient is happy with the progress of his knee.  Progress as able.        Plan: Progress treatment as tolerated.       Diagnosis:    Precautions:    POC Expires Auth Status Start Date Expiration Date PT Visit Limit    24 NO AUTH NA NA NA   Date     Used  12 13 14    Remaining        Manuals        PROM Flex/ext RT RT RT    Mobs                Bandage Removal                There Ex        Bike 5 min ROM  5 min ROM 5 min ROM  5 min ROM     Heel Slides  X12  x12 x10    Gastroc Stretch  20\" x 3  20\" x 4 20\" x 4    Hamstring Stretch  20\" x 3  20\" x 4 20\" x 4    Step lunge stretch  10x5\"       Patient Education         ROM        Neruo Re-Ed        Quad Set        SLR 4x5 0# ea 4x5 2x10 w/ strap assist  X5 - pain in hip     Clamshell  Supine Black 3x10       Step-Up 6\" lateral tap down 3x10    Excursion lateral 2x10     LAQ 2x10 LAQ 4#  5# LAQ 2x15 5# LAQ 3x10  5# SAQ 2x10     Wobble board        Leg Press # 2x10  SL 80# 2x10 ea # 2x10  SL 85# 2x10  # 2x10  SL 90# 3x10 # 2x10  SL 70# 2x10    HS Curls        SLS        Sit <> Stand  Held (fatigue)       Bridge   2x10       Hip ABD  2x10 ea  X10 ea  Side-stepping Red TB 3 laps @ rail  2x10 ea              Gait Training          SPC                  Modalities        CP PRN                                          "

## 2024-06-27 ENCOUNTER — OFFICE VISIT (OUTPATIENT)
Dept: PHYSICAL THERAPY | Facility: CLINIC | Age: 64
End: 2024-06-27
Payer: MEDICARE

## 2024-06-27 DIAGNOSIS — M17.11 PRIMARY OSTEOARTHRITIS OF RIGHT KNEE: Primary | ICD-10-CM

## 2024-06-27 DIAGNOSIS — Z96.651 S/P TOTAL KNEE REPLACEMENT, RIGHT: ICD-10-CM

## 2024-06-27 PROCEDURE — 97112 NEUROMUSCULAR REEDUCATION: CPT | Performed by: PHYSICAL THERAPIST

## 2024-06-27 PROCEDURE — 97110 THERAPEUTIC EXERCISES: CPT | Performed by: PHYSICAL THERAPIST

## 2024-06-27 PROCEDURE — 97140 MANUAL THERAPY 1/> REGIONS: CPT | Performed by: PHYSICAL THERAPIST

## 2024-06-27 NOTE — PROGRESS NOTES
"Daily Note     Today's date: 2024  Patient name: Luther Vaughan  : 1960  MRN: [id number]  Referring provider: Augustine Weston DO  Dx:   Encounter Diagnosis     ICD-10-CM    1. Primary osteoarthritis of right knee  M17.11       2. S/P total knee replacement, right  Z96.651                      Subjective: Patient notes that his knee has been feeling good, but notes that his hip has been giving him most of the pain.        Objective: See treatment diary below      Assessment: Tolerated treatment well. Patient would benefit from continued PT.  Patient doing very well overall.  Knee ROM continues to do well.  Patient will transition to 1 time a week next week as patient prepares for potential discharge.          Plan: Progress treatment as tolerated.       Diagnosis:    Precautions:    POC Expires Auth Status Start Date Expiration Date PT Visit Limit    24 NO AUTH NA NA NA   Date    Used  12 13 14 15   Remaining        Manuals        PROM Flex/ext RT RT RT RT   Mobs                Bandage Removal                There Ex        Bike 5 min ROM  5 min ROM 5 min ROM  5 min ROM  5 min ROM    Heel Slides  X12  x12 x10 x10   Gastroc Stretch  20\" x 3  20\" x 4 20\" x 4 20\" x 3   Hamstring Stretch  20\" x 3  20\" x 4 20\" x 4 20\" x 3   Step lunge stretch  10x5\"       Patient Education         ROM        Neruo Re-Ed        Quad Set        SLR 4x5 0# ea 4x5 2x10 w/ strap assist  X5 - pain in hip     Clamshell  Supine Black 3x10       Step-Up 6\" lateral tap down 3x10    Excursion lateral 2x10     LAQ 2x10 LAQ 4#  5# LAQ 2x15 5# LAQ 3x10  5# SAQ 2x10  5# 3\" 3x10 SAQ      Wobble board        Leg Press # 2x10  SL 80# 2x10 ea # 2x10  SL 85# 2x10  # 2x10  SL 90# 3x10 # 2x10  SL 70# 2x10 DL 70# 2x10  SL 90# 2x10   HS Curls        SLS        Sit <> Stand  Held (fatigue)       Bridge   2x10       Hip ABD  2x10 ea  X10 ea  Side-stepping Red TB 3 laps @ rail  2x10 ea  2x12 ea "            Gait Training          SPC                  Modalities        CP PRN

## 2024-07-05 ENCOUNTER — OFFICE VISIT (OUTPATIENT)
Dept: PHYSICAL THERAPY | Facility: CLINIC | Age: 64
End: 2024-07-05
Payer: MEDICARE

## 2024-07-05 DIAGNOSIS — M17.11 PRIMARY OSTEOARTHRITIS OF RIGHT KNEE: Primary | ICD-10-CM

## 2024-07-05 DIAGNOSIS — Z96.651 S/P TOTAL KNEE REPLACEMENT, RIGHT: ICD-10-CM

## 2024-07-05 PROCEDURE — 97110 THERAPEUTIC EXERCISES: CPT | Performed by: PHYSICAL THERAPIST

## 2024-07-05 PROCEDURE — 97112 NEUROMUSCULAR REEDUCATION: CPT | Performed by: PHYSICAL THERAPIST

## 2024-07-05 NOTE — PROGRESS NOTES
"Daily Note     Today's date: 2024  Patient name: Luther Vaughan  : 1960  MRN: [id number]  Referring provider: Augustine Weston DO  Dx:   Encounter Diagnosis     ICD-10-CM    1. Primary osteoarthritis of right knee  M17.11       2. S/P total knee replacement, right  Z96.651                      Subjective: Patinet notes that his knee has been feeling OK.        Objective: See treatment diary below      Assessment: Tolerated treatment well. Patient would benefit from continued PT.  Patient doing well with his knee overall.  Feels that his hip is his biggest complaint - notes that he will try to get into his surgeon earlier due to his discomfort.  Patient is now down to 1x / week.  Progress as able.        Plan: Progress treatment as tolerated.       Diagnosis:    Precautions:    POC Expires Auth Status Start Date Expiration Date PT Visit Limit    24 NO AUTH NA NA NA   Date    Used 16   12 13 14 15   Remaining        Manuals        PROM RT RT RT RT RT   Mobs                Bandage Removal                There Ex        Bike 5 min ROM 5 min ROM 5 min ROM  5 min ROM  5 min ROM    Heel Slides x12 X12  x12 x10 x10   Gastroc Stretch 20\" x 4 20\" x 3  20\" x 4 20\" x 4 20\" x 3   Hamstring Stretch 20\" x 4 20\" x 3  20\" x 4 20\" x 4 20\" x 3   Step lunge stretch         Patient Education         ROM        Neruo Re-Ed        Quad Set        SLR  4x5 2x10 w/ strap assist  X5 - pain in hip     Clamshell  Supine Black 3x10       Step-Up    Excursion lateral 2x10     LAQ 5# 3x10 SAQ  5# LAQ 2x15 5# LAQ 3x10  5# SAQ 2x10  5# 3\" 3x10 SAQ      Wobble board        Leg Press SL 90# 2x10  # 2x10  SL 85# 2x10  # 2x10  SL 90# 3x10 # 2x10  SL 70# 2x10 DL 70# 2x10  SL 90# 2x10   HS Curls        SLS        Sit <> Stand         Bridge   2x10       Hip ABD Red 2x10  2x10 ea  X10 ea  Side-stepping Red TB 3 laps @ rail  2x10 ea  2x12 ea            Gait Training          SPC                "   Modalities        CP PRN

## 2024-07-11 ENCOUNTER — OFFICE VISIT (OUTPATIENT)
Dept: PHYSICAL THERAPY | Facility: CLINIC | Age: 64
End: 2024-07-11
Payer: MEDICARE

## 2024-07-11 DIAGNOSIS — M17.11 PRIMARY OSTEOARTHRITIS OF RIGHT KNEE: Primary | ICD-10-CM

## 2024-07-11 DIAGNOSIS — Z96.651 S/P TOTAL KNEE REPLACEMENT, RIGHT: ICD-10-CM

## 2024-07-11 PROCEDURE — 97110 THERAPEUTIC EXERCISES: CPT | Performed by: PHYSICAL THERAPIST

## 2024-07-11 PROCEDURE — 97140 MANUAL THERAPY 1/> REGIONS: CPT | Performed by: PHYSICAL THERAPIST

## 2024-07-11 PROCEDURE — 97112 NEUROMUSCULAR REEDUCATION: CPT | Performed by: PHYSICAL THERAPIST

## 2024-07-11 NOTE — PROGRESS NOTES
"Daily Note     Today's date: 2024  Patient name: Luther Vaughan  : 1960  MRN: [id number]  Referring provider: Augustine Weston DO  Dx:   Encounter Diagnosis     ICD-10-CM    1. Primary osteoarthritis of right knee  M17.11       2. S/P total knee replacement, right  Z96.651           Start Time: 703  Stop Time: 743  Total time in clinic (min): 40 minutes    Subjective: Solangenet notes that his knee has been feeling good overall. Hip has been the most limiting.         Objective: See treatment diary below      Assessment: Tolerated treatment well. Patient would benefit from continued PT.  Patient demonstrates good range of motion into both flexion and extension. Continues to demonstrate gait deficits and antalgia, however primarily due to his hip vs knee. He has a follow up regarding his hip on . Overall doing very well with his knee.       Plan: Progress treatment as tolerated.       Diagnosis:    Precautions:    POC Expires Auth Status Start Date Expiration Date PT Visit Limit    24 NO AUTH NA NA NA   Date    Used 16   17 13 14 15   Remaining        Manuals        PROM RT LB (flx 122*, ext 0*) RT RT RT   Mobs                Bandage Removal                There Ex        Bike 5 min ROM 5 min ROM 5 min ROM  5 min ROM  5 min ROM    Heel Slides x12 x12 x12 x10 x10   Gastroc Stretch 20\" x 4 20\" x 4 20\" x 4 20\" x 4 20\" x 3   Hamstring Stretch 20\" x 4 20\" x 4 20\" x 4 20\" x 4 20\" x 3   Step lunge stretch         Patient Education         ROM        Neruo Re-Ed        Quad Set        SLR   2x10 w/ strap assist  X5 - pain in hip     Clamshell        Step-Up    Excursion lateral 2x10     LAQ 5# 3x10 SAQ  5# 3x10 SAQ   5# 2x10 LAQ 5# LAQ 3x10  5# SAQ 2x10  5# 3\" 3x10 SAQ      Wobble board        Leg Press SL 90# 2x10  SL 90# 2x10 # 2x10  SL 90# 3x10 # 2x10  SL 70# 2x10 DL 70# 2x10  SL 90# 2x10   HS Curls        SLS        Sit <> Stand         Bridge         Hip ABD Red " 2x10   X10 ea  Side-stepping Red TB 3 laps @ rail  2x10 ea  2x12 ea            Gait Training          SPC                  Modalities        CP PRN

## 2024-07-18 ENCOUNTER — APPOINTMENT (OUTPATIENT)
Dept: PHYSICAL THERAPY | Facility: CLINIC | Age: 64
End: 2024-07-18
Payer: MEDICARE

## 2024-07-25 ENCOUNTER — OFFICE VISIT (OUTPATIENT)
Dept: PHYSICAL THERAPY | Facility: CLINIC | Age: 64
End: 2024-07-25
Payer: MEDICARE

## 2024-07-25 DIAGNOSIS — Z96.651 S/P TOTAL KNEE REPLACEMENT, RIGHT: ICD-10-CM

## 2024-07-25 DIAGNOSIS — M17.11 PRIMARY OSTEOARTHRITIS OF RIGHT KNEE: Primary | ICD-10-CM

## 2024-07-25 PROCEDURE — 97140 MANUAL THERAPY 1/> REGIONS: CPT

## 2024-07-25 PROCEDURE — 97110 THERAPEUTIC EXERCISES: CPT

## 2024-07-25 PROCEDURE — 97112 NEUROMUSCULAR REEDUCATION: CPT

## 2024-07-25 NOTE — PROGRESS NOTES
"Daily Note     Today's date: 2024  Patient name: Luther Vaughan  : 1960  MRN: [id number]  Referring provider: Augustine Weston DO  Dx:   Encounter Diagnosis     ICD-10-CM    1. Primary osteoarthritis of right knee  M17.11       2. S/P total knee replacement, right  Z96.651                      Subjective: Solangenet notes that his knee has been feeling good overall. Hip has been the most limiting.         Objective: See treatment diary below      Assessment: Tolerated treatment well. Patient would benefit from continued PT.  Good tolerance to outlined program to this date. He demonstrates weakness in hip flexor, held on SLR secondary to increased hip pain. Able to progress weight on leg press to this date. Patient demonstrates good ROM with OP to this date.       Plan: Progress treatment as tolerated.       Diagnosis:    Precautions:    POC Expires Auth Status Start Date Expiration Date PT Visit Limit    24 NO AUTH NA NA NA   Date    Used 16   17  14 15   Remaining        Manuals        PROM RT LB (flx 122*, ext 0*) SP (124  RT RT   Mobs                Bandage Removal                There Ex        Bike 5 min ROM 5 min ROM 5 min ROM  5 min ROM  5 min ROM    Heel Slides x12 x12 x12 x10 x10   Gastroc Stretch 20\" x 4 20\" x 4 20\"x4 20\" x 4 20\" x 3   Hamstring Stretch 20\" x 4 20\" x 4 20\"x4 20\" x 4 20\" x 3   Step lunge stretch         Patient Education         ROM        Neruo Re-Ed        Quad Set        SLR    X5 - pain in hip     Clamshell        Step-Up    Excursion lateral 2x10     LAQ 5# 3x10 SAQ  5# 3x10 SAQ   5# 2x10 LAQ 5# 3x10 LAQ   5# SAQ 3x10  5# 3\" 3x10 SAQ      Wobble board        Leg Press SL 90# 2x10  SL 90# 2x10 SL 95# 3x10  # 3x10 # 2x10  SL 70# 2x10 DL 70# 2x10  SL 90# 2x10   HS Curls        SLS        Sit <> Stand         Bridge         Hip ABD Red 2x10    2x10 ea  2x12 ea            Gait Training          SPC                  Modalities        CP " PRN

## 2024-07-28 DIAGNOSIS — R03.0 ELEVATED BLOOD-PRESSURE READING WITHOUT DIAGNOSIS OF HYPERTENSION: ICD-10-CM

## 2024-07-28 RX ORDER — LOSARTAN POTASSIUM 50 MG/1
50 TABLET ORAL DAILY
Qty: 90 TABLET | Refills: 1 | Status: SHIPPED | OUTPATIENT
Start: 2024-07-28

## 2024-07-30 ENCOUNTER — OFFICE VISIT (OUTPATIENT)
Dept: PHYSICAL THERAPY | Facility: CLINIC | Age: 64
End: 2024-07-30
Payer: MEDICARE

## 2024-07-30 DIAGNOSIS — M17.11 PRIMARY OSTEOARTHRITIS OF RIGHT KNEE: Primary | ICD-10-CM

## 2024-07-30 DIAGNOSIS — Z96.651 S/P TOTAL KNEE REPLACEMENT, RIGHT: ICD-10-CM

## 2024-07-30 PROCEDURE — 97110 THERAPEUTIC EXERCISES: CPT | Performed by: PHYSICAL THERAPIST

## 2024-07-30 PROCEDURE — 97140 MANUAL THERAPY 1/> REGIONS: CPT | Performed by: PHYSICAL THERAPIST

## 2024-07-30 PROCEDURE — 97112 NEUROMUSCULAR REEDUCATION: CPT | Performed by: PHYSICAL THERAPIST

## 2024-07-30 NOTE — PROGRESS NOTES
"Daily Note     Today's date: 2024  Patient name: Luther Vaughan  : 1960  MRN: [id number]  Referring provider: Augustine Weston DO  Dx:   Encounter Diagnosis     ICD-10-CM    1. Primary osteoarthritis of right knee  M17.11       2. S/P total knee replacement, right  Z96.651                      Subjective: Patient notes that his knee feels good, but he has hip and thigh pain.        Objective: See treatment diary below      Assessment: Tolerated treatment well. Patient would benefit from continued PT.  Patient noted that he felt really good over the weekend, but noted discomfort the other day.  Worked on light ROM as well as strength as tolerated.  Patient scheduled for potential discharge at his next visit.        Plan: Progress treatment as tolerated.       Diagnosis:    Precautions:    POC Expires Auth Status Start Date Expiration Date PT Visit Limit    24 NO AUTH NA NA NA   Date     Used 16   17  19    Remaining        Manuals        PROM RT LB (flx 122*, ext 0*) SP (124  RT    Mobs                Bandage Removal                There Ex        Bike 5 min ROM 5 min ROM 5 min ROM  NuStep 5' ROM     Heel Slides x12 x12 x12 x15    Gastroc Stretch 20\" x 4 20\" x 4 20\"x4     Hamstring Stretch 20\" x 4 20\" x 4 20\"x4 20\" x 4    Step lunge stretch         Patient Education         ROM        Neruo Re-Ed        Quad Set        SLR        Clamshell        Step-Up        LAQ 5# 3x10 SAQ  5# 3x10 SAQ   5# 2x10 LAQ 5# 3x10 LAQ   5# SAQ 3x10     Wobble board        Leg Press SL 90# 2x10  SL 90# 2x10 SL 95# 3x10  # 3x10 SL 95# 2x10  # 3x10    HS Curls        SLS        Sit <> Stand         Bridge         Hip ABD Red 2x10                 Gait Training          SPC                  Modalities        CP PRN                                               "

## 2024-08-06 ENCOUNTER — OFFICE VISIT (OUTPATIENT)
Dept: PHYSICAL THERAPY | Facility: CLINIC | Age: 64
End: 2024-08-06
Payer: MEDICARE

## 2024-08-06 DIAGNOSIS — Z96.651 S/P TOTAL KNEE REPLACEMENT, RIGHT: ICD-10-CM

## 2024-08-06 DIAGNOSIS — M17.11 PRIMARY OSTEOARTHRITIS OF RIGHT KNEE: Primary | ICD-10-CM

## 2024-08-06 PROCEDURE — 97140 MANUAL THERAPY 1/> REGIONS: CPT | Performed by: PHYSICAL THERAPIST

## 2024-08-06 PROCEDURE — 97112 NEUROMUSCULAR REEDUCATION: CPT | Performed by: PHYSICAL THERAPIST

## 2024-08-06 PROCEDURE — 97110 THERAPEUTIC EXERCISES: CPT | Performed by: PHYSICAL THERAPIST

## 2024-08-06 NOTE — PROGRESS NOTES
PT Discharge    Today's date: 2024  Patient name: Luther Vaughan  : 1960  MRN: [id number]  Referring provider: Augustine Weston DO  Dx:   Encounter Diagnosis     ICD-10-CM    1. Primary osteoarthritis of right knee  M17.11       2. S/P total knee replacement, right  Z96.651                          Assessment  Impairments: abnormal muscle firing, abnormal or restricted ROM, activity intolerance, impaired physical strength, lacks appropriate home exercise program, pain with function, poor posture  and poor body mechanics    Assessment details: Luther Vaughan is a 64 y.o. male who has been consistent with attending and participating in skilled PT sessions.  The patient has done very well with PT.  He has been able to demonstrate improved ROM as well as improved strength.  He has been happy with his progress.  He has demonstrated independence with his HEP and will transition to this at this time.  If Luther were to need PT in the future, we would be happy to share in his care.      Understanding of Dx/Px/POC: good     Prognosis: good    Goals  Impairment Goals  - Decrease pain by 50% in 8 weeks - MET   - Increase right flexibility by 50% in 8 weeks - MET  - Increase right lower extremity strength golbally to 4+/5 in 8 weeks - MET    Functional Goals  - Return to Prior Level of Function in 8 weeks - MET  - Patient will be independent with HEP in 8 weeks - MET   - Patient will be able to ambulate with decreased pain in 8 weeks - MET   - Patient will be able to ascend/descend stairs with decreased pain in 8 weeks - MET      Plan  Patient would benefit from: skilled physical therapy  Planned modality interventions: cryotherapy, thermotherapy: hydrocollator packs and TENS    Planned therapy interventions: home exercise program, graded exercise, functional ROM exercises, flexibility, body mechanics training, postural training, patient education, therapeutic activities, therapeutic exercise, manual  "therapy, joint mobilization and neuromuscular re-education    Frequency: 2x week  Duration in weeks: 8  Treatment plan discussed with: patient        Subjective Evaluation    History of Present Illness  Mechanism of injury: Patient notes that he has improved with his motion and his strength.  He notes that he has had some radiation pain from his hip into his inner thigh / medial knee.  He notes that he can ascend/descend stairs with a reciprocal gait pattern.      HPI:  Patient notes having knee pain for \"a long time\".  He does note have shots in his knees and hip prior.  He then underwent a right knee replacement on 24.  He now presents for his PT evaluation.      Pain Location:  R Knee   Occupation:  Retired   Prior Functional Limitations:  Independent piror   AGG:  Stairs, ambulation, bending/moving knee  Ease:  Loosen up knee, ice   Patient Goals:  \"I just want to be mobile again\"     Pain  Current pain ratin  At best pain ratin  At worst pain rating: 3  Quality: radiating          Objective     Observations     Additional Observation Details  Slight drainage noted at bottom of incision with slight rubor.  Instructed patient to provide picture to surgeon.      Passive Range of Motion   Left Knee   Flexion: 105 degrees with pain  Extension: -5 degrees     Right Knee   Flexion: 127 degrees   Extension: 0 degrees with pain    Strength/Myotome Testing     Left Hip   Planes of Motion   Flexion: 4+    Right Hip   Planes of Motion   Flexion: 4+    Left Knee   Flexion: 4+  Extension: 4+    Right Knee   Flexion: 4+  Extension: 4+    Ambulation     Comments   Currently ambulating with SPC, decreased stance time on the right, antalgic gait pattern due to right hip pain               Diagnosis:    Precautions:    POC Expires Auth Status Start Date Expiration Date PT Visit Limit    24 NO AUTH NA NA NA   Date    Used 16   17  19 20   Remaining        Manuals        PROM RT LB (flx 122*, " "ext 0*) SP (124  RT RT   Mobs                Bandage Removal                There Ex        Bike 5 min ROM 5 min ROM 5 min ROM  NuStep 5' ROM  NuSTep 5' ROM   Heel Slides x12 x12 x12 x15 x10   Gastroc Stretch 20\" x 4 20\" x 4 20\"x4     Hamstring Stretch 20\" x 4 20\" x 4 20\"x4 20\" x 4    Step lunge stretch         Patient Education      RT - DC   ROM        Neruo Re-Ed        Quad Set        SLR        Clamshell        Step-Up        LAQ 5# 3x10 SAQ  5# 3x10 SAQ   5# 2x10 LAQ 5# 3x10 LAQ   5# SAQ 3x10     Wobble board        Leg Press SL 90# 2x10  SL 90# 2x10 SL 95# 3x10  # 3x10 SL 95# 2x10  # 3x10 SL 95# 3x10  # 3x10    HS Curls        SLS        Sit <> Stand      @ TRX 2x10   Bridge         Hip ABD Red 2x10                 Gait Training          SPC                  Modalities        CP PRN                            "

## 2024-08-14 ENCOUNTER — APPOINTMENT (OUTPATIENT)
Dept: LAB | Facility: CLINIC | Age: 64
End: 2024-08-14
Payer: MEDICARE

## 2024-08-14 DIAGNOSIS — Z01.818 OTHER SPECIFIED PRE-OPERATIVE EXAMINATION: ICD-10-CM

## 2024-08-14 DIAGNOSIS — E11.65 TYPE 2 DIABETES MELLITUS WITH HYPERGLYCEMIA, WITHOUT LONG-TERM CURRENT USE OF INSULIN (HCC): ICD-10-CM

## 2024-08-14 DIAGNOSIS — Z12.5 SCREENING FOR MALIGNANT NEOPLASM OF PROSTATE: ICD-10-CM

## 2024-08-14 DIAGNOSIS — E11.9 NEWLY DIAGNOSED DIABETES (HCC): ICD-10-CM

## 2024-08-14 LAB
ALBUMIN SERPL BCG-MCNC: 3.8 G/DL (ref 3.5–5)
ALP SERPL-CCNC: 71 U/L (ref 34–104)
ALT SERPL W P-5'-P-CCNC: 15 U/L (ref 7–52)
ANION GAP SERPL CALCULATED.3IONS-SCNC: 8 MMOL/L (ref 4–13)
APTT PPP: 32 SECONDS (ref 23–34)
AST SERPL W P-5'-P-CCNC: 15 U/L (ref 13–39)
BASOPHILS # BLD AUTO: 0.02 THOUSANDS/ÂΜL (ref 0–0.1)
BASOPHILS NFR BLD AUTO: 0 % (ref 0–1)
BILIRUB SERPL-MCNC: 0.67 MG/DL (ref 0.2–1)
BUN SERPL-MCNC: 17 MG/DL (ref 5–25)
CALCIUM SERPL-MCNC: 9.1 MG/DL (ref 8.4–10.2)
CHLORIDE SERPL-SCNC: 103 MMOL/L (ref 96–108)
CO2 SERPL-SCNC: 27 MMOL/L (ref 21–32)
CREAT SERPL-MCNC: 0.87 MG/DL (ref 0.6–1.3)
EOSINOPHIL # BLD AUTO: 0.04 THOUSAND/ÂΜL (ref 0–0.61)
EOSINOPHIL NFR BLD AUTO: 1 % (ref 0–6)
ERYTHROCYTE [DISTWIDTH] IN BLOOD BY AUTOMATED COUNT: 13.5 % (ref 11.6–15.1)
ERYTHROCYTE [SEDIMENTATION RATE] IN BLOOD: 28 MM/HOUR (ref 0–19)
EST. AVERAGE GLUCOSE BLD GHB EST-MCNC: 134 MG/DL
GFR SERPL CREATININE-BSD FRML MDRD: 89 ML/MIN/1.73SQ M
GLUCOSE P FAST SERPL-MCNC: 99 MG/DL (ref 65–99)
HBA1C MFR BLD: 6.3 %
HCT VFR BLD AUTO: 44.8 % (ref 36.5–49.3)
HGB BLD-MCNC: 14.4 G/DL (ref 12–17)
IMM GRANULOCYTES # BLD AUTO: 0.03 THOUSAND/UL (ref 0–0.2)
IMM GRANULOCYTES NFR BLD AUTO: 0 % (ref 0–2)
INR PPP: 1.03 (ref 0.85–1.19)
LYMPHOCYTES # BLD AUTO: 2.42 THOUSANDS/ÂΜL (ref 0.6–4.47)
LYMPHOCYTES NFR BLD AUTO: 35 % (ref 14–44)
MCH RBC QN AUTO: 29.7 PG (ref 26.8–34.3)
MCHC RBC AUTO-ENTMCNC: 32.1 G/DL (ref 31.4–37.4)
MCV RBC AUTO: 92 FL (ref 82–98)
MONOCYTES # BLD AUTO: 0.45 THOUSAND/ÂΜL (ref 0.17–1.22)
MONOCYTES NFR BLD AUTO: 7 % (ref 4–12)
NEUTROPHILS # BLD AUTO: 3.96 THOUSANDS/ÂΜL (ref 1.85–7.62)
NEUTS SEG NFR BLD AUTO: 57 % (ref 43–75)
NRBC BLD AUTO-RTO: 0 /100 WBCS
PLATELET # BLD AUTO: 234 THOUSANDS/UL (ref 149–390)
PMV BLD AUTO: 9.6 FL (ref 8.9–12.7)
POTASSIUM SERPL-SCNC: 4 MMOL/L (ref 3.5–5.3)
PROT SERPL-MCNC: 6.4 G/DL (ref 6.4–8.4)
PROTHROMBIN TIME: 13.9 SECONDS (ref 12.3–15)
PSA SERPL-MCNC: 0.68 NG/ML (ref 0–4)
RBC # BLD AUTO: 4.85 MILLION/UL (ref 3.88–5.62)
SODIUM SERPL-SCNC: 138 MMOL/L (ref 135–147)
WBC # BLD AUTO: 6.92 THOUSAND/UL (ref 4.31–10.16)

## 2024-08-14 PROCEDURE — 83036 HEMOGLOBIN GLYCOSYLATED A1C: CPT

## 2024-08-14 PROCEDURE — 36415 COLL VENOUS BLD VENIPUNCTURE: CPT

## 2024-08-14 PROCEDURE — G0103 PSA SCREENING: HCPCS

## 2024-08-14 PROCEDURE — 85730 THROMBOPLASTIN TIME PARTIAL: CPT

## 2024-08-14 PROCEDURE — 85652 RBC SED RATE AUTOMATED: CPT

## 2024-08-14 PROCEDURE — 80053 COMPREHEN METABOLIC PANEL: CPT

## 2024-08-14 PROCEDURE — 85610 PROTHROMBIN TIME: CPT

## 2024-08-14 PROCEDURE — 85025 COMPLETE CBC W/AUTO DIFF WBC: CPT

## 2024-08-16 ENCOUNTER — RA CDI HCC (OUTPATIENT)
Dept: OTHER | Facility: HOSPITAL | Age: 64
End: 2024-08-16

## 2024-08-23 ENCOUNTER — CONSULT (OUTPATIENT)
Dept: FAMILY MEDICINE CLINIC | Facility: CLINIC | Age: 64
End: 2024-08-23
Payer: MEDICARE

## 2024-08-23 VITALS
OXYGEN SATURATION: 98 % | DIASTOLIC BLOOD PRESSURE: 70 MMHG | WEIGHT: 298 LBS | HEIGHT: 69 IN | HEART RATE: 71 BPM | SYSTOLIC BLOOD PRESSURE: 122 MMHG | BODY MASS INDEX: 44.14 KG/M2

## 2024-08-23 DIAGNOSIS — G47.33 OBSTRUCTIVE SLEEP APNEA: ICD-10-CM

## 2024-08-23 DIAGNOSIS — G89.29 CHRONIC PAIN OF LEFT KNEE: Primary | ICD-10-CM

## 2024-08-23 DIAGNOSIS — M25.662 DECREASED RANGE OF MOTION (ROM) OF LEFT KNEE: ICD-10-CM

## 2024-08-23 DIAGNOSIS — M25.562 CHRONIC PAIN OF LEFT KNEE: Primary | ICD-10-CM

## 2024-08-23 DIAGNOSIS — E11.9 TYPE 2 DIABETES MELLITUS WITHOUT COMPLICATION, WITHOUT LONG-TERM CURRENT USE OF INSULIN (HCC): ICD-10-CM

## 2024-08-23 DIAGNOSIS — I10 PRIMARY HYPERTENSION: ICD-10-CM

## 2024-08-23 DIAGNOSIS — M17.12 PRIMARY OSTEOARTHRITIS OF LEFT KNEE: ICD-10-CM

## 2024-08-23 DIAGNOSIS — I48.21 PERMANENT ATRIAL FIBRILLATION (HCC): ICD-10-CM

## 2024-08-23 DIAGNOSIS — E78.5 DYSLIPIDEMIA: ICD-10-CM

## 2024-08-23 DIAGNOSIS — I50.32 CHRONIC DIASTOLIC HEART FAILURE (HCC): ICD-10-CM

## 2024-08-23 PROBLEM — K42.0 INCARCERATED UMBILICAL HERNIA: Status: RESOLVED | Noted: 2022-01-05 | Resolved: 2024-08-23

## 2024-08-23 PROBLEM — I50.9 CHF (CONGESTIVE HEART FAILURE) (HCC): Status: ACTIVE | Noted: 2024-08-23

## 2024-08-23 PROBLEM — I50.31 ACUTE DIASTOLIC CHF (CONGESTIVE HEART FAILURE) (HCC): Status: RESOLVED | Noted: 2024-01-05 | Resolved: 2024-08-23

## 2024-08-23 PROBLEM — L97.909 CHRONIC ULCER OF LOWER EXTREMITY (HCC): Status: RESOLVED | Noted: 2024-01-05 | Resolved: 2024-08-23

## 2024-08-23 PROCEDURE — 99214 OFFICE O/P EST MOD 30 MIN: CPT | Performed by: FAMILY MEDICINE

## 2024-08-23 NOTE — PROGRESS NOTES
[facility] MEDICINE PRE-OPERATIVE EVALUATION  [facility] - [facility]    NAME: Luther Vaughan  AGE: 64 y.o. SEX: male  : 1960     DATE: 2024     Internal Medicine Pre-Operative Evaluation:     Chief Complaint: Pre-operative Evaluation     Surgery: left total knee replacement  Anticipated Date of Surgery: 2024  Referring Provider: Dr. Butler      History of Present Illness:     Luther Vaughan is a 64 y.o. male who presents to the office today for a preoperative consultation at the request of surgeon, Dr. butler, who plans on performing Left total knee replacements on  2024. Planned anesthesia is general. Patient has a bleeding risk of:  increased risk due to eliquis use, history of afib . Patient does not have objections to receiving blood products if needed. Current anti-platelet/anti-coagulation medications that the patient is prescribed includes: Apixaban (Eliquis).  Patient has had chronic issues with the left knee.  He has had a previous surgery for meniscectomy.  He has had continued problems despite this intervention in the past and is looking to a left total knee replacement     Assessment of Chronic Conditions:   - Diabetes Mellitus: Stable, last A1c was 6.3 in August.   - Congestive Heart Failure: , Currently on Demadex, ARB and beta-blocker  - Hypertension: On ARB and beta-blocker.  Well-controlled.     Assessment of Cardiac Risk:  Denies unstable or severe angina or MI in the last 6 weeks or history of stent placement in the last year   Denies decompensated heart failure (e.g. New onset heart failure, NYHA functional class IV heart failure, or worsening existing heart failure)  Pt. Has afib, but is well controlled with rate.  significant arrhythmias such as high grade AV block, symptomatic ventricular arrhythmia, newly recognized ventricular tachycardia, supraventricular tachycardia with resting heart rate >100, or symptomatic  bradycardia  Denies severe heart valve disease including aortic stenosis or symptomatic mitral stenosis     Exercise Capacity:  Able to walk 4 blocks without symptoms?: Yes, If there was no issues with left knee and right hip  Able to walk 2 flights without symptoms?: Yes, If there was no issues with left knee and right hip    Prior Anesthesia Reactions: No     Personal history of venous thromboembolic disease? No    History of steroid use for >2 weeks within last year? No    STOP-BANG Sleep Apnea Screening Questionnaire:   Patient has MARLA.  He uses CPAP nightly.         Review of Systems:     Review of Systems   Constitutional:  Negative for chills, fever and unexpected weight change.   HENT:  Positive for postnasal drip. Negative for congestion, ear discharge, ear pain, hearing loss, rhinorrhea, sinus pressure, sinus pain and sore throat.    Eyes:  Negative for visual disturbance.   Respiratory:  Negative for cough, chest tightness and shortness of breath.    Cardiovascular:  Negative for chest pain and palpitations.   Gastrointestinal:  Negative for abdominal pain, constipation, diarrhea, nausea and vomiting.   Genitourinary:  Positive for frequency (1x infrequently.). Negative for dysuria.   Skin:  Negative for rash and wound.   Allergic/Immunologic: Positive for environmental allergies.   Neurological:  Negative for dizziness, light-headedness and headaches.        Problem List:     Patient Active Problem List   Diagnosis    Elevated blood-pressure reading without diagnosis of hypertension    Primary osteoarthritis of left knee    Primary osteoarthritis of right knee    Primary osteoarthritis of right hip    BMI 45.0-49.9, adult ([facility])    Atrial fibrillation ([facility])    Hypertension    Obstructive sleep apnea    Type 2 diabetes mellitus ([facility])    Dyslipidemia    CHF (congestive heart failure) ([facility])        Allergies:     Allergies   Allergen Reactions    Erythromycin Diarrhea    Fish-Derived Products - Food Allergy  "Diarrhea     States \"cartilage fish\" cause sweating, diarrhea, vomiting        Current Medications:       Current Outpatient Medications:     apixaban (ELIQUIS) 5 mg, Take 1 tablet (5 mg total) by mouth 2 (two) times a day, Disp: 180 tablet, Rfl: 1    Ascorbic Acid, Vitamin C, (VITAMIN C) 100 MG tablet, Take by mouth daily, Disp: , Rfl:     atorvastatin (LIPITOR) 10 mg tablet, TAKE 1 TABLET BY MOUTH EVERY DAY WITH DINNER, Disp: 90 tablet, Rfl: 1    cholecalciferol (VITAMIN D3) 400 units tablet, Take 400 Units by mouth daily, Disp: , Rfl:     losartan (COZAAR) 50 mg tablet, TAKE 1 TABLET BY MOUTH EVERY DAY, Disp: 90 tablet, Rfl: 1    meloxicam (MOBIC) 15 mg tablet, 15 mg daily States does not take daily., Disp: , Rfl:     metoprolol tartrate (LOPRESSOR) 50 mg tablet, TAKE 1 TABLET BY MOUTH EVERY 12 HOURS, Disp: 180 tablet, Rfl: 1    torsemide (DEMADEX) 10 mg tablet, TAKE 1 TABLET (10 MG TOTAL) BY MOUTH DAILY. DO NOT START BEFORE JANUARY 9, 2024., Disp: 90 tablet, Rfl: 1     Past History:     Past Medical History:   Diagnosis Date    Chronic ulcer of lower extremity (HCC) 01/05/2024    Diabetes mellitus (HCC)     Hypertension     Incarcerated umbilical hernia 01/05/2022    Migraine     Obesity     MARLA on CPAP     Sleep apnea     Vertigo     episode 1/30/2016        Past Surgical History:   Procedure Laterality Date    HAND SURGERY      football injury    HERNIA REPAIR      KNEE SURGERY Left     left meniscus     MENISCECTOMY Left         Family History   Problem Relation Age of Onset    No Known Problems Mother     Heart attack Father     Heart disease Father     Prostate cancer Neg Hx     Testicular cancer Neg Hx     Colon cancer Neg Hx         Social History     Socioeconomic History    Marital status: /Civil Union     Spouse name: Esthela    Number of children: 3    Years of education: 16    Highest education level: Bachelor's degree (e.g., BA, AB, BS)   Occupational History    Not on file   Tobacco Use    " Smoking status: Never    Smokeless tobacco: Never    Tobacco comments:     never smoker- per Lisa    Vaping Use    Vaping status: Never Used   Substance and Sexual Activity    Alcohol use: Not Currently    Drug use: Never    Sexual activity: Yes     Partners: Female     Birth control/protection: Post-menopausal   Other Topics Concern    Not on file   Social History Narrative    · Most recent tobacco use screenin2018      · Alcohol-years of use:   40      · Able to care for self:   Yes      · Live alone or with others:   with others      · Advance directive:   No      · Diet:   Regular      Social Determinants of Health     Financial Resource Strain: Low Risk  (2024)    [facility]    Overall Financial Resource Strain (CARDIA)     Difficulty of Paying Living Expenses: Not very hard   Food Insecurity: No Food Insecurity (2024)    Received from [facility]    Hunger Vital Sign     Worried About Running Out of Food in the Last Year: Never true     Ran Out of Food in the Last Year: Never true   Transportation Needs: No Transportation Needs (2024)    [facility]    PRAPARE - Transportation     Lack of Transportation (Medical): No     Lack of Transportation (Non-Medical): No   Physical Activity: Not on file   Stress: Not on file   Social Connections: Not on file   Intimate Partner Violence: Not At Risk (2024)    Received from [facility]    Humiliation, Afraid, Rape, and Kick questionnaire     Fear of Current or Ex-Partner: No     Emotionally Abused: No     Physically Abused: No     Sexually Abused: No   Housing Stability: Low Risk  (2024)    [facility], [facility]    Housing Stability Vital Sign     In the last 12 months, was  "there a time when you were not able to pay the mortgage or rent on time?: No     In the past 12 months, how many times have you moved where you were living?: 1     At any time in the past 12 months, were you homeless or living in a shelter (including now)?: No        Physical Exam:      /70   Pulse 71   Ht 5' 8.5\" (1.74 m)   Wt 135 kg (298 lb)   SpO2 98%   BMI 44.65 kg/m²     Physical Exam  Constitutional:       General: He is not in acute distress.     Appearance: Normal appearance. He is obese. He is not ill-appearing, toxic-appearing or diaphoretic.   HENT:      Head: Normocephalic and atraumatic.      Nose: Nose normal. No congestion or rhinorrhea.      Mouth/Throat:      Mouth: Mucous membranes are moist.      Pharynx: Oropharynx is clear. No oropharyngeal exudate or posterior oropharyngeal erythema.   Eyes:      General:         Right eye: No discharge.         Left eye: No discharge.      Extraocular Movements: Extraocular movements intact.      Pupils: Pupils are equal, round, and reactive to light.   Cardiovascular:      Rate and Rhythm: Normal rate and regular rhythm.      Heart sounds: Normal heart sounds. No murmur heard.     No friction rub. No gallop.      Comments: No afib rhythm present at time of evaluation  Pulmonary:      Effort: Pulmonary effort is normal. No respiratory distress.      Breath sounds: Normal breath sounds. No stridor. No wheezing, rhonchi or rales.   Abdominal:      General: Bowel sounds are normal. There is no distension.      Palpations: Abdomen is soft.      Tenderness: There is no abdominal tenderness.   Musculoskeletal:         General: Swelling and tenderness present.      Cervical back: Neck supple. No tenderness.      Comments: Decreased range of motion of left knee.  Tenderness to palpation, difficulty with getting up and moving after sitting.  Patient using cane   Lymphadenopathy:      Cervical: No cervical adenopathy.   Skin:     General: Skin is warm.      " Capillary Refill: Capillary refill takes less than 2 seconds.   Neurological:      Mental Status: He is alert.      Cranial Nerves: No cranial nerve deficit (Cranial nerves II-XII grossly intact).      Comments:  strength normal.  Upper and lower extremity strength was not tested due to history of lower extremity discomfort at the knee and hip.           Data:     Pre-operative work-up    Laboratory Results: I have personally reviewed the pertinent laboratory results/reports     EKG:  patient following up with cardiology for cardiac clearance    Chest x-ray: I have personally reviewed pertinent reports.      Previous cardiopulmonary studies within the past year:  Echocardiogram: Left ventricular cavity normal, wall thickness increased, left ejection fraction 55%, small patent from AMN ovale with predominantly right to left shunting  Cardiac Catheterization: None  Stress Test: Concordant with no evidence of stress-induced myocardial ischemia  Pulmonary Function Testing: None       Assessment:     1. Chronic pain of left knee        2. Decreased range of motion (ROM) of left knee        3. Primary osteoarthritis of left knee        4. Permanent atrial fibrillation (HCC)        5. Primary hypertension        6. Obstructive sleep apnea        7. Type 2 diabetes mellitus without complication, without long-term current use of insulin (HCC)        8. Dyslipidemia        9. Chronic diastolic heart failure (HCC)             Plan:     64 y.o. male with planned surgery: Left total knee replacement.  The patient overall is up-to-date with labs and A1c, kidney function, liver function, electrolytes, and blood counts are within normal limits.  The patient still needs to see cardiology for clearance, but his other chronic conditions are stable and controlled.  As long as cardiology clears him, I am clearing him today.      Cardiac Risk Estimation: per the Revised Cardiac Risk Index (Circ. 100:1043, 1999), the patient's risk  factors for cardiac complications include history of congestive heart failure, putting him in: RCI RISK CLASS II (1 risk factor, risk of major cardiac compl. appr. 1.3%).    1. Further preoperative workup as follows:   -Will need clearance by cardiology, but has an appointment on 8/28/2024.  Unless there is any major issues he is cleared by family medicine.    2. Medication Management/Recommendations:   - Patient has been instructed to avoid herbs or non-directed vitamins the week prior to surgery to ensure no drug interactions with perioperative surgical and anesthetic medications.  - Patient should continue antihypertensive medications up through and including the day of surgery.   - Patient should continue beta-blocker medication up through and including the day of surgery.  Patient to receive recommendations on Eliquis and its use through cardiology.    3. Prophylaxis for cardiac events with perioperative beta-blockers: not indicated.    4. Patient requires further consultation with: Cardiology, has an appointment.  He is cleared by my standards for his chronic medical issues, if cardiac clearance is given, he is fully cleared by me    Clearance  He is cleared by my standards for his chronic medical issues, if cardiac clearance is given, he is fully cleared by me     Maycol Arango,   [facility]  [address]  [address]  [place] [address]  Phone#  [phone]  Fax#  [phone]

## 2024-08-26 ENCOUNTER — PATIENT MESSAGE (OUTPATIENT)
Dept: CARDIOLOGY CLINIC | Facility: CLINIC | Age: 64
End: 2024-08-26

## 2024-08-27 NOTE — PROGRESS NOTES
Progress Note - Cardiology Office  [facility]    Luther Vaughan 64 y.o. male MRN: [id number]  : 1960  Encounter: [id number]      ASSESSMENT:   Perioperative cardiac risk assessment for right RO 10/21/2024 at [facility]  Osteoarthritis of right hip    S/p right TKA    PAF  [id number] score is 4  S/p cardioversion on 2024  Currently in sinus rhythm  On Lopressor and Eliquis     Chronic presumed diastolic heart failure  Dyspnea on exertion  Currently euvolemic  On losartan, metoprolol and Demadex     PFO  ELISA, 2024  Small, PFO with predominant right to left shunt     TTE, 2024:  EF 55% diastolic function could not be evaluated  Mild to moderate aortic stenosis  Mild MR and TR, PAP 30 mmhg    Lexiscan, 2024:  Stress Combined Conclusion: The ECG and SPECT imaging portions of the stress study are concordant with no evidence of stress induced myocardial ischemia.   EF 53%    Essential hypertension  On losartan 50 mg daily, metoprolol 50 mg twice daily and Demadex 10 mg daily     Dyslipidemia  2024: LDL 79, TG 97, HDL 37  On Lipitor 10 mg     Type 2 diabetes mellitus     Bilateral lower extremity lymphedema     Obesity, BMI 44.35    MARLA, on CPAP     Bilateral knee arthritis and right hip arthritis     RECOMMENDATIONS:  Patient has intermediate risk of perioperative cardiac complications with orthopedic surgery  There is no cardiac contraindication to the surgery.  Eliquis should be held for 48 hours preop and then resumed when cleared by the orthopedic surgeon    Continue current cardiac medications including losartan, metoprolol, Demadex and atorvastatin  Low-salt and low-cholesterol diet  Weight loss       Please call [phone] if any questions.    HPI :     Luther Vaughan is a 64 y.o. year old male who came for follow up and preop evaluation for right RO at [facility] on 2021.  Patient had right TKA recently.   "He has severe arthritis of right hip and left knee also.  His right hip is going to be operated on first and then subsequently the left knee.  He denies any cardiac symptoms like chest pain, dyspnea or syncope  His last pharmacologic nuclear stress test was negative for ischemia    REVIEW OF SYSTEMS:  Review of Systems   Musculoskeletal:  Positive for arthralgias and gait problem.   All other systems reviewed and are negative.        Historical Information   Past Medical History:   Diagnosis Date    Chronic ulcer of lower extremity (HCC) 01/05/2024    Diabetes mellitus (HCC)     Hypertension     Incarcerated umbilical hernia 01/05/2022    Migraine     Obesity     MARLA on CPAP     Sleep apnea     Vertigo     episode 1/30/2016     Past Surgical History:   Procedure Laterality Date    HAND SURGERY      football injury    HERNIA REPAIR      KNEE SURGERY Left     left meniscus     MENISCECTOMY Left      Social History     Substance and Sexual Activity   Alcohol Use Not Currently     Social History     Substance and Sexual Activity   Drug Use Never     Social History     Tobacco Use   Smoking Status Never   Smokeless Tobacco Never   Tobacco Comments    never smoker- per [place]      Family History:   Family History   Problem Relation Age of Onset    No Known Problems Mother     Heart attack Father     Heart disease Father     Prostate cancer Neg Hx     Testicular cancer Neg Hx     Colon cancer Neg Hx        Meds/Allergies     Allergies   Allergen Reactions    Erythromycin Diarrhea    Fish-Derived Products - Food Allergy Diarrhea     States \"cartilage fish\" cause sweating, diarrhea, vomiting       Current Outpatient Medications:     apixaban (ELIQUIS) 5 mg, Take 1 tablet (5 mg total) by mouth 2 (two) times a day, Disp: 180 tablet, Rfl: 1    Ascorbic Acid, Vitamin C, (VITAMIN C) 100 MG tablet, Take by mouth daily, Disp: , Rfl:     atorvastatin (LIPITOR) 10 mg tablet, TAKE 1 TABLET BY MOUTH EVERY DAY WITH DINNER, Disp: 90 " "tablet, Rfl: 1    cholecalciferol (VITAMIN D3) 400 units tablet, Take 400 Units by mouth daily, Disp: , Rfl:     losartan (COZAAR) 50 mg tablet, TAKE 1 TABLET BY MOUTH EVERY DAY, Disp: 90 tablet, Rfl: 1    meloxicam (MOBIC) 15 mg tablet, 15 mg daily States does not take daily., Disp: , Rfl:     metoprolol tartrate (LOPRESSOR) 50 mg tablet, TAKE 1 TABLET BY MOUTH EVERY 12 HOURS, Disp: 180 tablet, Rfl: 1    torsemide (DEMADEX) 10 mg tablet, TAKE 1 TABLET (10 MG TOTAL) BY MOUTH DAILY. DO NOT START BEFORE JANUARY 9, 2024., Disp: 90 tablet, Rfl: 1    Vitals: Blood pressure 144/80, pulse 82, height 5' 8.5\" (1.74 m), weight 134 kg (296 lb), SpO2 96%.    Body mass index is 44.35 kg/m².  Vitals:    08/28/24 1437   Weight: 134 kg (296 lb)     BP Readings from Last 3 Encounters:   08/28/24 144/80   08/23/24 122/70   04/11/24 125/80       Physical Exam:  Physical Exam    Neurologic:  Alert & oriented x 3, no new focal deficits, Not in any acute distress,  Constitutional: Morbid obesity  Eyes:  Pupil equal and reacting to light, conjunctiva normal,   HENT:  Atraumatic, oropharynx moist, Neck- normal range of motion, no tenderness,  Neck supple, No JVP, No LNP   Respiratory:  Bilateral air entry, mostly clear to auscultation  Cardiovascular: S1-S2 regular with a I/VI systolic murmur   GI:  Soft, nondistended, normal bowel sounds, nontender, no hepatosplenomegaly appreciated.  Musculoskeletal: Right hip and bilateral knee tenderness  Skin:  Well hydrated, no rash   Lymphatic:  No lymphadenopathy noted   Extremities: Lymphedema of both lower extremities        Diagnostic Studies Review Cardio:      EKG: Normal sinus rhythm, heart rate 82/min, left posterior fascicular block    Cardiac testing:     Results for orders placed during the hospital encounter of 01/05/24    Echo complete w/ contrast if indicated    Interpretation Summary    Left Ventricle: Left ventricular cavity size is normal. Wall thickness is moderately increased. " There is mild to moderate concentric hypertrophy. The left ventricular ejection fraction is around 55% by visual estimation. Systolic function is normal.  Hard to assess accurately EF due to tachycardia and atrial fibrillation. Although no diagnostic regional wall motion abnormality was identified, this possibility cannot be completely excluded on the basis of this study.    Left Atrium: The atrium is mildly dilated by 2D.    Aortic Valve: The aortic valve is trileaflet. The leaflets are moderately thickened. The leaflets are moderately calcified. There is moderately reduced mobility. There is mild to moderate stenosis by 2D, it was a limited study velocities were not measured..    Mitral Valve: There is mild regurgitation.    Tricuspid Valve: There is mild regurgitation.  Pulmonary artery pressure around 30 mmHg.    IVC/SVC: The inferior vena cava is upper normal in size.  It has around 50% collapse with inspiration.    Prior TTE study available for comparison. Prior study date: 7/3/2023.  As compared to that study EF remained the same.  Aortic valve appears to have at least mild stenosis.    Results for orders placed during the hospital encounter of 01/05/24    ELISA    Interpretation Summary    Left Ventricle: Left ventricular cavity size is normal. Wall thickness is increased. The left ventricular ejection fraction is 55% by visual estimation. Systolic function is low normal.    Atrial Septum: There is a small and patent foramen ovale confirmed at rest and with provocation (abdominal compression) with predominant right to left shunting using saline contrast.    Left Atrial Appendage: There is no thrombus.    Aorta: .3cm sessile atheroma noted in descending aorta    No results found for this or any previous visit.    Results for orders placed during the hospital encounter of 01/05/24    Cardioversion    Interpretation Summary  ELISA/Cardioversion    Luther Vaughan  [id number]  1/8/24    PRE-OP DIAGNOSIS: Atrial  fibrillation    POST-OP DIAGNOSIS: Successful electrical cardioversion of atrial fibrillation to normal sinus rhythm with PACs    : Sarwat Coyle MD [facility]    ANESTHESIA: Propofol given by anesthesiology.    COMPLICATIONS: None.    OPERATIVE TERM: DC cardioversion.    The nature of the procedure, risks and alternatives were discussed with the patient who gave informed consent.    OPERATIVE TECHNIQUE: The patient was sedated with propofol. Once adequately sedated, ELISA was performed.  (Please see full ELISA report for details). Once no thrombus confirmed,  DC cardioversion was performed with 200 J biphasically and synchronously. The patient was then monitored until fully alert and left the procedure area in stable condition.    Impression  Successful DC cardioversion of atrial fibrillation to normal sinus rhythm with PACs.    No results found for this or any previous visit.    Results for orders placed during the hospital encounter of 03/08/24    NM myocardial perfusion spect (rx stress and/or rest)    Interpretation Summary    Stress Combined Conclusion: The ECG and SPECT imaging portions of the stress study are concordant with no evidence of stress induced myocardial ischemia.    The stress ECG is negative for ischemia after pharmacologic stress, without reproduction of symptoms.    Perfusion: Comparison of post Lexiscan supine and prone images with the resting revealed no significant reversible ischemia or evidence of prior infarction.  Mild, fixed, small inferior wall perfusion defect is probably secondary to soft tissue attenuation.    Stress Function: Left ventricular function post-stress is normal.  Calculated LV ejection fraction is 53%    Perfusion Defect Conclusion: There is no evidence of transient ischemic dilation (TID).      Imaging:  Chest X-Ray:   No Chest XR results available for this patient.    CT-scan of the chest:     No CTA results available for this patient.  Lab Review   Lab Results  "  Component Value Date    WBC 6.92 08/14/2024    HGB 14.4 08/14/2024    HCT 44.8 08/14/2024    MCV 92 08/14/2024    RDW 13.5 08/14/2024     08/14/2024     BMP:  Lab Results   Component Value Date    SODIUM 138 08/14/2024    K 4.0 08/14/2024     08/14/2024    CO2 27 08/14/2024    BUN 17 08/14/2024    CREATININE 0.87 08/14/2024    GLUC 125 (H) 05/07/2024    GLUF 99 08/14/2024    CALCIUM 9.1 08/14/2024    EGFR 89 08/14/2024    MG 2.0 01/09/2024     LFT:  Lab Results   Component Value Date    AST 15 08/14/2024    ALT 15 08/14/2024    ALKPHOS 71 08/14/2024    TP 6.4 08/14/2024    ALB 3.8 08/14/2024      No components found for: \"TSH3\"  Lab Results   Component Value Date    OYS4WJXFSRKS 2.657 06/20/2023     Lab Results   Component Value Date    HGBA1C 6.3 (H) 08/14/2024     Lipid Profile:   Lab Results   Component Value Date    CHOLESTEROL 135 01/06/2024    HDL 37 (L) 01/06/2024    LDLCALC 79 01/06/2024    TRIG 97 01/06/2024     Lab Results   Component Value Date    CHOLESTEROL 135 01/06/2024    CHOLESTEROL 179 09/26/2023     No results found for: \"CKTOTAL\", \"CKMB\", \"CKMBINDEX\", \"TROPONINI\"  No results found for: \"NTBNP\"   Recent Results (from the past 672 hour(s))   Hemoglobin A1C    Collection Time: 08/14/24  7:13 AM   Result Value Ref Range    Hemoglobin A1C 6.3 (H) Normal 4.0-5.6%; PreDiabetic 5.7-6.4%; Diabetic >=6.5%; Glycemic control for adults with diabetes <7.0% %     mg/dl   Comprehensive metabolic panel    Collection Time: 08/14/24  7:13 AM   Result Value Ref Range    Sodium 138 135 - 147 mmol/L    Potassium 4.0 3.5 - 5.3 mmol/L    Chloride 103 96 - 108 mmol/L    CO2 27 21 - 32 mmol/L    ANION GAP 8 4 - 13 mmol/L    BUN 17 5 - 25 mg/dL    Creatinine 0.87 0.60 - 1.30 mg/dL    Glucose, Fasting 99 65 - 99 mg/dL    Calcium 9.1 8.4 - 10.2 mg/dL    AST 15 13 - 39 U/L    ALT 15 7 - 52 U/L    Alkaline Phosphatase 71 34 - 104 U/L    Total Protein 6.4 6.4 - 8.4 g/dL    Albumin 3.8 3.5 - 5.0 g/dL    " "Total Bilirubin 0.67 0.20 - 1.00 mg/dL    eGFR 89 ml/min/1.73sq m   PSA, Total Screen    Collection Time: 08/14/24  7:13 AM   Result Value Ref Range    PSA 0.681 0.000 - 4.000 ng/mL   CBC and differential    Collection Time: 08/14/24  7:13 AM   Result Value Ref Range    WBC 6.92 4.31 - 10.16 Thousand/uL    RBC 4.85 3.88 - 5.62 Million/uL    Hemoglobin 14.4 12.0 - 17.0 g/dL    Hematocrit 44.8 36.5 - 49.3 %    MCV 92 82 - 98 fL    MCH 29.7 26.8 - 34.3 pg    MCHC 32.1 31.4 - 37.4 g/dL    RDW 13.5 11.6 - 15.1 %    MPV 9.6 8.9 - 12.7 fL    Platelets 234 149 - 390 Thousands/uL    nRBC 0 /100 WBCs    Segmented % 57 43 - 75 %    Immature Grans % 0 0 - 2 %    Lymphocytes % 35 14 - 44 %    Monocytes % 7 4 - 12 %    Eosinophils Relative 1 0 - 6 %    Basophils Relative 0 0 - 1 %    Absolute Neutrophils 3.96 1.85 - 7.62 Thousands/µL    Absolute Immature Grans 0.03 0.00 - 0.20 Thousand/uL    Absolute Lymphocytes 2.42 0.60 - 4.47 Thousands/µL    Absolute Monocytes 0.45 0.17 - 1.22 Thousand/µL    Eosinophils Absolute 0.04 0.00 - 0.61 Thousand/µL    Basophils Absolute 0.02 0.00 - 0.10 Thousands/µL   Sedimentation rate, automated    Collection Time: 08/14/24  7:13 AM   Result Value Ref Range    Sed Rate 28 (H) 0 - 19 mm/hour   Protime-INR    Collection Time: 08/14/24  7:13 AM   Result Value Ref Range    Protime 13.9 12.3 - 15.0 seconds    INR 1.03 0.85 - 1.19   APTT    Collection Time: 08/14/24  7:13 AM   Result Value Ref Range    PTT 32 23 - 34 seconds             Dr. Akosua Maher MD, FACC      \"This note has been constructed using a voice recognition system.Therefore there may be syntax, spelling, and/or grammatical errors. Please call if you have any questions. \"  "

## 2024-08-28 ENCOUNTER — OFFICE VISIT (OUTPATIENT)
Dept: CARDIOLOGY CLINIC | Facility: CLINIC | Age: 64
End: 2024-08-28
Payer: MEDICARE

## 2024-08-28 VITALS
DIASTOLIC BLOOD PRESSURE: 80 MMHG | OXYGEN SATURATION: 96 % | HEART RATE: 82 BPM | SYSTOLIC BLOOD PRESSURE: 144 MMHG | WEIGHT: 296 LBS | HEIGHT: 69 IN | BODY MASS INDEX: 43.84 KG/M2

## 2024-08-28 DIAGNOSIS — Z01.810 PRE-OPERATIVE CARDIOVASCULAR EXAMINATION: Primary | ICD-10-CM

## 2024-08-28 DIAGNOSIS — R03.0 ELEVATED BLOOD-PRESSURE READING WITHOUT DIAGNOSIS OF HYPERTENSION: ICD-10-CM

## 2024-08-28 DIAGNOSIS — I48.91 ATRIAL FIBRILLATION, UNSPECIFIED TYPE (HCC): ICD-10-CM

## 2024-08-28 PROCEDURE — 99214 OFFICE O/P EST MOD 30 MIN: CPT | Performed by: INTERNAL MEDICINE

## 2024-08-28 PROCEDURE — 93000 ELECTROCARDIOGRAM COMPLETE: CPT | Performed by: INTERNAL MEDICINE

## 2024-09-09 ENCOUNTER — PATIENT MESSAGE (OUTPATIENT)
Dept: FAMILY MEDICINE CLINIC | Facility: CLINIC | Age: 64
End: 2024-09-09

## 2024-09-09 DIAGNOSIS — M25.551 PAIN OF RIGHT HIP: Primary | ICD-10-CM

## 2024-09-09 DIAGNOSIS — R10.31 SEVERE RIGHT GROIN PAIN: ICD-10-CM

## 2024-09-18 RX ORDER — TRAMADOL HYDROCHLORIDE 50 MG/1
50 TABLET ORAL EVERY 8 HOURS PRN
Qty: 30 TABLET | Refills: 0 | Status: SHIPPED | OUTPATIENT
Start: 2024-09-18

## 2024-09-24 ENCOUNTER — TRANSCRIBE ORDERS (OUTPATIENT)
Dept: LAB | Facility: CLINIC | Age: 64
End: 2024-09-24

## 2024-09-24 ENCOUNTER — APPOINTMENT (OUTPATIENT)
Dept: LAB | Facility: CLINIC | Age: 64
End: 2024-09-24
Payer: MEDICARE

## 2024-09-24 DIAGNOSIS — Z51.81 ENCOUNTER FOR THERAPEUTIC DRUG MONITORING: ICD-10-CM

## 2024-09-24 DIAGNOSIS — Z01.818 PREOPERATIVE EXAMINATION, UNSPECIFIED: Primary | ICD-10-CM

## 2024-09-24 DIAGNOSIS — Z01.818 PREOPERATIVE EXAMINATION, UNSPECIFIED: ICD-10-CM

## 2024-09-24 LAB
ALBUMIN SERPL BCG-MCNC: 4 G/DL (ref 3.5–5)
ALP SERPL-CCNC: 73 U/L (ref 34–104)
ALT SERPL W P-5'-P-CCNC: 13 U/L (ref 7–52)
ANION GAP SERPL CALCULATED.3IONS-SCNC: 8 MMOL/L (ref 4–13)
APTT PPP: 31 SECONDS (ref 23–34)
AST SERPL W P-5'-P-CCNC: 14 U/L (ref 13–39)
BASOPHILS # BLD AUTO: 0.03 THOUSANDS/ΜL (ref 0–0.1)
BASOPHILS NFR BLD AUTO: 0 % (ref 0–1)
BILIRUB SERPL-MCNC: 0.74 MG/DL (ref 0.2–1)
BUN SERPL-MCNC: 20 MG/DL (ref 5–25)
CALCIUM SERPL-MCNC: 9.5 MG/DL (ref 8.4–10.2)
CHLORIDE SERPL-SCNC: 103 MMOL/L (ref 96–108)
CO2 SERPL-SCNC: 29 MMOL/L (ref 21–32)
CREAT SERPL-MCNC: 0.84 MG/DL (ref 0.6–1.3)
EOSINOPHIL # BLD AUTO: 0.05 THOUSAND/ΜL (ref 0–0.61)
EOSINOPHIL NFR BLD AUTO: 1 % (ref 0–6)
ERYTHROCYTE [DISTWIDTH] IN BLOOD BY AUTOMATED COUNT: 13.8 % (ref 11.6–15.1)
ERYTHROCYTE [SEDIMENTATION RATE] IN BLOOD: 30 MM/HOUR (ref 0–19)
GFR SERPL CREATININE-BSD FRML MDRD: 91 ML/MIN/1.73SQ M
GLUCOSE P FAST SERPL-MCNC: 101 MG/DL (ref 65–99)
HCT VFR BLD AUTO: 46.6 % (ref 36.5–49.3)
HGB BLD-MCNC: 15 G/DL (ref 12–17)
IMM GRANULOCYTES # BLD AUTO: 0.02 THOUSAND/UL (ref 0–0.2)
IMM GRANULOCYTES NFR BLD AUTO: 0 % (ref 0–2)
INR PPP: 1.06 (ref 0.85–1.19)
LYMPHOCYTES # BLD AUTO: 3.14 THOUSANDS/ΜL (ref 0.6–4.47)
LYMPHOCYTES NFR BLD AUTO: 36 % (ref 14–44)
MCH RBC QN AUTO: 29.8 PG (ref 26.8–34.3)
MCHC RBC AUTO-ENTMCNC: 32.2 G/DL (ref 31.4–37.4)
MCV RBC AUTO: 93 FL (ref 82–98)
MONOCYTES # BLD AUTO: 0.65 THOUSAND/ΜL (ref 0.17–1.22)
MONOCYTES NFR BLD AUTO: 8 % (ref 4–12)
NEUTROPHILS # BLD AUTO: 4.74 THOUSANDS/ΜL (ref 1.85–7.62)
NEUTS SEG NFR BLD AUTO: 55 % (ref 43–75)
NRBC BLD AUTO-RTO: 0 /100 WBCS
PLATELET # BLD AUTO: 237 THOUSANDS/UL (ref 149–390)
PMV BLD AUTO: 10.2 FL (ref 8.9–12.7)
POTASSIUM SERPL-SCNC: 4.7 MMOL/L (ref 3.5–5.3)
PROT SERPL-MCNC: 6.7 G/DL (ref 6.4–8.4)
PROTHROMBIN TIME: 14.1 SECONDS (ref 12.3–15)
RBC # BLD AUTO: 5.04 MILLION/UL (ref 3.88–5.62)
SODIUM SERPL-SCNC: 140 MMOL/L (ref 135–147)
WBC # BLD AUTO: 8.63 THOUSAND/UL (ref 4.31–10.16)

## 2024-09-24 PROCEDURE — 85652 RBC SED RATE AUTOMATED: CPT

## 2024-09-24 PROCEDURE — 85025 COMPLETE CBC W/AUTO DIFF WBC: CPT

## 2024-09-24 PROCEDURE — 80053 COMPREHEN METABOLIC PANEL: CPT

## 2024-09-24 PROCEDURE — 36415 COLL VENOUS BLD VENIPUNCTURE: CPT

## 2024-09-24 PROCEDURE — 85730 THROMBOPLASTIN TIME PARTIAL: CPT

## 2024-09-24 PROCEDURE — 85610 PROTHROMBIN TIME: CPT

## 2024-09-30 ENCOUNTER — TELEPHONE (OUTPATIENT)
Age: 64
End: 2024-09-30

## 2024-09-30 NOTE — TELEPHONE ENCOUNTER
Caller: Ava @ [facility]    Doctor: Dr. Maher    Call back #: [phone]    Reason for call: Ava with [facility] called to request for patient's most recent EKG tracings (with graphs included). Ava stated that Dr. Maher messaged her today asking if she received the EKG fax and she has not. Her direct fax number is [phone].

## 2024-10-03 ENCOUNTER — APPOINTMENT (OUTPATIENT)
Dept: PHYSICAL THERAPY | Facility: CLINIC | Age: 64
End: 2024-10-03
Payer: MEDICARE

## 2024-10-07 ENCOUNTER — APPOINTMENT (OUTPATIENT)
Dept: PHYSICAL THERAPY | Facility: CLINIC | Age: 64
End: 2024-10-07
Payer: MEDICARE

## 2024-10-08 ENCOUNTER — RA CDI HCC (OUTPATIENT)
Dept: OTHER | Facility: HOSPITAL | Age: 64
End: 2024-10-08

## 2024-10-08 DIAGNOSIS — E66.01 MORBID OBESITY (HCC): Primary | ICD-10-CM

## 2024-10-08 PROBLEM — I11.0 HYPERTENSIVE HEART DISEASE WITH CONGESTIVE HEART FAILURE (HCC): Status: ACTIVE | Noted: 2024-10-08

## 2024-10-08 NOTE — PROGRESS NOTES
HCC coding opportunities          Chart Reviewed number of suggestions sent to Provider: 2     Patients Insurance     Medicare Insurance: Medicare        E66.01  I11.0

## 2024-10-10 ENCOUNTER — APPOINTMENT (OUTPATIENT)
Dept: PHYSICAL THERAPY | Facility: CLINIC | Age: 64
End: 2024-10-10
Payer: MEDICARE

## 2024-10-14 ENCOUNTER — APPOINTMENT (OUTPATIENT)
Dept: PHYSICAL THERAPY | Facility: CLINIC | Age: 64
End: 2024-10-14
Payer: MEDICARE

## 2024-10-15 ENCOUNTER — CONSULT (OUTPATIENT)
Dept: FAMILY MEDICINE CLINIC | Facility: CLINIC | Age: 64
End: 2024-10-15

## 2024-10-15 VITALS
HEIGHT: 69 IN | SYSTOLIC BLOOD PRESSURE: 138 MMHG | BODY MASS INDEX: 44.58 KG/M2 | HEART RATE: 75 BPM | OXYGEN SATURATION: 98 % | DIASTOLIC BLOOD PRESSURE: 80 MMHG | WEIGHT: 301 LBS

## 2024-10-15 DIAGNOSIS — M25.551 PAIN OF RIGHT HIP: ICD-10-CM

## 2024-10-15 DIAGNOSIS — Z01.818 PRE-OP EXAMINATION: Primary | ICD-10-CM

## 2024-10-15 DIAGNOSIS — R10.31 SEVERE RIGHT GROIN PAIN: ICD-10-CM

## 2024-10-15 DIAGNOSIS — M16.11 PRIMARY OSTEOARTHRITIS OF RIGHT HIP: ICD-10-CM

## 2024-10-15 DIAGNOSIS — E11.9 TYPE 2 DIABETES MELLITUS WITHOUT COMPLICATION, WITHOUT LONG-TERM CURRENT USE OF INSULIN (HCC): ICD-10-CM

## 2024-10-15 DIAGNOSIS — I50.32 CHRONIC DIASTOLIC HEART FAILURE (HCC): ICD-10-CM

## 2024-10-15 NOTE — PATIENT INSTRUCTIONS
Please continue the cholesterol medication or atorvastatin 10 mg and metoprolol 50 mg twice a day the morning of your surgery.    Please discontinue the losartan/Cozaar and Demadex/ torsemide the morning of, you can take it the day before but not the day of surgery.    If you need pain control you can take Tylenol or the Ultram/tramadol.  You need to discontinue the NSAIDs, this includes Mobic, meloxicam, Aleve, ibuprofen, naproxen, and Motrin.

## 2024-10-15 NOTE — Clinical Note
Dear Dr. Maher,   I am not sure if his surgeon will need follow up clearance by cardiology.  I have cleared him at this time due to no complications from last surgery/ no new symptoms or concerns.  Clearance recently given for right knee replacement in August 2024.  If you require the patient to follow up in office his surgery is next week.  I hope you have a great rest of the week.  Sincerely,   Maycol Arango  [facility]

## 2024-10-15 NOTE — PROGRESS NOTES
Pre-operative Clearance  Name: Luther Vaughan      : 1960      MRN: [id number]  Encounter Provider: Maycol Arango DO  Encounter Date: 10/15/2024   Encounter department: [facility]    Assessment & Plan  Pre-op examination  Patient presents for pre op clearance.  As long as his cardiologist has no new concerns he is cleared by [facility] medicine.  I will forward this note to cardiology to review and comment.  Patient recently had the right knee replaced and did not have any issues or complications.  He is hopeful that with this surgery he will have improved mobility and health in general.  He has no new cardiac, pulmonary, or vascular concerns.        Pain of right hip         Severe right groin pain         Primary osteoarthritis of right hip         Chronic diastolic heart failure (HCC)  Wt Readings from Last 3 Encounters:   10/15/24 (!) 137 kg (301 lb)   24 134 kg (296 lb)   24 135 kg (298 lb)   Patient does not have any reduction in EF.  Primarily diagnosed with BNP elevated and requirement for medication such as beta blocker, diuretic, Ace/ARB, and statin. Currently medication controlled and Afib present on examination, rate controlled.           Type 2 diabetes mellitus without complication, without long-term current use of insulin (HCC)  Well controlled based off of last A1c.  He is monitoring carbs and avoiding sweets the best he can.  Last A1c 6.3, He has not required any additional hyperglycemic medications due to diet control.   Lab Results   Component Value Date    HGBA1C 6.3 (H) 2024            Pre-operative Clearance:     Revised Cardiac Risk Index:  RCI RISK CLASS II (1 risk factor, risk of major cardiac complications approximately 1.3%)    Clearance:  Patient is medically optimized (CLEARED) for proposed surgery without any additional cardiac testing.      Medication Instructions:   - Avoid herbs or non-directed vitamins one week prior to surgery     - Avoid aspirin containing medications or non-steroidal anti-inflammatory drugs one week preceding surgery    - May take tylenol for pain up until the night before surgery    - ACE Inhibitors or ARBs: Continue this medication up to the evening before surgery/procedure, but do not take the morning of the day of surgery.  - Beta blockers:  Continue to take this medication on your normal schedule.  - Diuretics: Continue taking this medication up to the evening before surgery/procedure, but do not take in the morning of the day of surgery/procedure.  - Hyperlipidemia meds: Continue to take this medication on your normal schedule.  - Opioids: Continue to take this medication on your normal schedule.       History of Present Illness     Patient presents today for follow-up preop for right hip replacement.  Recently he went through a right knee replacement which overall went well, but unfortunately due to his hip pain and discomfort it is difficult to measure how effective it was in reducing his pain and discomfort.  He did not have any complications from the previous surgery and physical therapy feels that he has been improving appropriately for the replacement.    Pre-op Exam  Surgery: Right hip replacement  Anticipated Date of Surgery: 10/21/2024  Surgeon: Dr. Weston    Patient continues to have severe pain in right hip.  This is affecting his gait still and he is using a cane.  The patient went through recent surgery for right knee replacement and has done well.    Previous history of bleeding disorders or clots?: No  Previous Anesthesia reaction?: No  Prolonged steroid use in the last 6 months?: No    Assessment of Cardiac Risk:   - Unstable or severe angina or MI in the last 6 weeks or history of stent placement in the last year?: No   - Decompensated heart failure (e.g. New onset heart failure, NYHA  Class IV heart failure, or worsening existing heart failure)?: No  - Significant arrhythmias such as high grade AV  block, symptomatic ventricular arrhythmia, newly recognized ventricular tachycardia, supraventricular tachycardia with resting heart rate >100, or symptomatic bradycardia?: No  - Severe heart valve disease including aortic stenosis or symptomatic mitral stenosis?: No      Pre-operative Risk Factors:  Elevated-risk surgery: No    History of cerebrovascular disease: No    History of ischemic heart disease: No  Pre-operative treatment with insulin: No  Pre-operative creatinine >2 mg/dL: No    History of congestive heart failure: Yes    Duke Activity Status Index (DASI):   DASI Total Score: 9.95  METs: 4    Medications of Perioperative Concern:   Anti-coagulants (Coumadin, Xarelto, Pradaxa, Eliquis, Lixiana), NSAIDs, Beta blockers and ACE inhibitors/ARBs    Review of Systems   Constitutional:  Negative for chills and fever.   HENT:  Positive for sneezing. Negative for congestion, ear discharge, ear pain, rhinorrhea, sinus pressure and sinus pain.    Respiratory:  Negative for cough, chest tightness and shortness of breath.    Cardiovascular:  Negative for chest pain and palpitations.   Gastrointestinal:  Negative for abdominal pain, blood in stool, constipation, diarrhea, nausea and vomiting.   Musculoskeletal:  Positive for arthralgias, gait problem, joint swelling and myalgias.   Neurological:  Negative for dizziness, light-headedness and headaches.     Past Medical History   Past Medical History:   Diagnosis Date    Chronic ulcer of lower extremity (HCC) 01/05/2024    Diabetes mellitus (HCC)     Hypertension     Incarcerated umbilical hernia 01/05/2022    Migraine     Obesity     MARLA on CPAP     Sleep apnea     Vertigo     episode 1/30/2016     Past Surgical History:   Procedure Laterality Date    HAND SURGERY      football injury    HERNIA REPAIR      KNEE SURGERY Left     left meniscus     MENISCECTOMY Left      Family History   Problem Relation Age of Onset    No Known Problems Mother     Heart attack Father      "Heart disease Father     Prostate cancer Neg Hx     Testicular cancer Neg Hx     Colon cancer Neg Hx      Social History     Tobacco Use    Smoking status: Never    Smokeless tobacco: Never    Tobacco comments:     never smoker- per [place]    Vaping Use    Vaping status: Never Used   Substance and Sexual Activity    Alcohol use: Not Currently    Drug use: Never    Sexual activity: Yes     Partners: Female     Birth control/protection: Post-menopausal     Current Outpatient Medications on File Prior to Visit   Medication Sig    Ascorbic Acid, Vitamin C, (VITAMIN C) 100 MG tablet Take by mouth daily    atorvastatin (LIPITOR) 10 mg tablet TAKE 1 TABLET BY MOUTH EVERY DAY WITH DINNER    cholecalciferol (VITAMIN D3) 400 units tablet Take 400 Units by mouth daily    losartan (COZAAR) 50 mg tablet TAKE 1 TABLET BY MOUTH EVERY DAY    meloxicam (MOBIC) 15 mg tablet 15 mg daily States does not take daily.    metoprolol tartrate (LOPRESSOR) 50 mg tablet TAKE 1 TABLET BY MOUTH EVERY 12 HOURS    torsemide (DEMADEX) 10 mg tablet TAKE 1 TABLET (10 MG TOTAL) BY MOUTH DAILY. DO NOT START BEFORE JANUARY 9, 2024.    traMADol (Ultram) 50 mg tablet Take 1 tablet (50 mg total) by mouth every 8 (eight) hours as needed for moderate pain    apixaban (ELIQUIS) 5 mg Take 1 tablet (5 mg total) by mouth 2 (two) times a day     Allergies   Allergen Reactions    Erythromycin Diarrhea    Fish-Derived Products - Food Allergy Diarrhea     States \"cartilage fish\" cause sweating, diarrhea, vomiting     Objective     /80   Pulse 75   Ht 5' 8.5\" (1.74 m)   Wt (!) 137 kg (301 lb)   SpO2 98%   BMI 45.10 kg/m²     Physical Exam  Constitutional:       General: He is not in acute distress.     Appearance: Normal appearance. He is obese. He is not ill-appearing, toxic-appearing or diaphoretic.   HENT:      Head: Normocephalic and atraumatic.      Right Ear: Tympanic membrane, ear canal and external ear normal. There is no impacted cerumen.      " Left Ear: Tympanic membrane, ear canal and external ear normal. There is no impacted cerumen.      Nose: Nose normal. No congestion or rhinorrhea.      Mouth/Throat:      Mouth: Mucous membranes are moist.      Pharynx: Oropharynx is clear. No oropharyngeal exudate or posterior oropharyngeal erythema.   Eyes:      General:         Right eye: No discharge.         Left eye: No discharge.      Extraocular Movements: Extraocular movements intact.      Pupils: Pupils are equal, round, and reactive to light.   Cardiovascular:      Rate and Rhythm: Normal rate. Rhythm irregular.      Heart sounds: Normal heart sounds. No murmur heard.     No friction rub. No gallop.      Comments: Rate controlled afib  Pulmonary:      Effort: Pulmonary effort is normal. No respiratory distress.      Breath sounds: Normal breath sounds. No stridor. No wheezing, rhonchi or rales.   Abdominal:      General: Bowel sounds are normal. There is no distension.      Palpations: Abdomen is soft.      Tenderness: There is no abdominal tenderness.   Musculoskeletal:      Cervical back: Neck supple. No tenderness.   Lymphadenopathy:      Cervical: No cervical adenopathy.   Skin:     General: Skin is warm.      Capillary Refill: Capillary refill takes less than 2 seconds.      Findings: No lesion or rash.   Neurological:      Mental Status: He is alert.      Cranial Nerves: No cranial nerve deficit (II-XII grossly intact).      Sensory: No sensory deficit (light touch present in all 4 extremities).      Motor: No weakness (upper extremities tested, lower extremities omitted 2/2 to significant pain of right side just while sitting. UE 5/5.).       Maycol Arango,

## 2024-10-16 NOTE — ASSESSMENT & PLAN NOTE
Well controlled based off of last A1c.  He is monitoring carbs and avoiding sweets the best he can.  Last A1c 6.3, He has not required any additional hyperglycemic medications due to diet control.   Lab Results   Component Value Date    HGBA1C 6.3 (H) 08/14/2024

## 2024-10-17 ENCOUNTER — APPOINTMENT (OUTPATIENT)
Dept: PHYSICAL THERAPY | Facility: CLINIC | Age: 64
End: 2024-10-17
Payer: MEDICARE

## 2024-10-24 DIAGNOSIS — I48.91 NEW ONSET ATRIAL FIBRILLATION (HCC): ICD-10-CM

## 2024-10-24 DIAGNOSIS — I50.31 ACUTE DIASTOLIC CHF (CONGESTIVE HEART FAILURE) (HCC): ICD-10-CM

## 2024-10-24 RX ORDER — METOPROLOL TARTRATE 50 MG
50 TABLET ORAL EVERY 12 HOURS
Qty: 180 TABLET | Refills: 1 | Status: SHIPPED | OUTPATIENT
Start: 2024-10-24

## 2024-10-24 RX ORDER — ATORVASTATIN CALCIUM 10 MG/1
10 TABLET, FILM COATED ORAL
Qty: 90 TABLET | Refills: 1 | Status: SHIPPED | OUTPATIENT
Start: 2024-10-24

## 2024-10-24 RX ORDER — TORSEMIDE 10 MG/1
10 TABLET ORAL DAILY
Qty: 90 TABLET | Refills: 1 | Status: SHIPPED | OUTPATIENT
Start: 2024-10-24

## 2024-10-25 ENCOUNTER — OFFICE VISIT (OUTPATIENT)
Dept: PHYSICAL THERAPY | Facility: CLINIC | Age: 64
End: 2024-10-25
Payer: MEDICARE

## 2024-10-25 DIAGNOSIS — M16.11 PRIMARY OSTEOARTHRITIS OF RIGHT HIP: Primary | ICD-10-CM

## 2024-10-25 DIAGNOSIS — Z96.641 S/P HIP REPLACEMENT, RIGHT: ICD-10-CM

## 2024-10-25 PROCEDURE — 97162 PT EVAL MOD COMPLEX 30 MIN: CPT | Performed by: PHYSICAL THERAPIST

## 2024-10-25 NOTE — PROGRESS NOTES
PT Evaluation     Today's date: 10/25/2024  Patient name: Luther Vaughan  : 1960  MRN: [id number]  Referring provider: Augustine Weston DO  Dx:   Encounter Diagnosis     ICD-10-CM    1. Primary osteoarthritis of right hip  M16.11       2. S/P hip replacement, right  Z96.641                      Assessment  Impairments: abnormal muscle firing, abnormal or restricted ROM, activity intolerance, impaired physical strength, lacks appropriate home exercise program, pain with function, poor posture  and poor body mechanics    Assessment details: Luther Vaughan is a 64 y.o. male who presents to the clinic s/p right hip replacement on 10/21/24.  The patient presents with the above listed impairments.  He demonstrates decreased hip ROM as well as decreased hip strength.  He is limited with ADLs and ambulation and is eager to improve his overall function.  The patient should benefit from skilled physical therapy.  Thank you for the referral.      Understanding of Dx/Px/POC: good     Prognosis: good    Goals  Impairment Goals  - Decrease pain by 50% in 8 weeks  - Increase right flexibility by 50% in 8 weeks  - Increase right lower extremity strength Independa to 4+/5 in 8 weeks    Functional Goals  - Return to Prior Level of Function in 8 weeks  - Patient will be independent with HEP in 8 weeks  - Patient will be able to ambulate with decreased pain in 8 weeks  - Patient will be able to ascend/descend stairs with a reciprocal gait pattern in 8 weeks  - Patient will be able to complete ADLs with decreased pain in 4 weeks       Plan  Patient would benefit from: skilled physical therapy  Planned modality interventions: cryotherapy, thermotherapy: hydrocollator packs and TENS    Planned therapy interventions: home exercise program, graded exercise, functional ROM exercises, flexibility, body mechanics training, postural training, patient education, therapeutic activities, therapeutic exercise, manual therapy, joint  "mobilization and neuromuscular re-education    Frequency: 2x week  Duration in weeks: 8  Treatment plan discussed with: patient        Subjective Evaluation    History of Present Illness  Mechanism of injury: HPI:  Patient underwent a right hip replacement on 10/21/24.  He now presents for his post-op evaluation.      Pain Location:  R Hip   Occupation:     Prior Functional Limitations: Independent prior   AGG:  Sleeping, ambulation, stairs, ADLs / dressing, moving around  Ease:  Ice  Patient Goals:  \"I just want to be myself again\"     Pain  Current pain ratin  At best pain ratin  At worst pain ratin          Objective     Passive Range of Motion     Right Hip   Flexion: 90 degrees   Abduction: 40 degrees     Strength/Myotome Testing     Left Hip   Normal muscle strength    Right Hip   Planes of Motion   Flexion: 3+  Extension: 3+  Abduction: 3+    Right Knee   Flexion: 4+  Extension: 4+    Right Ankle/Foot   Dorsiflexion: 5    Ambulation     Comments   Using RW - step through gait pattern with decreased stance time on the right     Functional Assessment        Comments  TU.13 sec w/ RW               Diagnosis:    Precautions:    POC Expires Auth Status Start Date Expiration Date PT Visit Limit     No Auth NA NA NA   Date 10/25 IE       Used 1       Remaining        Manuals        PROM        Mobs                                There Ex        Bike        Hamstring Stretch                                        Neruo Re-Ed        Quad Set         SLR        Clamshell        Hip ABD                                                                                        Ther Act                                         Modalities             CP PRN                                      "

## 2024-10-25 NOTE — LETTER
2024    Augustine Weston DO  [address]  [address]  [facility] [address]    Patient: Luther Vaughan   YOB: 1960   Date of Visit: 10/25/2024     Encounter Diagnosis     ICD-10-CM    1. Primary osteoarthritis of right hip  M16.11       2. S/P hip replacement, right  Z96.641           Dear Dr. Weston:    Thank you for your recent referral of Luther Vaughan. Please review the attached evaluation summary from Luther's recent visit.     Please verify that you agree with the plan of care by signing the attached order.     If you have any questions or concerns, please do not hesitate to call.     I sincerely appreciate the opportunity to share in the care of one of your patients and hope to have another opportunity to work with you in the near future.       Sincerely,    Derrek Montanez, PT      Referring Provider:      I certify that I have read the below Plan of Care and certify the need for these services furnished under this plan of treatment while under my care.                    Augustine Weston DO  [address]  [address]  [facility] [address]  Via Fax: [phone]          PT Evaluation     Today's date: 10/25/2024  Patient name: Luther Vaughan  : 1960  MRN: [id number]  Referring provider: Augustine Weston DO  Dx:   Encounter Diagnosis     ICD-10-CM    1. Primary osteoarthritis of right hip  M16.11       2. S/P hip replacement, right  Z96.641                      Assessment  Impairments: abnormal muscle firing, abnormal or restricted ROM, activity intolerance, impaired physical strength, lacks appropriate home exercise program, pain with function, poor posture  and poor body mechanics    Assessment details: Luther Vaughan is a 64 y.o. male who presents to the clinic s/p right hip replacement on 10/21/24.  The patient presents with the above listed impairments.  He demonstrates decreased hip ROM as well as decreased hip strength.  He is limited with  "ADLs and ambulation and is eager to improve his overall function.  The patient should benefit from skilled physical therapy.  Thank you for the referral.      Understanding of Dx/Px/POC: good     Prognosis: good    Goals  Impairment Goals  - Decrease pain by 50% in 8 weeks  - Increase right flexibility by 50% in 8 weeks  - Increase right lower extremity strength golbally to 4+/5 in 8 weeks    Functional Goals  - Return to Prior Level of Function in 8 weeks  - Patient will be independent with HEP in 8 weeks  - Patient will be able to ambulate with decreased pain in 8 weeks  - Patient will be able to ascend/descend stairs with a reciprocal gait pattern in 8 weeks  - Patient will be able to complete ADLs with decreased pain in 4 weeks       Plan  Patient would benefit from: skilled physical therapy  Planned modality interventions: cryotherapy, thermotherapy: hydrocollator packs and TENS    Planned therapy interventions: home exercise program, graded exercise, functional ROM exercises, flexibility, body mechanics training, postural training, patient education, therapeutic activities, therapeutic exercise, manual therapy, joint mobilization and neuromuscular re-education    Frequency: 2x week  Duration in weeks: 8  Treatment plan discussed with: patient        Subjective Evaluation    History of Present Illness  Mechanism of injury: HPI:  Patient underwent a right hip replacement on 10/21/24.  He now presents for his post-op evaluation.      Pain Location:  R Hip   Occupation:     Prior Functional Limitations: Independent prior   AGG:  Sleeping, ambulation, stairs, ADLs / dressing, moving around  Ease:  Ice  Patient Goals:  \"I just want to be myself again\"     Pain  Current pain ratin  At best pain ratin  At worst pain ratin          Objective     Passive Range of Motion     Right Hip   Flexion: 90 degrees   Abduction: 40 degrees     Strength/Myotome Testing     Left Hip   Normal muscle " strength    Right Hip   Planes of Motion   Flexion: 3+  Extension: 3+  Abduction: 3+    Right Knee   Flexion: 4+  Extension: 4+    Right Ankle/Foot   Dorsiflexion: 5    Ambulation     Comments   Using RW - step through gait pattern with decreased stance time on the right     Functional Assessment        Comments  TU.13 sec w/ RW               Diagnosis:    Precautions:    POC Expires Auth Status Start Date Expiration Date PT Visit Limit     No Auth NA NA NA   Date 10/25 IE       Used 1       Remaining        Manuals        PROM        Mobs                                There Ex        Bike        Hamstring Stretch                                        Neruo Re-Ed        Quad Set         SLR        Clamshell        Hip ABD                                                                                        Ther Act                                         Modalities             CP PRN

## 2024-10-29 ENCOUNTER — OFFICE VISIT (OUTPATIENT)
Dept: PHYSICAL THERAPY | Facility: CLINIC | Age: 64
End: 2024-10-29
Payer: MEDICARE

## 2024-10-29 DIAGNOSIS — M16.11 PRIMARY OSTEOARTHRITIS OF RIGHT HIP: Primary | ICD-10-CM

## 2024-10-29 DIAGNOSIS — Z96.641 S/P HIP REPLACEMENT, RIGHT: ICD-10-CM

## 2024-10-29 PROCEDURE — 97110 THERAPEUTIC EXERCISES: CPT | Performed by: PHYSICAL THERAPIST

## 2024-10-29 PROCEDURE — 97112 NEUROMUSCULAR REEDUCATION: CPT | Performed by: PHYSICAL THERAPIST

## 2024-10-29 NOTE — PROGRESS NOTES
"Daily Note     Today's date: 10/29/2024  Patient name: Luther Vaughan  : 1960  MRN: [id number]  Referring provider: Augustine Weston DO  Dx:   Encounter Diagnosis     ICD-10-CM    1. Primary osteoarthritis of right hip  M16.11       2. S/P hip replacement, right  Z96.641                      Subjective: Patient notes that he is feeling good overall.        Objective: See treatment diary below      Assessment: Tolerated treatment well. Patient would benefit from continued PT.  Patient did well with his first PT treatment session today.  No complaints of pain during session, but did note fatigue.  Focused heavily on hip strengthening and did attempt some gait training with a SPC today.  Continue to progress as able.        Plan: Progress treatment as tolerated.       Diagnosis:    Precautions:    POC Expires Auth Status Start Date Expiration Date PT Visit Limit     No Auth NA NA NA   Date 10/25 IE 10/29      Used 1 2      Remaining        Manuals        PROM        Mobs                                There Ex        Bike  Upright 5' ROM      Hamstring Stretch  20\" x 4      Gastroc Stretch  20\" x 4      HS Curls  Orange Swiss Bal DL x20, SL x 20                       Neruo Re-Ed        Quad Set         SLR  2x5      Clamshell        Hip ABD   [place] TB 2x10 standing      Hip Ext  Pink TB 2x10 standing 4\" 2x10       Lateral Tap Down        Side-stepping  Pink TB 3 laps       Leg Press  60# 2x10 DL  30# 2x10 SL                                                      Ther Act                                         Modalities             CP PRN                                           "

## 2024-10-31 ENCOUNTER — OFFICE VISIT (OUTPATIENT)
Dept: PHYSICAL THERAPY | Facility: CLINIC | Age: 64
End: 2024-10-31
Payer: MEDICARE

## 2024-10-31 DIAGNOSIS — M16.11 PRIMARY OSTEOARTHRITIS OF RIGHT HIP: Primary | ICD-10-CM

## 2024-10-31 DIAGNOSIS — Z96.641 S/P HIP REPLACEMENT, RIGHT: ICD-10-CM

## 2024-10-31 PROCEDURE — 97112 NEUROMUSCULAR REEDUCATION: CPT

## 2024-10-31 PROCEDURE — 97110 THERAPEUTIC EXERCISES: CPT

## 2024-10-31 NOTE — PROGRESS NOTES
"Daily Note     Today's date: 10/31/2024  Patient name: Luther Vaughan  : 1960  MRN: [id number]  Referring provider: Augustine Weston DO  Dx:   Encounter Diagnosis     ICD-10-CM    1. Primary osteoarthritis of right hip  M16.11       2. S/P hip replacement, right  Z96.641           Start Time: 0700  Stop Time: 0745  Total time in clinic (min): 45 minutes    Subjective: Pt reports that he is stiff starting the day this morning.        Objective: See treatment diary below      Assessment: Tolerated treatment well. Began today with nustep warm up with good tolerance.  Pt was able to perform SLR with 10 in a row today as compared to 2 sets today. Pt is improving in gait mechanics as well.  Patient demonstrated fatigue post treatment, exhibited good technique with therapeutic exercises, and would benefit from continued PT      Plan: Continue per plan of care.      Diagnosis:    Precautions:    POC Expires Auth Status Start Date Expiration Date PT Visit Limit     No Auth NA NA NA   Date 10/25 IE 10/29 10/31     Used 1 2 3     Remaining        Manuals        PROM        Mobs                                There Ex        Bike  Upright 5' ROM Upright 5' ROM     Hamstring Stretch  20\" x 4 20\" x 4     Gastroc Stretch  20\" x 4 20\" x 4     HS Curls  Orange Swiss Bal DL x20, SL x 20  Orange Swiss Bal DL x20, SL x 20                      Neruo Re-Ed        Quad Set         SLR  2x5 1x10     Clamshell        Hip ABD   Pink TB 2x10 standing Pink TB 2x10 standing     Hip Ext  Pink TB 2x10 standing 4\" 2x10  Pink TB 2x10 standing 4\" 2x10      Lateral Tap Down        Side-stepping  Pink TB 3 laps  Pink TB 3 laps      Leg Press  60# 2x10 DL  30# 2x10 SL  60# 2x10 DL  30# 2x10 SL                                                     Ther Act                                         Modalities             CP PRN                                             "

## 2024-11-04 ENCOUNTER — OFFICE VISIT (OUTPATIENT)
Dept: PHYSICAL THERAPY | Facility: CLINIC | Age: 64
End: 2024-11-04
Payer: MEDICARE

## 2024-11-04 DIAGNOSIS — Z96.641 S/P HIP REPLACEMENT, RIGHT: Primary | ICD-10-CM

## 2024-11-04 PROCEDURE — 97112 NEUROMUSCULAR REEDUCATION: CPT

## 2024-11-04 PROCEDURE — 97110 THERAPEUTIC EXERCISES: CPT

## 2024-11-04 NOTE — PROGRESS NOTES
"Daily Note     Today's date: 2024  Patient name: Luther Vaughan  : 1960  MRN: [id number]  Referring provider: Augustine Weston DO  Dx:   Encounter Diagnosis     ICD-10-CM    1. S/P hip replacement, right  Z96.641                      Subjective: Pt states that he is doing better, less pain every day.  Pt ambulating with SPC on R.      Objective: See treatment diary below      Assessment: Tolerated treatment well.  Continued with outlined program to improve strength and ROM and progressed where able.  Pt challenged with side steps and cues are needed to avoid compensation as pt tends to turn his whole body.  Gentle PROM into hip flexion with pt reporting relief of tightness.  Pt progressing slowly towards his goals and will benefit from continued therapy.    Plan: Continue per plan of care.      Diagnosis:    Precautions:    POC Expires Auth Status Start Date Expiration Date PT Visit Limit     No Auth NA NA NA   Date 10/25 IE 10/29 10/31 11/4    Used 1 2 3 4    Remaining        Manuals        PROM    TH: flexion to 90    Mobs                                There Ex        Bike  Upright 5' ROM Upright 5' ROM NuStep 5'    Hamstring Stretch  20\" x 4 20\" x 4 20\"x4    Gastroc Stretch  20\" x 4 20\" x 4 20\"x4    HS Curls  [place] Swiss Bal DL x20, SL x 20  Orange Swiss Bal DL x20, SL x 20  Orange swiss bal DL x20  SLx20                    Neruo Re-Ed        Quad Set         SLR  2x5 1x10 1x10    Clamshell        Hip ABD   Pink TB 2x10 standing Pink TB 2x10 standing Pink TB 2x10  standing    Hip Ext  Pink TB 2x10 standing 4\" 2x10  Pink TB 2x10 standing 4\" 2x10  Pink TB 2x10 standing    Lateral Tap Down        Side-stepping  Pink TB 3 laps  Pink TB 3 laps  Pink TB 3 laps mirror    Leg Press  60# 2x10 DL  30# 2x10 SL  60# 2x10 DL  30# 2x10 SL  60# 2x10 DL  45# 2x10 SL    Wobble board balance    F/B 2'                                           Ther Act                                         Modalities       "       CP PRN

## 2024-11-07 ENCOUNTER — OFFICE VISIT (OUTPATIENT)
Dept: PHYSICAL THERAPY | Facility: CLINIC | Age: 64
End: 2024-11-07
Payer: MEDICARE

## 2024-11-07 DIAGNOSIS — M16.11 PRIMARY OSTEOARTHRITIS OF RIGHT HIP: ICD-10-CM

## 2024-11-07 DIAGNOSIS — Z96.641 S/P HIP REPLACEMENT, RIGHT: Primary | ICD-10-CM

## 2024-11-07 PROCEDURE — 97140 MANUAL THERAPY 1/> REGIONS: CPT | Performed by: PHYSICAL THERAPIST

## 2024-11-07 PROCEDURE — 97112 NEUROMUSCULAR REEDUCATION: CPT | Performed by: PHYSICAL THERAPIST

## 2024-11-07 PROCEDURE — 97110 THERAPEUTIC EXERCISES: CPT | Performed by: PHYSICAL THERAPIST

## 2024-11-07 NOTE — PROGRESS NOTES
"Daily Note     Today's date: 2024  Patient name: Luther Vaughan  : 1960  MRN: [id number]  Referring provider: Augustine Weston DO  Dx:   Encounter Diagnosis     ICD-10-CM    1. S/P hip replacement, right  Z96.641       2. Primary osteoarthritis of right hip  M16.11                      Subjective: Patient notes that he is doing OK today.        Objective: See treatment diary below      Assessment: Tolerated treatment well. Patient would benefit from continued PT.  Patient doing well overall.  No complaints of pain during or post session.  Notes that his hip feels stiff, but able to tolerate all ROM.  Working on weight-bearing exercises as well as strengthening of the hip extensors and abductors.  Continue to progress as able.        Plan: Progress treatment as tolerated.       Diagnosis:    Precautions:    POC Expires Auth Status Start Date Expiration Date PT Visit Limit     No Auth NA NA NA   Date 10/25 IE 10/29 10/31 11/4 11/7   Used 1 2 3 4 5   Remaining        Manuals        PROM    TH: flexion to 90 Hip Flexion    Mobs                                There Ex        Bike  Upright 5' ROM Upright 5' ROM NuStep 5' Uprihgt 5' ROM   Hamstring Stretch  20\" x 4 20\" x 4 20\"x4    Gastroc Stretch  20\" x 4 20\" x 4 20\"x4    HS Curls  [place] Swiss Bal DL x20, SL x 20  Orange Swiss Bal DL x20, SL x 20  Orange swiss bal DL x20  SLx20 Green swiss ball DL x20, SL x 20                   Neruo Re-Ed        Quad Set         SLR  2x5 1x10 1x10    Clamshell        Hip ABD   Pink TB 2x10 standing Pink TB 2x10 standing Pink TB 2x10  standing 2x10 standing   Hip Ext  Pink TB 2x10 standing 4\" 2x10  Pink TB 2x10 standing 4\" 2x10  Pink TB 2x10 standing 2x10 standing   Lateral Tap Down        Side-stepping  Pink TB 3 laps  Pink TB 3 laps  Pink TB 3 laps mirror    Leg Press  60# 2x10 DL  30# 2x10 SL  60# 2x10 DL  30# 2x10 SL  60# 2x10 DL  45# 2x10 SL 65# 2x10 DL  45# SL 2x10   Wobble board balance    F/B 2'    Step Up   " "  Lateral Tap Down 4\" 2x10    Step Up 4\" 2x10                                  Ther Act                                         Modalities             CP PRN                                                 "

## 2024-11-11 ENCOUNTER — OFFICE VISIT (OUTPATIENT)
Dept: PHYSICAL THERAPY | Facility: CLINIC | Age: 64
End: 2024-11-11
Payer: MEDICARE

## 2024-11-11 DIAGNOSIS — Z96.641 S/P HIP REPLACEMENT, RIGHT: Primary | ICD-10-CM

## 2024-11-11 DIAGNOSIS — M16.11 PRIMARY OSTEOARTHRITIS OF RIGHT HIP: ICD-10-CM

## 2024-11-11 PROCEDURE — 97112 NEUROMUSCULAR REEDUCATION: CPT | Performed by: PHYSICAL THERAPIST

## 2024-11-11 PROCEDURE — 97110 THERAPEUTIC EXERCISES: CPT | Performed by: PHYSICAL THERAPIST

## 2024-11-11 NOTE — PROGRESS NOTES
"Daily Note     Today's date: 2024  Patient name: Luther Vaughan  : 1960  MRN: [id number]  Referring provider: Augustine Weston DO  Dx:   Encounter Diagnosis     ICD-10-CM    1. S/P hip replacement, right  Z96.641       2. Primary osteoarthritis of right hip  M16.11                      Subjective: Patient notes that his biggest issue is stiffness in his hip, but once he moves, it feels better.        Objective: See treatment diary below      Assessment: Tolerated treatment well. Patient would benefit from continued PT.  Patient doing well overall.  Did note muscular fatigue by the end of the session.  ROM is doing well, heavy emphasis on strengthening of the hip.  Continue to progress as able.        Plan: Progress treatment as tolerated.       Diagnosis:    Precautions:    POC Expires Auth Status Start Date Expiration Date PT Visit Limit     No Auth NA NA NA   Date 11/11  10/31 11/4 11/7   Used 6  3 4 5   Remaining        Manuals        PROM    TH: flexion to 90 Hip Flexion    Mobs                                There Ex        Bike   Upright 5' ROM NuStep 5' Uprihgt 5' ROM   Hamstring Stretch 20\" x 4  20\" x 4 20\"x4    Gastroc Stretch   20\" x 4 20\"x4    HS Curls Orange Swiss Ball   Orange Swiss Bal DL x20, SL x 20  Orange swiss bal DL x20  SLx20 Green swiss ball DL x20, SL x 20                   Neruo Re-Ed        Quad Set         SLR   1x10 1x10    Clamshell Supine Black 2x12       Hip ABD  2x10 Green TB standing  Pink TB 2x10 standing Pink TB 2x10  standing 2x10 standing   Hip Ext 2x10 Green TB standing   Pink TB 2x10 standing 4\" 2x10  Pink TB 2x10 standing 2x10 standing   Lateral Tap Down 4\" 2x10       Side-stepping [place] TB 4 laps @ mirror   Pink TB 3 laps  Pink TB 3 laps mirror    Leg Press 65# DL x10  65# SL x15  60# 2x10 DL  30# 2x10 SL  60# 2x10 DL  45# 2x10 SL 65# 2x10 DL  45# SL 2x10   Wobble board balance    F/B 2'    Step Up 4\" x 10     Lateral Tap Down 4\" 2x10    Step Up 4\" 2x10 "                                Ther Act                                   Modalities           CP PRN

## 2024-11-14 ENCOUNTER — OFFICE VISIT (OUTPATIENT)
Dept: PHYSICAL THERAPY | Facility: CLINIC | Age: 64
End: 2024-11-14
Payer: MEDICARE

## 2024-11-14 DIAGNOSIS — Z96.641 S/P HIP REPLACEMENT, RIGHT: Primary | ICD-10-CM

## 2024-11-14 DIAGNOSIS — M16.11 PRIMARY OSTEOARTHRITIS OF RIGHT HIP: ICD-10-CM

## 2024-11-14 PROCEDURE — 97110 THERAPEUTIC EXERCISES: CPT | Performed by: PHYSICAL THERAPIST

## 2024-11-14 PROCEDURE — 97112 NEUROMUSCULAR REEDUCATION: CPT | Performed by: PHYSICAL THERAPIST

## 2024-11-14 NOTE — PROGRESS NOTES
"Daily Note     Today's date: 2024  Patient name: Luther Vaughan  : 1960  MRN: [id number]  Referring provider: Augustine Weston DO  Dx:   Encounter Diagnosis     ICD-10-CM    1. S/P hip replacement, right  Z96.641       2. Primary osteoarthritis of right hip  M16.11                      Subjective: Patient notes that he will see ortho tomorrow.        Objective: See treatment diary below      Assessment: Tolerated treatment well. Patient would benefit from continued PT.  Patient doing well overall.  Continuing to focus on overall hip strength.  No complaints of pain s/p session.  Continue to progress as able.        Plan: Progress treatment as tolerated.         Diagnosis:    Precautions:    POC Expires Auth Status Start Date Expiration Date PT Visit Limit     No Auth NA NA NA   Date    Used 6 7  4 5   Remaining        Manuals        PROM    TH: flexion to 90 Hip Flexion    Mobs                                There Ex        Bike  NuStep 5' LVL 4  NuStep 5' Uprihgt 5' ROM   Hamstring Stretch 20\" x 4 20\" x 4  20\"x4    Gastroc Stretch    20\"x4    HS Curls Orange Swiss Ball  Orange Swiss Ball x15 DL, x15 SL   Orange swiss bal DL x20  SLx20 Green swiss ball DL x20, SL x 20                   Neruo Re-Ed        Quad Set         SLR    1x10    Clamshell Supine Black 2x12 Supine Black 3x10      Hip ABD  2x10 Green TB standing SL 2x10  2x10 Standing Green TB  Pink TB 2x10  standing 2x10 standing   Hip Ext 2x10 Green TB standing  2x10 Green TB standing  Pink TB 2x10 standing 2x10 standing   Lateral Tap Down 4\" 2x10       Side-stepping [place] TB 4 laps @ mirror  Green TB 3 laps @   Pink TB 3 laps mirror    Leg Press 65# DL x10  65# SL x15 135# x20 DL  65# SL x20   60# 2x10 DL  45# 2x10 SL 65# 2x10 DL  45# SL 2x10   Wobble board balance    F/B 2'    Step Up 4\" x 10     Lateral Tap Down 4\" 2x10    Step Up 4\" 2x10                               Ther Act                              "   Modalities          CP PRN

## 2024-11-19 ENCOUNTER — OFFICE VISIT (OUTPATIENT)
Dept: PHYSICAL THERAPY | Facility: CLINIC | Age: 64
End: 2024-11-19
Payer: MEDICARE

## 2024-11-19 DIAGNOSIS — M16.11 PRIMARY OSTEOARTHRITIS OF RIGHT HIP: Primary | ICD-10-CM

## 2024-11-19 DIAGNOSIS — Z96.641 S/P HIP REPLACEMENT, RIGHT: ICD-10-CM

## 2024-11-19 PROCEDURE — 97110 THERAPEUTIC EXERCISES: CPT | Performed by: PHYSICAL THERAPIST

## 2024-11-19 PROCEDURE — 97140 MANUAL THERAPY 1/> REGIONS: CPT | Performed by: PHYSICAL THERAPIST

## 2024-11-19 NOTE — PROGRESS NOTES
PT Re-Evaluation     Today's date: 2024  Patient name: Luther Vaughan  : 1960  MRN: [id number]  Referring provider: Augustine Weston DO  Dx:   Encounter Diagnosis     ICD-10-CM    1. Primary osteoarthritis of right hip  M16.11       2. S/P hip replacement, right  Z96.641                        Assessment  Impairments: abnormal muscle firing, abnormal or restricted ROM, activity intolerance, impaired physical strength, lacks appropriate home exercise program, pain with function, poor posture  and poor body mechanics    Assessment details: Luther Vaughan is a 64 y.o. male who has been consistent with attending and participating in skilled PT sessions.  The patient is doing well with PT.  He is demonstrating improving strength and ROM.  He is still limited with his strength, ambulation, and overall function.  He will continue to benefit from further PT sessions to increase strength and overall functional performance.      Understanding of Dx/Px/POC: good     Prognosis: good    Goals  Impairment Goals  - Decrease pain by 50% in 8 weeks - PARTIALLY MET   - Increase right flexibility by 50% in 8 weeks - PARTIALLY MET   - Increase right lower extremity strength Nexant to 4+/5 in 8 weeks - PARTIALLY MET     Functional Goals  - Return to Prior Level of Function in 8 weeks - PARTIALLY MET   - Patient will be independent with HEP in 8 weeks - PARTIALLY MET   - Patient will be able to ambulate with decreased pain in 8 weeks - PARTIALLY MET   - Patient will be able to ascend/descend stairs with a reciprocal gait pattern in 8 weeks - PARTIALLY MET   - Patient will be able to complete ADLs with decreased pain in 4 weeks - PARTIALLY MET       Plan  Patient would benefit from: skilled physical therapy  Planned modality interventions: cryotherapy, thermotherapy: hydrocollator packs and TENS    Planned therapy interventions: home exercise program, graded exercise, functional ROM exercises, flexibility, body  "mechanics training, postural training, patient education, therapeutic activities, therapeutic exercise, manual therapy, joint mobilization and neuromuscular re-education    Frequency: 2x week  Duration in weeks: 8  Treatment plan discussed with: patient        Subjective Evaluation    History of Present Illness  Mechanism of injury: Patient notes that he has improved with his ambulation.  He notes that he has been working on his reciprocal gait pattern.  He notes taht sleeping at night can be difficult due to pain.  He notes that his endurance is still lacking.      HPI:  Patient underwent a right hip replacement on 10/21/24.  He now presents for his post-op evaluation.      Pain Location:  R Hip   Occupation:     Prior Functional Limitations: Independent prior   AGG:  Sleeping, ambulation, stairs, ADLs / dressing, moving around  Ease:  Ice  Patient Goals:  \"I just want to be myself again\"     Pain  Current pain ratin  At best pain ratin  At worst pain ratin          Objective     Passive Range of Motion     Right Hip   Flexion: 90 degrees   Abduction: 40 degrees     Strength/Myotome Testing     Left Hip   Normal muscle strength    Right Hip   Planes of Motion   Flexion: 4-  Extension: 4-  Abduction: 3+    Right Knee   Flexion: 4+  Extension: 4+    Right Ankle/Foot   Dorsiflexion: 5    Ambulation     Comments   Patient ambulating with a SPC.  Slight step-through gait pattern, antalgic gait - decreased stance on right LE                Diagnosis:    Precautions:    POC Expires Auth Status Start Date Expiration Date PT Visit Limit     No Auth NA NA NA   Date    Used 6 7 8  5   Remaining        Manuals        PROM     Hip Flexion    Mobs                                There Ex        Bike  NuStep 5' LVL 4 NuStep 6' 6' LVL 4  Uprihgt 5' ROM   Hamstring Stretch 20\" x 4 20\" x 4 20\" x 4     Gastroc Stretch   20\" x 4     HS Curls Orange Swiss Ball  Orange Swiss Ball x15 DL, " "x15 SL  Orange Swiss Ball x15 DL, x15 SL   Green swiss ball DL x20, SL x 20           Patient Education    RT - RE      Neruo Re-Ed        Quad Set         SLR        Clamshell Supine Black 2x12 Supine Black 3x10 SL no band 2x10     Hip ABD  2x10 Green TB standing SL 2x10  2x10 Standing Green TB SL 2x10  Standing 3x10 4#  2x10 standing   Hip Ext 2x10 Green TB standing  2x10 Green TB standing Standing 4# 3x10  2x10 standing   Lateral Tap Down 4\" 2x10  6\" 2x10     Side-stepping [place] TB 4 laps @ mirror  Green TB 3 laps @  Onto black dynadisc 2x10      Leg Press 65# DL x10  65# SL x15 135# x20 DL  65# SL x20  135# x20 DL  65# SL 2x12  65# 2x10 DL  45# SL 2x10   Wobble board balance        Step Up 4\" x 10     Lateral Tap Down 4\" 2x10    Step Up 4\" 2x10                              Ther Act                             Modalities         CP PRN                                "

## 2024-11-19 NOTE — LETTER
2024    Augustine Weston DO  [address]  [address]  [facility] [address]    Patient: Luther Vaughan   YOB: 1960   Date of Visit: 2024     Encounter Diagnosis     ICD-10-CM    1. Primary osteoarthritis of right hip  M16.11       2. S/P hip replacement, right  Z96.641           Dear Dr. Weston:    Thank you for your recent referral of Luther Vaughan. Please review the attached evaluation summary from Luther's recent visit.     Please verify that you agree with the plan of care by signing the attached order.     If you have any questions or concerns, please do not hesitate to call.     I sincerely appreciate the opportunity to share in the care of one of your patients and hope to have another opportunity to work with you in the near future.       Sincerely,    Derrek Montanez, PT      Referring Provider:      I certify that I have read the below Plan of Care and certify the need for these services furnished under this plan of treatment while under my care.                    Augustine Weston DO  [address]  [address]  [facility] [address]  Via Fax: [phone]          PT Re-Evaluation     Today's date: 2024  Patient name: Luther Vaughan  : 1960  MRN: [id number]  Referring provider: Augustine Weston DO  Dx:   Encounter Diagnosis     ICD-10-CM    1. Primary osteoarthritis of right hip  M16.11       2. S/P hip replacement, right  Z96.641                        Assessment  Impairments: abnormal muscle firing, abnormal or restricted ROM, activity intolerance, impaired physical strength, lacks appropriate home exercise program, pain with function, poor posture  and poor body mechanics    Assessment details: Luther Vaughan is a 64 y.o. male who has been consistent with attending and participating in skilled PT sessions.  The patient is doing well with PT.  He is demonstrating improving strength and ROM.  He is still limited with his strength,  ambulation, and overall function.  He will continue to benefit from further PT sessions to increase strength and overall functional performance.      Understanding of Dx/Px/POC: good     Prognosis: good    Goals  Impairment Goals  - Decrease pain by 50% in 8 weeks - PARTIALLY MET   - Increase right flexibility by 50% in 8 weeks - PARTIALLY MET   - Increase right lower extremity strength [place] to 4+/5 in 8 weeks - PARTIALLY MET     Functional Goals  - Return to Prior Level of Function in 8 weeks - PARTIALLY MET   - Patient will be independent with HEP in 8 weeks - PARTIALLY MET   - Patient will be able to ambulate with decreased pain in 8 weeks - PARTIALLY MET   - Patient will be able to ascend/descend stairs with a reciprocal gait pattern in 8 weeks - PARTIALLY MET   - Patient will be able to complete ADLs with decreased pain in 4 weeks - PARTIALLY MET       Plan  Patient would benefit from: skilled physical therapy  Planned modality interventions: cryotherapy, thermotherapy: hydrocollator packs and TENS    Planned therapy interventions: home exercise program, graded exercise, functional ROM exercises, flexibility, body mechanics training, postural training, patient education, therapeutic activities, therapeutic exercise, manual therapy, joint mobilization and neuromuscular re-education    Frequency: 2x week  Duration in weeks: 8  Treatment plan discussed with: patient        Subjective Evaluation    History of Present Illness  Mechanism of injury: Patient notes that he has improved with his ambulation.  He notes that he has been working on his reciprocal gait pattern.  He notes taht sleeping at night can be difficult due to pain.  He notes that his endurance is still lacking.      HPI:  Patient underwent a right hip replacement on 10/21/24.  He now presents for his post-op evaluation.      Pain Location:  R Hip   Occupation:     Prior Functional Limitations: Independent prior   AGG:  Sleeping,  "ambulation, stairs, ADLs / dressing, moving around  Ease:  Ice  Patient Goals:  \"I just want to be myself again\"     Pain  Current pain ratin  At best pain ratin  At worst pain ratin          Objective     Passive Range of Motion     Right Hip   Flexion: 90 degrees   Abduction: 40 degrees     Strength/Myotome Testing     Left Hip   Normal muscle strength    Right Hip   Planes of Motion   Flexion: 4-  Extension: 4-  Abduction: 3+    Right Knee   Flexion: 4+  Extension: 4+    Right Ankle/Foot   Dorsiflexion: 5    Ambulation     Comments   Patient ambulating with a SPC.  Slight step-through gait pattern, antalgic gait - decreased stance on right LE                Diagnosis:    Precautions:    POC Expires Auth Status Start Date Expiration Date PT Visit Limit     No Auth NA NA NA   Date    Used 6 7 8  5   Remaining        Manuals        PROM     Hip Flexion    Mobs                                There Ex        Bike  NuStep 5' LVL 4 NuStep 6' 6' LVL 4  Uprihgt 5' ROM   Hamstring Stretch 20\" x 4 20\" x 4 20\" x 4     Gastroc Stretch   20\" x 4     HS Curls Orange Swiss Ball  Orange Swiss Ball x15 DL, x15 SL  Orange Swiss Ball x15 DL, x15 SL   Green swiss ball DL x20, SL x 20           Patient Education    RT - RE      Neruo Re-Ed        Quad Set         SLR        Clamshell Supine Black 2x12 Supine Black 3x10 SL no band 2x10     Hip ABD  2x10 Green TB standing SL 2x10  2x10 Standing Green TB SL 2x10  Standing 3x10 4#  2x10 standing   Hip Ext 2x10 Green TB standing  2x10 Green TB standing Standing 4# 3x10  2x10 standing   Lateral Tap Down 4\" 2x10  6\" 2x10     Side-stepping [place] TB 4 laps @ mirror  Green TB 3 laps @  Onto black dynadisc 2x10      Leg Press 65# DL x10  65# SL x15 135# x20 DL  65# SL x20  135# x20 DL  65# SL 2x12  65# 2x10 DL  45# SL 2x10   Wobble board balance        Step Up 4\" x 10     Lateral Tap Down 4\" 2x10    Step Up 4\" 2x10                              Ther Act    "                          Modalities         CP PRN

## 2024-11-22 ENCOUNTER — OFFICE VISIT (OUTPATIENT)
Dept: PHYSICAL THERAPY | Facility: CLINIC | Age: 64
End: 2024-11-22
Payer: MEDICARE

## 2024-11-22 DIAGNOSIS — M16.11 PRIMARY OSTEOARTHRITIS OF RIGHT HIP: Primary | ICD-10-CM

## 2024-11-22 DIAGNOSIS — Z96.641 S/P HIP REPLACEMENT, RIGHT: ICD-10-CM

## 2024-11-22 PROCEDURE — 97112 NEUROMUSCULAR REEDUCATION: CPT | Performed by: PHYSICAL THERAPIST

## 2024-11-22 PROCEDURE — 97110 THERAPEUTIC EXERCISES: CPT | Performed by: PHYSICAL THERAPIST

## 2024-11-22 PROCEDURE — 97140 MANUAL THERAPY 1/> REGIONS: CPT | Performed by: PHYSICAL THERAPIST

## 2024-11-22 NOTE — PROGRESS NOTES
"Daily Note     Today's date: 2024  Patient name: Luther Vaughan  : 1960  MRN: [id number]  Referring provider: Augustine Weston DO  Dx:   Encounter Diagnosis     ICD-10-CM    1. Primary osteoarthritis of right hip  M16.11       2. S/P hip replacement, right  Z96.641                      Subjective: Patient with no new complaints today.        Objective: See treatment diary below      Assessment: Tolerated treatment well. Patient would benefit from continued PT.  Patient is doing well overall.  Does still note that the first step or two can be the worst, but once he starts moving, it gets better.  Continuing to work on overall hip strength and stability.  Patient to work on more single leg stance at home.  Continue to progress as able.        Plan: Progress treatment as tolerated.       Diagnosis:    Precautions:    POC Expires Auth Status Start Date Expiration Date PT Visit Limit     No Auth NA NA NA   Date     Used 6 7 8 9    Remaining        Manuals        PROM    Hip Ext    Mobs                                There Ex        Bike  NuStep 5' LVL 4 NuStep 6' 6' LVL 4 NuSTep 5' lvl 6    Hamstring Stretch 20\" x 4 20\" x 4 20\" x 4 20\" x 4    Gastroc Stretch   20\" x 4 20\" x 4    HS Curls Orange Swiss Ball  Orange Swiss Ball x15 DL, x15 SL  Orange Swiss Ball x15 DL, x15 SL  Swiss Ball x15 DL, x15 SL             Patient Education    RT - RE      Neruo Re-Ed        Quad Set         Bridge    X10, x10 w/ toe lift     Clamshell Supine Black 2x12 Supine Black 3x10 SL no band 2x10 SL no band 2x10    Hip ABD  2x10 Green TB standing SL 2x10  2x10 Standing Green TB SL 2x10  Standing 3x10 4# Standing 3x12 4#     Hip Ext 2x10 Green TB standing  2x10 Green TB standing Standing 4# 3x10     Marching    4# 2x12    Lateral Tap Down 4\" 2x10  6\" 2x10     Side-stepping [place] TB 4 laps @ mirror  Green TB 3 laps @  Onto black dynadisc 2x10      Leg Press 65# DL x10  65# SL x15 135# x20 DL  65# SL " "x20  135# x20 DL  65# SL 2x12 140# 2x12 DL  70# 2x12 SL     Wobble board balance        Step Up 4\" x 10                                  Ther Act                          Modalities        CP PRN                                    "

## 2024-11-25 ENCOUNTER — OFFICE VISIT (OUTPATIENT)
Dept: PHYSICAL THERAPY | Facility: CLINIC | Age: 64
End: 2024-11-25
Payer: MEDICARE

## 2024-11-25 DIAGNOSIS — M16.11 PRIMARY OSTEOARTHRITIS OF RIGHT HIP: Primary | ICD-10-CM

## 2024-11-25 DIAGNOSIS — Z96.641 S/P HIP REPLACEMENT, RIGHT: ICD-10-CM

## 2024-11-25 PROCEDURE — 97112 NEUROMUSCULAR REEDUCATION: CPT | Performed by: PHYSICAL THERAPIST

## 2024-11-25 PROCEDURE — 97110 THERAPEUTIC EXERCISES: CPT | Performed by: PHYSICAL THERAPIST

## 2024-11-25 PROCEDURE — 97140 MANUAL THERAPY 1/> REGIONS: CPT | Performed by: PHYSICAL THERAPIST

## 2024-11-25 NOTE — PROGRESS NOTES
"Daily Note     Today's date: 2024  Patient name: Luther Vaughan  : 1960  MRN: [id number]  Referring provider: Augustine Weston DO  Dx:   Encounter Diagnosis     ICD-10-CM    1. Primary osteoarthritis of right hip  M16.11       2. S/P hip replacement, right  Z96.641                      Subjective: Patient notes that he is not having any really pain, but it is primarily stiffness in his hip.        Objective: See treatment diary below      Assessment: Tolerated treatment well. Patient would benefit from continued PT.  Patient doing well overall.  Does note feeling looser after stretching the patient into hip extension.  No complaints of pain during or post session.  Continuing to focus on overall strength of the right hip and lower extremity.  Progress as able.        Plan: Progress treatment as tolerated.         Diagnosis:    Precautions:    POC Expires Auth Status Start Date Expiration Date PT Visit Limit     No Auth NA NA NA   Date    Used 6 7 8 9    Remaining        Manuals        PROM    Hip Ext Hip Ext    Mobs                                There Ex        Bike  NuStep 5' LVL 4 NuStep 6' 6' LVL 4 NuSTep 5' lvl 6 NuStep 5' LVL 6   Hamstring Stretch 20\" x 4 20\" x 4 20\" x 4 20\" x 4    Gastroc Stretch   20\" x 4 20\" x 4    HS Curls Orange Swiss Ball  Orange Swiss Ball x15 DL, x15 SL  Orange Swiss Ball x15 DL, x15 SL  Swiss Ball x15 DL, x15 SL  Swiss Ball x15 DL, x15 SL            Patient Education    RT - RE      Neruo Re-Ed        Quad Set         Bridge    X10, x10 w/ toe lift  2x10   Clamshell Supine Black 2x12 Supine Black 3x10 SL no band 2x10 SL no band 2x10 SL no band 2x10   SLR     2x10   Hip ABD  2x10 Green TB standing SL 2x10  2x10 Standing Green TB SL 2x10  Standing 3x10 4# Standing 3x12 4#     Hip Ext 2x10 Green TB standing  2x10 Green TB standing Standing 4# 3x10     Marching    4# 2x12    Lateral Tap Down 4\" 2x10  6\" 2x10     Side-stepping [place] TB 4 " "laps @ mirror  Green TB 3 laps @  Onto black dynadisc 2x10      Leg Press 65# DL x10  65# SL x15 135# x20 DL  65# SL x20  135# x20 DL  65# SL 2x12 140# 2x12 DL  70# 2x12 SL  150# 2x12 DL  70# 2x12 SL   Wobble board balance     Lateral 1' balance x 2    Step Up 4\" x 10                                  Ther Act                          Modalities        CP PRN                                      "

## 2024-11-27 ENCOUNTER — OFFICE VISIT (OUTPATIENT)
Dept: PHYSICAL THERAPY | Facility: CLINIC | Age: 64
End: 2024-11-27
Payer: MEDICARE

## 2024-11-27 DIAGNOSIS — Z96.641 S/P HIP REPLACEMENT, RIGHT: ICD-10-CM

## 2024-11-27 DIAGNOSIS — M16.11 PRIMARY OSTEOARTHRITIS OF RIGHT HIP: Primary | ICD-10-CM

## 2024-11-27 PROCEDURE — 97140 MANUAL THERAPY 1/> REGIONS: CPT | Performed by: PHYSICAL THERAPIST

## 2024-11-27 PROCEDURE — 97110 THERAPEUTIC EXERCISES: CPT | Performed by: PHYSICAL THERAPIST

## 2024-11-27 PROCEDURE — 97112 NEUROMUSCULAR REEDUCATION: CPT | Performed by: PHYSICAL THERAPIST

## 2024-11-27 NOTE — PROGRESS NOTES
"Daily Note     Today's date: 2024  Patient name: Luther Vaughan  : 1960  MRN: [id number]  Referring provider: Augustine Westno DO  Dx:   Encounter Diagnosis     ICD-10-CM    1. Primary osteoarthritis of right hip  M16.11       2. S/P hip replacement, right  Z96.641                      Subjective: Patient with no new complaints today.      Objective: See treatment diary below      Assessment: Tolerated treatment well. Patient would benefit from continued PT.  Patient doing better overall.  Continuing to work on overall strength and hip mobility.  No complaints of pain during or ost session.  Continue to progress as able.        Plan: Progress treatment as tolerated.         Diagnosis:    Precautions:    POC Expires Auth Status Start Date Expiration Date PT Visit Limit     No Auth NA NA NA   Date    Used   8 9    Remaining        Manuals        PROM Hip Ext    Hip Ext Hip Ext    Mobs                                There Ex        Bike Bike 5' ROM   NuStep 6' 6' LVL 4 NuSTep 5' lvl 6 NuStep 5' LVL 6   Hamstring Stretch   20\" x 4 20\" x 4    Gastroc Stretch   20\" x 4 20\" x 4    HS Curls Swiss Ball x15 DL, x15 SL   Orange Swiss Ball x15 DL, x15 SL  Swiss Ball x15 DL, x15 SL  Swiss Ball x15 DL, x15 SL            Patient Education    RT - RE      Neruo Re-Ed        Quad Set         Bridge 2x12   X10, x10 w/ toe lift  2x10   Clamshell SL x12  SL Orange TB 2x12  SL no band 2x10 SL no band 2x10 SL no band 2x10   SLR     2x10   Hip ABD  SL 2x12  SL 2x10  Standing 3x10 4# Standing 3x12 4#     Hip Ext   Standing 4# 3x10     Marching    4# 2x12    Lateral Tap Down   6\" 2x10     Side-stepping Lateral Tap down 4\" 2x12    Side-stepping @ rail [place] TB 3 laps   Onto black dynadisc 2x10      Leg Press 150# 2x12 DL   135# x20 DL  65# SL 2x12 140# 2x12 DL  70# 2x12 SL  150# 2x12 DL  70# 2x12 SL   Wobble board balance Fwd & lateral blaance 1.5' x1     Lateral 1' balance x 2    Step Up      "                             Ther Act                          Modalities        CP PRN

## 2024-12-02 ENCOUNTER — OFFICE VISIT (OUTPATIENT)
Dept: PHYSICAL THERAPY | Facility: CLINIC | Age: 64
End: 2024-12-02
Payer: MEDICARE

## 2024-12-02 DIAGNOSIS — Z96.641 S/P HIP REPLACEMENT, RIGHT: ICD-10-CM

## 2024-12-02 DIAGNOSIS — M16.11 PRIMARY OSTEOARTHRITIS OF RIGHT HIP: Primary | ICD-10-CM

## 2024-12-02 PROCEDURE — 97110 THERAPEUTIC EXERCISES: CPT | Performed by: PHYSICAL THERAPIST

## 2024-12-02 PROCEDURE — 97112 NEUROMUSCULAR REEDUCATION: CPT | Performed by: PHYSICAL THERAPIST

## 2024-12-02 PROCEDURE — 97140 MANUAL THERAPY 1/> REGIONS: CPT | Performed by: PHYSICAL THERAPIST

## 2024-12-02 NOTE — PROGRESS NOTES
"Daily Note     Today's date: 2024  Patient name: Luther Vaughan  : 1960  MRN: [id number]  Referring provider: Augustine Weston DO  Dx:   Encounter Diagnosis     ICD-10-CM    1. Primary osteoarthritis of right hip  M16.11       2. S/P hip replacement, right  Z96.641                      Subjective: Patient notes that he has been moving more at home.  Still has the biggest complaint of stiffness.        Objective: See treatment diary below      Assessment: Tolerated treatment well. Patient would benefit from continued PT.  Patient continues to do well overall with PT.  Continuing to focus on overall strength of the LE.  Is challenged with SLS.  Continue to focus on strength as able.        Plan: Progress treatment as tolerated.         Diagnosis:    Precautions:    POC Expires Auth Status Start Date Expiration Date PT Visit Limit     No Auth NA NA NA   Date    Used    9    Remaining        Manuals        PROM Hip Ext  Hip Ext   Hip Ext Hip Ext    Mobs                                There Ex        Bike Bike 5' ROM  NuStep LVL 6 5'  NuSTep 5' lvl 6 NuStep 5' LVL 6   Hamstring Stretch    20\" x 4    Gastroc Stretch    20\" x 4    HS Curls Swiss Ball x15 DL, x15 SL    Swiss Ball x15 DL, x15 SL  Swiss Ball x15 DL, x15 SL            Patient Education         Neruo Re-Ed        Quad Set         Bridge 2x12 2x12  X10, x10 w/ toe lift  2x10   Clamshell SL x12  SL [place] TB 2x12 SL x12  SL Orange TB 3x12   SL no band 2x10 SL no band 2x10   SLR  X10  1# 2x10    2x10   Hip ABD  SL 2x12 SL 2x12   Standing 3x12 4#     Hip Ext        Marching    4# 2x12    Lateral Tap Down        Side-stepping Lateral Tap down 4\" 2x12    Side-stepping @ rail [place] TB 3 laps        Leg Press 150# 2x12 DL  155# 2x12 DL  75# 2x12 SL  140# 2x12 DL  70# 2x12 SL  150# 2x12 DL  70# 2x12 SL   Wobble board balance Fwd & lateral blaance 1.5' x1  Fwd & lateral balance 1.5' ea   Lateral 1' balance x 2    Step Up  6\" " 2x10                                Ther Act                          Modalities        CP PRN

## 2024-12-05 ENCOUNTER — OFFICE VISIT (OUTPATIENT)
Dept: PHYSICAL THERAPY | Facility: CLINIC | Age: 64
End: 2024-12-05
Payer: MEDICARE

## 2024-12-05 DIAGNOSIS — M16.11 PRIMARY OSTEOARTHRITIS OF RIGHT HIP: Primary | ICD-10-CM

## 2024-12-05 DIAGNOSIS — Z96.641 S/P HIP REPLACEMENT, RIGHT: ICD-10-CM

## 2024-12-05 PROCEDURE — 97112 NEUROMUSCULAR REEDUCATION: CPT | Performed by: PHYSICAL THERAPIST

## 2024-12-05 PROCEDURE — 97140 MANUAL THERAPY 1/> REGIONS: CPT | Performed by: PHYSICAL THERAPIST

## 2024-12-05 PROCEDURE — 97110 THERAPEUTIC EXERCISES: CPT | Performed by: PHYSICAL THERAPIST

## 2024-12-05 NOTE — PROGRESS NOTES
"Daily Note     Today's date: 2024  Patient name: Luther Vaughan  : 1960  MRN: [id number]  Referring provider: Augustine Weston DO  Dx:   Encounter Diagnosis     ICD-10-CM    1. Primary osteoarthritis of right hip  M16.11       2. S/P hip replacement, right  Z96.641                      Subjective: Patient notes that he still has stiffness in his hip.        Objective: See treatment diary below      Assessment: Tolerated treatment well. Patient would benefit from continued PT.  Patient doing well.  Still notes stiffness in his hip and needs to take several small antalgic steps before he can begin to truly ambulate.  Still using a SPC at this time and does demonstrate decreased stance time on his right lower extremity.  Continue to progress strength as able.        Plan: Progress treatment as tolerated.         Diagnosis:    Precautions:    POC Expires Auth Status Start Date Expiration Date PT Visit Limit     No Auth NA NA NA   Date    Used        Remaining        Manuals        PROM Hip Ext  Hip Ext  Hip Ext  Hip Ext    Mobs                                There Ex        Bike Bike 5' ROM  NuStep LVL 6 5' NuStep LVL 6 5'   NuStep 5' LVL 6   Hamstring Stretch        Gastroc Stretch        HS Curls Swiss Ball x15 DL, x15 SL   Swiss Ball x15 DL, x15 SL   Swiss Ball x15 DL, x15 SL            Patient Education         Neruo Re-Ed        Quad Set         Bridge 2x12 2x12 2x12  2x10   Clamshell SL x12  SL [place] TB 2x12 SL x12  SL Orange TB 3x12  SL Green 3x12   SL no band 2x10   SLR  X10  1# 2x10  X10  2# 2x10   2x10   Hip ABD  SL 2x12 SL 2x12  SL 2x12      Hip Ext        Marching        Lateral Tap Down        Side-stepping Lateral Tap down 4\" 2x12    Side-stepping @ rail [place] TB 3 laps   Side-stepping @ rail Green TB toes forward 3 laps     Side step onto 2\" step 3\" x 10, 5\" 2x10     Leg Press 150# 2x12 DL  155# 2x12 DL  75# 2x12 # 2x12 DL  75# 2x12 SL   150# 2x12 " "DL  70# 2x12 SL   Wobble board balance Fwd & lateral blaance 1.5' x1  Fwd & lateral balance 1.5' ea   Lateral 1' balance x 2    Step Up  6\" 2x10      Foam   Marching w/ SLS on right 5\" 2x5    SLS on R 20\" x 3                       Ther Act                          Modalities        CP PRN                                          "

## 2024-12-09 ENCOUNTER — OFFICE VISIT (OUTPATIENT)
Dept: PHYSICAL THERAPY | Facility: CLINIC | Age: 64
End: 2024-12-09
Payer: MEDICARE

## 2024-12-09 DIAGNOSIS — M16.11 PRIMARY OSTEOARTHRITIS OF RIGHT HIP: Primary | ICD-10-CM

## 2024-12-09 DIAGNOSIS — Z96.641 S/P HIP REPLACEMENT, RIGHT: ICD-10-CM

## 2024-12-09 PROCEDURE — 97110 THERAPEUTIC EXERCISES: CPT | Performed by: PHYSICAL THERAPIST

## 2024-12-09 PROCEDURE — 97112 NEUROMUSCULAR REEDUCATION: CPT | Performed by: PHYSICAL THERAPIST

## 2024-12-09 NOTE — PROGRESS NOTES
"Daily Note     Today's date: 2024  Patient name: Luther Vaughan  : 1960  MRN: [id number]  Referring provider: Augustine Weston DO  Dx:   Encounter Diagnosis     ICD-10-CM    1. Primary osteoarthritis of right hip  M16.11       2. S/P hip replacement, right  Z96.641                      Subjective: Patient notes that he had a really good day on Saturday, but felt more stiff yesterday.        Objective: See treatment diary below      Assessment: Tolerated treatment well. Patient would benefit from continued PT.  Continuing to do well with PT.  Able to tolerate increases in PRES and reps today.  Can still note stiffness in his hip and has difficulty with his first few steps of ambulation.  Continue to progress as able.        Plan: Progress treatment as tolerated.         Diagnosis:    Precautions:    POC Expires Auth Status Start Date Expiration Date PT Visit Limit     No Auth NA NA NA   Date     Used        Remaining        Manuals        PROM Hip Ext  Hip Ext  Hip Ext     Mobs                                There Ex        Bike Bike 5' ROM  NuStep LVL 6 5' NuStep LVL 6 5'  NuStep LVL 7 5'    Hamstring Stretch        Gastroc Stretch        HS Curls Swiss Ball x15 DL, x15 SL   Swiss Ball x15 DL, x15 SL  Swiss Ball x15 DL, x15 SL            Patient Education         Neruo Re-Ed        Quad Set         Bridge 2x12 2x12 2x12 2x12    Clamshell SL x12  SL [place] TB 2x12 SL x12  SL Orange TB 3x12  SL Green 3x12  SL Pink 3x10    SLR  X10  1# 2x10  X10  2# 2x10  2.5# 2x10    Hip ABD  SL 2x12 SL 2x12  SL 2x12  SL 2x12    Hip Ext        Marching        Lateral Tap Down        Side-stepping Lateral Tap down 4\" 2x12    Side-stepping @ rail [place] TB 3 laps   Side-stepping @ rail Green TB toes forward 3 laps     Side step onto 2\" step 3\" x 10, 5\" 2x10 Side-stepping @ rail blue TB toes forward 3 laps     Side step onto blue foam w/ 3\" SLS x12    Leg Press 150# 2x12 DL  155# 2x12 DL  75# " "2x12 # 2x12 DL  75# 2x12 SL  160# 2x10 DL  80# 2x10 SL     Wobble board balance Fwd & lateral blaance 1.5' x1  Fwd & lateral balance 1.5' ea      Step Up  6\" 2x10  Up and over on blue foam x 10     Foam   Marching w/ SLS on right 5\" 2x5    SLS on R 20\" x 3     SLS    10\" x 5 on R               Ther Act                          Modalities        CP PRN                                           "

## 2024-12-12 ENCOUNTER — OFFICE VISIT (OUTPATIENT)
Dept: PHYSICAL THERAPY | Facility: CLINIC | Age: 64
End: 2024-12-12
Payer: MEDICARE

## 2024-12-12 DIAGNOSIS — M16.11 PRIMARY OSTEOARTHRITIS OF RIGHT HIP: Primary | ICD-10-CM

## 2024-12-12 DIAGNOSIS — Z96.641 S/P HIP REPLACEMENT, RIGHT: ICD-10-CM

## 2024-12-12 PROCEDURE — 97112 NEUROMUSCULAR REEDUCATION: CPT | Performed by: PHYSICAL THERAPIST

## 2024-12-12 PROCEDURE — 97116 GAIT TRAINING THERAPY: CPT | Performed by: PHYSICAL THERAPIST

## 2024-12-12 PROCEDURE — 97110 THERAPEUTIC EXERCISES: CPT | Performed by: PHYSICAL THERAPIST

## 2024-12-12 NOTE — PROGRESS NOTES
"Daily Note     Today's date: 2024  Patient name: Luther Vaughan  : 1960  MRN: [id number]  Referring provider: Augustine Weston DO  Dx:   Encounter Diagnosis     ICD-10-CM    1. Primary osteoarthritis of right hip  M16.11       2. S/P hip replacement, right  Z96.641                      Subjective: Patient reports \"same old, same old\" today.        Objective: See treatment diary below      Assessment: Tolerated treatment well. Patient would benefit from continued PT.  Patient doing well with PT.  Continuing to focus heavily on his strength.  Still limited with his ambulation, especially his first few steps.  Worked more today on his gait with emphasis on WB and even step length and stance time.  Continue to progress as able.        Plan: Progress treatment as tolerated.         Diagnosis:    Precautions:    POC Expires Auth Status Start Date Expiration Date PT Visit Limit     No Auth NA NA NA   Date    Used        Remaining        Manuals        PROM Hip Ext  Hip Ext  Hip Ext  Hip Ext    Mobs                                There Ex        Bike Bike 5' ROM  NuStep LVL 6 5' NuStep LVL 6 5'  NuStep LVL 7 5' NuStep LVL 7 5'    Hamstring Stretch        Gastroc Stretch        HS Curls Swiss Ball x15 DL, x15 SL   Swiss Ball x15 DL, x15 SL  Swiss Ball x15 DL, x15 SL Swiss Ball x15 DL< x15 Sl            Patient Education         Neruo Re-Ed        Quad Set         Bridge 2x12 2x12 2x12 2x12 2x15   Clamshell SL x12  SL [place] TB 2x12 SL x12  SL Orange TB 3x12  SL Green 3x12  SL Pink 3x10 Supine Black single leg fall out x30   SLR  X10  1# 2x10  X10  2# 2x10  2.5# 2x10 2.5# 3x12   Hip ABD  SL 2x12 SL 2x12  SL 2x12  SL 2x12 SL x10   SL 1.5# 2x10   Hip Ext        Marching     X10 ea  X10 w/ 5\" hold on R LE   Lateral Tap Down        SLS     30\" x 4   Side-stepping Lateral Tap down 4\" 2x12    Side-stepping @ rail [place] TB 3 laps   Side-stepping @ rail [place] TB toes forward 3 laps " "    Side step onto 2\" step 3\" x 10, 5\" 2x10 Side-stepping @ rail blue TB toes forward 3 laps     Side step onto blue foam w/ 3\" SLS x12    Leg Press 150# 2x12 DL  155# 2x12 DL  75# 2x12 # 2x12 DL  75# 2x12 SL  160# 2x10 DL  80# 2x10 SL  80# 2x12 SL    Wobble board balance Fwd & lateral blaance 1.5' x1  Fwd & lateral balance 1.5' ea      Step Up  6\" 2x10  Up and over on blue foam x 10     Foam   Marching w/ SLS on right 5\" 2x5    SLS on R 20\" x 3     SLS    10\" x 5 on R              Gait Training        @ Rail     Increased stance time on R, walking over hurdles 6 laps, walking over imaginary hurdles 6 laps             Modalities        CP PRN                                             "

## 2024-12-16 ENCOUNTER — OFFICE VISIT (OUTPATIENT)
Dept: PHYSICAL THERAPY | Facility: CLINIC | Age: 64
End: 2024-12-16
Payer: MEDICARE

## 2024-12-16 DIAGNOSIS — Z96.641 S/P HIP REPLACEMENT, RIGHT: ICD-10-CM

## 2024-12-16 DIAGNOSIS — M16.11 PRIMARY OSTEOARTHRITIS OF RIGHT HIP: Primary | ICD-10-CM

## 2024-12-16 PROCEDURE — 97112 NEUROMUSCULAR REEDUCATION: CPT | Performed by: PHYSICAL THERAPIST

## 2024-12-16 PROCEDURE — 97110 THERAPEUTIC EXERCISES: CPT | Performed by: PHYSICAL THERAPIST

## 2024-12-16 NOTE — PROGRESS NOTES
"Daily Note     Today's date: 2024  Patient name: Luther Vaughan  : 1960  MRN: [id number]  Referring provider: Augustine Weston DO  Dx:   Encounter Diagnosis     ICD-10-CM    1. Primary osteoarthritis of right hip  M16.11       2. S/P hip replacement, right  Z96.641                      Subjective: Patient notes that he walked across a long parking lot over the weekend.        Objective: See treatment diary below      Assessment: Tolerated treatment well. Patient would benefit from continued PT.  Patient notes that his back been bothering him over the weekend, but notes that his leg is feeling good.  Did note have prolonged ambulation over the weekend and only needed to stop and sit for a minute or two until he was able to go again.        Plan: Progress treatment as tolerated.         Diagnosis:    Precautions:    POC Expires Auth Status Start Date Expiration Date PT Visit Limit     No Auth NA NA NA   Date    Used        Remaining        Manuals        PROM   Hip Ext  Hip Ext    Mobs                                There Ex        Bike NuStep LVL 7 5'  NuStep LVL 6 5'  NuStep LVL 7 5' NuStep LVL 7 5'    Hamstring Stretch        Gastroc Stretch        HS Curls Swiss Ball x15 DL, x15 SL   Swiss Ball x15 DL, x15 SL  Swiss Ball x15 DL, x15 SL Swiss Ball x15 DL< x15 Sl            Patient Education         Neruo Re-Ed        Quad Set         Bridge 2x12  2x12 2x12 2x15   Clamshell Supine Black single leg fall out x30  SL Green 3x12  SL Pink 3x10 Supine Black single leg fall out x30   SLR 3x12  X10  2# 2x10  2.5# 2x10 2.5# 3x12   Hip ABD  Standing 2.5# 2x10 ea   SL 2x12  SL 2x12 SL x10   SL 1.5# 2x10   Hip Ext Standing 2.5# 2x10 ea       Marching 2x10 w/ SLS     X10 ea  X10 w/ 5\" hold on R LE   Lateral Tap Down        SLS     30\" x 4   Side-stepping   Side-stepping @ rail [place] TB toes forward 3 laps     Side step onto 2\" step 3\" x 10, 5\" 2x10 Side-stepping @ rail blue TB toes " "forward 3 laps     Side step onto blue foam w/ 3\" SLS x12    Leg Press 165# 2x10 DL  85# 2x10 SL  155# 2x12 DL  75# 2x12 SL  160# 2x10 DL  80# 2x10 SL  80# 2x12 SL    Wobble board balance Taps fwd & lateral x 10 ea    Balance 1' fwd & laeral       Step Up    Up and over on blue foam x 10     Foam   Marching w/ SLS on right 5\" 2x5    SLS on R 20\" x 3     SLS    10\" x 5 on R              Gait Training        @ Rail     Increased stance time on R, walking over hurdles 6 laps, walking over imaginary hurdles 6 laps             Modalities        CP PRN                                               "

## 2024-12-19 ENCOUNTER — OFFICE VISIT (OUTPATIENT)
Dept: PHYSICAL THERAPY | Facility: CLINIC | Age: 64
End: 2024-12-19
Payer: MEDICARE

## 2024-12-19 DIAGNOSIS — Z96.641 S/P HIP REPLACEMENT, RIGHT: ICD-10-CM

## 2024-12-19 DIAGNOSIS — M16.11 PRIMARY OSTEOARTHRITIS OF RIGHT HIP: Primary | ICD-10-CM

## 2024-12-19 PROCEDURE — 97110 THERAPEUTIC EXERCISES: CPT | Performed by: PHYSICAL THERAPIST

## 2024-12-19 PROCEDURE — 97116 GAIT TRAINING THERAPY: CPT | Performed by: PHYSICAL THERAPIST

## 2024-12-19 PROCEDURE — 97112 NEUROMUSCULAR REEDUCATION: CPT | Performed by: PHYSICAL THERAPIST

## 2024-12-19 NOTE — PROGRESS NOTES
"Daily Note     Today's date: 2024  Patient name: Luther Vaughan  : 1960  MRN: [id number]  Referring provider: Augustine Weston DO  Dx:   Encounter Diagnosis     ICD-10-CM    1. Primary osteoarthritis of right hip  M16.11       2. S/P hip replacement, right  Z96.641                      Subjective: Patient with no new complaints today.        Objective: See treatment diary below      Assessment: Tolerated treatment well. Patient would benefit from continued PT.  Patient doing well overall.  No problems with PREs today.  Heavy emphasis on gait today - stepping over hurdles with and without a cane.  Patient was able to demonstrate better gait today with this.  No increased complaints of pain s/p session.  Continue to progress as able.        Plan: Progress treatment as tolerated.         Diagnosis:    Precautions:    POC Expires Auth Status Start Date Expiration Date PT Visit Limit     No Auth NA NA NA   Date    Used        Remaining        Manuals        PROM  Hip Ext   Hip Ext    Mobs                                There Ex        Bike NuStep LVL 7 5' NuStep LVL 8 5'  NuStep LVL 7 5' NuStep LVL 7 5'    Hamstring Stretch        Gastroc Stretch        HS Curls Swiss Ball x15 DL, x15 SL  Swiss Ball x15 DL, x15 SL  Swiss Ball x15 DL, x15 SL Swiss Ball x15 DL< x15 Sl            Patient Education         Neruo Re-Ed        Quad Set         Bridge 2x12 2x12  2x12 2x15   Clamshell Supine Black single leg fall out x30   SL Pink 3x10 Supine Black single leg fall out x30   SLR 3x12 2.5# 2x12  2.5# 2x10 2.5# 3x12   Hip ABD  Standing 2.5# 2x10 ea    SL 2x12 SL x10   SL 1.5# 2x10   Hip Ext Standing 2.5# 2x10 ea       Marching 2x10 w/ SLS     X10 ea  X10 w/ 5\" hold on R LE   Lateral Tap Down        SLS     30\" x 4   Side-stepping  Blue TB twisted towards forward  Side-stepping @ rail blue TB toes forward 3 laps     Side step onto blue foam w/ 3\" SLS x12    Leg Press 165# 2x10 DL  85# " "2x10 # 2x10 DL  90# 2x10 SL   160# 2x10 DL  80# 2x10 SL  80# 2x12 SL    Wobble board balance Taps fwd & lateral x 10 ea    Balance 1' fwd & laeral       Step Up    Up and over on blue foam x 10     Foam        SLS    10\" x 5 on R              Gait Training        @ Rail  Stepping over hurdles no cane 5 laps, stepping over hurdles no AD or rail 5 laps   Increased stance time on R, walking over hurdles 6 laps, walking over imaginary hurdles 6 laps             Modalities        CP PRN                                                 "

## 2024-12-23 ENCOUNTER — OFFICE VISIT (OUTPATIENT)
Dept: PHYSICAL THERAPY | Facility: CLINIC | Age: 64
End: 2024-12-23
Payer: MEDICARE

## 2024-12-23 DIAGNOSIS — M16.11 PRIMARY OSTEOARTHRITIS OF RIGHT HIP: Primary | ICD-10-CM

## 2024-12-23 DIAGNOSIS — Z96.641 S/P HIP REPLACEMENT, RIGHT: ICD-10-CM

## 2024-12-23 PROCEDURE — 97112 NEUROMUSCULAR REEDUCATION: CPT | Performed by: PHYSICAL THERAPIST

## 2024-12-23 PROCEDURE — 97110 THERAPEUTIC EXERCISES: CPT | Performed by: PHYSICAL THERAPIST

## 2024-12-23 PROCEDURE — 97140 MANUAL THERAPY 1/> REGIONS: CPT | Performed by: PHYSICAL THERAPIST

## 2024-12-23 NOTE — PROGRESS NOTES
PT Re-Evaluation     Today's date: 2024  Patient name: Luther Vaughan  : 1960  MRN: [id number]  Referring provider: Augustine Weston DO  Dx:   Encounter Diagnosis     ICD-10-CM    1. Primary osteoarthritis of right hip  M16.11       2. S/P hip replacement, right  Z96.641                          Assessment  Impairments: abnormal muscle firing, abnormal or restricted ROM, activity intolerance, impaired physical strength, lacks appropriate home exercise program, pain with function, poor posture  and poor body mechanics    Assessment details: Luther Vaughan is a 64 y.o. male who has been consistent with attending and participating in skilled PT sessions.  The patient continues to do well with PT.  He demonstrates improving ROM as well as improving strength.  He is still lacking full normalized gait performance as he is still using a SPC as well as lacking full strength in his hip.  He will continue to benefit from further PT sessions to decrease his pain, increase his strength, and increase his ambulation performance.    Understanding of Dx/Px/POC: good     Prognosis: good    Goals  Impairment Goals  - Decrease pain by 50% in 8 weeks - PARTIALLY MET   - Increase right flexibility by 50% in 8 weeks - PARTIALLY MET   - Increase right lower extremity strength golbally to 4+/5 in 8 weeks - PARTIALLY MET     Functional Goals  - Return to Prior Level of Function in 8 weeks - PARTIALLY MET   - Patient will be independent with HEP in 8 weeks - PARTIALLY MET   - Patient will be able to ambulate with decreased pain in 8 weeks - PARTIALLY MET   - Patient will be able to ascend/descend stairs with a reciprocal gait pattern in 8 weeks - PARTIALLY MET   - Patient will be able to complete ADLs with decreased pain in 4 weeks - PARTIALLY MET       Plan  Patient would benefit from: skilled physical therapy  Planned modality interventions: cryotherapy, thermotherapy: hydrocollator packs and TENS    Planned therapy  "interventions: home exercise program, graded exercise, functional ROM exercises, flexibility, body mechanics training, postural training, patient education, therapeutic activities, therapeutic exercise, manual therapy, joint mobilization and neuromuscular re-education    Frequency: 2x week  Duration in weeks: 8  Treatment plan discussed with: patient        Subjective Evaluation    History of Present Illness  Mechanism of injury: Patient notes that he has improved with his walking and steps, especially over the last few days.  He notes that stairs are getting easier.  He notes that he is still using his cane.  He notes that his biggest issue is his initial stiffness when trying to get up and walk.      HPI:  Patient underwent a right hip replacement on 10/21/24.  He now presents for his post-op evaluation.      Pain Location:  R Hip   Occupation:     Prior Functional Limitations: Independent prior   AGG:  Sleeping, ambulation, stairs, ADLs / dressing, moving around  Ease:  Ice  Patient Goals:  \"I just want to be myself again\"     Pain  Current pain ratin  At best pain ratin  At worst pain rating: 3  Quality: tight and dull ache          Objective     Passive Range of Motion     Right Hip   Flexion: 95 degrees   Abduction: 40 degrees     Strength/Myotome Testing     Left Hip   Normal muscle strength    Right Hip   Planes of Motion   Flexion: 4  Extension: 4  Abduction: 4-    Right Knee   Flexion: 4+  Extension: 4+    Right Ankle/Foot   Dorsiflexion: 5    Ambulation     Comments   Patient using a SPC for gait - ambulation performance is normalizing, still slightly decreased stance time on the right, but this is improving.                 Diagnosis:    Precautions:    POC Expires Auth Status Start Date Expiration Date PT Visit Limit     No Auth NA NA NA   Date    Used        Remaining        Manuals        PROM  Hip Ext Hip Ext  Hip Ext    Mobs                              " "  There Ex        Bike NuStep LVL 7 5' NuStep LVL 8 5' NuStep LVL 7 5'  NuStep LVL 7 5'    Hamstring Stretch        Gastroc Stretch        HS Curls Swiss Ball x15 DL, x15 SL  Swiss Ball x15 DL, x15 SL Swiss Ball x15 DL, x15 SL   Swiss Ball x15 DL< x15 Sl            Patient Education    RT - RE      Neruo Re-Ed        Quad Set         Bridge 2x12 2x12 2x15  2x15   Clamshell Supine Black single leg fall out x30    Supine Black single leg fall out x30   SLR 3x12 2.5# 2x12 2.5# 3x12   2.5# 3x12   Hip ABD  Standing 2.5# 2x10 ea   SL x10, 2# 2x10   SL x10   SL 1.5# 2x10   Hip Ext Standing 2.5# 2x10 ea       Marching 2x10 w/ SLS     X10 ea  X10 w/ 5\" hold on R LE   Lateral Tap Down        SLS     30\" x 4   Side-stepping  Blue TB twisted towards forward      Leg Press 165# 2x10 DL  85# 2x10 # 2x10 DL  90# 2x10 SL  170# 2x10 DL  90# 2x10 SL     80# 2x12 SL    Wobble board balance Taps fwd & lateral x 10 ea    Balance 1' fwd & laeral       Step Up        Foam        SLS                 Gait Training        @ Rail  Stepping over hurdles no cane 5 laps, stepping over hurdles no AD or rail 5 laps   Increased stance time on R, walking over hurdles 6 laps, walking over imaginary hurdles 6 laps             Modalities        CP PRN                            "

## 2024-12-23 NOTE — LETTER
2024    Augustine Weston DO  [address]  [address]  [facility] [address]    Patient: Luther Vaughan   YOB: 1960   Date of Visit: 2024     Encounter Diagnosis     ICD-10-CM    1. Primary osteoarthritis of right hip  M16.11       2. S/P hip replacement, right  Z96.641           Dear Dr. Weston:    Thank you for your recent referral of Luther Vaughan. Please review the attached evaluation summary from Luther's recent visit.     Please verify that you agree with the plan of care by signing the attached order.     If you have any questions or concerns, please do not hesitate to call.     I sincerely appreciate the opportunity to share in the care of one of your patients and hope to have another opportunity to work with you in the near future.       Sincerely,    Derrek Montanez, PT      Referring Provider:      I certify that I have read the below Plan of Care and certify the need for these services furnished under this plan of treatment while under my care.                    Augustine Weston DO  [address]  [address]  [facility] [address]  Via Fax: [phone]          PT Re-Evaluation     Today's date: 2024  Patient name: Luther Vaughan  : 1960  MRN: [id number]  Referring provider: Augustine Weston DO  Dx:   Encounter Diagnosis     ICD-10-CM    1. Primary osteoarthritis of right hip  M16.11       2. S/P hip replacement, right  Z96.641                          Assessment  Impairments: abnormal muscle firing, abnormal or restricted ROM, activity intolerance, impaired physical strength, lacks appropriate home exercise program, pain with function, poor posture  and poor body mechanics    Assessment details: Luther Vaughan is a 64 y.o. male who has been consistent with attending and participating in skilled PT sessions.  The patient continues to do well with PT.  He demonstrates improving ROM as well as improving strength.  He is still lacking  full normalized gait performance as he is still using a SPC as well as lacking full strength in his hip.  He will continue to benefit from further PT sessions to decrease his pain, increase his strength, and increase his ambulation performance.    Understanding of Dx/Px/POC: good     Prognosis: good    Goals  Impairment Goals  - Decrease pain by 50% in 8 weeks - PARTIALLY MET   - Increase right flexibility by 50% in 8 weeks - PARTIALLY MET   - Increase right lower extremity strength golbally to 4+/5 in 8 weeks - PARTIALLY MET     Functional Goals  - Return to Prior Level of Function in 8 weeks - PARTIALLY MET   - Patient will be independent with HEP in 8 weeks - PARTIALLY MET   - Patient will be able to ambulate with decreased pain in 8 weeks - PARTIALLY MET   - Patient will be able to ascend/descend stairs with a reciprocal gait pattern in 8 weeks - PARTIALLY MET   - Patient will be able to complete ADLs with decreased pain in 4 weeks - PARTIALLY MET       Plan  Patient would benefit from: skilled physical therapy  Planned modality interventions: cryotherapy, thermotherapy: hydrocollator packs and TENS    Planned therapy interventions: home exercise program, graded exercise, functional ROM exercises, flexibility, body mechanics training, postural training, patient education, therapeutic activities, therapeutic exercise, manual therapy, joint mobilization and neuromuscular re-education    Frequency: 2x week  Duration in weeks: 8  Treatment plan discussed with: patient        Subjective Evaluation    History of Present Illness  Mechanism of injury: Patient notes that he has improved with his walking and steps, especially over the last few days.  He notes that stairs are getting easier.  He notes that he is still using his cane.  He notes that his biggest issue is his initial stiffness when trying to get up and walk.      HPI:  Patient underwent a right hip replacement on 10/21/24.  He now presents for his post-op  "evaluation.      Pain Location:  R Hip   Occupation:     Prior Functional Limitations: Independent prior   AGG:  Sleeping, ambulation, stairs, ADLs / dressing, moving around  Ease:  Ice  Patient Goals:  \"I just want to be myself again\"     Pain  Current pain ratin  At best pain ratin  At worst pain rating: 3  Quality: tight and dull ache          Objective     Passive Range of Motion     Right Hip   Flexion: 95 degrees   Abduction: 40 degrees     Strength/Myotome Testing     Left Hip   Normal muscle strength    Right Hip   Planes of Motion   Flexion: 4  Extension: 4  Abduction: 4-    Right Knee   Flexion: 4+  Extension: 4+    Right Ankle/Foot   Dorsiflexion: 5    Ambulation     Comments   Patient using a SPC for gait - ambulation performance is normalizing, still slightly decreased stance time on the right, but this is improving.                 Diagnosis:    Precautions:    POC Expires Auth Status Start Date Expiration Date PT Visit Limit     No Auth NA NA NA   Date    Used        Remaining        Manuals        PROM  Hip Ext Hip Ext  Hip Ext    Mobs                                There Ex        Bike NuStep LVL 7 5' NuStep LVL 8 5' NuStep LVL 7 5'  NuStep LVL 7 5'    Hamstring Stretch        Gastroc Stretch        HS Curls Swiss Ball x15 DL, x15 SL  Swiss Ball x15 DL, x15 SL Swiss Ball x15 DL, x15 SL   Swiss Ball x15 DL< x15 Sl            Patient Education    RT - RE      Neruo Re-Ed        Quad Set         Bridge 2x12 2x12 2x15  2x15   Clamshell Supine Black single leg fall out x30    Supine Black single leg fall out x30   SLR 3x12 2.5# 2x12 2.5# 3x12   2.5# 3x12   Hip ABD  Standing 2.5# 2x10 ea   SL x10, 2# 2x10   SL x10   SL 1.5# 2x10   Hip Ext Standing 2.5# 2x10 ea       Marching 2x10 w/ SLS     X10 ea  X10 w/ 5\" hold on R LE   Lateral Tap Down        SLS     30\" x 4   Side-stepping  Blue TB twisted towards forward      Leg Press 165# 2x10 DL  85# 2x10 # 2x10 " DL  90# 2x10 SL  170# 2x10 DL  90# 2x10 SL     80# 2x12 SL    Wobble board balance Taps fwd & lateral x 10 ea    Balance 1' fwd & laeral       Step Up        Foam        SLS                 Gait Training        @ Rail  Stepping over hurdles no cane 5 laps, stepping over hurdles no AD or rail 5 laps   Increased stance time on R, walking over hurdles 6 laps, walking over imaginary hurdles 6 laps             Modalities        CP PRN

## 2024-12-26 ENCOUNTER — OFFICE VISIT (OUTPATIENT)
Dept: PHYSICAL THERAPY | Facility: CLINIC | Age: 64
End: 2024-12-26
Payer: MEDICARE

## 2024-12-26 DIAGNOSIS — Z96.641 S/P HIP REPLACEMENT, RIGHT: ICD-10-CM

## 2024-12-26 DIAGNOSIS — M16.11 PRIMARY OSTEOARTHRITIS OF RIGHT HIP: Primary | ICD-10-CM

## 2024-12-26 PROCEDURE — 97140 MANUAL THERAPY 1/> REGIONS: CPT | Performed by: PHYSICAL THERAPIST

## 2024-12-26 PROCEDURE — 97110 THERAPEUTIC EXERCISES: CPT | Performed by: PHYSICAL THERAPIST

## 2024-12-26 PROCEDURE — 97112 NEUROMUSCULAR REEDUCATION: CPT | Performed by: PHYSICAL THERAPIST

## 2024-12-30 ENCOUNTER — OFFICE VISIT (OUTPATIENT)
Dept: PHYSICAL THERAPY | Facility: CLINIC | Age: 64
End: 2024-12-30
Payer: MEDICARE

## 2024-12-30 DIAGNOSIS — M16.11 PRIMARY OSTEOARTHRITIS OF RIGHT HIP: Primary | ICD-10-CM

## 2024-12-30 DIAGNOSIS — Z96.641 S/P HIP REPLACEMENT, RIGHT: ICD-10-CM

## 2024-12-30 PROCEDURE — 97112 NEUROMUSCULAR REEDUCATION: CPT

## 2024-12-30 PROCEDURE — 97110 THERAPEUTIC EXERCISES: CPT

## 2024-12-30 NOTE — PROGRESS NOTES
"Daily Note     Today's date: 2024  Patient name: Luther Vaughan  : 1960  MRN: [id number]  Referring provider: Augustine Weston DO  Dx:   Encounter Diagnosis     ICD-10-CM    1. Primary osteoarthritis of right hip  M16.11       2. S/P hip replacement, right  Z96.641                      Subjective: Patient reports that overall he is doing well just feeling stiff today.        Objective: See treatment diary below      Assessment: Tolerated treatment well. Patient would benefit from continued PT.  Continuing to work on strength, especially with WB as this is his biggest issue currently. He required HHAx1 with steps and side steps. He did feel looser post session. Continue to progress strength as able.        Plan: Progress treatment as tolerated.       Diagnosis:    Precautions:    POC Expires Auth Status Start Date Expiration Date PT Visit Limit     No Auth NA NA NA   Date    Used        Remaining        Manuals        PROM  Hip Ext Hip Ext Hip Ext  Hip Ext   Mobs                                There Ex        Bike NuStep LVL 7 5' NuStep LVL 8 5' NuStep LVL 7 5' NuStep LVL 8 5' NuStep LVL 8 5'   Hamstring Stretch        Gastroc Stretch        HS Curls Swiss Ball x15 DL, x15 SL  Swiss Ball x15 DL, x15 SL Swiss Ball x15 DL, x15 SL  Swiss Ball x15 DL, x15 SL  Swiss Ball x15 DL, x15 SL            Patient Education    RT - RE      Neruo Re-Ed        Quad Set         Bridge 2x12 2x12 2x15 2x15 2x15   Clamshell Supine Black single leg fall out x30       SLR 3x12 2.5# 2x12 2.5# 3x12  3# 2x10 3# 2x10   Hip ABD  Standing 2.5# 2x10 ea   SL x10, 2# 2x10  2.5# SL 5x10  Standing 0# 3x12 ea  3# SL 3x10   Hip Ext Standing 2.5# 2x10 ea   @ [place] 6.5# x10, 8.7# 2x10 @ [place] 8# 2x10   Marching 2x10 w/ SLS        Lateral Tap Down        SLS        Side-stepping  Blue TB twisted towards forward  Onto foam 3\" hold w/ minimal hand hold x20  Onto foam 3\" hold w/ minimal hand hold x20  " "  Leg Press 165# 2x10 DL  85# 2x10 # 2x10 DL  90# 2x10 SL  170# 2x10 DL  90# 2x10 SL    175# 2x10 DL  95# 2x10 DL  175# 2x10 DL  95# 2x10 DL    Wobble board balance Taps fwd & lateral x 10 ea    Balance 1' fwd & laeral       Step Up    6\" w/ SLS @ top 3 second hold 2x10 6\" w/ SLS @ top 3 second hold 2x10   Foam        SLS                 Gait Training        @ Rail  Stepping over hurdles no cane 5 laps, stepping over hurdles no AD or rail 5 laps               Modalities        CP PRN                                 "

## 2025-01-02 ENCOUNTER — OFFICE VISIT (OUTPATIENT)
Dept: PHYSICAL THERAPY | Facility: CLINIC | Age: 65
End: 2025-01-02
Payer: MEDICARE

## 2025-01-02 DIAGNOSIS — Z96.641 S/P HIP REPLACEMENT, RIGHT: Primary | ICD-10-CM

## 2025-01-02 PROCEDURE — 97112 NEUROMUSCULAR REEDUCATION: CPT

## 2025-01-02 PROCEDURE — 97110 THERAPEUTIC EXERCISES: CPT

## 2025-01-02 PROCEDURE — 97140 MANUAL THERAPY 1/> REGIONS: CPT

## 2025-01-02 NOTE — PROGRESS NOTES
"Daily Note     Today's date: 2025  Patient name: Luther Vaughan  : 1960  MRN: [id number]  Referring provider: Augustine Weston DO  Dx:   Encounter Diagnosis     ICD-10-CM    1. S/P hip replacement, right  Z96.641                      Subjective: Pt states that he is doing well, no new complaints.      Objective: See treatment diary below      Assessment: Tolerated treatment well.  Continued with outlined program and progressed where able.  Pt  challenged with SLS on foam however is able to perform with light UE support.  Muscle fatigue is noted however pt is motivated throughout to push himself throughout.  Pt progressing slowly towards his goals and will benefit from continued therapy.      Plan: Continue per plan of care.        Diagnosis:    Precautions:    POC Expires Auth Status Start Date Expiration Date PT Visit Limit     No Auth NA NA NA   Date    Used        Remaining        Manuals        PROM Hip Ext Hip Ext Hip Ext Hip Ext  Hip Ext   Mobs                                There Ex        Bike NuStep LVL 7 5' NuStep LVL 8 5' NuStep LVL 7 5' NuStep LVL 8 5' NuStep LVL 8 5'   Hamstring Stretch        Gastroc Stretch        HS Curls Swiss ball x15 DL  X15 SL Swiss Ball x15 DL, x15 SL Swiss Ball x15 DL, x15 SL  Swiss Ball x15 DL, x15 SL  Swiss Ball x15 DL, x15 SL            Patient Education    RT - RE      Neruo Re-Ed        Quad Set         Bridge 2x15 2x12 2x15 2x15 2x15   Clamshell        SLR 3# 3x10 2.5# 2x12 2.5# 3x12  3# 2x10 3# 2x10   Hip ABD  3# SL 3x10  SL x10, 2# 2x10  2.5# SL 5x10  Standing 0# 3x12 ea  3# SL 3x10   Hip Ext [place] 8# 2x10   @ [place] 6.5# x10, 8.7# 2x10 @ l[place] 8# 2x10   Marching        Lateral Tap Down        SLS Foam 20\"x2ea       Side-stepping On foam  4x Blue TB twisted towards forward  Onto foam 3\" hold w/ minimal hand hold x20  Onto foam 3\" hold w/ minimal hand hold x20    Leg Press 175# 2x10DL  95# 2x10SL 170# 2x10 DL  90# 2x10 SL " " 170# 2x10 DL  90# 2x10 SL    175# 2x10 DL  95# 2x10 DL  175# 2x10 DL  95# 2x10 DL    Wobble board balance 2 min ea       Step Up 6\" w/ SLS @ top 3 sec hold 2x10   6\" w/ SLS @ top 3 second hold 2x10 6\" w/ SLS @ top 3 second hold 2x10   Foam Tandem amb. 3x                        Gait Training        @ Rail  Stepping over hurdles no cane 5 laps, stepping over hurdles no AD or rail 5 laps               Modalities        CP PRN                                   "

## 2025-01-06 ENCOUNTER — OFFICE VISIT (OUTPATIENT)
Dept: PHYSICAL THERAPY | Facility: CLINIC | Age: 65
End: 2025-01-06
Payer: MEDICARE

## 2025-01-06 DIAGNOSIS — Z96.641 S/P HIP REPLACEMENT, RIGHT: Primary | ICD-10-CM

## 2025-01-06 PROCEDURE — 97110 THERAPEUTIC EXERCISES: CPT

## 2025-01-06 PROCEDURE — 97112 NEUROMUSCULAR REEDUCATION: CPT

## 2025-01-06 PROCEDURE — 97140 MANUAL THERAPY 1/> REGIONS: CPT

## 2025-01-06 NOTE — PROGRESS NOTES
"Daily Note     Today's date: 2025  Patient name: Luther Vaughan  : 1960  MRN: [id number]  Referring provider: Augustine Weston DO  Dx:   Encounter Diagnosis     ICD-10-CM    1. S/P hip replacement, right  Z96.641                      Subjective: Pt states that he finds himself walking around the house more without a cane and feels pretty good.        Objective: See treatment diary below      Assessment: Tolerated treatment well.  Progressed pt with strengthening in functional movements as able.  Pt challenged with hurdles going forward and has difficulty at times lifting his LLE over the teresa.  He is also challenged with SLS on foam and requires occasional UE support to maintain his balance.  Pt progressing slowly towards his goals and will benefit from continued therapy.      Plan: Continue per plan of care.        Diagnosis:    Precautions:    POC Expires Auth Status Start Date Expiration Date PT Visit Limit     No Auth NA NA NA   Date    Used        Remaining        Manuals        PROM Hip Ext Hip Ext Hip Ext Hip Ext  Hip Ext   Mobs                                There Ex        Bike NuStep LVL 7 5' NuStep LVL 8 5' NuStep LVL 7 5' NuStep LVL 8 5' NuStep LVL 8 5'   Hamstring Stretch        Gastroc Stretch        HS Curls Swiss ball x15 DL  X15 SL Swiss ball   X15 DL  X15 SL Swiss Ball x15 DL, x15 SL  Swiss Ball x15 DL, x15 SL  Swiss Ball x15 DL, x15 SL            Patient Education    RT - RE      Neruo Re-Ed        Quad Set         Bridge 2x15 2x15 2x15 2x15 2x15   Clamshell        SLR 3# 3x10 3# 3x10 2.5# 3x12  3# 2x10 3# 2x10   Hip ABD  3# SL 3x10 3# SL 3x10 SL x10, 2# 2x10  2.5# SL 5x10  Standing 0# 3x12 ea  3# SL 3x10   Hip Ext [place] 8# 2x10   @ [place] 6.5# x10, 8.7# 2x10 @ [place] 8# 2x10   Marching        Lateral Tap Down        SLS Foam 20\"x2ea Foam 20\"x2 ea      Side-stepping On foam  4x   Onto foam 3\" hold w/ minimal hand hold x20  Onto foam 3\" hold w/ " "minimal hand hold x20    Leg Press 175# 2x10DL  95# 2x10SL 174# 2x10DL  95# 2x10 # 2x10 DL  90# 2x10 SL    175# 2x10 DL  95# 2x10 DL  175# 2x10 DL  95# 2x10 DL    Wobble board balance 2 min ea 2 min ea      Step Up 6\" w/ SLS @ top 3 sec hold 2x10   6\" w/ SLS @ top 3 second hold 2x10 6\" w/ SLS @ top 3 second hold 2x10   Foam Tandem amb. 3x Tandem balance 30\"x2 ea      Hurdles   Frwd/ lateral 2x ea               Gait Training        @ Rail                 Modalities        CP PRN                                     "

## 2025-01-08 ENCOUNTER — OFFICE VISIT (OUTPATIENT)
Dept: SLEEP CENTER | Facility: CLINIC | Age: 65
End: 2025-01-08
Payer: MEDICARE

## 2025-01-08 ENCOUNTER — TELEPHONE (OUTPATIENT)
Dept: SLEEP CENTER | Facility: CLINIC | Age: 65
End: 2025-01-08

## 2025-01-08 VITALS
HEIGHT: 69 IN | HEART RATE: 71 BPM | SYSTOLIC BLOOD PRESSURE: 136 MMHG | WEIGHT: 303 LBS | OXYGEN SATURATION: 98 % | DIASTOLIC BLOOD PRESSURE: 82 MMHG | BODY MASS INDEX: 44.88 KG/M2

## 2025-01-08 DIAGNOSIS — I10 PRIMARY HYPERTENSION: ICD-10-CM

## 2025-01-08 DIAGNOSIS — I48.91 ATRIAL FIBRILLATION WITH RVR (HCC): ICD-10-CM

## 2025-01-08 DIAGNOSIS — E66.01 MORBID OBESITY (HCC): ICD-10-CM

## 2025-01-08 DIAGNOSIS — E11.9 TYPE 2 DIABETES MELLITUS WITHOUT COMPLICATION, WITHOUT LONG-TERM CURRENT USE OF INSULIN (HCC): ICD-10-CM

## 2025-01-08 DIAGNOSIS — G47.33 OBSTRUCTIVE SLEEP APNEA: Primary | ICD-10-CM

## 2025-01-08 DIAGNOSIS — I50.31 ACUTE DIASTOLIC CHF (CONGESTIVE HEART FAILURE) (HCC): ICD-10-CM

## 2025-01-08 PROCEDURE — 99204 OFFICE O/P NEW MOD 45 MIN: CPT | Performed by: STUDENT IN AN ORGANIZED HEALTH CARE EDUCATION/TRAINING PROGRAM

## 2025-01-08 NOTE — PATIENT INSTRUCTIONS
"Patient Education     Sleep apnea in adults   The Basics   Written by the doctors and editors at [facility]   What is sleep apnea? -- Sleep apnea is a condition that makes you stop breathing for short periods while you are asleep. There are 2 types of sleep apnea. One is called \"obstructive sleep apnea.\" The other is called \"central sleep apnea.\"  In obstructive sleep apnea, you stop breathing because your throat narrows or closes (figure 1). In central sleep apnea, you stop breathing because your brain does not send the right signals to your muscles to make you breathe. When people talk about sleep apnea, they are usually referring to obstructive sleep apnea, which is what this article is about.  People with sleep apnea do not know that they stop breathing when they are asleep. But they do sometimes wake up startled or gasping for breath. They also often hear from loved ones that they snore.  What are the symptoms of sleep apnea? -- The main symptoms of sleep apnea are loud snoring, tiredness, and daytime sleepiness. Other symptoms can include:   Restless sleep   Waking up choking or gasping   Morning headaches, dry mouth, or sore throat   Waking up often to urinate   Waking up feeling unrested or groggy   Trouble thinking clearly or remembering things  Some people with sleep apnea don't have symptoms, or don't realize that they have them.  Should I see a doctor or nurse? -- Yes. If you think that you might have sleep apnea, see your doctor.  Is there a test for sleep apnea? -- Yes. First, your doctor or nurse will ask about your symptoms. If you have a bed partner, they might also ask that person if you snore or gasp in your sleep. If the doctor or nurse suspects that you have sleep apnea, they might send you for a \"sleep study.\"  Sleep studies can sometimes be done at home, but they are usually done in a sleep lab. For the study, you spend the night in the lab, and you are hooked up to different machines that " "monitor your heart rate, breathing, and other body functions. The results of the test tell your doctor or nurse if you have the disorder.  Is there anything I can do on my own to help my sleep apnea? -- Yes. Some things that might help:   Try to avoid sleeping on your back, if possible. This might help some people.   Lose weight, if you have excess body weight.   Get regular physical activity. This might help you lose weight. But even if it doesn't, being active is good for your health.   Avoid alcohol, especially in the evening. Alcohol can make sleep apnea worse.  How is sleep apnea treated? -- Treatment can include:   Weight loss - As mentioned above, weight loss can help if you have excess weight or obesity. But losing weight can be challenging, and it takes time to lose enough weight to help with your sleep apnea. Most people need other treatment while they work on losing weight.   CPAP - The most effective treatment for sleep apnea is a device that keeps your airway open while you sleep. Treatment with this device is called \"continuous positive airway pressure\" (\"CPAP\"). People getting CPAP wear a face mask at night that keeps them breathing (figure 2).  If your doctor or nurse recommends a CPAP machine, be patient about using it. The mask might seem uncomfortable to wear at first, and the machine might seem noisy, but using the machine can really help you. People with sleep apnea who use a CPAP machine feel more rested and generally feel better.  If CPAP does not work, your doctor might suggest other treatment. Options might include:   An oral device - This is a device that you wear in your mouth. It is called an \"oral appliance\" or \"mandibular advancement device.\" It helps keep your airway open while you sleep.   Hypoglossal nerve stimulation - This involves a procedure to implant a small device into your chest. The device has a wire that connects to the nerve under your tongue. While you are sleeping, it " sends an electrical signal that causes the tongue to push forward. This helps open up your airway.   Surgery to widen your airway - This might involve removing your tonsils or other tissue that blocks the airway.  Is sleep apnea dangerous? -- It can be. Risks include:   Accidents - People with sleep apnea do not get good-quality sleep, so they are often tired and not alert. This puts them at risk for car accidents and other types of accidents.   Other health problems - Studies show that people with sleep apnea are more likely than others to have high blood pressure, heart attacks, and other serious heart problems. Some people also have mood changes or depression.  In people with severe sleep apnea, getting treated (for example, with CPAP) can help lower these risks.  All topics are updated as new evidence becomes available and our peer review process is complete.  This topic retrieved from [employer] on: Feb 28, 2024.  Topic 23343 Version 18.0  Release: 32.2.4 - C32.58  © 2024 UpToDate, Inc. and/or its affiliates. All rights reserved.  figure 1: Airway in a person with sleep apnea     Normally, when a person sleeps, the airway remains open, and air can pass from the nose and mouth to the lungs. In a person with sleep apnea, parts of the throat and mouth drop into the airway and block off the flow of air. This can cause loud snoring and interrupt breathing for short periods.  Graphic 20923 Version 6.0  figure 2: Continuous positive airway pressure (CPAP) for sleep apnea     The CPAP mask gently blows air into your nose while you sleep. It puts just enough pressure on your airway to keep it from closing. The mask in this picture fits over just the nose. Other CPAP devices have masks that fit over the nose and mouth.  Graphic 26540 Version 5.0  Consumer Information Use and Disclaimer   Disclaimer: This generalized information is a limited summary of diagnosis, treatment, and/or medication information. It is not meant to  be comprehensive and should be used as a tool to help the user understand and/or assess potential diagnostic and treatment options. It does NOT include all information about conditions, treatments, medications, side effects, or risks that may apply to a specific patient. It is not intended to be medical advice or a substitute for the medical advice, diagnosis, or treatment of a health care provider based on the health care provider's examination and assessment of a patient's specific and unique circumstances. Patients must speak with a health care provider for complete information about their health, medical questions, and treatment options, including any risks or benefits regarding use of medications. This information does not endorse any treatments or medications as safe, effective, or approved for treating a specific patient. UpToDate, Inc. and its affiliates disclaim any warranty or liability relating to this information or the use thereof.The use of this information is governed by the Terms of Use, available at https://www.woltersTribotekuwer.com/en/know/clinical-effectiveness-terms. 2024© UpToDate, Inc. and its affiliates and/or licensors. All rights reserved.  Copyright   © 2024 UpToDate, Inc. and/or its affiliates. All rights reserved.

## 2025-01-08 NOTE — ASSESSMENT & PLAN NOTE
Wt Readings from Last 3 Encounters:   01/08/25 (!) 137 kg (303 lb)   10/15/24 (!) 137 kg (301 lb)   08/28/24 134 kg (296 lb)   He is continued on goal-directed medical therapy.  He follows regularly with cardiology.  I have reviewed the most recent results from his transthoracic echocardiogram.  As above, I have encouraged him to continue CPAP therapy with all periods of sleep including naps.            Orders:    Ambulatory Referral to Sleep Medicine    PAP DME Resupply/Reorder

## 2025-01-08 NOTE — ASSESSMENT & PLAN NOTE
Patient with a history of type 2 diabetes.  Most recent A1c of 6.3 on 8/14/2024.  I have encouraged continue primary care follow-up.  We reviewed the impact of sleep on glucose control and insulin sensitivity.  I have encouraged the patient to continue to aim for a goal of 7 to 9 hours of sleep nightly for overall health.  Lab Results   Component Value Date    HGBA1C 6.3 (H) 08/14/2024       Orders:    PAP DME Resupply/Reorder

## 2025-01-08 NOTE — ASSESSMENT & PLAN NOTE
Hypertension - We reviewed the association between untreated obstructive sleep apnea and the increased risk for hypertension. Patient to continue on prescribed anti-hypertensive therapy and follow up with PCP for continuity of care.     Orders:    PAP DME Resupply/Reorder

## 2025-01-08 NOTE — ASSESSMENT & PLAN NOTE
Obesity - We reviewed the association of obesity on obstructive sleep apnea and sleep disordered breathing. Encouraged patient to follow healthy diet, regular exercise regimen, and pursue weight loss to manage symptoms.   -I reviewed GLP-1 agents for weight loss as well as cardiac indication and treatment of sleep disordered breathing with the patient today in the office.  I have encouraged him to consider this therapy as he finishes his rehab from his right hip and right knee replacement.  We discussed this together in detail today in the office.  Orders:    PAP DME Resupply/Reorder

## 2025-01-08 NOTE — ASSESSMENT & PLAN NOTE
Patient with reported history of permanent atrial fibrillation.  He is continued on Eliquis for stroke prevention as well as Lopressor for rate control.  He follows up regularly with cardiology.  We discussed and reviewed the impact of increased cardiac comorbidity as well as increased arrhythmia risk with untreated sleep disordered breathing.  Patient verbalized understanding and will continue to use CPAP therapy with all periods of sleep including naps.  Orders:    Ambulatory Referral to Sleep Medicine    PAP DME Resupply/Reorder

## 2025-01-08 NOTE — ASSESSMENT & PLAN NOTE
Obstructive Sleep Apnea - Discussed pathophysiology of MARLA, consequences of untreated MARLA and treatment options including PAP therapy, mandibular advancement device, positional therapy, and surgical referral. - Discussed risks of sleepy driving and mitigation strategies (napping). Patient agrees to not drive if tired or sleepy. - Advised avoidance of alcohol and centrally acting medications as these can worsen MARLA.  -Patient presents for initial consultation with history of sleep disordered breathing and obstructive sleep apnea.  He reports initially being diagnosed approximately 25 years ago.  He has been continued on CPAP therapy ever since.  He is currently on a fixed CPAP pressure of 10 cm of water.  He has a residual AHI of 2.6 events per hour, he does not have significant leak, he is using an over the nose nasal mask.  -I have placed an order today in the office for CPAP resupply.  - I have asked the patient to return to the office in approximately 1 year for CPAP compliance check and follow-up.  Patient verbalized understanding and stated he would do so.  Orders:    Ambulatory Referral to Sleep Medicine    PAP DME Resupply/Reorder

## 2025-01-09 ENCOUNTER — OFFICE VISIT (OUTPATIENT)
Dept: PHYSICAL THERAPY | Facility: CLINIC | Age: 65
End: 2025-01-09
Payer: MEDICARE

## 2025-01-09 DIAGNOSIS — Z96.641 S/P HIP REPLACEMENT, RIGHT: Primary | ICD-10-CM

## 2025-01-09 DIAGNOSIS — M16.11 PRIMARY OSTEOARTHRITIS OF RIGHT HIP: ICD-10-CM

## 2025-01-09 PROCEDURE — 97110 THERAPEUTIC EXERCISES: CPT | Performed by: PHYSICAL THERAPIST

## 2025-01-09 PROCEDURE — 97112 NEUROMUSCULAR REEDUCATION: CPT | Performed by: PHYSICAL THERAPIST

## 2025-01-09 NOTE — PROGRESS NOTES
"Daily Note     Today's date: 2025  Patient name: Luther Vaughan  : 1960  MRN: [id number]  Referring provider: Augustine Weston DO  Dx:   Encounter Diagnosis     ICD-10-CM    1. S/P hip replacement, right  Z96.641       2. Primary osteoarthritis of right hip  M16.11                      Subjective: Patient feels that he is getting better each day.        Objective: See treatment diary below      Assessment: Tolerated treatment well. Patient would benefit from continued PT.  Introduced more PRES today for continued strengthening as well as balance.  Challenged with balance exercises, but able to tolerate all new PREs.  Progress as able.       Plan: Progress treatment as tolerated.         Diagnosis:    Precautions:    POC Expires Auth Status Start Date Expiration Date PT Visit Limit     No Auth NA NA NA   Date    Used        Remaining        Manuals        PROM Hip Ext Hip Ext   Hip Ext   Mobs                                There Ex        Bike NuStep LVL 7 5' NuStep LVL 8 5' NuStep LVL 8 5'   NuStep LVL 8 5'   Hamstring Stretch        Gastroc Stretch        HS Curls Swiss ball x15 DL  X15 SL Swiss ball   X15 DL  X15 SL Swiss Ball x15 DL, x15 SL  Swiss Ball x15 DL, x15 SL            Patient Education         Neruo Re-Ed        Quad Set         Bridge 2x15 2x15   2x15   Clamshell   SL Black 3x10     SLR 3# 3x10 3# 3x10 4# 3x10   3# 2x10   Hip ABD  3# SL 3x10 3# SL 3x10 4# 3x10   Black TB 2x10  3# SL 3x10   Hip Ext [place] 8# 2x10    @ lázaro 8# 2x10   Marching        Lateral Tap Down        SLS Foam 20\"x2ea Foam 20\"x2 ea Foam 10\" x 4      Side-stepping On foam  4x    Onto foam 3\" hold w/ minimal hand hold x20    Leg Press 175# 2x10DL  95# 2x10SL 174# 2x10DL  95# 2x10 # 2x10 DL  95# 2x10 SL   175# 2x10 DL  95# 2x10 DL    Wobble board balance 2 min ea 2 min ea      Step Up 6\" w/ SLS @ top 3 sec hold 2x10    6\" w/ SLS @ top 3 second hold 2x10   Foam Tandem amb. 3x Tandem balance " "30\"x2 ea      Hurdles   Frwd/ lateral 2x ea Fwd alternating leading feet 6 laps      BOSU   Lunge x20     Excursion   Lateral Sliders 2x10 ea              Gait Training        @ Rail                 Modalities        CP PRN                                       "

## 2025-01-10 LAB

## 2025-01-13 ENCOUNTER — OFFICE VISIT (OUTPATIENT)
Dept: PHYSICAL THERAPY | Facility: CLINIC | Age: 65
End: 2025-01-13
Payer: MEDICARE

## 2025-01-13 DIAGNOSIS — Z96.641 S/P HIP REPLACEMENT, RIGHT: Primary | ICD-10-CM

## 2025-01-13 DIAGNOSIS — M16.11 PRIMARY OSTEOARTHRITIS OF RIGHT HIP: ICD-10-CM

## 2025-01-13 PROCEDURE — 97110 THERAPEUTIC EXERCISES: CPT | Performed by: PHYSICAL THERAPIST

## 2025-01-13 PROCEDURE — 97112 NEUROMUSCULAR REEDUCATION: CPT | Performed by: PHYSICAL THERAPIST

## 2025-01-13 NOTE — PROGRESS NOTES
"Daily Note     Today's date: 2025  Patient name: Luther Vaughan  : 1960  MRN: [id number]  Referring provider: Augustine Weston DO  Dx:   Encounter Diagnosis     ICD-10-CM    1. S/P hip replacement, right  Z96.641       2. Primary osteoarthritis of right hip  M16.11                      Subjective: Patient notes that he continues to slowly get better each day.        Objective: See treatment diary below      Assessment: Tolerated treatment well. Patient would benefit from continued PT.  Patient still with use of his SPC, but gait looks to be getting smoother and easier.  Will still note tightness when going from sitting to standing and ambulating.  Did note muscular fatigue s/p session.  Continue to progress as able.        Plan: Progress treatment as tolerated.         Diagnosis:    Precautions:    POC Expires Auth Status Start Date Expiration Date PT Visit Limit     No Auth NA NA NA   Date     Used        Remaining        Manuals        PROM Hip Ext Hip Ext      Mobs                                There Ex        Bike NuStep LVL 7 5' NuStep LVL 8 5' NuStep LVL 8 5'  NuStep LVL 7 5'    Hamstring Stretch        Gastroc Stretch        HS Curls Swiss ball x15 DL  X15 SL Swiss ball   X15 DL  X15 SL Swiss Ball x15 DL, x15 SL Swiss Ball x15 DL, x15 SL             Patient Education         Neruo Re-Ed        Quad Set         Bridge 2x15 2x15      Clamshell   SL Black 3x10 SL Black 3x10    SLR 3# 3x10 3# 3x10 4# 3x10  4# 3x10     Hip ABD  3# SL 3x10 3# SL 3x10 4# 3x10   Black TB 2x10 Black TB 2x10 ea     Hip Ext [place] 8# 2x10   Black TB 2x10 ea     Marching        Lateral Tap Down        SLS Foam 20\"x2ea Foam 20\"x2 ea Foam 10\" x 4  Foam 10\" x 4     Side-stepping On foam  4x       Leg Press 175# 2x10DL  95# 2x10SL 174# 2x10DL  95# 2x10 # 2x10 DL  95# 2x10 SL  180# 2x10 DL   100# 2x10 SL     Wobble board balance 2 min ea 2 min ea      Step Up 6\" w/ SLS @ top 3 sec hold 2x10     " "  Foam Tandem amb. 3x Tandem balance 30\"x2 ea  Marching w/ 3\" hold x10     Hurdles   Frwd/ lateral 2x ea Fwd alternating leading feet 6 laps  Fwd alternating leading feet 6 laps     BOSU   Lunge x20     Excursion   Lateral Sliders 2x10 ea Lateral sliders 2x10             Gait Training        @ Rail                 Modalities        CP PRN                                         "

## 2025-01-16 ENCOUNTER — OFFICE VISIT (OUTPATIENT)
Dept: PHYSICAL THERAPY | Facility: CLINIC | Age: 65
End: 2025-01-16
Payer: MEDICARE

## 2025-01-16 DIAGNOSIS — Z96.641 S/P HIP REPLACEMENT, RIGHT: Primary | ICD-10-CM

## 2025-01-16 DIAGNOSIS — M16.11 PRIMARY OSTEOARTHRITIS OF RIGHT HIP: ICD-10-CM

## 2025-01-16 PROCEDURE — 97110 THERAPEUTIC EXERCISES: CPT | Performed by: PHYSICAL THERAPIST

## 2025-01-16 PROCEDURE — 97112 NEUROMUSCULAR REEDUCATION: CPT | Performed by: PHYSICAL THERAPIST

## 2025-01-16 NOTE — PROGRESS NOTES
"Daily Note     Today's date: 2025  Patient name: Luther Vaughan  : 1960  MRN: [id number]  Referring provider: Augustine Weston DO  Dx:   Encounter Diagnosis     ICD-10-CM    1. S/P hip replacement, right  Z96.641       2. Primary osteoarthritis of right hip  M16.11                      Subjective: Patient notes that he has difficulty with putting on his sock.        Objective: See treatment diary below      Assessment: Tolerated treatment well. Patient would benefit from continued PT.  Heavily focusing on weight-bearing through the right lower extremity as well as lateral hip and glute strength.  Patient can note muscular fatigue during and post session.  Continue to work on strength as able.        Plan: Progress treatment as tolerated.         Diagnosis:    Precautions:    POC Expires Auth Status Start Date Expiration Date PT Visit Limit     No Auth NA NA NA   Date    Used        Remaining        Manuals        PROM Hip Ext Hip Ext      Mobs                                There Ex        Bike NuStep LVL 7 5' NuStep LVL 8 5' NuStep LVL 8 5'  NuStep LVL 7 5' NuStep LVL 7 5'   Hamstring Stretch        Gastroc Stretch        HS Curls Swiss ball x15 DL  X15 SL Swiss ball   X15 DL  X15 SL Swiss Ball x15 DL, x15 SL Swiss Ball x15 DL, x15 SL  Swiss Ball x15 DL, x15 SL   Lumbar Flexion      Seated x 10 fwd, x10 to R    Patient Education         Neruo Re-Ed        Quad Set         Bridge 2x15 2x15      Clamshell   SL Black 3x10 SL Black 3x10 SL Black 4x10   SLR 3# 3x10 3# 3x10 4# 3x10  4# 3x10  4# 3x10   Hip ABD  3# SL 3x10 3# SL 3x10 4# 3x10   Black TB 2x10 Black TB 2x10 ea  SL 4# 3x10   Standing Black TB 2x10    Hip Ext [place] 8# 2x10   Black TB 2x10 ea  Black TB 2x10 ea    Marching        Lateral Tap Down        SLS Foam 20\"x2ea Foam 20\"x2 ea Foam 10\" x 4  Foam 10\" x 4     Side-stepping On foam  4x       Leg Press 175# 2x10DL  95# 2x10SL 174# 2x10DL  95# 2x10 # 2x10 " "DL  95# 2x10 SL  180# 2x10 DL   100# 2x10 SL  180# 2x10 DL  100# 2x10 SL    Wobble board balance 2 min ea 2 min ea      Step Up 6\" w/ SLS @ top 3 sec hold 2x10    6\" w/ 1 rail - step up x 10, step up and over x 10, xtep fwd facing rail x10   Foam Tandem amb. 3x Tandem balance 30\"x2 ea  Marching w/ 3\" hold x10     Hurdles   Frwd/ lateral 2x ea Fwd alternating leading feet 6 laps  Fwd alternating leading feet 6 laps     BOSU   Lunge x20     Excursion   Lateral Sliders 2x10 ea Lateral sliders 2x10 Lateral Sliders walk 3 laps @ rail            Gait Training        @ Rail                 Modalities        CP PRN                                           "

## 2025-01-20 ENCOUNTER — OFFICE VISIT (OUTPATIENT)
Dept: PHYSICAL THERAPY | Facility: CLINIC | Age: 65
End: 2025-01-20
Payer: MEDICARE

## 2025-01-20 DIAGNOSIS — M16.11 PRIMARY OSTEOARTHRITIS OF RIGHT HIP: ICD-10-CM

## 2025-01-20 DIAGNOSIS — Z96.641 S/P HIP REPLACEMENT, RIGHT: Primary | ICD-10-CM

## 2025-01-20 PROCEDURE — 97112 NEUROMUSCULAR REEDUCATION: CPT | Performed by: PHYSICAL THERAPIST

## 2025-01-20 PROCEDURE — 97110 THERAPEUTIC EXERCISES: CPT | Performed by: PHYSICAL THERAPIST

## 2025-01-20 NOTE — LETTER
2025    Augustine Weston DO  [address]  [address]  [facility] [address]    Patient: Luther Vaughan   YOB: 1960   Date of Visit: 2025     Encounter Diagnosis     ICD-10-CM    1. S/P hip replacement, right  Z96.641       2. Primary osteoarthritis of right hip  M16.11           Dear Dr. Weston:    Thank you for your recent referral of Luther Vaughan. Please review the attached evaluation summary from Luther's recent visit.     Please verify that you agree with the plan of care by signing the attached order.     If you have any questions or concerns, please do not hesitate to call.     I sincerely appreciate the opportunity to share in the care of one of your patients and hope to have another opportunity to work with you in the near future.       Sincerely,    Derrek Montanez, PT      Referring Provider:      I certify that I have read the below Plan of Care and certify the need for these services furnished under this plan of treatment while under my care.                    Augustine Weston DO  [address]  [address]  [facility] [address]  Via Fax: [phone]          PT Re-Evaluation     Today's date: 2025  Patient name: Luther Vaughan  : 1960  MRN: [id number]  Referring provider: Augustine Weston DO  Dx:   Encounter Diagnosis     ICD-10-CM    1. S/P hip replacement, right  Z96.641       2. Primary osteoarthritis of right hip  M16.11                            Assessment  Impairments: abnormal muscle firing, abnormal or restricted ROM, activity intolerance, impaired physical strength, lacks appropriate home exercise program, pain with function, poor posture  and poor body mechanics    Assessment details: Luther Vaughan is a 64 y.o. male who has been consistent with attending and participating in skilled PT sessions.  The patient continues to make progress with PT.  He has been able to demonstrate improved hip strength as well as improving  stability.  The patient still notes stiffness in his hip after sitting for even 2 minutes and getting up to ambulate again.  Patient also has some difficulty with getting his sock on.  He will continue to benefit from further PT sessions to increase his strength, ambulation ability, and overall function.    Understanding of Dx/Px/POC: good     Prognosis: good    Goals  Impairment Goals  - Decrease pain by 50% in 8 weeks - PARTIALLY MET   - Increase right flexibility by 50% in 8 weeks - PARTIALLY MET   - Increase right lower extremity strength golbally to 4+/5 in 8 weeks - PARTIALLY MET     Functional Goals  - Return to Prior Level of Function in 8 weeks - PARTIALLY MET   - Patient will be independent with HEP in 8 weeks - PARTIALLY MET   - Patient will be able to ambulate with decreased pain in 8 weeks - PARTIALLY MET   - Patient will be able to ascend/descend stairs with a reciprocal gait pattern in 8 weeks - PARTIALLY MET   - Patient will be able to complete ADLs with decreased pain in 4 weeks - PARTIALLY MET       Plan  Patient would benefit from: skilled physical therapy  Planned modality interventions: cryotherapy, thermotherapy: hydrocollator packs and TENS    Planned therapy interventions: home exercise program, graded exercise, functional ROM exercises, flexibility, body mechanics training, postural training, patient education, therapeutic activities, therapeutic exercise, manual therapy, joint mobilization and neuromuscular re-education    Frequency: 2x week  Duration in weeks: 6  Treatment plan discussed with: patient        Subjective Evaluation    History of Present Illness  Mechanism of injury: Patient feels that he continues to have incremental pluses.  He notes that he is walking around the home more without his cane.  He notes having a slightly easier time with going up his steps.  He notes that his biggest issues are his stiffness in his hip especially after sitting and getting up to walk.  He also  "notes some difficulty with putting on his sock.      HPI:  Patient underwent a right hip replacement on 10/21/24.  He now presents for his post-op evaluation.      Pain Location:  R Hip   Occupation:     Prior Functional Limitations: Independent prior   AGG:  Sleeping, ambulation, stairs, ADLs / dressing, moving around  Ease:  Ice  Patient Goals:  \"I just want to be myself again\"     Pain  Current pain ratin  At best pain ratin  At worst pain ratin  Quality: tight          Objective     Passive Range of Motion     Right Hip   Flexion: 95 degrees     Strength/Myotome Testing     Left Hip   Normal muscle strength    Right Hip   Planes of Motion   Flexion: 4+  Extension: 4  Abduction: 4-    Right Knee   Flexion: 4+  Extension: 4+    Right Ankle/Foot   Dorsiflexion: 5    Ambulation     Comments   Patient will use a SPC for ambulation - patient can demonstrate ambulating without a SPC, antalgic gait pattern                Diagnosis:    Precautions:    POC Expires Auth Status Start Date Expiration Date PT Visit Limit     No Auth NA NA NA   Date    Used        Remaining        Manuals        PROM        Mobs                                There Ex        Bike NuStep LVL 7 5'  NuStep LVL 8 5'  NuStep LVL 7 5' NuStep LVL 7 5'   Hamstring Stretch 20\" x 4       Gastroc Stretch 20\" x 4       HS Curls Swiss Ball x15 DL, x15 SL   Swiss Ball x15 DL, x15 SL Swiss Ball x15 DL, x15 SL  Swiss Ball x15 DL, x15 SL   Lumbar Flexion      Seated x 10 fwd, x10 to R    Patient Education  RT - RE       Neruo Re-Ed        Quad Set         Bridge        Clamshell   SL Black 3x10 SL Black 3x10 SL Black 4x10   SLR 4# 3x10  4# 3x10  4# 3x10  4# 3x10   Hip ABD  SL 4# 3x10   4# 3x10   Black TB 2x10 Black TB 2x10 ea  SL 4# 3x10   Standing Black TB 2x10    Hip Ext    Black TB 2x10 ea  Black TB 2x10 ea    Marching        Lateral Tap Down 6\" 2x10       SLS   Foam 10\" x 4  Foam 10\" x 4     Side-stepping Blue TB " "3 laps        Leg Press 180# 2x10 DL  100# 2x10 SL   175# 2x10 DL  95# 2x10 SL  180# 2x10 DL   100# 2x10 SL  180# 2x10 DL  100# 2x10 SL    Wobble board balance        Step Up     6\" w/ 1 rail - step up x 10, step up and over x 10, xtep fwd facing rail x10   Foam    Marching w/ 3\" hold x10     Hurdles    Fwd alternating leading feet 6 laps  Fwd alternating leading feet 6 laps     BOSU   Lunge x20     Excursion   Lateral Sliders 2x10 ea Lateral sliders 2x10 Lateral Sliders walk 3 laps @ rail            Gait Training        @ Rail                 Modalities        CP PRN                                            "

## 2025-01-20 NOTE — PROGRESS NOTES
PT Re-Evaluation     Today's date: 2025  Patient name: Luther Vaughan  : 1960  MRN: [id number]  Referring provider: Augustine Weston DO  Dx:   Encounter Diagnosis     ICD-10-CM    1. S/P hip replacement, right  Z96.641       2. Primary osteoarthritis of right hip  M16.11                            Assessment  Impairments: abnormal muscle firing, abnormal or restricted ROM, activity intolerance, impaired physical strength, lacks appropriate home exercise program, pain with function, poor posture  and poor body mechanics    Assessment details: Luther Vaughan is a 64 y.o. male who has been consistent with attending and participating in skilled PT sessions.  The patient continues to make progress with PT.  He has been able to demonstrate improved hip strength as well as improving stability.  The patient still notes stiffness in his hip after sitting for even 2 minutes and getting up to ambulate again.  Patient also has some difficulty with getting his sock on.  He will continue to benefit from further PT sessions to increase his strength, ambulation ability, and overall function.    Understanding of Dx/Px/POC: good     Prognosis: good    Goals  Impairment Goals  - Decrease pain by 50% in 8 weeks - PARTIALLY MET   - Increase right flexibility by 50% in 8 weeks - PARTIALLY MET   - Increase right lower extremity strength V-Keybally to 4+/5 in 8 weeks - PARTIALLY MET     Functional Goals  - Return to Prior Level of Function in 8 weeks - PARTIALLY MET   - Patient will be independent with HEP in 8 weeks - PARTIALLY MET   - Patient will be able to ambulate with decreased pain in 8 weeks - PARTIALLY MET   - Patient will be able to ascend/descend stairs with a reciprocal gait pattern in 8 weeks - PARTIALLY MET   - Patient will be able to complete ADLs with decreased pain in 4 weeks - PARTIALLY MET       Plan  Patient would benefit from: skilled physical therapy  Planned modality interventions: cryotherapy,  Pt continues on bedrest. AAOx4. Call for help when in need. Less edema to left leg/ankle. Continues on heparin drip at this time with apTT at 43.1 within a therapeutic range. Pt will be NPO after midnight for CV ultrasound in am. Colostomy with noted small amount of brown feces in bag. Continues to void clear yellow urine to catheter. Pt given tylenol at change of shift for leg pain. Effectiveness pending. Call light within reach, bed alarm on, and side rails up x2.   "thermotherapy: hydrocollator packs and TENS    Planned therapy interventions: home exercise program, graded exercise, functional ROM exercises, flexibility, body mechanics training, postural training, patient education, therapeutic activities, therapeutic exercise, manual therapy, joint mobilization and neuromuscular re-education    Frequency: 2x week  Duration in weeks: 6  Treatment plan discussed with: patient        Subjective Evaluation    History of Present Illness  Mechanism of injury: Patient feels that he continues to have incremental pluses.  He notes that he is walking around the home more without his cane.  He notes having a slightly easier time with going up his steps.  He notes that his biggest issues are his stiffness in his hip especially after sitting and getting up to walk.  He also notes some difficulty with putting on his sock.      HPI:  Patient underwent a right hip replacement on 10/21/24.  He now presents for his post-op evaluation.      Pain Location:  R Hip   Occupation:     Prior Functional Limitations: Independent prior   AGG:  Sleeping, ambulation, stairs, ADLs / dressing, moving around  Ease:  Ice  Patient Goals:  \"I just want to be myself again\"     Pain  Current pain ratin  At best pain ratin  At worst pain ratin  Quality: tight          Objective     Passive Range of Motion     Right Hip   Flexion: 95 degrees     Strength/Myotome Testing     Left Hip   Normal muscle strength    Right Hip   Planes of Motion   Flexion: 4+  Extension: 4  Abduction: 4-    Right Knee   Flexion: 4+  Extension: 4+    Right Ankle/Foot   Dorsiflexion: 5    Ambulation     Comments   Patient will use a SPC for ambulation - patient can demonstrate ambulating without a SPC, antalgic gait pattern                Diagnosis:    Precautions:    POC Expires Auth Status Start Date Expiration Date PT Visit Limit     No Auth NA NA NA   Date    Used        Remaining        Manuals " "       PROM        Mobs                                There Ex        Bike NuStep LVL 7 5'  NuStep LVL 8 5'  NuStep LVL 7 5' NuStep LVL 7 5'   Hamstring Stretch 20\" x 4       Gastroc Stretch 20\" x 4       HS Curls Swiss Ball x15 DL, x15 SL   Swiss Ball x15 DL, x15 SL Swiss Ball x15 DL, x15 SL  Swiss Ball x15 DL, x15 SL   Lumbar Flexion      Seated x 10 fwd, x10 to R    Patient Education  RT - RE       Neruo Re-Ed        Quad Set         Bridge        Clamshell   SL Black 3x10 SL Black 3x10 SL Black 4x10   SLR 4# 3x10  4# 3x10  4# 3x10  4# 3x10   Hip ABD  SL 4# 3x10   4# 3x10   Black TB 2x10 Black TB 2x10 ea  SL 4# 3x10   Standing Black TB 2x10    Hip Ext    Black TB 2x10 ea  Black TB 2x10 ea    Marching        Lateral Tap Down 6\" 2x10       SLS   Foam 10\" x 4  Foam 10\" x 4     Side-stepping Blue TB 3 laps        Leg Press 180# 2x10 DL  100# 2x10 SL   175# 2x10 DL  95# 2x10 SL  180# 2x10 DL   100# 2x10 SL  180# 2x10 DL  100# 2x10 SL    Wobble board balance        Step Up     6\" w/ 1 rail - step up x 10, step up and over x 10, xtep fwd facing rail x10   Foam    Marching w/ 3\" hold x10     Hurdles    Fwd alternating leading feet 6 laps  Fwd alternating leading feet 6 laps     BOSU   Lunge x20     Excursion   Lateral Sliders 2x10 ea Lateral sliders 2x10 Lateral Sliders walk 3 laps @ rail            Gait Training        @ Rail                 Modalities        CP PRN                            "

## 2025-01-23 ENCOUNTER — EVALUATION (OUTPATIENT)
Dept: PHYSICAL THERAPY | Facility: CLINIC | Age: 65
End: 2025-01-23
Payer: MEDICARE

## 2025-01-23 DIAGNOSIS — Z96.641 S/P HIP REPLACEMENT, RIGHT: ICD-10-CM

## 2025-01-23 DIAGNOSIS — M16.11 PRIMARY OSTEOARTHRITIS OF RIGHT HIP: Primary | ICD-10-CM

## 2025-01-23 DIAGNOSIS — R03.0 ELEVATED BLOOD-PRESSURE READING WITHOUT DIAGNOSIS OF HYPERTENSION: ICD-10-CM

## 2025-01-23 PROCEDURE — 97110 THERAPEUTIC EXERCISES: CPT | Performed by: PHYSICAL THERAPIST

## 2025-01-23 PROCEDURE — 97140 MANUAL THERAPY 1/> REGIONS: CPT | Performed by: PHYSICAL THERAPIST

## 2025-01-23 PROCEDURE — 97112 NEUROMUSCULAR REEDUCATION: CPT | Performed by: PHYSICAL THERAPIST

## 2025-01-23 RX ORDER — LOSARTAN POTASSIUM 50 MG/1
50 TABLET ORAL DAILY
Qty: 90 TABLET | Refills: 1 | Status: SHIPPED | OUTPATIENT
Start: 2025-01-23

## 2025-01-23 NOTE — PROGRESS NOTES
"Daily Note     Today's date: 2025  Patient name: Luther Vaughan  : 1960  MRN: [id number]  Referring provider: Augustine Weston DO  Dx:   Encounter Diagnosis     ICD-10-CM    1. Primary osteoarthritis of right hip  M16.11       2. S/P hip replacement, right  Z96.641                      Subjective: Patient notes that he feels stiff this morning.        Objective: See treatment diary below    .    Assessment: Tolerated treatment well. Patient would benefit from continued PT.  Patient notes that he can have a harder time getting out of a chair due to his stiffness depending on the height / make-up of the chair.  Was able to tolerate all PREs.  No complaints of pain s/p session.  Continue to progress as able.        Plan: Progress treatment as tolerated.       Diagnosis:    Precautions:    POC Expires Auth Status Start Date Expiration Date PT Visit Limit     No Auth NA NA NA   Date    Used        Remaining        Manuals        PROM  Add Stretch      Mobs                                There Ex        Bike NuStep LVL 7 5' NuStep LVL 7 5'   NuStep LVL 7 5' NuStep LVL 7 5'   Hamstring Stretch 20\" x 4 20\" x 4      Gastroc Stretch 20\" x 4 20\" x 4      HS Curls Swiss Ball x15 DL, x15 SL  Swiss Ball x15 DL, x15 SL   Swiss Ball x15 DL, x15 SL  Swiss Ball x15 DL, x15 SL   Lumbar Flexion      Seated x 10 fwd, x10 to R    Patient Education  RT - RE       Neruo Re-Ed        Quad Set         Bridge        Clamshell  Supine & SL Black TB 3x10   SL Black 3x10 SL Black 4x10   SLR 4# 3x10 4# 3x10  4# 3x10  4# 3x10   Hip ABD  SL 4# 3x10  SL 4# 3x10  Black TB 2x10 ea  SL 4# 3x10   Standing Black TB 2x10    Hip Ext    Black TB 2x10 ea  Black TB 2x10 ea    Marching        Lateral Tap Down 6\" 2x10 6\" 2x10       SLS    Foam 10\" x 4     Side-stepping Blue TB 3 laps  Black TB 3 laps @ rail       Leg Press 180# 2x10 DL  100# 2x10 SL  180# 3x10 DL  100# 3x10 SL   180# 2x10 DL   100# 2x10 SL  180# 2x10 " "DL  100# 2x10 SL    Wobble board balance        Step Up     6\" w/ 1 rail - step up x 10, step up and over x 10, xtep fwd facing rail x10   Foam    Marching w/ 3\" hold x10     Hurdles     Fwd alternating leading feet 6 laps     BOSU        Excursion  Lateral Slides walk 3 laps @ rail   Lateral sliders 2x10 Lateral Sliders walk 3 laps @ rail            Gait Training        @ Rail                 Modalities        CP PRN                                 "

## 2025-01-27 ENCOUNTER — OFFICE VISIT (OUTPATIENT)
Dept: PHYSICAL THERAPY | Facility: CLINIC | Age: 65
End: 2025-01-27
Payer: MEDICARE

## 2025-01-27 DIAGNOSIS — M16.11 PRIMARY OSTEOARTHRITIS OF RIGHT HIP: Primary | ICD-10-CM

## 2025-01-27 DIAGNOSIS — Z96.641 S/P HIP REPLACEMENT, RIGHT: ICD-10-CM

## 2025-01-27 PROCEDURE — 97112 NEUROMUSCULAR REEDUCATION: CPT | Performed by: PHYSICAL THERAPIST

## 2025-01-27 PROCEDURE — 97110 THERAPEUTIC EXERCISES: CPT | Performed by: PHYSICAL THERAPIST

## 2025-01-27 NOTE — PROGRESS NOTES
"Daily Note     Today's date: 2025  Patient name: Luther Vaughan  : 1960  MRN: [id number]  Referring provider: Augustine Weston DO  Dx:   Encounter Diagnosis     ICD-10-CM    1. Primary osteoarthritis of right hip  M16.11       2. S/P hip replacement, right  Z96.641                      Subjective: Patient with no new complaints today.        Objective: See treatment diary below      Assessment: Tolerated treatment well. Patient would benefit from continued PT.  Patient continues to do well with PT.  Able to tolerate increased PREs today, being able to handle increased weights and repetitions.  Continue to progress as able.        Plan: Progress treatment as tolerated.         Diagnosis:    Precautions:    POC Expires Auth Status Start Date Expiration Date PT Visit Limit     No Auth NA NA NA   Date    Used        Remaining        Manuals        PROM  Add Stretch      Mobs                                There Ex        Bike NuStep LVL 7 5' NuStep LVL 7 5'  NuStep LvL 9 5'  NuStep LVL 7 5'   Hamstring Stretch 20\" x 4 20\" x 4 20\" x 4     Gastroc Stretch 20\" x 4 20\" x 4 20\" x 4     HS Curls Swiss Ball x15 DL, x15 SL  Swiss Ball x15 DL, x15 SL  Swiss Bal x15 DL, x15 SL  Swiss Ball x15 DL, x15 SL   Lumbar Flexion      Seated x 10 fwd, x10 to R    Patient Education  RT - RE       Neruo Re-Ed        Quad Set         Bridge        Clamshell  Supine & SL Black TB 3x10  Supine & SL Black TB 3x10  SL Black 4x10   SLR 4# 3x10 4# 3x10 5# 3x10  4# 3x10   Hip ABD  SL 4# 3x10  SL 4# 3x10 SL 5# 3x10  SL 4# 3x10   Standing Black TB 2x10    Hip Ext     Black TB 2x10 ea    Marching        Lateral Tap Down 6\" 2x10 6\" 2x10  6\" 3x10     SLS        Side-stepping Blue TB 3 laps  Black TB 3 laps @ rail  Black TB 4 laps @ rail      Leg Press 180# 2x10 DL  100# 2x10 SL  180# 3x10 DL  100# 3x10 SL  180# 3x10  100# 3x10   180# 2x10 DL  100# 2x10 SL    Wobble board balance        Step Up   6\" 2x10  6\" w/ 1 " rail - step up x 10, step up and over x 10, xtep fwd facing rail x10   Foam        Hurdles         BOSU        Excursion  Lateral Slides walk 3 laps @ rail    Lateral Sliders walk 3 laps @ rail            Gait Training        @ Rail                 Modalities        CP PRN

## 2025-01-30 ENCOUNTER — OFFICE VISIT (OUTPATIENT)
Dept: PHYSICAL THERAPY | Facility: CLINIC | Age: 65
End: 2025-01-30
Payer: MEDICARE

## 2025-01-30 DIAGNOSIS — M16.11 PRIMARY OSTEOARTHRITIS OF RIGHT HIP: Primary | ICD-10-CM

## 2025-01-30 DIAGNOSIS — Z96.641 S/P HIP REPLACEMENT, RIGHT: ICD-10-CM

## 2025-01-30 PROCEDURE — 97110 THERAPEUTIC EXERCISES: CPT | Performed by: PHYSICAL THERAPIST

## 2025-01-30 PROCEDURE — 97112 NEUROMUSCULAR REEDUCATION: CPT | Performed by: PHYSICAL THERAPIST

## 2025-01-30 NOTE — PROGRESS NOTES
"Daily Note     Today's date: 2025  Patient name: Luther Vaughan  : 1960  MRN: [id number]  Referring provider: Augustine Weston DO  Dx:   Encounter Diagnosis     ICD-10-CM    1. Primary osteoarthritis of right hip  M16.11       2. S/P hip replacement, right  Z96.641                      Subjective: Patient notes that he had a good follow-up with Dr. Weston.        Objective: See treatment diary below      Assessment: Tolerated treatment well. Patient would benefit from continued PT.  Patient notes that he saw Dr. Weston and had a good follow-up.  Strength is improving overall.  Doing a better job with weight-bearing through his right LE.  Continue to progress as able.        Plan: Progress treatment as tolerated.         Diagnosis:    Precautions:    POC Expires Auth Status Start Date Expiration Date PT Visit Limit     No Auth NA NA NA   Date     Used        Remaining        Manuals        PROM  Add Stretch      Mobs                                There Ex        Bike NuStep LVL 7 5' NuStep LVL 7 5'  NuStep LvL 9 5' NuStep LVL 9 5'    Hamstring Stretch 20\" x 4 20\" x 4 20\" x 4 20\" x 4    Gastroc Stretch 20\" x 4 20\" x 4 20\" x 4 20\" x 4    HS Curls Swiss Ball x15 DL, x15 SL  Swiss Ball x15 DL, x15 SL  Swiss Bal x15 DL, x15 SL Swiss Ball x15 DL, x15 SL     Lumbar Flexion         Patient Education  RT - RE       Neruo Re-Ed        Quad Set         Bridge        Clamshell  Supine & SL Black TB 3x10  Supine & SL Black TB 3x10 SL Black TB 4x10    SLR 4# 3x10 4# 3x10 5# 3x10 5# 3x10    Hip ABD  SL 4# 3x10  SL 4# 3x10 SL 5# 3x10 SL 5# 3x10     Hip Ext        Marching        Lateral Tap Down 6\" 2x10 6\" 2x10  6\" 3x10 8\" 2x10    SLS        Side-stepping Blue TB 3 laps  Black TB 3 laps @ rail  Black TB 4 laps @ rail  Black TB 4 laps @ rail     Leg Press 180# 2x10 DL  100# 2x10 SL  180# 3x10 DL  100# 3x10 SL  180# 3x10  100# 3x10  180# 2x10 DL  100# 2x10 SL     Wobble board balance        Step " "Up   6\" 2x10 8\" x10     Foam        Hurdles         BOSU        Excursion  Lateral Slides walk 3 laps @ rail                Gait Training        @ Rail                 Modalities        CP PRN                                     "

## 2025-02-03 ENCOUNTER — OFFICE VISIT (OUTPATIENT)
Dept: PHYSICAL THERAPY | Facility: CLINIC | Age: 65
End: 2025-02-03
Payer: MEDICARE

## 2025-02-03 DIAGNOSIS — Z96.641 S/P HIP REPLACEMENT, RIGHT: ICD-10-CM

## 2025-02-03 DIAGNOSIS — M16.11 PRIMARY OSTEOARTHRITIS OF RIGHT HIP: Primary | ICD-10-CM

## 2025-02-03 PROCEDURE — 97112 NEUROMUSCULAR REEDUCATION: CPT | Performed by: PHYSICAL THERAPIST

## 2025-02-03 PROCEDURE — 97110 THERAPEUTIC EXERCISES: CPT | Performed by: PHYSICAL THERAPIST

## 2025-02-03 NOTE — PROGRESS NOTES
"Daily Note     Today's date: 2/3/2025  Patient name: Luther Vaughan  : 1960  MRN: [id number]  Referring provider: Augustine Weston DO  Dx:   Encounter Diagnosis     ICD-10-CM    1. Primary osteoarthritis of right hip  M16.11       2. S/P hip replacement, right  Z96.641                      Subjective: Patient notes that he is no longer using SPC at home.        Objective: See treatment diary below      Assessment: Tolerated treatment well. Patient would benefit from continued PT.  Patient doing a better job with his ambulation - demonstrating increased stance time on his right LE.  Can still note stiffness in his hip when he sits and transitions to a standing position.  No complaints of sharp pain during or post session.  Continue to progress as able.        Plan: Progress treatment as tolerated.         Diagnosis:    Precautions:    POC Expires Auth Status Start Date Expiration Date PT Visit Limit     No Auth NA NA NA   Date  2/3   Used        Remaining        Manuals        PROM  Add Stretch      Mobs                                There Ex        Bike NuStep LVL 7 5' NuStep LVL 7 5'  NuStep LvL 9 5' NuStep LVL 9 5' NuStep LVL 9 5'   Hamstring Stretch 20\" x 4 20\" x 4 20\" x 4 20\" x 4 20\" x 3   Gastroc Stretch 20\" x 4 20\" x 4 20\" x 4 20\" x 4 20\" x 3   HS Curls Swiss Ball x15 DL, x15 SL  Swiss Ball x15 DL, x15 SL  Swiss Bal x15 DL, x15 SL Swiss Ball x15 DL, x15 SL  Swiss Ball x15 DL, x15 SL    Lumbar Flexion      Seated Lumbar Roll Outs x20 fwd    Patient Education  RT - RE       Neruo Re-Ed        Quad Set         Bridge     X15, x5 single on R    Clamshell  Supine & SL Black TB 3x10  Supine & SL Black TB 3x10 SL Black TB 4x10 SL Black TB 4x10    SLR 4# 3x10 4# 3x10 5# 3x10 5# 3x10 5# 3x10    Hip ABD  SL 4# 3x10  SL 4# 3x10 SL 5# 3x10 SL 5# 3x10  SL 5# 3x10    Hip Ext        Marching        Lateral Tap Down 6\" 2x10 6\" 2x10  6\" 3x10 8\" 2x10    SLS        Side-stepping Blue TB 3 " "laps  Black TB 3 laps @ rail  Black TB 4 laps @ rail  Black TB 4 laps @ rail  Green Twisted 4 laps @ rail    Leg Press 180# 2x10 DL  100# 2x10 SL  180# 3x10 DL  100# 3x10 SL  180# 3x10  100# 3x10  180# 2x10 DL  100# 2x10 SL  180# 3x10 DL  100# 3x10 SL    Wobble board balance        Step Up   6\" 2x10 8\" x10     Foam        Hurdles         BOSU        Excursion  Lateral Slides walk 3 laps @ rail       Squats     2x10 @ TRX             Gait Training        @ Rail                 Modalities        CP PRN                                       "

## 2025-02-05 ENCOUNTER — OFFICE VISIT (OUTPATIENT)
Dept: PHYSICAL THERAPY | Facility: CLINIC | Age: 65
End: 2025-02-05
Payer: MEDICARE

## 2025-02-05 DIAGNOSIS — Z96.641 S/P HIP REPLACEMENT, RIGHT: ICD-10-CM

## 2025-02-05 DIAGNOSIS — M16.11 PRIMARY OSTEOARTHRITIS OF RIGHT HIP: Primary | ICD-10-CM

## 2025-02-05 PROCEDURE — 97112 NEUROMUSCULAR REEDUCATION: CPT | Performed by: PHYSICAL THERAPIST

## 2025-02-05 PROCEDURE — 97110 THERAPEUTIC EXERCISES: CPT | Performed by: PHYSICAL THERAPIST

## 2025-02-05 NOTE — PROGRESS NOTES
"Daily Note     Today's date: 2025  Patient name: Luther Vaughan  : 1960  MRN: [id number]  Referring provider: Augustine Weston DO  Dx:   Encounter Diagnosis     ICD-10-CM    1. Primary osteoarthritis of right hip  M16.11       2. S/P hip replacement, right  Z96.641                      Subjective: Patient notes that he was able to put on his sock yesterday.        Objective: See treatment diary below      Assessment: Tolerated treatment well. Patient would benefit from continued PT.  Patient doing well overall.  Notes that his mobility is slightly better as he was able to get his sock on by himself yesterday - notes that he must be in the perfect position.  Continuing to work on overall strength.  Progress as able.        Plan: Progress treatment as tolerated.         Diagnosis:    Precautions:    POC Expires Auth Status Start Date Expiration Date PT Visit Limit     No Auth NA NA NA   Date 2/5  1/27 1/30 2/3   Used        Remaining        Manuals        PROM        Mobs                                There Ex        Bike   NuStep LvL 9 5' NuStep LVL 9 5' NuStep LVL 9 5'   Hamstring Stretch 20\" x 4  20\" x 4 20\" x 4 20\" x 3   Gastroc Stretch 20\" x 4  20\" x 4 20\" x 4 20\" x 3   HS Curls Swiss Ball x20 DL, x20 SL   Swiss Bal x15 DL, x15 SL Swiss Ball x15 DL, x15 SL  Swiss Ball x15 DL, x15 SL    Lumbar Flexion      Seated Lumbar Roll Outs x20 fwd    Patient Education         Neruo Re-Ed        Quad Set         Bridge X20  X10 single on R     X15, x5 single on R    Clamshell SL Black TB 3x10  Supine & SL Black TB 3x10 SL Black TB 4x10 SL Black TB 4x10    SLR 5# 3x10  5# 3x10 5# 3x10 5# 3x10    Hip ABD  5# 3x10  Burnouts  x10  SL 5# 3x10 SL 5# 3x10  SL 5# 3x10    Hip Ext        Marching        Lateral Tap Down 8\" x 10   6\" 3x10 8\" 2x10    SLS Kickers @ rail 2x10        Side-stepping Green Twisted 4 laps @ rail   Black TB 4 laps @ rail  Black TB 4 laps @ rail  Green Twisted 4 laps @ rail    Leg Press " "180# 3x10 DL   100# 3x10 SL   180# 3x10  100# 3x10  180# 2x10 DL  100# 2x10 SL  180# 3x10 DL  100# 3x10 SL    Wobble board balance        Step Up   6\" 2x10 8\" x10     Foam Plank Walks 1 plank 4x       Hurdles         BOSU        Excursion        Squats     2x10 @ TRX             Gait Training        @ Rail                 Modalities        CP PRN                                         "

## 2025-02-06 ENCOUNTER — APPOINTMENT (OUTPATIENT)
Dept: PHYSICAL THERAPY | Facility: CLINIC | Age: 65
End: 2025-02-06
Payer: MEDICARE

## 2025-02-10 ENCOUNTER — OFFICE VISIT (OUTPATIENT)
Dept: PHYSICAL THERAPY | Facility: CLINIC | Age: 65
End: 2025-02-10
Payer: MEDICARE

## 2025-02-10 DIAGNOSIS — M16.11 PRIMARY OSTEOARTHRITIS OF RIGHT HIP: Primary | ICD-10-CM

## 2025-02-10 DIAGNOSIS — Z96.641 S/P HIP REPLACEMENT, RIGHT: ICD-10-CM

## 2025-02-10 PROCEDURE — 97112 NEUROMUSCULAR REEDUCATION: CPT | Performed by: PHYSICAL THERAPIST

## 2025-02-10 PROCEDURE — 97110 THERAPEUTIC EXERCISES: CPT | Performed by: PHYSICAL THERAPIST

## 2025-02-10 NOTE — PROGRESS NOTES
"Daily Note     Today's date: 2/10/2025  Patient name: Luther Vaughan  : 1960  MRN: [id number]  Referring provider: Augustine Weston DO  Dx:   Encounter Diagnosis     ICD-10-CM    1. Primary osteoarthritis of right hip  M16.11       2. S/P hip replacement, right  Z96.641                      Subjective: Patient with no new complaints today.        Objective: See treatment diary below      Assessment: Tolerated treatment well. Patient would benefit from continued PT.  Patient doing much better with his strength.  Getting better lift and height with on his single leg bridge.  Can still note muscular soreness during and s/p session.  Continue to progress as able.        Plan: Progress treatment as tolerated.         Diagnosis:    Precautions:    POC Expires Auth Status Start Date Expiration Date PT Visit Limit     No Auth NA NA NA   Date 2/5 2/10  1/30 23   Used        Remaining        Manuals        PROM        Mobs                                There Ex        Bike  NuStep LVL 9 5'  NuStep LVL 9 5' NuStep LVL 9 5'   Hamstring Stretch 20\" x 4 20\" x 4  20\" x 4 20\" x 3   Gastroc Stretch 20\" x 4 20\" x 4  20\" x 4 20\" x 3   HS Curls Swiss Ball x20 DL, x20 SL  Swiss Ball x20 DL, x20 SL   Swiss Ball x15 DL, x15 SL  Swiss Ball x15 DL, x15 SL    Lumbar Flexion      Seated Lumbar Roll Outs x20 fwd    Patient Education         Neruo Re-Ed        Quad Set         Bridge X20  X10 single on R  X20  X10 single on R    X15, x5 single on R    Clamshell SL Black TB 3x10 SL Black TB 3x10   SL Black TB 4x10 SL Black TB 4x10    SLR 5# 3x10   5# 3x10 5# 3x10    Hip ABD  5# 3x10  Burnouts  x10 5# 3x10  Burnouts 2x10  SL 5# 3x10  SL 5# 3x10    Hip Ext        Marching        Lateral Tap Down 8\" x 10  8\" 3x10  8\" 2x10    SLS Kickers @ rail 2x10  Kickers 2x10       Side-stepping Green Twisted 4 laps @ rail  Green Twisted 5 laps @ rail   Black TB 4 laps @ rail  Green Twisted 4 laps @ rail    Leg Press 180# 3x10 DL   100# 3x10 " "SL  180# 3x10 DL  100# 3x10 SL   180# 2x10 DL  100# 2x10 SL  180# 3x10 DL  100# 3x10 SL    Wobble board balance        Step Up    8\" x10     Foam Plank Walks 1 plank 4x       Hurdles         BOSU        Excursion        Squats     2x10 @ TRX             Gait Training        @ Rail                 Modalities        CP PRN                                           "

## 2025-02-11 ENCOUNTER — PATIENT MESSAGE (OUTPATIENT)
Dept: FAMILY MEDICINE CLINIC | Facility: CLINIC | Age: 65
End: 2025-02-11

## 2025-02-11 DIAGNOSIS — B35.6 TINEA CRURIS: Primary | ICD-10-CM

## 2025-02-11 DIAGNOSIS — B35.9 TINEA: ICD-10-CM

## 2025-02-11 RX ORDER — NYSTATIN 100000 [USP'U]/G
POWDER TOPICAL 3 TIMES DAILY
Qty: 60 G | Refills: 0 | Status: SHIPPED | OUTPATIENT
Start: 2025-02-11

## 2025-02-11 NOTE — PATIENT COMMUNICATION
Order placed for the patient.     DO Ron Gordon [facility]  2/11/2025 5:41 PM

## 2025-02-12 ENCOUNTER — OFFICE VISIT (OUTPATIENT)
Dept: PHYSICAL THERAPY | Facility: CLINIC | Age: 65
End: 2025-02-12
Payer: MEDICARE

## 2025-02-12 DIAGNOSIS — Z96.641 S/P HIP REPLACEMENT, RIGHT: ICD-10-CM

## 2025-02-12 DIAGNOSIS — M16.11 PRIMARY OSTEOARTHRITIS OF RIGHT HIP: Primary | ICD-10-CM

## 2025-02-12 PROCEDURE — 97112 NEUROMUSCULAR REEDUCATION: CPT | Performed by: PHYSICAL THERAPIST

## 2025-02-12 PROCEDURE — 97110 THERAPEUTIC EXERCISES: CPT | Performed by: PHYSICAL THERAPIST

## 2025-02-12 NOTE — PROGRESS NOTES
"Daily Note     Today's date: 2025  Patient name: Luther Vaughan  : 1960  MRN: [id number]  Referring provider: Augustine Weston DO  Dx:   Encounter Diagnosis     ICD-10-CM    1. Primary osteoarthritis of right hip  M16.11       2. S/P hip replacement, right  Z96.641                      Subjective: Patient with no new complaints today.        Objective: See treatment diary below      Assessment: Tolerated treatment well. Patient would benefit from continued PT.  Patient feels that he has progressed more.  Doing a better job with his ambulation without a SPC.  Able to tolerate all PREs and stretches.  No complaints of pain s/p session.  Progress as able.        Plan: Progress treatment as tolerated.         Diagnosis:    Precautions:    POC Expires Auth Status Start Date Expiration Date PT Visit Limit     No Auth NA NA NA   Date 2/5 2/10 2/12  2/3   Used        Remaining        Manuals        PROM        Mobs                                There Ex        Bike  NuStep LVL 9 5' NuStep LVL 9 5'  NuStep LVL 9 5'   Hamstring Stretch 20\" x 4 20\" x 4 20\" x 4  20\" x 3   Gastroc Stretch 20\" x 4 20\" x 4 20\" x 4   20\" x 3   HS Curls Swiss Ball x20 DL, x20 SL  Swiss Ball x20 DL, x20 SL  Swiss Bal x20 DL, x20 SL   Swiss Ball x15 DL, x15 SL    Lumbar Flexion      Seated Lumbar Roll Outs x20 fwd    Patient Education         Neruo Re-Ed        Quad Set         Bridge X20  X10 single on R  X20  X10 single on R    X15, x5 single on R    Clamshell SL Black TB 3x10 SL Black TB 3x10  SL Black TB 3x10   SL Black TB 4x10    SLR 5# 3x10    5# 3x10    Hip ABD  5# 3x10  Burnouts  x10 5# 3x10  Burnouts 2x10 5# 3x10   Burnouts 2x10   SL 5# 3x10    Hip Ext        Marching        Lateral Tap Down 8\" x 10  8\" 3x10 8\" 3x10  Step Up 2x10     SLS Kickers @ rail 2x10  Kickers 2x10       Side-stepping Green Twisted 4 laps @ rail  Green Twisted 5 laps @ rail  Green Twisted 4 laps @ rail   Green Twisted 4 laps @ rail    Leg Press " 180# 3x10 DL   100# 3x10 SL  180# 3x10 DL  100# 3x10 SL  180# 3x10 DL  100# 3x10 SL   180# 3x10 DL  100# 3x10 SL    Wobble board balance        Step Up        Foam Plank Walks 1 plank 4x       Hurdles         BOSU        Excursion        Squats     2x10 @ TRX             Gait Training        @ Rail                 Modalities        CP PRN

## 2025-02-13 ENCOUNTER — APPOINTMENT (OUTPATIENT)
Dept: PHYSICAL THERAPY | Facility: CLINIC | Age: 65
End: 2025-02-13
Payer: MEDICARE

## 2025-02-17 ENCOUNTER — APPOINTMENT (OUTPATIENT)
Dept: PHYSICAL THERAPY | Facility: CLINIC | Age: 65
End: 2025-02-17
Payer: MEDICARE

## 2025-02-19 ENCOUNTER — OFFICE VISIT (OUTPATIENT)
Dept: PHYSICAL THERAPY | Facility: CLINIC | Age: 65
End: 2025-02-19
Payer: MEDICARE

## 2025-02-19 DIAGNOSIS — M16.11 PRIMARY OSTEOARTHRITIS OF RIGHT HIP: Primary | ICD-10-CM

## 2025-02-19 DIAGNOSIS — Z96.641 S/P HIP REPLACEMENT, RIGHT: ICD-10-CM

## 2025-02-19 PROCEDURE — 97110 THERAPEUTIC EXERCISES: CPT | Performed by: PHYSICAL THERAPIST

## 2025-02-19 PROCEDURE — 97112 NEUROMUSCULAR REEDUCATION: CPT | Performed by: PHYSICAL THERAPIST

## 2025-02-19 NOTE — PROGRESS NOTES
"Daily Note     Today's date: 2025  Patient name: Luther Vaughan  : 1960  MRN: [id number]  Referring provider: Augustine Weston DO  Dx:   Encounter Diagnosis     ICD-10-CM    1. Primary osteoarthritis of right hip  M16.11       2. S/P hip replacement, right  Z96.641                      Subjective: Patient notes that he was able to be at [facility] all [facility] long on  - did note that he needed to take some rest breaks, but was able to do everything.        Objective: See treatment diary below      Assessment: Tolerated treatment well. Patient would benefit from continued PT.  Patient described more weakness with endurance at [facility].  Noted that he would get fatigued and needed to sit for just a minute and then was able to continue to walk.  No issues with PREs today.  Progress strength as able.        Plan: Progress treatment as tolerated.         Diagnosis:    Precautions:    POC Expires Auth Status Start Date Expiration Date PT Visit Limit     No Auth NA NA NA   Date 2/5 2/10 2/12 2/19    Used        Remaining        Manuals        PROM        Mobs                                There Ex        Bike  NuStep LVL 9 5' NuStep LVL 9 5' NuStep LVL 8 5'    Hamstring Stretch 20\" x 4 20\" x 4 20\" x 4 20\" x 4    Gastroc Stretch 20\" x 4 20\" x 4 20\" x 4  20\" x 4    HS Curls Swiss Ball x20 DL, x20 SL  Swiss Ball x20 DL, x20 SL  Swiss Bal x20 DL, x20 SL  Swiss Ball x20 DL, x20 SL     Lumbar Flexion         Patient Education         Neruo Re-Ed        Quad Set         Bridge X20  X10 single on R  X20  X10 single on R   x20    Clamshell SL Black TB 3x10 SL Black TB 3x10  SL Black TB 3x10  SL Black TB 5x10    SLR 5# 3x10   4# 3x10    Hip ABD  5# 3x10  Burnouts  x10 5# 3x10  Burnouts 2x10 5# 3x10   Burnouts 2x10  4# 3x10    Hip Ext        Marching        Lateral Tap Down 8\" x 10  8\" 3x10 8\" 3x10  Step Up 2x10 8\" 3x10    SLS Kickers @ rail 2x10  Kickers 2x10       Side-stepping Green Twisted 4 laps @ rail  " Green Twisted 5 laps @ rail  Green Twisted 4 laps @ rail      Leg Press 180# 3x10 DL   100# 3x10 SL  180# 3x10 DL  100# 3x10 SL  180# 3x10 DL  100# 3x10 SL  180# 3x10 DL     Wobble board balance        Step Up        Foam Plank Walks 1 plank 4x       Hurdles         BOSU        Excursion        Squats    @ TRX x15             Gait Training        @ Rail                 Modalities        CP PRN

## 2025-02-20 ENCOUNTER — APPOINTMENT (OUTPATIENT)
Dept: PHYSICAL THERAPY | Facility: CLINIC | Age: 65
End: 2025-02-20
Payer: MEDICARE

## 2025-02-21 ENCOUNTER — OFFICE VISIT (OUTPATIENT)
Dept: PHYSICAL THERAPY | Facility: CLINIC | Age: 65
End: 2025-02-21
Payer: MEDICARE

## 2025-02-21 DIAGNOSIS — M16.11 PRIMARY OSTEOARTHRITIS OF RIGHT HIP: Primary | ICD-10-CM

## 2025-02-21 DIAGNOSIS — Z96.641 S/P HIP REPLACEMENT, RIGHT: ICD-10-CM

## 2025-02-21 PROCEDURE — 97110 THERAPEUTIC EXERCISES: CPT | Performed by: PHYSICAL THERAPIST

## 2025-02-21 PROCEDURE — 97112 NEUROMUSCULAR REEDUCATION: CPT | Performed by: PHYSICAL THERAPIST

## 2025-02-21 NOTE — PROGRESS NOTES
"Daily Note     Today's date: 2025  Patient name: Luther Vaughan  : 1960  MRN: [id number]  Referring provider: Augustine Weston DO  Dx:   Encounter Diagnosis     ICD-10-CM    1. Primary osteoarthritis of right hip  M16.11       2. S/P hip replacement, right  Z96.641                      Subjective: Patient with no new complaints today.        Objective: See treatment diary below      Assessment: Tolerated treatment well. Patient would benefit from continued PT.  Patient continues to do well.  Can still note stiffness in his hip when going from sitting to standing.  No complaints of pain in his right LE.  Look to add in more balance training and continue strength moving forward.        Plan: Progress treatment as tolerated.         Diagnosis:    Precautions:    POC Expires Auth Status Start Date Expiration Date PT Visit Limit     No Auth NA NA NA   Date 2/5 2/10 2/12 2/19 2/21   Used        Remaining        Manuals        PROM        Mobs                                There Ex        Bike  NuStep LVL 9 5' NuStep LVL 9 5' NuStep LVL 8 5' NuStep LVL 8 5'   Hamstring Stretch 20\" x 4 20\" x 4 20\" x 4 20\" x 4 20\" x 4   Gastroc Stretch 20\" x 4 20\" x 4 20\" x 4  20\" x 4 20\" x 4   HS Curls Swiss Ball x20 DL, x20 SL  Swiss Ball x20 DL, x20 SL  Swiss Bal x20 DL, x20 SL  Swiss Ball x20 DL, x20 SL  Swiss Ball x20 DL, x20 SL    Lumbar Flexion         Patient Education         Neruo Re-Ed        Quad Set         Bridge X20  X10 single on R  X20  X10 single on R   x20 X10  X10 single leg   Clamshell SL Black TB 3x10 SL Black TB 3x10  SL Black TB 3x10  SL Black TB 5x10 SL Black TB 5x10    SLR 5# 3x10   4# 3x10 5# 3x10   Hip ABD  5# 3x10  Burnouts  x10 5# 3x10  Burnouts 2x10 5# 3x10   Burnouts 2x10  4# 3x10 5# 3x10   Hip Ext        Marching        Lateral Tap Down 8\" x 10  8\" 3x10 8\" 3x10  Step Up 2x10 8\" 3x10    SLS Kickers @ rail 2x10  Kickers 2x10       Side-stepping Green Twisted 4 laps @ rail  Green Twisted 5 " laps @ rail  Green Twisted 4 laps @ rail   Black Twisted 5 laps @ rail   Leg Press 180# 3x10 DL   100# 3x10 SL  180# 3x10 DL  100# 3x10 SL  180# 3x10 DL  100# 3x10 SL  180# 3x10 DL  180# 3x10 DL  120# 2x10 SL   Wobble board balance        Step Up        Foam Plank Walks 1 plank 4x    Plank Walks 2 planks 3x    Feet together kettlebell passes 5# x5 ea, 10# x5 ea   Hurdles         BOSU        Excursion        Squats    @ TRX x15             Gait Training        @ Rail                 Modalities        CP PRN

## 2025-02-24 ENCOUNTER — OFFICE VISIT (OUTPATIENT)
Dept: PHYSICAL THERAPY | Facility: CLINIC | Age: 65
End: 2025-02-24
Payer: MEDICARE

## 2025-02-24 DIAGNOSIS — M16.11 PRIMARY OSTEOARTHRITIS OF RIGHT HIP: Primary | ICD-10-CM

## 2025-02-24 DIAGNOSIS — Z96.641 S/P HIP REPLACEMENT, RIGHT: ICD-10-CM

## 2025-02-24 PROCEDURE — 97112 NEUROMUSCULAR REEDUCATION: CPT | Performed by: PHYSICAL THERAPIST

## 2025-02-24 PROCEDURE — 97110 THERAPEUTIC EXERCISES: CPT | Performed by: PHYSICAL THERAPIST

## 2025-02-24 NOTE — PROGRESS NOTES
"Daily Note     Today's date: 2025  Patient name: Luther Vaughan  : 1960  MRN: [id number]  Referring provider: Augustine Weston DO  Dx:   Encounter Diagnosis     ICD-10-CM    1. Primary osteoarthritis of right hip  M16.11       2. S/P hip replacement, right  Z96.641           Start Time: 749  Stop Time: 827  Total time in clinic (min): 38 minutes    Subjective: Patient with no new complaints today.        Objective: See treatment diary below      Assessment: Tolerated treatment well. Patient would benefit from continued PT.  Patient continues to do well.  Did well with both open and closed chain strengthening exercises. Fatigued by step ups and leg press. Continues to require use of cane for ambulation. Overall doing very well and will benefit from a continuation of R LE strengthening.         Plan: Progress treatment as tolerated.         Diagnosis:    Precautions:    POC Expires Auth Status Start Date Expiration Date PT Visit Limit     No Auth NA NA NA   Date 2/24 2/10 2/12 2/19 2/21   Used        Remaining        Manuals        PROM        Mobs                                There Ex        Bike Nu Step LVL 8 5' NuStep LVL 9 5' NuStep LVL 9 5' NuStep LVL 8 5' NuStep LVL 8 5'   Hamstring Stretch 20\" 4x 20\" x 4 20\" x 4 20\" x 4 20\" x 4   Gastroc Stretch 20\" 4x 20\" x 4 20\" x 4  20\" x 4 20\" x 4   HS Curls Swiss Ball x20 DL, x20 SL  Swiss Ball x20 DL, x20 SL  Swiss Bal x20 DL, x20 SL  Swiss Ball x20 DL, x20 SL  Swiss Ball x20 DL, x20 SL    Lumbar Flexion         Patient Education         Neruo Re-Ed        Quad Set         Bridge X10  X10 single leg X20  X10 single on R   x20 X10  X10 single leg   Clamshell SL Black TB 5x10  SL Black TB 3x10  SL Black TB 3x10  SL Black TB 5x10 SL Black TB 5x10    SLR 5# 3x10   4# 3x10 5# 3x10   Hip ABD  5# 3x10 5# 3x10  Burnouts 2x10 5# 3x10   Burnouts 2x10  4# 3x10 5# 3x10   Hip Ext        Marching        Lateral Tap Down 8\" 3x10    Step up 10x 8\" 3x10 8\" " "3x10  Step Up 2x10 8\" 3x10    SLS  Kickers 2x10       Side-stepping Black twisted 6 laps at plinth Green Twisted 5 laps @ rail  Green Twisted 4 laps @ rail   Black Twisted 5 laps @ rail   Leg Press 180# 3x10 DL  120# 2x10 # 3x10 DL  100# 3x10 SL  180# 3x10 DL  100# 3x10 SL  180# 3x10 DL  180# 3x10 DL  120# 2x10 SL   Wobble board balance        Step Up        Foam     Plank Walks 2 planks 3x    Feet together kettlebell passes 5# x5 ea, 10# x5 ea   Hurdles         BOSU        Excursion        Squats @ TRX 15x   @ TRX x15             Gait Training        @ Rail                         Modalities        CP PRN                                                 "

## 2025-02-24 NOTE — PROGRESS NOTES
Progress Note - Cardiology Office  [facility]    Luther Vaughan 64 y.o. male MRN: [id number]  : 1960  Encounter: [id number]      Assessment & Plan  Paroxysmal atrial fibrillation (HCC)    Primary hypertension    Dyslipidemia    Morbid obesity (HCC)    Type 2 diabetes mellitus without complication, without long-term current use of insulin ([facility])    Lab Results   Component Value Date    HGBA1C 6.3 (H) 2024     Obstructive sleep apnea    Acute diastolic CHF (congestive heart failure) ([facility])  Wt Readings from Last 3 Encounters:   25 (!) 138 kg (305 lb)   25 (!) 137 kg (303 lb)   10/15/24 (!) 137 kg (301 lb)             Elevated blood-pressure reading without diagnosis of hypertension    Type 2 diabetes mellitus with other skin ulcer (CODE) ([facility])    Lab Results   Component Value Date    HGBA1C 6.3 (H) 2024        ASSESSMENT:   Osteoarthritis of right hip     S/p right TKA     PAF  [id number] score is 4  S/p cardioversion on 2024  Currently in sinus rhythm  On Lopressor and Eliquis     Chronic presumed diastolic heart failure  Dyspnea on exertion  Currently euvolemic  On losartan, metoprolol and Demadex     PFO  ELISA, 2024  Small, PFO with predominant right to left shunt     TTE, 2024:  EF 55% diastolic function could not be evaluated  Mild to moderate aortic stenosis  Mild MR and TR, PAP 30 mmhg     Lexiscan, 2024:  Stress Combined Conclusion: The ECG and SPECT imaging portions of the stress study are concordant with no evidence of stress induced myocardial ischemia.   EF 53%     Essential hypertension  BP is elevated at 140/80 with heart rate of 67/min  Will increase losartan to 100 mg daily  Continue metoprolol 50 mg twice daily and Demadex 10 mg       Dyslipidemia  2024: LDL 79, TG 97, HDL 37  On Lipitor 10 mg       Type 2 diabetes mellitus     Bilateral lower extremity lymphedema      Obesity, BMI 44.35     MARLA, on CPAP    "  Bilateral knee arthritis and right hip arthritis     RECOMMENDATIONS:  Increase losartan to 100 mg daily  Continue metoprolol 50 mg every 12 hours and Demadex 10 mg daily  Continue Eliquis 5 mg twice daily and atorvastatin 10 mg daily  Low-salt and low-cholesterol diet  Weight loss      Please call [phone] if any questions.    HPI :     Luther Vaughan is a 64 y.o. year old male who came for follow up.  He still has ambulatory dysfunction since his hip surgery.  His blood pressure is elevated and I am going to increase his losartan to 100 mg daily.  I have advised him on low-salt diet and weight loss    REVIEW OF SYSTEMS:  Review of Systems   Musculoskeletal:  Positive for arthralgias and gait problem.   All other systems reviewed and are negative.        Historical Information   Past Medical History:   Diagnosis Date    Chronic ulcer of lower extremity (HCC) 01/05/2024    Diabetes mellitus (HCC)     Hypertension     Incarcerated umbilical hernia 01/05/2022    Migraine     Obesity     MARLA on CPAP     Sleep apnea     Vertigo     episode 1/30/2016     Past Surgical History:   Procedure Laterality Date    HAND SURGERY      football injury    HERNIA REPAIR      KNEE SURGERY Left     left meniscus     MENISCECTOMY Left      Social History     Substance and Sexual Activity   Alcohol Use Not Currently     Social History     Substance and Sexual Activity   Drug Use Never     Social History     Tobacco Use   Smoking Status Never   Smokeless Tobacco Never   Tobacco Comments    never smoker- per [place]      Family History:   Family History   Problem Relation Age of Onset    No Known Problems Mother     Heart attack Father     Heart disease Father     Prostate cancer Neg Hx     Testicular cancer Neg Hx     Colon cancer Neg Hx        Meds/Allergies     Allergies   Allergen Reactions    Erythromycin Diarrhea    Fish-Derived Products - Food Allergy Diarrhea     States \"cartilage fish\" cause sweating, diarrhea, vomiting " "      Current Outpatient Medications:     Ascorbic Acid, Vitamin C, (VITAMIN C) 100 MG tablet, Take by mouth daily, Disp: , Rfl:     atorvastatin (LIPITOR) 10 mg tablet, TAKE 1 TABLET BY MOUTH EVERY DAY WITH DINNER, Disp: 90 tablet, Rfl: 1    cholecalciferol (VITAMIN D3) 400 units tablet, Take 400 Units by mouth daily, Disp: , Rfl:     losartan (COZAAR) 100 MG tablet, Take 1 tablet (100 mg total) by mouth daily, Disp: 90 tablet, Rfl: 2    metoprolol tartrate (LOPRESSOR) 50 mg tablet, TAKE 1 TABLET BY MOUTH EVERY 12 HOURS, Disp: 180 tablet, Rfl: 1    nystatin (MYCOSTATIN) powder, Apply topically 3 (three) times a day, Disp: 60 g, Rfl: 0    torsemide (DEMADEX) 10 mg tablet, TAKE 1 TABLET (10 MG TOTAL) BY MOUTH DAILY. DO NOT START BEFORE JANUARY 9, 2024., Disp: 90 tablet, Rfl: 1    traMADol (Ultram) 50 mg tablet, Take 1 tablet (50 mg total) by mouth every 8 (eight) hours as needed for moderate pain, Disp: 30 tablet, Rfl: 0    apixaban (ELIQUIS) 5 mg, Take 1 tablet (5 mg total) by mouth 2 (two) times a day, Disp: 180 tablet, Rfl: 1    meloxicam (MOBIC) 15 mg tablet, 15 mg daily States does not take daily. (Patient not taking: Reported on 1/8/2025), Disp: , Rfl:     Vitals: Blood pressure 144/80, pulse 67, height 5' 8.5\" (1.74 m), weight (!) 138 kg (305 lb), SpO2 98%.    Body mass index is 45.7 kg/m².  Vitals:    02/26/25 1425   Weight: (!) 138 kg (305 lb)     BP Readings from Last 3 Encounters:   02/26/25 144/80   01/08/25 136/82   10/15/24 138/80       Physical Exam:  Physical Exam    Neurologic:  Alert & oriented x 3, no new focal deficits, Not in any acute distress,  Constitutional: Morbidly obese  Eyes:  Pupil equal and reacting to light, conjunctiva normal,   HENT:  Atraumatic, oropharynx moist, Neck- normal range of motion, no tenderness,  Neck supple, No JVP, No LNP   Respiratory:  Bilateral air entry, mostly clear to auscultation  Cardiovascular: S1-S2 regular with a I/VI systolic murmur   GI:  Soft, " nondistended, normal bowel sounds, nontender, no hepatosplenomegaly appreciated.  Musculoskeletal: Hip tenderness  Skin:  Well hydrated, no rash   Lymphatic:  No lymphadenopathy noted   Extremities:  No edema        Diagnostic Studies Review Cardio:      EKG: Normal sinus rhythm, heart rate 67/min, left posterior fascicular block    Cardiac testing:       Results for orders placed during the hospital encounter of 01/05/24    Echo complete w/ contrast if indicated    Interpretation Summary    Left Ventricle: Left ventricular cavity size is normal. Wall thickness is moderately increased. There is mild to moderate concentric hypertrophy. The left ventricular ejection fraction is around 55% by visual estimation. Systolic function is normal.  Hard to assess accurately EF due to tachycardia and atrial fibrillation. Although no diagnostic regional wall motion abnormality was identified, this possibility cannot be completely excluded on the basis of this study.    Left Atrium: The atrium is mildly dilated by 2D.    Aortic Valve: The aortic valve is trileaflet. The leaflets are moderately thickened. The leaflets are moderately calcified. There is moderately reduced mobility. There is mild to moderate stenosis by 2D, it was a limited study velocities were not measured..    Mitral Valve: There is mild regurgitation.    Tricuspid Valve: There is mild regurgitation.  Pulmonary artery pressure around 30 mmHg.    IVC/SVC: The inferior vena cava is upper normal in size.  It has around 50% collapse with inspiration.    Prior TTE study available for comparison. Prior study date: 7/3/2023.  As compared to that study EF remained the same.  Aortic valve appears to have at least mild stenosis.    Results for orders placed during the hospital encounter of 01/05/24    ELISA    Interpretation Summary    Left Ventricle: Left ventricular cavity size is normal. Wall thickness is increased. The left ventricular ejection fraction is 55% by visual  estimation. Systolic function is low normal.    Atrial Septum: There is a small and patent foramen ovale confirmed at rest and with provocation (abdominal compression) with predominant right to left shunting using saline contrast.    Left Atrial Appendage: There is no thrombus.    Aorta: .3cm sessile atheroma noted in descending aorta        Results for orders placed during the hospital encounter of 01/05/24    Cardioversion    Interpretation Summary  ELISA/Cardioversion    Luther Vaughan  [id number]  1/8/24    PRE-OP DIAGNOSIS: Atrial fibrillation    POST-OP DIAGNOSIS: Successful electrical cardioversion of atrial fibrillation to normal sinus rhythm with PACs    : Sarwat Coyle MD [facility]    ANESTHESIA: Propofol given by anesthesiology.    COMPLICATIONS: None.    OPERATIVE TERM: DC cardioversion.    The nature of the procedure, risks and alternatives were discussed with the patient who gave informed consent.    OPERATIVE TECHNIQUE: The patient was sedated with propofol. Once adequately sedated, ELISA was performed.  (Please see full ELISA report for details). Once no thrombus confirmed,  DC cardioversion was performed with 200 J biphasically and synchronously. The patient was then monitored until fully alert and left the procedure area in stable condition.    Impression  Successful DC cardioversion of atrial fibrillation to normal sinus rhythm with PACs.        Results for orders placed during the hospital encounter of 03/08/24    NM myocardial perfusion spect (rx stress and/or rest)    Interpretation Summary    Stress Combined Conclusion: The ECG and SPECT imaging portions of the stress study are concordant with no evidence of stress induced myocardial ischemia.    The stress ECG is negative for ischemia after pharmacologic stress, without reproduction of symptoms.    Perfusion: Comparison of post Lexiscan supine and prone images with the resting revealed no significant reversible ischemia or evidence of prior  "infarction.  Mild, fixed, small inferior wall perfusion defect is probably secondary to soft tissue attenuation.    Stress Function: Left ventricular function post-stress is normal.  Calculated LV ejection fraction is 53%    Perfusion Defect Conclusion: There is no evidence of transient ischemic dilation (TID).          Imaging:  Chest X-Ray:   No Chest XR results available for this patient.    CT-scan of the chest:     No CTA results available for this patient.  Lab Review   Lab Results   Component Value Date    WBC 8.63 09/24/2024    HGB 12.0 (L) 10/22/2024    HCT 36.2 (L) 10/22/2024    MCV 93 09/24/2024    RDW 13.8 09/24/2024     09/24/2024     BMP:  Lab Results   Component Value Date    SODIUM 138 10/22/2024    K 3.8 10/22/2024     10/22/2024    CO2 27 10/22/2024    BUN 17 10/22/2024    CREATININE 0.84 10/22/2024    GLUC 126 (H) 10/22/2024    GLUF 101 (H) 09/24/2024    CALCIUM 8.6 10/22/2024    EGFR 96 10/22/2024    MG 2.0 01/09/2024     LFT:  Lab Results   Component Value Date    AST 14 09/24/2024    ALT 13 09/24/2024    ALKPHOS 73 09/24/2024    TP 6.7 09/24/2024    ALB 4.0 09/24/2024      No components found for: \"TSH3\"  Lab Results   Component Value Date    QKN4VUJAGAKZ 2.657 06/20/2023     Lab Results   Component Value Date    HGBA1C 6.3 (H) 08/14/2024     Lipid Profile:   Lab Results   Component Value Date    CHOLESTEROL 135 01/06/2024    HDL 37 (L) 01/06/2024    LDLCALC 79 01/06/2024    TRIG 97 01/06/2024     Lab Results   Component Value Date    CHOLESTEROL 135 01/06/2024    CHOLESTEROL 179 09/26/2023     No results found for: \"CKTOTAL\", \"CKMB\", \"CKMBINDEX\", \"TROPONINI\"  No results found for: \"NTBNP\"   No results found for this or any previous visit (from the past 4 weeks).          Dr. Akosua Maher MD, [facility]      \"This note has been constructed using a voice recognition system.Therefore there may be syntax, spelling, and/or grammatical errors. Please call if you have any questions. \"  "

## 2025-02-24 NOTE — ASSESSMENT & PLAN NOTE
Wt Readings from Last 3 Encounters:   02/26/25 (!) 138 kg (305 lb)   01/08/25 (!) 137 kg (303 lb)   10/15/24 (!) 137 kg (301 lb)

## 2025-02-26 ENCOUNTER — OFFICE VISIT (OUTPATIENT)
Dept: CARDIOLOGY CLINIC | Facility: CLINIC | Age: 65
End: 2025-02-26
Payer: MEDICARE

## 2025-02-26 VITALS
HEART RATE: 67 BPM | HEIGHT: 69 IN | SYSTOLIC BLOOD PRESSURE: 144 MMHG | WEIGHT: 305 LBS | BODY MASS INDEX: 45.18 KG/M2 | OXYGEN SATURATION: 98 % | DIASTOLIC BLOOD PRESSURE: 80 MMHG

## 2025-02-26 DIAGNOSIS — E78.5 DYSLIPIDEMIA: ICD-10-CM

## 2025-02-26 DIAGNOSIS — I48.0 PAROXYSMAL ATRIAL FIBRILLATION (HCC): Primary | ICD-10-CM

## 2025-02-26 DIAGNOSIS — R03.0 ELEVATED BLOOD-PRESSURE READING WITHOUT DIAGNOSIS OF HYPERTENSION: ICD-10-CM

## 2025-02-26 DIAGNOSIS — I50.31 ACUTE DIASTOLIC CHF (CONGESTIVE HEART FAILURE) (HCC): ICD-10-CM

## 2025-02-26 DIAGNOSIS — I10 PRIMARY HYPERTENSION: ICD-10-CM

## 2025-02-26 DIAGNOSIS — G47.33 OBSTRUCTIVE SLEEP APNEA: ICD-10-CM

## 2025-02-26 DIAGNOSIS — E11.622 TYPE 2 DIABETES MELLITUS WITH OTHER SKIN ULCER (CODE) (HCC): ICD-10-CM

## 2025-02-26 DIAGNOSIS — E11.9 TYPE 2 DIABETES MELLITUS WITHOUT COMPLICATION, WITHOUT LONG-TERM CURRENT USE OF INSULIN (HCC): ICD-10-CM

## 2025-02-26 DIAGNOSIS — E66.01 MORBID OBESITY (HCC): ICD-10-CM

## 2025-02-26 PROCEDURE — 93000 ELECTROCARDIOGRAM COMPLETE: CPT | Performed by: INTERNAL MEDICINE

## 2025-02-26 PROCEDURE — 99214 OFFICE O/P EST MOD 30 MIN: CPT | Performed by: INTERNAL MEDICINE

## 2025-02-26 RX ORDER — LOSARTAN POTASSIUM 100 MG/1
100 TABLET ORAL DAILY
Qty: 90 TABLET | Refills: 2 | Status: SHIPPED | OUTPATIENT
Start: 2025-02-26

## 2025-02-27 ENCOUNTER — OFFICE VISIT (OUTPATIENT)
Dept: PHYSICAL THERAPY | Facility: CLINIC | Age: 65
End: 2025-02-27
Payer: MEDICARE

## 2025-02-27 DIAGNOSIS — M16.11 PRIMARY OSTEOARTHRITIS OF RIGHT HIP: Primary | ICD-10-CM

## 2025-02-27 DIAGNOSIS — Z96.641 S/P HIP REPLACEMENT, RIGHT: ICD-10-CM

## 2025-02-27 PROCEDURE — 97112 NEUROMUSCULAR REEDUCATION: CPT | Performed by: PHYSICAL THERAPIST

## 2025-02-27 PROCEDURE — 97110 THERAPEUTIC EXERCISES: CPT | Performed by: PHYSICAL THERAPIST

## 2025-02-27 NOTE — PROGRESS NOTES
"Daily Note     Today's date: 2025  Patient name: Luther Vaughan  : 1960  MRN: [id number]  Referring provider: Augustine Weston DO  Dx:   Encounter Diagnosis     ICD-10-CM    1. Primary osteoarthritis of right hip  M16.11       2. S/P hip replacement, right  Z96.641                      Subjective: Patient feels that he has improved.        Objective: See treatment diary below      Assessment: Tolerated treatment well. Patient would benefit from continued PT.  Patient doing well overall.  Doing a better job with WB through his R LE.  No complaints of pain during or post session.  Continuing to increase weights and reps.  Progress as able.        Plan: Progress treatment as tolerated.         Diagnosis:    Precautions:    POC Expires Auth Status Start Date Expiration Date PT Visit Limit     No Auth NA NA NA   Date    Used        Remaining        Manuals        PROM        Mobs                                There Ex        Bike Nu Step LVL 8 5' NuStep LVL 8 5'  NuStep LVL 8 5' NuStep LVL 8 5'   Hamstring Stretch 20\" 4x 20\" x 4  20\" x 4 20\" x 4   Gastroc Stretch 20\" 4x 20\" x 4  20\" x 4 20\" x 4   HS Curls Swiss Ball x20 DL, x20 SL  Swiss Ball x15, x15 SL   Swiss Ball x20 DL, x20 SL  Swiss Ball x20 DL, x20 SL    Lumbar Flexion         Patient Education         Neruo Re-Ed        Quad Set         Bridge X10  X10 single leg X10  X10 single leg  x20 X10  X10 single leg   Clamshell SL Black TB 5x10  SL Black 3x10  Supine x30  SL Black TB 5x10 SL Black TB 5x10    SLR 5# 3x10 5# 3x10  4# 3x10 5# 3x10   Hip ABD  5# 3x10 5# 3x10  4# 3x10 5# 3x10   Hip Ext        Marching        Lateral Tap Down 8\" 3x10    Step up 10x 8\" 3x10  8\" 3x10    SLS        Side-stepping Black twisted 6 laps at plinth Black 5 laps    Black Twisted 5 laps @ rail   Leg Press 180# 3x10 DL  120# 2x10 # 2x10 DL  125# 2x10 SL   180# 3x10 DL  180# 3x10 DL  120# 2x10 SL   Wobble board balance        Step Up      "   Foam     Plank Walks 2 planks 3x    Feet together tom passes 5# x5 ea, 10# x5 ea   Hurdles         BOSU        Excursion        Squats @ TRX 15x   @ TRX x15             Gait Training        @ Rail                         Modalities        CP PRN

## 2025-03-03 ENCOUNTER — OFFICE VISIT (OUTPATIENT)
Dept: PHYSICAL THERAPY | Facility: CLINIC | Age: 65
End: 2025-03-03
Payer: MEDICARE

## 2025-03-03 DIAGNOSIS — Z96.641 S/P HIP REPLACEMENT, RIGHT: ICD-10-CM

## 2025-03-03 DIAGNOSIS — M16.11 PRIMARY OSTEOARTHRITIS OF RIGHT HIP: Primary | ICD-10-CM

## 2025-03-03 PROCEDURE — 97110 THERAPEUTIC EXERCISES: CPT

## 2025-03-03 PROCEDURE — 97112 NEUROMUSCULAR REEDUCATION: CPT

## 2025-03-03 NOTE — PROGRESS NOTES
"Daily Note     Today's date: 3/3/2025  Patient name: Luther Vaughan  : 1960  MRN: [id number]  Referring provider: Augustine Weston DO  Dx:   Encounter Diagnosis     ICD-10-CM    1. Primary osteoarthritis of right hip  M16.11       2. S/P hip replacement, right  Z96.641                      Subjective: Pt states that he is doing ok, hip is  a little stiff.        Objective: See treatment diary below      Assessment: Tolerated treatment well.  Continued with outlined program to improve overall LE/ hip strength.  Pt has good tolerance however requires few cues for form to control movement.  Pt progressing slowly towards his goals and will benefit from continued therapy.      Plan: Continue per plan of care.        Diagnosis:    Precautions:    POC Expires Auth Status Start Date Expiration Date PT Visit Limit     No Auth NA NA NA   Date 2/24 2/27 3/3  2/21   Used        Remaining        Manuals        PROM        Mobs                                There Ex        Bike Nu Step LVL 8 5' NuStep LVL 8 5' NuStep LvL  8 5'  NuStep LVL 8 5'   Hamstring Stretch 20\" 4x 20\" x 4 20\"x4  20\" x 4   Gastroc Stretch 20\" 4x 20\" x 4 20\"x4  20\" x 4   HS Curls Swiss Ball x20 DL, x20 SL  Swiss Ball x15, x15 SL  Swiss ball  X15, x15 SL  Swiss Ball x20 DL, x20 SL    Lumbar Flexion         Patient Education         Neruo Re-Ed        Quad Set         Bridge X10  X10 single leg X10  X10 single leg X10  X10 single leg  X10  X10 single leg   Clamshell SL Black TB 5x10  SL Black 3x10  Supine x30 SL black 3x10 supine x30  SL Black TB 5x10    SLR 5# 3x10 5# 3x10 5# 3x10  5# 3x10   Hip ABD  5# 3x10 5# 3x10 5# 3x10  5# 3x10   Hip Ext        Marching        Lateral Tap Down 8\" 3x10    Step up 10x 8\" 3x10 8\" 3x10     SLS        Side-stepping Black twisted 6 laps at plinth Black 5 laps  Black 5 laps  Black Twisted 5 laps @ rail   Leg Press 180# 3x10 DL  120# 2x10 # 2x10 DL  125# 2x10 SL  180# 2x10 DL    125# 2x10 SL  180# 3x10 " DL  120# 2x10 SL   Wobble board balance        Step Up        Foam     Plank Walks 2 planks 3x    Feet together kettlebell passes 5# x5 ea, 10# x5 ea   Hurdles         BOSU        Excursion        Squats @ TRX 15x                Gait Training        @ Rail                         Modalities        CP PRN

## 2025-03-06 ENCOUNTER — OFFICE VISIT (OUTPATIENT)
Dept: PHYSICAL THERAPY | Facility: CLINIC | Age: 65
End: 2025-03-06
Payer: MEDICARE

## 2025-03-06 DIAGNOSIS — Z96.641 S/P HIP REPLACEMENT, RIGHT: ICD-10-CM

## 2025-03-06 DIAGNOSIS — M16.11 PRIMARY OSTEOARTHRITIS OF RIGHT HIP: Primary | ICD-10-CM

## 2025-03-06 PROCEDURE — 97112 NEUROMUSCULAR REEDUCATION: CPT

## 2025-03-06 PROCEDURE — 97110 THERAPEUTIC EXERCISES: CPT

## 2025-03-06 NOTE — PROGRESS NOTES
"Daily Note     Today's date: 3/6/2025  Patient name: Luther Vaughan  : 1960  MRN: [id number]  Referring provider: Augustine Weston DO  Dx:   Encounter Diagnosis     ICD-10-CM    1. Primary osteoarthritis of right hip  M16.11       2. S/P hip replacement, right  Z96.641             Start Time: 07  Stop Time: 08  Total time in clinic (min): 38 minutes      Subjective: Patient reports some L hip stiffness this morning, but otherwise offers no new complaints since last session.       Objective: See treatment diary below      Assessment: Tolerated treatment well.  Continued with current POC focused on B LE/ hip strength to promote improvements in functional deficits, with good tolerance and an appropriate level of challenge demonstrated t/o session. Patient required some cueing for proper form during bridge to ensure proper muscle activation, with good carryover noted. No increases in sxs noted post tx. Patient would benefit from continued PT to address R hip/LE deficits in order to maximize overall functional ability.    Plan: Continue per plan of care.        Diagnosis:    Precautions:    POC Expires Auth Status Start Date Expiration Date PT Visit Limit     No Auth NA NA NA   Date 2/24 2/27 3/3 3/6 2/21   Used        Remaining        Manuals        PROM        Mobs                                There Ex        Bike Nu Step LVL 8 5' NuStep LVL 8 5' NuStep LvL  8 5' NuStep LvL  8 5' NuStep LVL 8 5'   Hamstring Stretch 20\" 4x 20\" x 4 20\"x4 20\"x4 20\" x 4   Gastroc Stretch 20\" 4x 20\" x 4 20\"x4 20\"x4 20\" x 4   HS Curls Swiss Ball x20 DL, x20 SL  Swiss Ball x15, x15 SL  Swiss ball  X15, x15 SL Swiss ball  X15, x15 SL Swiss Ball x20 DL, x20 SL    Lumbar Flexion         Patient Education         Neruo Re-Ed        Quad Set         Bridge X10  X10 single leg X10  X10 single leg X10  X10 single leg X10  X10 single leg X10  X10 single leg   Clamshell SL Black TB 5x10  SL Black 3x10  Supine x30 SL black 3x10 " "supine x30 SL black 3x10 supine x30 SL Black TB 5x10    SLR 5# 3x10 5# 3x10 5# 3x10 5# 3x10 5# 3x10   Hip ABD  5# 3x10 5# 3x10 5# 3x10 5# 3x10 5# 3x10   Hip Ext        Marching        Lateral Tap Down 8\" 3x10    Step up 10x 8\" 3x10 8\" 3x10 8\" 3x10    SLS        Side-stepping Black twisted 6 laps at plinth Black 5 laps  Black 5 laps Black 5 laps Black Twisted 5 laps @ rail   Leg Press 180# 3x10 DL  120# 2x10 # 2x10 DL  125# 2x10 SL  180# 2x10 DL    125# 2x10 # 2x10 DL    125# 2x10 # 3x10 DL  120# 2x10 SL   Wobble board balance        Step Up        Foam     Plank Walks 2 planks 3x    Feet together kettlebell passes 5# x5 ea, 10# x5 ea   Hurdles         BOSU        Excursion        Squats @ TRX 15x                Gait Training        @ Rail                         Modalities        CP PRN                                                     "

## 2025-03-10 ENCOUNTER — OFFICE VISIT (OUTPATIENT)
Dept: PHYSICAL THERAPY | Facility: CLINIC | Age: 65
End: 2025-03-10
Payer: MEDICARE

## 2025-03-10 DIAGNOSIS — Z96.641 S/P HIP REPLACEMENT, RIGHT: ICD-10-CM

## 2025-03-10 DIAGNOSIS — M16.11 PRIMARY OSTEOARTHRITIS OF RIGHT HIP: Primary | ICD-10-CM

## 2025-03-10 PROCEDURE — 97112 NEUROMUSCULAR REEDUCATION: CPT

## 2025-03-13 ENCOUNTER — APPOINTMENT (OUTPATIENT)
Dept: PHYSICAL THERAPY | Facility: CLINIC | Age: 65
End: 2025-03-13
Payer: MEDICARE

## 2025-03-14 ENCOUNTER — OFFICE VISIT (OUTPATIENT)
Dept: PHYSICAL THERAPY | Facility: CLINIC | Age: 65
End: 2025-03-14
Payer: MEDICARE

## 2025-03-14 DIAGNOSIS — M16.11 PRIMARY OSTEOARTHRITIS OF RIGHT HIP: Primary | ICD-10-CM

## 2025-03-14 DIAGNOSIS — Z96.641 S/P HIP REPLACEMENT, RIGHT: ICD-10-CM

## 2025-03-14 PROCEDURE — 97112 NEUROMUSCULAR REEDUCATION: CPT | Performed by: PHYSICAL THERAPIST

## 2025-03-14 PROCEDURE — 97110 THERAPEUTIC EXERCISES: CPT | Performed by: PHYSICAL THERAPIST

## 2025-03-14 NOTE — PROGRESS NOTES
"Daily Note     Today's date: 3/14/2025  Patient name: Luther Vaughan  : 1960  MRN: [id number]  Referring provider: Augustine Weston DO  Dx:   Encounter Diagnosis     ICD-10-CM    1. Primary osteoarthritis of right hip  M16.11       2. S/P hip replacement, right  Z96.641                      Subjective: Patient feels that his leg is strong, but notes decreased endurance.        Objective: See treatment diary below      Assessment: Tolerated treatment well. Patient would benefit from continued PT.  Patient notes that he is feeling stronger in his leg, but notes that his endurance is limited.  Notes taht he will need rest breaks for his leg if he is ambulating longer distances.  Patient will be re-evaluated at his next visit.  Progress as able.        Plan: Progress treatment as tolerated.         Diagnosis:    Precautions:    POC Expires Auth Status Start Date Expiration Date PT Visit Limit     No Auth NA NA NA   Date 3/14  3/3 3/6 3/10   Used        Remaining        Manuals        PROM        Mobs                                There Ex        Bike NuStep LVL 10 5'  NuStep LvL  8 5' NuStep LvL  8 5' NuStep LvL 8 5'   Hamstring Stretch 20\" x 4   20\"x4 20\"x4 20\" x 4   Gastroc Stretch 20\" x 4  20\"x4 20\"x4 20\" x 4   HS Curls Swiss Ball x15, x15 SL   Swiss ball  X15, x15 SL Swiss ball  X15, x15 SL Swiss Ball x15, x15 SL    Lumbar Flexion         Patient Education         Neruo Re-Ed        Quad Set         Bridge 2x12  X10 single leg   X10  X10 single leg X10  X10 single leg X10   X1o single leg    Clamshell SL Black 3x10  Supine x30  SL black 3x10 supine x30 SL black 3x10 supine x30 SL Black 3x10  Supine x30   SLR 5# 3x10   5# 3x10 5# 3x10 5# 3x10   Hip ABD  5# 3x10  5# 3x10 5# 3x10 5# 3x10    Hip Ext        Marching        Lateral Tap Down 8\" 3x10   8\" 3x10 8\" 3x10 8\" 3x10    SLS        Side-stepping Black 5 laps  Black 5 laps Black 5 laps Black 5 laps   Leg Press 180# 2x10 DL  125# 2x10 SL  180# 2x10 " DL    125# 2x10 # 2x10 DL    125# 2x10 # 2x10 DL    125# 2x10 SL   Wobble board balance        Step Up        Foam        Hurdles  Lateral steps over hurdles 10x     4 hurdles  Fwd/lat  3 laps ea   BOSU        Excursion        Squats                 Gait Training        @ Rail                         Modalities        CP PRN

## 2025-03-17 ENCOUNTER — EVALUATION (OUTPATIENT)
Dept: PHYSICAL THERAPY | Facility: CLINIC | Age: 65
End: 2025-03-17
Payer: MEDICARE

## 2025-03-17 DIAGNOSIS — M16.11 PRIMARY OSTEOARTHRITIS OF RIGHT HIP: Primary | ICD-10-CM

## 2025-03-17 DIAGNOSIS — Z96.641 S/P HIP REPLACEMENT, RIGHT: ICD-10-CM

## 2025-03-17 PROCEDURE — 97110 THERAPEUTIC EXERCISES: CPT | Performed by: PHYSICAL THERAPIST

## 2025-03-17 NOTE — LETTER
2025    Augustine Weston DO  [address]  [address]  [facility] [address]    Patient: Lutehr Vaughan   YOB: 1960   Date of Visit: 3/17/2025     Encounter Diagnosis     ICD-10-CM    1. Primary osteoarthritis of right hip  M16.11       2. S/P hip replacement, right  Z96.641           Dear Dr. Weston:    Thank you for your recent referral of Luther Vaughan. Please review the attached evaluation summary from Luther's recent visit.     Please verify that you agree with the plan of care by signing the attached order.     If you have any questions or concerns, please do not hesitate to call.     I sincerely appreciate the opportunity to share in the care of one of your patients and hope to have another opportunity to work with you in the near future.       Sincerely,    Derrek Montanez, PT      Referring Provider:      I certify that I have read the below Plan of Care and certify the need for these services furnished under this plan of treatment while under my care.                    Augustine Weston DO  [address]  [address]  [facility] [address]  Via Fax: [phone]          PT Re-Evaluation     Today's date: 3/17/2025  Patient name: Luther Vaughan  : 1960  MRN: [id number]  Referring provider: Augustine Weston DO  Dx:   Encounter Diagnosis     ICD-10-CM    1. Primary osteoarthritis of right hip  M16.11       2. S/P hip replacement, right  Z96.641                            Assessment  Impairments: abnormal muscle firing, abnormal or restricted ROM, activity intolerance, impaired physical strength, lacks appropriate home exercise program, pain with function, poor posture  and poor body mechanics    Assessment details: Luther Vaughan is a 64 y.o. male who has been consistent with attending and participating in skilled PT sessions.  The patient continues to make progress with PT.  His strength is improving and his ambulation is getting better, but he still  needs use of his SPC.  He is outside his age related norms on his TUG and 30 second sit to stand.  This data suggests that he is at a higher risk for a fall and demonstrates the need for further skilled PT to increase his strength, balance, and overall function.    Understanding of Dx/Px/POC: good     Prognosis: good    Goals  Impairment Goals  - Decrease pain by 50% in 8 weeks - PARTIALLY MET   - Increase right flexibility by 50% in 8 weeks - PARTIALLY MET   - Increase right lower extremity strength golbally to 4+/5 in 8 weeks - PARTIALLY MET     Functional Goals  - Return to Prior Level of Function in 8 weeks - PARTIALLY MET   - Patient will be independent with HEP in 8 weeks - PARTIALLY MET   - Patient will be able to ambulate with decreased pain in 8 weeks - PARTIALLY MET   - Patient will be able to ascend/descend stairs with a reciprocal gait pattern in 8 weeks - PARTIALLY MET   - Patient will be able to complete ADLs with decreased pain in 4 weeks - PARTIALLY MET       Plan  Patient would benefit from: skilled physical therapy  Planned modality interventions: cryotherapy, thermotherapy: hydrocollator packs and TENS    Planned therapy interventions: home exercise program, graded exercise, functional ROM exercises, flexibility, body mechanics training, postural training, patient education, therapeutic activities, therapeutic exercise, manual therapy, joint mobilization and neuromuscular re-education    Frequency: 2x week  Duration in weeks: 6  Treatment plan discussed with: patient        Subjective Evaluation    History of Present Illness  Mechanism of injury: Patient notes that he has improved with his walking, noting that he can walk some spurts without his cane.  Pain has also improved.  He notes that his endurance has gotten better, but notes that this is not fully back.  He notes that his stiffness is has improved, but he can still note stiffness that can vary by the day.  He notes that he has improved  "60-70% since starting.      HPI:  Patient underwent a right hip replacement on 10/21/24.  He now presents for his post-op evaluation.      Pain Location:  R Hip   Occupation:     Prior Functional Limitations: Independent prior   AGG:  Sleeping, ambulation, stairs, ADLs / dressing, moving around  Ease:  Ice  Patient Goals:  \"I just want to be myself again\"     Pain  Current pain ratin  At best pain ratin  At worst pain ratin  Quality: tight          Objective     Passive Range of Motion     Right Hip   Flexion: 97 degrees     Strength/Myotome Testing     Left Hip   Normal muscle strength    Right Hip   Planes of Motion   Flexion: 4+  Extension: 4  Abduction: 4    Right Knee   Flexion: 4+  Extension: 4+    Right Ankle/Foot   Dorsiflexion: 5    Ambulation     Comments   Patient will use a SPC for ambulation - patient can demonstrate ambulating without a SPC, antalgic gait pattern     Functional Assessment        Comments  TU.61 sec w/ SPC   30 sec STS:  10 reps w/ UE                Diagnosis:    Precautions:    POC Expires Auth Status Start Date Expiration Date PT Visit Limit     No Auth NA NA NA   Date 3/14 3/17 3/3 3/6 3/10   Used        Remaining        Manuals        PROM        Mobs                                There Ex        Bike NuStep LVL 10 5' NuStep LVL 9 5'  NuStep LvL  8 5' NuStep LvL  8 5' NuStep LvL 8 5'   Hamstring Stretch 20\" x 4   20\"x4 20\"x4 20\" x 4   Gastroc Stretch 20\" x 4  20\"x4 20\"x4 20\" x 4   HS Curls Swiss Ball x15, x15 SL   Swiss ball  X15, x15 SL Swiss ball  X15, x15 SL Swiss Ball x15, x15 SL    Lumbar Flexion         Patient Education   RT - RE      Neruo Re-Ed        Quad Set         Bridge 2x12  X10 single leg   X10  X10 single leg X10  X10 single leg X10   X1o single leg    Clamshell SL Black 3x10  Supine x30  SL black 3x10 supine x30 SL black 3x10 supine x30 SL Black 3x10  Supine x30   SLR 5# 3x10   5# 3x10 5# 3x10 5# 3x10   Hip ABD  5# 3x10  5# 3x10 5# " "3x10 5# 3x10    Hip Ext        Marching        Lateral Tap Down 8\" 3x10   8\" 3x10 8\" 3x10 8\" 3x10    SLS        Side-stepping Black 5 laps  Black 5 laps Black 5 laps Black 5 laps   Leg Press 180# 2x10 DL  125# 2x10 SL  180# 2x10 DL    125# 2x10 # 2x10 DL    125# 2x10 # 2x10 DL    125# 2x10 SL   Wobble board balance        Step Up        Foam        Hurdles  Lateral steps over hurdles 10x     4 hurdles  Fwd/lat  3 laps ea   BOSU        Excursion        Squats                 Gait Training        @ Rail                         Modalities        CP PRN                                            "

## 2025-03-17 NOTE — PROGRESS NOTES
PT Re-Evaluation     Today's date: 3/17/2025  Patient name: Luther Vaughan  : 1960  MRN: [id number]  Referring provider: Augustine Weston DO  Dx:   Encounter Diagnosis     ICD-10-CM    1. Primary osteoarthritis of right hip  M16.11       2. S/P hip replacement, right  Z96.641                            Assessment  Impairments: abnormal muscle firing, abnormal or restricted ROM, activity intolerance, impaired physical strength, lacks appropriate home exercise program, pain with function, poor posture  and poor body mechanics    Assessment details: Luther Vaughan is a 64 y.o. male who has been consistent with attending and participating in skilled PT sessions.  The patient continues to make progress with PT.  His strength is improving and his ambulation is getting better, but he still needs use of his SPC.  He is outside his age related norms on his TUG and 30 second sit to stand.  This data suggests that he is at a higher risk for a fall and demonstrates the need for further skilled PT to increase his strength, balance, and overall function.    Understanding of Dx/Px/POC: good     Prognosis: good    Goals  Impairment Goals  - Decrease pain by 50% in 8 weeks - PARTIALLY MET   - Increase right flexibility by 50% in 8 weeks - PARTIALLY MET   - Increase right lower extremity strength [employer] to 4+/5 in 8 weeks - PARTIALLY MET     Functional Goals  - Return to Prior Level of Function in 8 weeks - PARTIALLY MET   - Patient will be independent with HEP in 8 weeks - PARTIALLY MET   - Patient will be able to ambulate with decreased pain in 8 weeks - PARTIALLY MET   - Patient will be able to ascend/descend stairs with a reciprocal gait pattern in 8 weeks - PARTIALLY MET   - Patient will be able to complete ADLs with decreased pain in 4 weeks - PARTIALLY MET       Plan  Patient would benefit from: skilled physical therapy  Planned modality interventions: cryotherapy, thermotherapy: hydrocollator packs and  "TENS    Planned therapy interventions: home exercise program, graded exercise, functional ROM exercises, flexibility, body mechanics training, postural training, patient education, therapeutic activities, therapeutic exercise, manual therapy, joint mobilization and neuromuscular re-education    Frequency: 2x week  Duration in weeks: 6  Treatment plan discussed with: patient        Subjective Evaluation    History of Present Illness  Mechanism of injury: Patient notes that he has improved with his walking, noting that he can walk some spurts without his cane.  Pain has also improved.  He notes that his endurance has gotten better, but notes that this is not fully back.  He notes that his stiffness is has improved, but he can still note stiffness that can vary by the day.  He notes that he has improved 60-70% since starting.      HPI:  Patient underwent a right hip replacement on 10/21/24.  He now presents for his post-op evaluation.      Pain Location:  R Hip   Occupation:     Prior Functional Limitations: Independent prior   AGG:  Sleeping, ambulation, stairs, ADLs / dressing, moving around  Ease:  Ice  Patient Goals:  \"I just want to be myself again\"     Pain  Current pain ratin  At best pain ratin  At worst pain ratin  Quality: tight          Objective     Passive Range of Motion     Right Hip   Flexion: 97 degrees     Strength/Myotome Testing     Left Hip   Normal muscle strength    Right Hip   Planes of Motion   Flexion: 4+  Extension: 4  Abduction: 4    Right Knee   Flexion: 4+  Extension: 4+    Right Ankle/Foot   Dorsiflexion: 5    Ambulation     Comments   Patient will use a SPC for ambulation - patient can demonstrate ambulating without a SPC, antalgic gait pattern     Functional Assessment        Comments  TU.61 sec w/ SPC   30 sec STS:  10 reps w/ UE                Diagnosis:    Precautions:    POC Expires Auth Status Start Date Expiration Date PT Visit Limit     No Auth NA " "NA NA   Date 3/14 3/17 3/3 3/6 3/10   Used        Remaining        Manuals        PROM        Mobs                                There Ex        Bike NuStep LVL 10 5' NuStep LVL 9 5'  NuStep LvL  8 5' NuStep LvL  8 5' NuStep LvL 8 5'   Hamstring Stretch 20\" x 4   20\"x4 20\"x4 20\" x 4   Gastroc Stretch 20\" x 4  20\"x4 20\"x4 20\" x 4   HS Curls Swiss Ball x15, x15 SL   Swiss ball  X15, x15 SL Swiss ball  X15, x15 SL Swiss Ball x15, x15 SL    Lumbar Flexion         Patient Education   RT - RE      Neruo Re-Ed        Quad Set         Bridge 2x12  X10 single leg   X10  X10 single leg X10  X10 single leg X10   X1o single leg    Clamshell SL Black 3x10  Supine x30  SL black 3x10 supine x30 SL black 3x10 supine x30 SL Black 3x10  Supine x30   SLR 5# 3x10   5# 3x10 5# 3x10 5# 3x10   Hip ABD  5# 3x10  5# 3x10 5# 3x10 5# 3x10    Hip Ext        Marching        Lateral Tap Down 8\" 3x10   8\" 3x10 8\" 3x10 8\" 3x10    SLS        Side-stepping Black 5 laps  Black 5 laps Black 5 laps Black 5 laps   Leg Press 180# 2x10 DL  125# 2x10 SL  180# 2x10 DL    125# 2x10 # 2x10 DL    125# 2x10 # 2x10 DL    125# 2x10 SL   Wobble board balance        Step Up        Foam        Hurdles  Lateral steps over hurdles 10x     4 hurdles  Fwd/lat  3 laps ea   BOSU        Excursion        Squats                 Gait Training        @ Rail                         Modalities        CP PRN                            "

## 2025-03-20 ENCOUNTER — OFFICE VISIT (OUTPATIENT)
Dept: PHYSICAL THERAPY | Facility: CLINIC | Age: 65
End: 2025-03-20
Payer: MEDICARE

## 2025-03-20 DIAGNOSIS — M16.11 PRIMARY OSTEOARTHRITIS OF RIGHT HIP: Primary | ICD-10-CM

## 2025-03-20 DIAGNOSIS — Z96.641 S/P HIP REPLACEMENT, RIGHT: ICD-10-CM

## 2025-03-20 PROCEDURE — 97112 NEUROMUSCULAR REEDUCATION: CPT | Performed by: PHYSICAL THERAPIST

## 2025-03-20 PROCEDURE — 97110 THERAPEUTIC EXERCISES: CPT | Performed by: PHYSICAL THERAPIST

## 2025-03-20 NOTE — PROGRESS NOTES
"Daily Note     Today's date: 3/20/2025  Patient name: Luther Vaughan  : 1960  MRN: [id number]  Referring provider: Augustine Weston DO  Dx:   Encounter Diagnosis     ICD-10-CM    1. Primary osteoarthritis of right hip  M16.11       2. S/P hip replacement, right  Z96.641                      Subjective: Patient with no new complaints today.        Objective: See treatment diary below      Assessment: Tolerated treatment well. Patient would benefit from continued PT.  Focused heavily today on more PREs working to fatigue.  Patient was able to tolerate all PREs without any complaints of pain.  Was able to tolerate balance exercises as well.  Continue to progress as able.        Plan: Progress treatment as tolerated.       Diagnosis:    Precautions:    POC Expires Auth Status Start Date Expiration Date PT Visit Limit     No Auth NA NA NA   Date 3/14 3/17 3/20  3/10   Used        Remaining        Manuals        PROM        Mobs                                There Ex        Bike NuStep LVL 10 5' NuStep LVL 9 5'  NuStep LVL 9 5'  NuStep LvL 8 5'   Hamstring Stretch 20\" x 4   20\" x 4  20\" x 4   Gastroc Stretch 20\" x 4    20\" x 4   HS Curls Swiss Ball x15, x15 SL     Swiss Ball x15, x15 SL    Lumbar Flexion         Patient Education   RT - RE      Neruo Re-Ed        Quad Set         Bridge 2x12  X10 single leg   X10  Single leg 2 round to fatigue   X10   X1o single leg    Clamshell SL Black 3x10  Supine x30    SL Black 3x10  Supine x30   SLR 5# 3x10   4# 2 sets to fatigue   5# 3x10   Hip ABD  5# 3x10  4# 3 sets to fatigue   5# 3x10    Hip Ext        Marching        Lateral Tap Down 8\" 3x10     8\" 3x10    SLS   Sidestep onto foam w/ 3\" hold x15    Forward step onto foam w/ 3\" hold x15     Side-stepping Black 5 laps    Black 5 laps   Leg Press 180# 2x10 DL  125# 2x10 SL    180# 2x10 DL    125# 2x10 SL   Wobble board balance        Step Up        Foam   Plank Walks 3.5 laps      Hurdles  Lateral steps over " hurdles 10x     4 hurdles  Fwd/lat  3 laps ea   BOSU        Excursion        Squats                 Gait Training        @ Rail                         Modalities        CP PRN

## 2025-03-24 ENCOUNTER — OFFICE VISIT (OUTPATIENT)
Dept: PHYSICAL THERAPY | Facility: CLINIC | Age: 65
End: 2025-03-24
Payer: MEDICARE

## 2025-03-24 DIAGNOSIS — Z96.641 S/P HIP REPLACEMENT, RIGHT: ICD-10-CM

## 2025-03-24 DIAGNOSIS — M16.11 PRIMARY OSTEOARTHRITIS OF RIGHT HIP: Primary | ICD-10-CM

## 2025-03-24 PROCEDURE — 97110 THERAPEUTIC EXERCISES: CPT | Performed by: PHYSICAL THERAPIST

## 2025-03-24 NOTE — PROGRESS NOTES
"Daily Note     Today's date: 3/24/2025  Patient name: Luther Vaughan  : 1960  MRN: [id number]  Referring provider: Augustine Weston DO  Dx:   Encounter Diagnosis     ICD-10-CM    1. Primary osteoarthritis of right hip  M16.11       2. S/P hip replacement, right  Z96.641                      Subjective: Patient notes that he can still have difficulty with putting on his sock.        Objective: See treatment diary below      Assessment: Tolerated treatment well. Patient would benefit from continued PT.  Focused heavily on ROM today.  Patient was able to tolerate more quad and hip stretches today.  Continue to work on ROM as well as strength and balance as able.        Plan: Progress treatment as tolerated.         Diagnosis:    Precautions:    POC Expires Auth Status Start Date Expiration Date PT Visit Limit     No Auth NA NA NA   Date 3/14 3/17 3/20 3/24    Used        Remaining        Manuals        PROM        Mobs                                There Ex        Bike NuStep LVL 10 5' NuStep LVL 9 5'  NuStep LVL 9 5' NuStep LVL 9 5' ROM    Hamstring Stretch 20\" x 4   20\" x 4 20\" x 4    Gastroc Stretch 20\" x 4       HS Curls Swiss Ball x15, x15 SL    Swiss Ball x15 DL, x15 SL     Lumbar Flexion         Hip Flexion    W/ Strap x10    Quad Stretch    Prone 20\" x 6  Off side of table 20\" x 6    Patient Education   RT - RE      Neruo Re-Ed        Quad Set         Bridge 2x12  X10 single leg   X10  Single leg 2 round to fatigue  X10  Single leg x10    Clamshell SL Black 3x10  Supine x30       SLR 5# 3x10   4# 2 sets to fatigue  5# 2 sets to fatigue    Hip ABD  5# 3x10  4# 3 sets to fatigue  5# 2 sets to fatigue     Hip Ext        Marching        Lateral Tap Down 8\" 3x10        SLS   Sidestep onto foam w/ 3\" hold x15    Forward step onto foam w/ 3\" hold x15     Side-stepping Black 5 laps       Leg Press 180# 2x10 DL  125# 2x10 SL       Wobble board balance        Step Up        Foam   Plank Walks 3.5 laps   "    Hurdles  Lateral steps over hurdles 10x        BOSU        Excursion        Squats                 Gait Training        @ Rail                         Modalities        CP PRN

## 2025-03-26 ENCOUNTER — OFFICE VISIT (OUTPATIENT)
Dept: PHYSICAL THERAPY | Facility: CLINIC | Age: 65
End: 2025-03-26
Payer: MEDICARE

## 2025-03-26 DIAGNOSIS — Z96.641 S/P HIP REPLACEMENT, RIGHT: ICD-10-CM

## 2025-03-26 DIAGNOSIS — M16.11 PRIMARY OSTEOARTHRITIS OF RIGHT HIP: Primary | ICD-10-CM

## 2025-03-26 PROCEDURE — 97110 THERAPEUTIC EXERCISES: CPT | Performed by: PHYSICAL THERAPIST

## 2025-03-26 PROCEDURE — 97112 NEUROMUSCULAR REEDUCATION: CPT | Performed by: PHYSICAL THERAPIST

## 2025-03-26 NOTE — PROGRESS NOTES
"Daily Note     Today's date: 3/26/2025  Patient name: Luther Vaughan  : 1960  MRN: [id number]  Referring provider: Augustine Weston DO  Dx:   Encounter Diagnosis     ICD-10-CM    1. Primary osteoarthritis of right hip  M16.11       2. S/P hip replacement, right  Z96.641                      Subjective: Patient with no new complaints today.        Objective: See treatment diary below      Assessment: Tolerated treatment well. Patient would benefit from continued PT.  Continuing to work on overall mobility, strength, and balance.  Patient can still demonstrate antalgic gait pattern.  No complaints of pain s/p session.  Progress as able.        Plan: Progress treatment as tolerated.         Diagnosis:    Precautions:    POC Expires Auth Status Start Date Expiration Date PT Visit Limit     No Auth NA NA NA   Date 3/14 3/17 3/20 3/24 3/26   Used        Remaining        Manuals        PROM        Mobs                                There Ex        Bike NuStep LVL 10 5' NuStep LVL 9 5'  NuStep LVL 9 5' NuStep LVL 9 5' ROM NuStep LVL 9 5 ' ROM   Hamstring Stretch 20\" x 4   20\" x 4 20\" x 4    Gastroc Stretch 20\" x 4       HS Curls Swiss Ball x15, x15 SL    Swiss Ball x15 DL, x15 SL  Swiss Ball x15 DL, x15 SL    Lumbar Flexion      X10 fwd  X20 to R  X10 to R on 4\" step   Hip Flexion    W/ Strap x10 W/ strap x15   Quad Stretch    Prone 20\" x 6  Off side of table 20\" x 6 Off Table 20\" x 6  Prone 20\" x 6   Patient Education   RT - RE      Neruo Re-Ed        Quad Set         Bridge 2x12  X10 single leg   X10  Single leg 2 round to fatigue  X10  Single leg x10    Clamshell SL Black 3x10  Supine x30       SLR 5# 3x10   4# 2 sets to fatigue  5# 2 sets to fatigue    Hip ABD  5# 3x10  4# 3 sets to fatigue  5# 2 sets to fatigue     Hip Ext        Marching        Lateral Tap Down 8\" 3x10        SLS   Sidestep onto foam w/ 3\" hold x15    Forward step onto foam w/ 3\" hold x15  3\" x15    X15 step up & hold on BOSU "   Side-stepping Black 5 laps       Leg Press 180# 2x10 DL  125# 2x10 SL    125# 1 round to fatigue   Wobble board balance        Step Up        Foam   Plank Walks 3.5 laps      Hurdles  Lateral steps over hurdles 10x        BOSU        Excursion        Squats                 Gait Training        @ Rail                         Modalities        CP PRN

## 2025-03-27 ENCOUNTER — APPOINTMENT (OUTPATIENT)
Dept: PHYSICAL THERAPY | Facility: CLINIC | Age: 65
End: 2025-03-27
Payer: MEDICARE

## 2025-03-31 ENCOUNTER — OFFICE VISIT (OUTPATIENT)
Dept: PHYSICAL THERAPY | Facility: CLINIC | Age: 65
End: 2025-03-31
Payer: MEDICARE

## 2025-03-31 DIAGNOSIS — M16.11 PRIMARY OSTEOARTHRITIS OF RIGHT HIP: Primary | ICD-10-CM

## 2025-03-31 DIAGNOSIS — Z96.641 S/P HIP REPLACEMENT, RIGHT: ICD-10-CM

## 2025-03-31 PROCEDURE — 97110 THERAPEUTIC EXERCISES: CPT | Performed by: PHYSICAL THERAPIST

## 2025-03-31 PROCEDURE — 97112 NEUROMUSCULAR REEDUCATION: CPT | Performed by: PHYSICAL THERAPIST

## 2025-03-31 NOTE — PROGRESS NOTES
"Daily Note     Today's date: 3/31/2025  Patient name: Luther Vaughan  : 1960  MRN: [id number]  Referring provider: Augustine Weston DO  Dx:   Encounter Diagnosis     ICD-10-CM    1. Primary osteoarthritis of right hip  M16.11       2. S/P hip replacement, right  Z96.641                      Subjective: Patient with no new complaints today.        Objective: See treatment diary below      Assessment: Tolerated treatment well. Patient would benefit from continued PT.  Continuing to work on overall mobility and strength of the right lower extremity.  No complaints of pain during or post session.  Did note a good stretch with self stretches today.  Continue to progress as able.        Plan: Progress treatment as tolerated.         Diagnosis:    Precautions:    POC Expires Auth Status Start Date Expiration Date PT Visit Limit     No Auth NA NA NA   Date 3/31  3/20 3/24 3/26   Used        Remaining        Manuals        PROM        Mobs                                There Ex        Bike NuStep LVL 9 5' ROM  NuStep LVL 9 5' NuStep LVL 9 5' ROM NuStep LVL 9 5 ' ROM   Hamstring Stretch   20\" x 4 20\" x 4    Gastroc Stretch        HS Curls Swiss Ball x15 DL, x15 SL   Swiss Ball x15 DL, x15 SL  Swiss Ball x15 DL, x15 SL    Lumbar Flexion  X10 fwd  X20 to R  X10 to R on 4\" step     X10 fwd  X20 to R  X10 to R on 4\" step   Hip Flexion    W/ Strap x10 W/ strap x15   Quad Stretch Prone 20\" x 6  Off Table 20\" x 6   Prone 20\" x 6  Off side of table 20\" x 6 Off Table 20\" x 6  Prone 20\" x 6   Patient Education         Neruo Re-Ed        Quad Set         Bridge   X10  Single leg 2 round to fatigue  X10  Single leg x10    Clamshell        SLR   4# 2 sets to fatigue  5# 2 sets to fatigue    Hip ABD  5# 2 sets to fatigue   4# 3 sets to fatigue  5# 2 sets to fatigue     Hip Ext        Marching        Lateral Tap Down        #   Sidestep onto foam w/ 3\" hold x15    Forward step onto foam w/ 3\" hold x15  3\" x15    X15 " step up & hold on BOSU   Side-stepping        Leg Press     125# 1 round to fatigue   Wobble board balance 1 round to fatigue        Step Up        Foam   Plank Walks 3.5 laps      Hurdles         BOSU        Excursion        Squats                 Gait Training        @ Rail                         Modalities        CP PRN

## 2025-04-02 ENCOUNTER — OFFICE VISIT (OUTPATIENT)
Dept: PHYSICAL THERAPY | Facility: CLINIC | Age: 65
End: 2025-04-02
Payer: MEDICARE

## 2025-04-02 DIAGNOSIS — Z96.641 S/P HIP REPLACEMENT, RIGHT: ICD-10-CM

## 2025-04-02 DIAGNOSIS — M16.11 PRIMARY OSTEOARTHRITIS OF RIGHT HIP: Primary | ICD-10-CM

## 2025-04-02 PROCEDURE — 97112 NEUROMUSCULAR REEDUCATION: CPT | Performed by: PHYSICAL THERAPIST

## 2025-04-02 PROCEDURE — 97110 THERAPEUTIC EXERCISES: CPT | Performed by: PHYSICAL THERAPIST

## 2025-04-02 NOTE — PROGRESS NOTES
"Daily Note     Today's date: 2025  Patient name: Luther Vaughan  : 1960  MRN: [id number]  Referring provider: Augustine Weston*  Dx:   Encounter Diagnosis     ICD-10-CM    1. Primary osteoarthritis of right hip  M16.11       2. S/P hip replacement, right  Z96.641                      Subjective: Patient notes that he is feeling stronger and better overall.        Objective: See treatment diary below      Assessment: Tolerated treatment well. Patient would benefit from continued PT.  Patient doing well overall.  Does note that putting on his socks can be difficult - does note though that he can tie his shoes.  Does feel that the stretching is beneficial.  Is attempting to do more around the house and outside.  Continue to progress as able.        Plan: Progress treatment as tolerated.         Diagnosis:    Precautions:    POC Expires Auth Status Start Date Expiration Date PT Visit Limit     No Auth NA NA NA   Date 3/31 4/2  3/24 3/26   Used        Remaining        Manuals        PROM        Mobs                                There Ex        Bike NuStep LVL 9 5' ROM NuStep LVL 10 5' ROM  NuStep LVL 9 5' ROM NuStep LVL 9 5 ' ROM   Hamstring Stretch    20\" x 4    Gastroc Stretch        HS Curls Swiss Ball x15 DL, x15 SL Swiss Ball x15 DL, x15 SL   Swiss Ball x15 DL, x15 SL  Swiss Ball x15 DL, x15 SL    Lumbar Flexion  X10 fwd  X20 to R  X10 to R on 4\" step  X10 fwd  X10 to R  X10 to R on 4\" step   X10 fwd  X20 to R  X10 to R on 4\" step   Hip Flexion    W/ Strap x10 W/ strap x15   Quad Stretch Prone 20\" x 6  Off Table 20\" x 6 Prone 20\" x 6  Off Table 20\" x 6  Prone 20\" x 6  Off side of table 20\" x 6 Off Table 20\" x 6  Prone 20\" x 6   Patient Education         Neruo Re-Ed        Quad Set         Bridge  X10  X10 Single leg  X10  Single leg x10    Clamshell        SLR  5# 2 sets to fatigue  5# 2 sets to fatigue    Hip ABD  5# 2 sets to fatigue  5# 2 sets to fatigue  5# 2 sets to fatigue     Hip " "Ext        Marching        Lateral Tap Down        #     3\" x15    X15 step up & hold on BOSU   Side-stepping        Leg Press  125# 2 rounds to fatigue   125# 1 round to fatigue   Wobble board balance 1 round to fatigue        Step Up        Foam        Hurdles   2 laps fwd focus on hip flexion / 2 laps lateral focus on hip extension and ABD/ER      BOSU        Excursion        Squats                 Gait Training        @ Rail                         Modalities        CP PRN                                         "

## 2025-04-07 ENCOUNTER — OFFICE VISIT (OUTPATIENT)
Dept: PHYSICAL THERAPY | Facility: CLINIC | Age: 65
End: 2025-04-07
Payer: MEDICARE

## 2025-04-07 DIAGNOSIS — Z96.641 S/P HIP REPLACEMENT, RIGHT: ICD-10-CM

## 2025-04-07 DIAGNOSIS — M16.11 PRIMARY OSTEOARTHRITIS OF RIGHT HIP: Primary | ICD-10-CM

## 2025-04-07 PROCEDURE — 97110 THERAPEUTIC EXERCISES: CPT | Performed by: PHYSICAL THERAPIST

## 2025-04-07 PROCEDURE — 97112 NEUROMUSCULAR REEDUCATION: CPT | Performed by: PHYSICAL THERAPIST

## 2025-04-07 NOTE — PROGRESS NOTES
"Daily Note     Today's date: 2025  Patient name: Luther Vaughan  : 1960  MRN: [id number]  Referring provider: Augustine Weston*  Dx:   Encounter Diagnosis     ICD-10-CM    1. Primary osteoarthritis of right hip  M16.11       2. S/P hip replacement, right  Z96.641                      Subjective: Patient with no new complaints.        Objective: See treatment diary below      Assessment: Tolerated treatment well. Patient would benefit from continued PT.  Continued focus on more mobility in the leg and hip today.  No complaints during or post session.  Patient continues to feel that he is getting better each day.  Progress as able.        Plan: Progress treatment as tolerated.         Diagnosis:    Precautions:    POC Expires Auth Status Start Date Expiration Date PT Visit Limit     No Auth NA NA NA   Date 3/31 4/2 4/7  3/26   Used        Remaining        Manuals        PROM        Mobs                                There Ex        Bike NuStep LVL 9 5' ROM NuStep LVL 10 5' ROM NuStep LvL 10, 5' ROM   NuStep LVL 9 5 ' ROM   Hamstring Stretch        Gastroc Stretch        HS Curls Swiss Ball x15 DL, x15 SL Swiss Ball x15 DL, x15 SL  Swiss Bal x15 SL, x15 DL  Swiss Ball x15 DL, x15 SL    Lumbar Flexion  X10 fwd  X20 to R  X10 to R on 4\" step  X10 fwd  X10 to R  X10 to R on 4\" step X15 fwd  X15 to R   X10 fwd  X20 to R  X10 to R on 4\" step   Hip Flexion     W/ strap x15   Quad Stretch Prone 20\" x 6  Off Table 20\" x 6 Prone 20\" x 6  Off Table 20\" x 6 Prone 20\" x 6  Prone w/ bolster 20\" x 6  Off Table 20\" x 6   Off Table 20\" x 6  Prone 20\" x 6   Patient Education         Neruo Re-Ed        Quad Set         Bridge  X10  X10 Single leg      Clamshell        SLR  5# 2 sets to fatigue      Hip ABD  5# 2 sets to fatigue  5# 2 sets to fatigue      Hip Ext        Marching   Lateral March over cone x10, x10 w/ 5# weight     Lateral Tap Down        #     3\" x15    X15 step up & hold on BOSU "   Side-stepping        Leg Press  125# 2 rounds to fatigue 125# to fatigue   125# 1 round to fatigue   Wobble board balance 1 round to fatigue        Step Up        Foam        Hurdles   2 laps fwd focus on hip flexion / 2 laps lateral focus on hip extension and ABD/ER      BOSU        Excursion        Squats                 Gait Training        @ Rail                         Modalities        CP PRN

## 2025-04-09 ENCOUNTER — APPOINTMENT (OUTPATIENT)
Dept: PHYSICAL THERAPY | Facility: CLINIC | Age: 65
End: 2025-04-09
Payer: MEDICARE

## 2025-04-09 ENCOUNTER — OFFICE VISIT (OUTPATIENT)
Dept: PHYSICAL THERAPY | Facility: CLINIC | Age: 65
End: 2025-04-09
Payer: MEDICARE

## 2025-04-09 DIAGNOSIS — Z96.641 S/P HIP REPLACEMENT, RIGHT: ICD-10-CM

## 2025-04-09 DIAGNOSIS — M16.11 PRIMARY OSTEOARTHRITIS OF RIGHT HIP: Primary | ICD-10-CM

## 2025-04-09 PROCEDURE — 97112 NEUROMUSCULAR REEDUCATION: CPT | Performed by: PHYSICAL THERAPIST

## 2025-04-09 PROCEDURE — 97110 THERAPEUTIC EXERCISES: CPT | Performed by: PHYSICAL THERAPIST

## 2025-04-09 NOTE — PROGRESS NOTES
"Daily Note     Today's date: 2025  Patient name: Luther Vaughan  : 1960  MRN: [id number]  Referring provider: Augustine Weston*  Dx:   Encounter Diagnosis     ICD-10-CM    1. Primary osteoarthritis of right hip  M16.11       2. S/P hip replacement, right  Z96.641                      Subjective: Patient with no new complaints today.        Objective: See treatment diary below      Assessment: Tolerated treatment well. Patient would benefit from continued PT.  Doing well overall.  Still notes difficulty with putting on his socks.  Was able to tolerate upgraded PREs today.  Continue to progress as able.        Plan: Progress treatment as tolerated.         Diagnosis:    Precautions:    POC Expires Auth Status Start Date Expiration Date PT Visit Limit     No Auth NA NA NA   Date 3/31 4/2 4/7 4/9    Used        Remaining        Manuals        PROM        Mobs                                There Ex        Bike NuStep LVL 9 5' ROM NuStep LVL 10 5' ROM NuStep LvL 10, 5' ROM  NuStep LVL 10, 5' ROM    Hamstring Stretch        Gastroc Stretch        HS Curls Swiss Ball x15 DL, x15 SL Swiss Ball x15 DL, x15 SL  Swiss Bal x15 SL, x15 DL Swiss Ball x15 SL, x15 DL     Lumbar Flexion  X10 fwd  X20 to R  X10 to R on 4\" step  X10 fwd  X10 to R  X10 to R on 4\" step X15 fwd  X15 to R  X10 fwd  X10 to R  X10 to R on 6\" step    Hip Flexion        Quad Stretch Prone 20\" x 6  Off Table 20\" x 6 Prone 20\" x 6  Off Table 20\" x 6 Prone 20\" x 6  Prone w/ bolster 20\" x 6  Off Table 20\" x 6  Prone 20\" x 6  Prone w/ bolster 20\" x 6  Off Table 20\" x 6     Patient Education         Neruo Re-Ed        Quad Set         Bridge  X10  X10 Single leg      Clamshell        SLR  5# 2 sets to fatigue      Hip ABD  5# 2 sets to fatigue  5# 2 sets to fatigue      Hip Ext        Marching   Lateral March over cone x10, x10 w/ 5# weight High Knees Marching 2 laps    Open Zapien 2 laps    Monser Walks 2 laps     Lateral Tap Down      "   #        Side-stepping        Leg Press  125# 2 rounds to fatigue 125# to fatigue  150# to fatigue     Wobble board balance 1 round to fatigue        Step Up        Foam        Hurdles   2 laps fwd focus on hip flexion / 2 laps lateral focus on hip extension and ABD/ER      BOSU        Excursion        Squats                 Gait Training        @ Rail                         Modalities        CP PRN

## 2025-04-10 ENCOUNTER — APPOINTMENT (OUTPATIENT)
Dept: PHYSICAL THERAPY | Facility: CLINIC | Age: 65
End: 2025-04-10
Payer: MEDICARE

## 2025-04-14 ENCOUNTER — OFFICE VISIT (OUTPATIENT)
Dept: PHYSICAL THERAPY | Facility: CLINIC | Age: 65
End: 2025-04-14
Attending: ORTHOPAEDIC SURGERY
Payer: MEDICARE

## 2025-04-14 DIAGNOSIS — M16.11 PRIMARY OSTEOARTHRITIS OF RIGHT HIP: Primary | ICD-10-CM

## 2025-04-14 DIAGNOSIS — Z96.641 S/P HIP REPLACEMENT, RIGHT: ICD-10-CM

## 2025-04-14 PROCEDURE — 97110 THERAPEUTIC EXERCISES: CPT | Performed by: PHYSICAL THERAPIST

## 2025-04-14 PROCEDURE — 97112 NEUROMUSCULAR REEDUCATION: CPT | Performed by: PHYSICAL THERAPIST

## 2025-04-14 NOTE — PROGRESS NOTES
"Daily Note     Today's date: 2025  Patient name: Luther Vaughan  : 1960  MRN: [id number]  Referring provider: Augustine Weston*  Dx:   Encounter Diagnosis     ICD-10-CM    1. Primary osteoarthritis of right hip  M16.11       2. S/P hip replacement, right  Z96.641                      Subjective: Patient notes that he has been doing a better job with balancing on one leg.        Objective: See treatment diary below      Assessment: Tolerated treatment well. Patient would benefit from continued PT.  Patient doing well.  Can still have some difficulty with ambulation right after sitting.  Continuing to work on overall strength and mobility.  NO complaints of pain during or post session.  Progress as able.        Plan: Progress treatment as tolerated.         Diagnosis:    Precautions:    POC Expires Auth Status Start Date Expiration Date PT Visit Limit     No Auth NA NA NA   Date 3/31 4/2 4/7 4/9 4/14   Used        Remaining        Manuals        PROM        Mobs                                There Ex        Bike NuStep LVL 9 5' ROM NuStep LVL 10 5' ROM NuStep LvL 10, 5' ROM  NuStep LVL 10, 5' ROM Held   Hamstring Stretch        Gastroc Stretch        HS Curls Swiss Ball x15 DL, x15 SL Swiss Ball x15 DL, x15 SL  Swiss Bal x15 SL, x15 DL Swiss Ball x15 SL, x15 DL  Swiss Ball x15 SL, x15 DL    Lumbar Flexion  X10 fwd  X20 to R  X10 to R on 4\" step  X10 fwd  X10 to R  X10 to R on 4\" step X15 fwd  X15 to R  X10 fwd  X10 to R  X10 to R on 6\" step    Hip Flexion        Quad Stretch Prone 20\" x 6  Off Table 20\" x 6 Prone 20\" x 6  Off Table 20\" x 6 Prone 20\" x 6  Prone w/ bolster 20\" x 6  Off Table 20\" x 6  Prone 20\" x 6  Prone w/ bolster 20\" x 6  Off Table 20\" x 6  Prone 20\" x 6  Prone w/ bolster 20\" x 6  Off Table 20\" x 6    Patient Education         Neruo Re-Ed        Quad Set         Bridge  X10  X10 Single leg      Clamshell        SLR  5# 2 sets to fatigue   5# 3x12   Hip ABD  5# 2 sets to " "fatigue  5# 2 sets to fatigue   5# 3x12   Hip Ext        Marching   Lateral March over cone x10, x10 w/ 5# weight High Knees Marching 2 laps    Open Zapien 2 laps    Monser Walks 2 laps  5# marching 3 laps    Lateral Tap Down     8\" 2x10   #        Side-stepping        Leg Press  125# 2 rounds to fatigue 125# to fatigue  150# to fatigue  150# to fatigue    Wobble board balance 1 round to fatigue        Step Up     8\" 2x10   Foam        Hurdles   2 laps fwd focus on hip flexion / 2 laps lateral focus on hip extension and ABD/ER      BOSU        Excursion        Squats                 Gait Training        @ Rail                         Modalities        CP PRN                                               "

## 2025-04-16 ENCOUNTER — OFFICE VISIT (OUTPATIENT)
Dept: PHYSICAL THERAPY | Facility: CLINIC | Age: 65
End: 2025-04-16
Attending: ORTHOPAEDIC SURGERY
Payer: MEDICARE

## 2025-04-16 DIAGNOSIS — M16.11 PRIMARY OSTEOARTHRITIS OF RIGHT HIP: Primary | ICD-10-CM

## 2025-04-16 DIAGNOSIS — Z96.641 S/P HIP REPLACEMENT, RIGHT: ICD-10-CM

## 2025-04-16 PROCEDURE — 97110 THERAPEUTIC EXERCISES: CPT

## 2025-04-16 PROCEDURE — 97112 NEUROMUSCULAR REEDUCATION: CPT

## 2025-04-16 NOTE — PROGRESS NOTES
"Daily Note     Today's date: 2025  Patient name: Luther Vaughan  : 1960  MRN: [id number]  Referring provider: Augustine Weston*  Dx:   Encounter Diagnosis     ICD-10-CM    1. Primary osteoarthritis of right hip  M16.11       2. S/P hip replacement, right  Z96.641                      Subjective: Patient with no new complaints.        Objective: See treatment diary below      Assessment: Tolerated treatment well. Patient would benefit from continued PT.  Patient with no complaints of pain today.  Still feels that he is improving.  Working on mobility as well as overall lower extremity strength and stability.  Progress as able.        Plan: Progress treatment as tolerated.         Diagnosis:    Precautions:    POC Expires Auth Status Start Date Expiration Date PT Visit Limit     No Auth NA NA NA   Date    Used        Remaining        Manuals        PROM        Mobs                                There Ex        Bike NuStep LVL 10 5' ROM  NuStep LvL 10, 5' ROM  NuStep LVL 10, 5' ROM Held   Hamstring Stretch        Gastroc Stretch        HS Curls Swiss Ball x15 SL, x15 DL   Swiss Bal x15 SL, x15 DL Swiss Ball x15 SL, x15 DL  Swiss Ball x15 SL, x15 DL    Lumbar Flexion    X15 fwd  X15 to R  X10 fwd  X10 to R  X10 to R on 6\" step    Hip Flexion        Quad Stretch Prone 20\" x 6    Supine Off Table 20\" x 6   Prone 20\" x 6  Prone w/ bolster 20\" x 6  Off Table 20\" x 6  Prone 20\" x 6  Prone w/ bolster 20\" x 6  Off Table 20\" x 6  Prone 20\" x 6  Prone w/ bolster 20\" x 6  Off Table 20\" x 6    Patient Education         Neruo Re-Ed        Quad Set         Bridge        Clamshell        SLR 5# 3x10    5# 3x12   Hip ABD  5# 3x10    5# 3x12   Hip Ext        Marching   Lateral March over cone x10, x10 w/ 5# weight High Knees Marching 2 laps    Open Zapien 2 laps    Monser Walks 2 laps  5# marching 3 laps    Lateral Tap Down 8\" 2x10    8\" 2x10   SLS Excursion w/ furniture slider - standing on " "R slider on L, L lateral slide 2x10       Side-stepping Onto black Ordoroadisc 3x10       Leg Press 150# to fatigue   125# to fatigue  150# to fatigue  150# to fatigue    Wobble board balance        Step Up     8\" 2x10   Foam        Hurdles         BOSU        Excursion        Squats                 Gait Training        @ Rail                         Modalities        CP PRN                                                 "

## 2025-04-20 DIAGNOSIS — I48.91 NEW ONSET ATRIAL FIBRILLATION (HCC): ICD-10-CM

## 2025-04-20 DIAGNOSIS — I50.31 ACUTE DIASTOLIC CHF (CONGESTIVE HEART FAILURE) (HCC): ICD-10-CM

## 2025-04-21 ENCOUNTER — TELEPHONE (OUTPATIENT)
Dept: CARDIOLOGY CLINIC | Facility: CLINIC | Age: 65
End: 2025-04-21

## 2025-04-21 ENCOUNTER — OFFICE VISIT (OUTPATIENT)
Dept: PHYSICAL THERAPY | Facility: CLINIC | Age: 65
End: 2025-04-21
Attending: ORTHOPAEDIC SURGERY
Payer: MEDICARE

## 2025-04-21 DIAGNOSIS — M16.11 PRIMARY OSTEOARTHRITIS OF RIGHT HIP: Primary | ICD-10-CM

## 2025-04-21 DIAGNOSIS — Z96.641 S/P HIP REPLACEMENT, RIGHT: ICD-10-CM

## 2025-04-21 DIAGNOSIS — E78.5 DYSLIPIDEMIA: ICD-10-CM

## 2025-04-21 DIAGNOSIS — I50.31 ACUTE DIASTOLIC CHF (CONGESTIVE HEART FAILURE) (HCC): Primary | ICD-10-CM

## 2025-04-21 PROCEDURE — 97110 THERAPEUTIC EXERCISES: CPT

## 2025-04-21 PROCEDURE — 97112 NEUROMUSCULAR REEDUCATION: CPT

## 2025-04-21 RX ORDER — TORSEMIDE 10 MG/1
10 TABLET ORAL DAILY
Qty: 90 TABLET | Refills: 1 | Status: SHIPPED | OUTPATIENT
Start: 2025-04-21

## 2025-04-21 RX ORDER — METOPROLOL TARTRATE 50 MG
50 TABLET ORAL 2 TIMES DAILY
Qty: 180 TABLET | Refills: 1 | Status: SHIPPED | OUTPATIENT
Start: 2025-04-21

## 2025-04-21 NOTE — PROGRESS NOTES
"Daily Note     Today's date: 2025  Patient name: Luther Vaughan  : 1960  MRN: [id number]  Referring provider: Augustine Weston*  Dx:   Encounter Diagnosis     ICD-10-CM    1. Primary osteoarthritis of right hip  M16.11       2. S/P hip replacement, right  Z96.641           Start Time: 0700  Stop Time: 0745  Total time in clinic (min): 45 minutes    Subjective: Presents to therapy noting some normal morning hip stiffness, but was out in the yard gardening this weekend. Stated he did have to take rest breaks even so often, but it was more so because of his left knee.       Objective: See treatment diary below      Assessment:Patient able to complete full program with minimal cueing throughout due to consistency performing exercises. Requires minimal left upper extremity support at handrail while performing FSU. Patient demonstrated fatigue post treatment and would benefit from continued PT      Plan: Continue per plan of care.        Diagnosis:     Precautions:     POC Expires Auth Status Start Date Expiration Date PT Visit Limit      No Auth NA NA NA NA   Date    Used         Remaining         Manuals         PROM         Mobs                                    There Ex         Bike NuStep LVL 10 5' ROM  NuStep LvL 10, 5' ROM  NuStep LVL 10, 5' ROM Held NuStep  L10 5'   Hamstring Stretch         Gastroc Stretch         HS Curls Swiss Ball x15 SL, x15 DL   Swiss Bal x15 SL, x15 DL Swiss Ball x15 SL, x15 DL  Swiss Ball x15 SL, x15 DL  Swiss Ball  X15, SL/DL   Lumbar Flexion    X15 fwd  X15 to R  X10 fwd  X10 to R  X10 to R on 6\" step     Hip Flexion         Quad Stretch Prone 20\" x 6    Supine Off Table 20\" x 6   Prone 20\" x 6  Prone w/ bolster 20\" x 6  Off Table 20\" x 6  Prone 20\" x 6  Prone w/ bolster 20\" x 6  Off Table 20\" x 6  Prone 20\" x 6  Prone w/ bolster 20\" x 6  Off Table 20\" x 6  Prone w/ strap 6 x 20''   Patient Education          Neruo Re-Ed         Quad Set " "         Bridge         Clamshell         SLR 5# 3x10    5# 3x12 5# 3x12   Hip ABD  5# 3x10    5# 3x12 5# 3x12   Hip Ext         Marching   Lateral March over cone x10, x10 w/ 5# weight High Knees Marching 2 laps    Open Zapien 2 laps    Monser Walks 2 laps  5# marching 3 laps  5# marching 3 laps   Lateral Tap Down 8\" 2x10    8\" 2x10 8'' 2 x10   SLS Excursion w/ furniture slider - standing on R slider on L, L lateral slide 2x10        Side-stepping Onto black dynadisc 3x10        Leg Press 150# to fatigue   125# to fatigue  150# to fatigue  150# to fatigue  150# to fatigue   Wobble board balance         Step Up     8\" 2x10 8'' 2 x 10   Foam         Hurdles          BOSU         Excursion         Squats                   Gait Training         @ Rail                            Modalities         CP PRN                                                      "

## 2025-04-22 RX ORDER — ATORVASTATIN CALCIUM 10 MG/1
10 TABLET, FILM COATED ORAL
Qty: 90 TABLET | Refills: 1 | Status: SHIPPED | OUTPATIENT
Start: 2025-04-22

## 2025-04-23 ENCOUNTER — RA CDI HCC (OUTPATIENT)
Dept: OTHER | Facility: HOSPITAL | Age: 65
End: 2025-04-23

## 2025-04-24 ENCOUNTER — OFFICE VISIT (OUTPATIENT)
Dept: PHYSICAL THERAPY | Facility: CLINIC | Age: 65
End: 2025-04-24
Payer: MEDICARE

## 2025-04-24 DIAGNOSIS — Z96.641 S/P HIP REPLACEMENT, RIGHT: ICD-10-CM

## 2025-04-24 DIAGNOSIS — M16.11 PRIMARY OSTEOARTHRITIS OF RIGHT HIP: Primary | ICD-10-CM

## 2025-04-24 PROCEDURE — 97110 THERAPEUTIC EXERCISES: CPT | Performed by: PHYSICAL THERAPIST

## 2025-04-24 PROCEDURE — 97112 NEUROMUSCULAR REEDUCATION: CPT | Performed by: PHYSICAL THERAPIST

## 2025-04-24 NOTE — PROGRESS NOTES
"Daily Note     Today's date: 2025  Patient name: Luther Vaughan  : 1960  MRN: [id number]  Referring provider: Augustine Weston*  Dx:   Encounter Diagnosis     ICD-10-CM    1. Primary osteoarthritis of right hip  M16.11       2. S/P hip replacement, right  Z96.641                      Subjective: Patient notes that his left knee has been bothering him the most.        Objective: See treatment diary below      Assessment: Tolerated treatment well. Patient would benefit from continued PT.  Patient doing well overall.  Able to demonstrate improved strength and function.  Reviewed standing hamstring curls x10 on R to help decrease hip quad tightness.  Progress as able.         Plan: Progress treatment as tolerated.         Diagnosis:     Precautions:     POC Expires Auth Status Start Date Expiration Date PT Visit Limit      No Auth NA NA NA NA   Date    Used         Remaining         Manuals         PROM         Mobs                                    There Ex         Bike NuStep L10 5' ROM  NuStep LvL 10, 5' ROM  NuStep LVL 10, 5' ROM Held NuStep  L10 5'   Hamstring Stretch         Gastroc Stretch         HS Curls Swiss Ball x15 SL/DL   Swiss Bal x15 SL, x15 DL Swiss Ball x15 SL, x15 DL  Swiss Ball x15 SL, x15 DL  Swiss Ball  X15, SL/DL   Lumbar Flexion    X15 fwd  X15 to R  X10 fwd  X10 to R  X10 to R on 6\" step     Hip Flexion         Quad Stretch Prone w/ strap 6\" x 20\"    Off Table 20\" x 6  Prone 20\" x 6  Prone w/ bolster 20\" x 6  Off Table 20\" x 6  Prone 20\" x 6  Prone w/ bolster 20\" x 6  Off Table 20\" x 6  Prone 20\" x 6  Prone w/ bolster 20\" x 6  Off Table 20\" x 6  Prone w/ strap 6 x 20''   Patient Education          Neruo Re-Ed         Quad Set          Bridge         Clamshell         SLR     5# 3x12 5# 3x12   Hip ABD      5# 3x12 5# 3x12   Hip Ext         Marching   Lateral March over cone x10, x10 w/ 5# weight High Knees Marching 2 laps    Open Zapien 2 " "alyssa Robles Walks 2 laps  5# marching 3 laps  5# marching 3 laps   Lateral Tap Down 6\" 2x12    8\" 2x10 8'' 2 x10   SLS Step onto foam 5\" hold x 10         Side-stepping         Leg Press 150# to fatigue  125# to fatigue  150# to fatigue  150# to fatigue  150# to fatigue   Wobble board balance         Step Up 6\" 2x12    8\" 2x10 8'' 2 x 10   Foam         Hurdles          BOSU         Excursion         Squats                   Gait Training         @ Rail                            Modalities         CP PRN                                                        "

## 2025-04-28 ENCOUNTER — APPOINTMENT (OUTPATIENT)
Dept: LAB | Facility: CLINIC | Age: 65
End: 2025-04-28
Payer: MEDICARE

## 2025-04-28 ENCOUNTER — OFFICE VISIT (OUTPATIENT)
Dept: PHYSICAL THERAPY | Facility: CLINIC | Age: 65
End: 2025-04-28
Attending: ORTHOPAEDIC SURGERY
Payer: MEDICARE

## 2025-04-28 DIAGNOSIS — M16.11 PRIMARY OSTEOARTHRITIS OF RIGHT HIP: Primary | ICD-10-CM

## 2025-04-28 DIAGNOSIS — E78.5 DYSLIPIDEMIA: ICD-10-CM

## 2025-04-28 DIAGNOSIS — I50.31 ACUTE DIASTOLIC CHF (CONGESTIVE HEART FAILURE) (HCC): ICD-10-CM

## 2025-04-28 DIAGNOSIS — Z96.641 S/P HIP REPLACEMENT, RIGHT: ICD-10-CM

## 2025-04-28 LAB
CHOLEST SERPL-MCNC: 142 MG/DL (ref ?–200)
HDLC SERPL-MCNC: 38 MG/DL
LDLC SERPL CALC-MCNC: 82 MG/DL (ref 0–100)
TRIGL SERPL-MCNC: 109 MG/DL (ref ?–150)

## 2025-04-28 PROCEDURE — 97112 NEUROMUSCULAR REEDUCATION: CPT

## 2025-04-28 PROCEDURE — 36415 COLL VENOUS BLD VENIPUNCTURE: CPT

## 2025-04-28 PROCEDURE — 80061 LIPID PANEL: CPT

## 2025-04-28 PROCEDURE — 97110 THERAPEUTIC EXERCISES: CPT

## 2025-04-28 NOTE — PROGRESS NOTES
"Daily Note     Today's date: 2025  Patient name: Luther Vaughan  : 1960  MRN: [id number]  Referring provider: Augustine Weston*  Dx:   Encounter Diagnosis     ICD-10-CM    1. Primary osteoarthritis of right hip  M16.11       2. S/P hip replacement, right  Z96.641                      Subjective: Patient with no new complaints today.        Objective: See treatment diary below      Assessment: Tolerated treatment well. Continued with outlined program to improve mobility and strength of R hip.  Pt challenged with standing lateral tap downs with muscle fatigue noted.  Step ups onto 6\" step required L UE support.  Patient would benefit from continued PT.        Plan: Progress treatment as tolerated.         Diagnosis:     Precautions:     POC Expires Auth Status Start Date Expiration Date PT Visit Limit      No Auth NA NA NA NA   Date    Used         Remaining         Manuals         PROM         Mobs                                    There Ex         Bike NuStep L10 5' ROM NuStep L10 5' ROM  NuStep LVL 10, 5' ROM Held NuStep  L10 5'   Hamstring Stretch         Gastroc Stretch         HS Curls Swiss Ball x15 SL/DL    Swiss Ball x15 SL, x15 DL  Swiss Ball x15 SL, x15 DL  Swiss Ball  X15, SL/DL   Lumbar Flexion     X10 fwd  X10 to R  X10 to R on 6\" step     Hip Flexion         Quad Stretch Prone w/ strap 6\" x 20\"    Off Table 20\" x 6 Prone w/ strap 6\"x20    Off table 20\"x6  Prone 20\" x 6  Prone w/ bolster 20\" x 6  Off Table 20\" x 6  Prone 20\" x 6  Prone w/ bolster 20\" x 6  Off Table 20\" x 6  Prone w/ strap 6 x 20''   Patient Education          Neruo Re-Ed         Quad Set          Bridge         Clamshell         SLR  5# 3x12   5# 3x12 5# 3x12   Hip ABD   5# 3x12   5# 3x12 5# 3x12   Hip Ext         Marching    High Knees Marching 2 laps    Open Zapien 2 laps    Monser Walks 2 laps  5# marching 3 laps  5# marching 3 laps   Lateral Tap Down 6\" 2x12 6\" 2x12   8\" 2x10 8'' 2 " "x10   SLS Step onto foam 5\" hold x 10         Side-stepping         Leg Press 150# to fatigue 150# to fatigue  150# to fatigue  150# to fatigue  150# to fatigue   Wobble board balance         Step Up 6\" 2x12 6\" 2x12   8\" 2x10 8'' 2 x 10   Foam         Hurdles          BOSU         Excursion         Squats                   Gait Training         @ Rail                            Modalities         CP PRN                                                          "

## 2025-04-29 ENCOUNTER — RESULTS FOLLOW-UP (OUTPATIENT)
Dept: CARDIOLOGY CLINIC | Facility: CLINIC | Age: 65
End: 2025-04-29

## 2025-04-29 DIAGNOSIS — E78.5 DYSLIPIDEMIA: Primary | ICD-10-CM

## 2025-04-29 NOTE — TELEPHONE ENCOUNTER
----- Message from Akosua Maher MD sent at 4/29/2025  8:05 AM EDT -----  Please call and inform patient that the blood test showed that  cholesterol is in normal range,  Should also get hepatic function panel or CMP with lipid panel in future  Continue current dose of Lipitor

## 2025-04-29 NOTE — RESULT ENCOUNTER NOTE
Called patient and reached voicemail.  Left message for patient informing him other lab orders have been placed to be done in 1 week.  Will send [employer] message as well.

## 2025-04-29 NOTE — TELEPHONE ENCOUNTER
Patient calling back, relayed previous message from Dr. Maher.   Patient verbalized understanding.     When should patient have Hepatic function panel and CMP drawn, and can orders be entered?  Please advise.

## 2025-04-30 ENCOUNTER — OFFICE VISIT (OUTPATIENT)
Dept: FAMILY MEDICINE CLINIC | Facility: CLINIC | Age: 65
End: 2025-04-30
Payer: MEDICARE

## 2025-04-30 VITALS
WEIGHT: 307 LBS | OXYGEN SATURATION: 98 % | DIASTOLIC BLOOD PRESSURE: 60 MMHG | HEIGHT: 69 IN | BODY MASS INDEX: 45.47 KG/M2 | HEART RATE: 69 BPM | SYSTOLIC BLOOD PRESSURE: 122 MMHG

## 2025-04-30 DIAGNOSIS — E78.2 MIXED HYPERLIPIDEMIA: ICD-10-CM

## 2025-04-30 DIAGNOSIS — M16.11 PRIMARY OSTEOARTHRITIS OF RIGHT HIP: ICD-10-CM

## 2025-04-30 DIAGNOSIS — I10 PRIMARY HYPERTENSION: ICD-10-CM

## 2025-04-30 DIAGNOSIS — I50.32 CHRONIC DIASTOLIC HEART FAILURE (HCC): ICD-10-CM

## 2025-04-30 DIAGNOSIS — E11.9 TYPE 2 DIABETES MELLITUS WITHOUT COMPLICATION, WITHOUT LONG-TERM CURRENT USE OF INSULIN (HCC): ICD-10-CM

## 2025-04-30 DIAGNOSIS — G47.33 OBSTRUCTIVE SLEEP APNEA: ICD-10-CM

## 2025-04-30 DIAGNOSIS — R10.31 SEVERE RIGHT GROIN PAIN: Primary | ICD-10-CM

## 2025-04-30 PROCEDURE — G0439 PPPS, SUBSEQ VISIT: HCPCS | Performed by: FAMILY MEDICINE

## 2025-04-30 PROCEDURE — G2211 COMPLEX E/M VISIT ADD ON: HCPCS | Performed by: FAMILY MEDICINE

## 2025-04-30 PROCEDURE — 99213 OFFICE O/P EST LOW 20 MIN: CPT | Performed by: FAMILY MEDICINE

## 2025-04-30 NOTE — ASSESSMENT & PLAN NOTE
Compliant on CPAP.  The patient does not have to go through another sleep study.  He reviewed this with  the sleep medicine and they did not feel a re titration is necessary.  He is to continue with sleep medicine and compliance of CPAP.

## 2025-04-30 NOTE — ASSESSMENT & PLAN NOTE
Prior A1c with tight control.  The patient has not had recent A1c or microalbumin.   Orders placed to follow up for testing.  The patient is more active now and has attempted to maintain a diabetic diet.  Will determine if any knew medication management is requiresd.  He is not currently on any diabetic medications due to diet control.   Lab Results   Component Value Date    HGBA1C 6.3 (H) 08/14/2024     Orders:    Hemoglobin A1C; Future    Albumin / creatinine urine ratio; Future

## 2025-04-30 NOTE — PROGRESS NOTES
Name: Luther Vaughan      : 1960      MRN: [id number]  Encounter Provider: Maycol Arango DO  Encounter Date: 2025   Encounter department: [facility]  :  Assessment & Plan  Severe right groin pain  Improving.  2024 the patient had surgical intervention to right hip and right knee.  He is actively working towards improving ambulation and ROM.  He is seeing PT regularly at the [place] location.  I recommend the patient continue with PT and follow up  with specialist.        Primary osteoarthritis of right hip  See Severe right groin pain.       Chronic diastolic heart failure (HCC)  Wt Readings from Last 3 Encounters:   25 (!) 139 kg (307 lb)   25 (!) 138 kg (305 lb)   25 (!) 137 kg (303 lb)   Patient has history of CHF.  He has been maintaining/ improving lower extremity swelling.  He has not been having increased weight gain.  He continues on proper medication of losartan, Lipitor, Demadex, and metoprolol. Following with cardiology and doing well on current medication management.  Apprecaite recommendations and treatment through cardiology.   Orders:    Hemoglobin A1C; Future    Albumin / creatinine urine ratio; Future    Basic metabolic panel; Future    Type 2 diabetes mellitus without complication, without long-term current use of insulin (HCC)  Prior A1c with tight control.  The patient has not had recent A1c or microalbumin.   Orders placed to follow up for testing.  The patient is more active now and has attempted to maintain a diabetic diet.  Will determine if any knew medication management is requiresd.  He is not currently on any diabetic medications due to diet control.   Lab Results   Component Value Date    HGBA1C 6.3 (H) 2024     Orders:    Hemoglobin A1C; Future    Albumin / creatinine urine ratio; Future    Obstructive sleep apnea  Compliant on CPAP.  The patient does not have to go through another sleep study.  He reviewed this  with  the sleep medicine and they did not feel a re titration is necessary.  He is to continue with sleep medicine and compliance of CPAP.        Primary hypertension  Stable. 122/60 on presentation.  Being controlled and managed by cardiology. Continue with current dosing and appreciate recommendations from cardiology.  Orders:    Hemoglobin A1C; Future    Albumin / creatinine urine ratio; Future    Basic metabolic panel; Future    Mixed hyperlipidemia  Continue with current dosing.  Most recent labs are within normal limits.  Continue atorvastatin 10mg daily.   Orders:    Hemoglobin A1C; Future       Preventive health issues were discussed with patient, and age appropriate screening tests were ordered as noted in patient's After Visit Summary. Personalized health advice and appropriate referrals for health education or preventive services given if needed, as noted in patient's After Visit Summary.    History of Present Illness     HPI   Patient Care Team:  Maycol Arango DO as PCP - General (Family Medicine)    Review of Systems   Constitutional:  Negative for chills, fever and unexpected weight change.   HENT:  Negative for congestion, ear discharge, ear pain, hearing loss, postnasal drip, rhinorrhea, sinus pressure, sinus pain and sore throat.    Eyes:  Positive for visual disturbance (glasses to drive. eye examination recently.).   Respiratory:  Negative for cough, chest tightness and shortness of breath.    Cardiovascular:  Negative for chest pain and palpitations.   Gastrointestinal:  Negative for abdominal pain, blood in stool, constipation, diarrhea, nausea and vomiting.   Neurological:  Positive for dizziness (orthostasis). Negative for light-headedness and headaches.     Medical History Reviewed by provider this encounter:       Annual Wellness Visit Questionnaire   Luther is here for his Subsequent Wellness visit.     Health Risk Assessment:   Patient rates overall health as very good. Patient  feels that their physical health rating is slightly better. Patient is very satisfied with their life. Eyesight was rated as same. Hearing was rated as same. Patient feels that their emotional and mental health rating is same. Patients states they are never, rarely angry. Patient states they are sometimes unusually tired/fatigued. Pain experienced in the last 7 days has been a lot. Patient's pain rating has been 3/10. Patient states that he has experienced no weight loss or gain in last 6 months.     Fall Risk Screening:   In the past year, patient has experienced: no history of falling in past year      Home Safety:  Patient has trouble with stairs inside or outside of their home. Patient has working smoke alarms and has working carbon monoxide detector. Home safety hazards include: none.     Nutrition:   Current diet is Diabetic and Low Carb.     Medications:   Patient is not currently taking any over-the-counter supplements. Patient is able to manage medications.     Activities of Daily Living (ADLs)/Instrumental Activities of Daily Living (IADLs):   Walk and transfer into and out of bed and chair?: Yes  Dress and groom yourself?: Yes    Bathe or shower yourself?: Yes    Feed yourself? Yes  Do your laundry/housekeeping?: Yes  Manage your money, pay your bills and track your expenses?: Yes  Make your own meals?: Yes    Do your own shopping?: Yes    Previous Hospitalizations:   Any hospitalizations or ED visits within the last 12 months?: Yes    How many hospitalizations have you had in the last year?: 1-2    Hospitalization Comments: Hip and knee surgery      Advance Care Planning:   Living will: Yes    Durable POA for healthcare: Yes    Advanced directive: Yes    Advanced directive counseling given: Yes      Preventive Screenings      Cardiovascular Screening:    General: Screening Current      Diabetes Screening:     General: Screening Not Indicated and History Diabetes      Colorectal Cancer Screening:      "General: Risks and Benefits Discussed and Patient Declines      Prostate Cancer Screening:    General: Risks and Benefits Discussed    Due for: PSA      Osteoporosis Screening:    General: Screening Not Indicated      Abdominal Aortic Aneurysm (AAA) Screening:    Risk factors include: age between 65-74 yo        General: Screening Not Indicated      Lung Cancer Screening:     General: Screening Not Indicated      Hepatitis C Screening:    General: Screening Current    Immunizations:  - Immunizations due: Prevnar 20 and Zoster (Shingrix)  - Risks/benefits immunizations discussed      Screening, Brief Intervention, and Referral to Treatment (SBIRT)     Screening  Typical number of drinks in a day: 0  Typical number of drinks in a week: 0  Interpretation: Low risk drinking behavior.    Social Drivers of Health     Financial Resource Strain: Low Risk  (10/21/2024)    Received from [facility]    Overall Financial Resource Strain (CARDIA)     Difficulty of Paying Living Expenses: Not very hard   Food Insecurity: No Food Insecurity (10/21/2024)    Received from [facility]    Hunger Vital Sign     Worried About Running Out of Food in the Last Year: Never true     Ran Out of Food in the Last Year: Never true   Transportation Needs: No Transportation Needs (10/21/2024)    Received from [facility]    PRAPARE - Transportation     Lack of Transportation (Medical): No     Lack of Transportation (Non-Medical): No   Housing Stability: Low Risk  (10/21/2024)    Received from [facility]    Housing Stability Vital Sign     Unable to Pay for Housing in the Last Year: No     Number of Times Moved in the Last Year: 1     Homeless in the Last Year: No   Utilities: Not At Risk (10/21/2024)    Received from [facility]     Threatened with loss of utilities: No     No results found.    Objective   Ht 5' 8.5\" (1.74 m)   BMI 45.70 kg/m² "     Physical Exam  Constitutional:       General: He is not in acute distress.     Appearance: Normal appearance. He is obese. He is not ill-appearing, toxic-appearing or diaphoretic.   HENT:      Head: Normocephalic and atraumatic.      Right Ear: There is impacted cerumen.      Left Ear: There is impacted cerumen.      Nose: Nose normal. No congestion or rhinorrhea.      Mouth/Throat:      Mouth: Mucous membranes are moist.      Pharynx: Oropharynx is clear. No oropharyngeal exudate or posterior oropharyngeal erythema.   Eyes:      General:         Right eye: No discharge.         Left eye: No discharge.      Pupils: Pupils are equal, round, and reactive to light.   Cardiovascular:      Rate and Rhythm: Normal rate and regular rhythm.      Heart sounds: Normal heart sounds. No murmur heard.     No friction rub. No gallop.   Pulmonary:      Effort: Pulmonary effort is normal. No respiratory distress.      Breath sounds: Normal breath sounds. No stridor. No wheezing, rhonchi or rales.   Abdominal:      General: Bowel sounds are normal. There is no distension.      Palpations: Abdomen is soft.      Tenderness: There is no abdominal tenderness.   Neurological:      Mental Status: He is alert.

## 2025-04-30 NOTE — ASSESSMENT & PLAN NOTE
Stable. 122/60 on presentation.  Being controlled and managed by cardiology. Continue with current dosing and appreciate recommendations from cardiology.  Orders:    Hemoglobin A1C; Future    Albumin / creatinine urine ratio; Future    Basic metabolic panel; Future

## 2025-04-30 NOTE — PATIENT INSTRUCTIONS
Medicare Preventive Visit Patient Instructions  Thank you for completing your Welcome to Medicare Visit or Medicare Annual Wellness Visit today. Your next wellness visit will be due in one year (5/1/2026).  The screening/preventive services that you may require over the next 5-10 years are detailed below. Some tests may not apply to you based off risk factors and/or age. Screening tests ordered at today's visit but not completed yet may show as past due. Also, please note that scanned in results may not display below.  Preventive Screenings:  Service Recommendations Previous Testing/Comments   Colorectal Cancer Screening  Colonoscopy    Fecal Occult Blood Test (FOBT)/Fecal Immunochemical Test (FIT)  Fecal DNA/Cologuard Test  Flexible Sigmoidoscopy Age: 45-75 years old   Colonoscopy: every 10 years (May be performed more frequently if at higher risk)  OR  FOBT/FIT: every 1 year  OR  Cologuard: every 3 years  OR  Sigmoidoscopy: every 5 years  Screening may be recommended earlier than age 45 if at higher risk for colorectal cancer. Also, an individualized decision between you and your healthcare provider will decide whether screening between the ages of 76-85 would be appropriate. Colonoscopy: Not on file  FOBT/FIT: Not on file  Cologuard: Not on file  Sigmoidoscopy: Not on file    Risks and Benefits Discussed  Due for Cologuard     Prostate Cancer Screening Individualized decision between patient and health care provider in men between ages of 55-69   Medicare will cover every 12 months beginning on the day after your 50th birthday PSA: 0.681 ng/mL     Screening Current     Hepatitis C Screening Once for adults born between 1945 and 1965  More frequently in patients at high risk for Hepatitis C Hep C Antibody: 06/20/2023    Screening Current   Diabetes Screening 1-2 times per year if you're at risk for diabetes or have pre-diabetes Fasting glucose: 101 mg/dL (9/24/2024)  A1C: 6.3 % (8/14/2024)  Screening Not  Indicated  History Diabetes   Cholesterol Screening Once every 5 years if you don't have a lipid disorder. May order more often based on risk factors. Lipid panel: 04/28/2025  Screening Current      Other Preventive Screenings Covered by Medicare:  Abdominal Aortic Aneurysm (AAA) Screening: covered once if your at risk. You're considered to be at risk if you have a family history of AAA or a male between the age of 65-75 who smoking at least 100 cigarettes in your lifetime.  Lung Cancer Screening: covers low dose CT scan once per year if you meet all of the following conditions: (1) Age 55-77; (2) No signs or symptoms of lung cancer; (3) Current smoker or have quit smoking within the last 15 years; (4) You have a tobacco smoking history of at least 20 pack years (packs per day x number of years you smoked); (5) You get a written order from a healthcare provider.  Glaucoma Screening: covered annually if you're considered high risk: (1) You have diabetes OR (2) Family history of glaucoma OR (3)  aged 50 and older OR (4)  American aged 65 and older  Osteoporosis Screening: covered every 2 years if you meet one of the following conditions: (1) Have a vertebral abnormality; (2) On glucocorticoid therapy for more than 3 months; (3) Have primary hyperparathyroidism; (4) On osteoporosis medications and need to assess response to drug therapy.  HIV Screening: covered annually if you're between the age of 15-65. Also covered annually if you are younger than 15 and older than 65 with risk factors for HIV infection. For pregnant patients, it is covered up to 3 times per pregnancy.    Immunizations:  Immunization Recommendations   Influenza Vaccine Annual influenza vaccination during flu season is recommended for all persons aged >= 6 months who do not have contraindications   Pneumococcal Vaccine   * Pneumococcal conjugate vaccine = PCV13 (Prevnar 13), PCV15 (Vaxneuvance), PCV20 (Prevnar 20)  *  Pneumococcal polysaccharide vaccine = PPSV23 (Pneumovax) Adults 19-65 yo with certain risk factors or if 65+ yo  If never received any pneumonia vaccine: recommend Prevnar 20 (PCV20)  Give PCV20 if previously received 1 dose of PCV13 or PPSV23   Hepatitis B Vaccine 3 dose series if at intermediate or high risk (ex: diabetes, end stage renal disease, liver disease)   Respiratory syncytial virus (RSV) Vaccine - COVERED BY MEDICARE PART D  * RSVPreF3 (Arexvy) CDC recommends that adults 60 years of age and older may receive a single dose of RSV vaccine using shared clinical decision-making (SCDM)   Tetanus (Td) Vaccine - COST NOT COVERED BY MEDICARE PART B Following completion of primary series, a booster dose should be given every 10 years to maintain immunity against tetanus. Td may also be given as tetanus wound prophylaxis.   Tdap Vaccine - COST NOT COVERED BY MEDICARE PART B Recommended at least once for all adults. For pregnant patients, recommended with each pregnancy.   Shingles Vaccine (Shingrix) - COST NOT COVERED BY MEDICARE PART B  2 shot series recommended in those 19 years and older who have or will have weakened immune systems or those 50 years and older     Health Maintenance Due:      Topic Date Due   • Colorectal Cancer Screening  Never done   • Hepatitis C Screening  Completed     Immunizations Due:      Topic Date Due   • Pneumococcal Vaccine: 65+ Years (1 of 2 - PCV) Never done   • COVID-19 Vaccine (1 - 2024-25 season) Never done     Advance Directives   What are advance directives?  Advance directives are legal documents that state your wishes and plans for medical care. These plans are made ahead of time in case you lose your ability to make decisions for yourself. Advance directives can apply to any medical decision, such as the treatments you want, and if you want to donate organs.   What are the types of advance directives?  There are many types of advance directives, and each state has rules  about how to use them. You may choose a combination of any of the following:  Living will:  This is a written record of the treatment you want. You can also choose which treatments you do not want, which to limit, and which to stop at a certain time. This includes surgery, medicine, IV fluid, and tube feedings.   Durable power of  for healthcare (DPAHC):  This is a written record that states who you want to make healthcare choices for you when you are unable to make them for yourself. This person, called a proxy, is usually a family member or a friend. You may choose more than 1 proxy.  Do not resuscitate (DNR) order:  A DNR order is used in case your heart stops beating or you stop breathing. It is a request not to have certain forms of treatment, such as CPR. A DNR order may be included in other types of advance directives.  Medical directive:  This covers the care that you want if you are in a coma, near death, or unable to make decisions for yourself. You can list the treatments you want for each condition. Treatment may include pain medicine, surgery, blood transfusions, dialysis, IV or tube feedings, and a ventilator (breathing machine).  Values history:  This document has questions about your views, beliefs, and how you feel and think about life. This information can help others choose the care that you would choose.  Why are advance directives important?  An advance directive helps you control your care. Although spoken wishes may be used, it is better to have your wishes written down. Spoken wishes can be misunderstood, or not followed. Treatments may be given even if you do not want them. An advance directive may make it easier for your family to make difficult choices about your care.   Fall Prevention    Fall prevention  includes ways to make your home and other areas safer. It also includes ways you can move more carefully to prevent a fall. Health conditions that cause changes in your blood  pressure, vision, or muscle strength and coordination may increase your risk for falls. Medicines may also increase your risk for falls if they make you dizzy, weak, or sleepy.   Fall prevention tips:   Stand or sit up slowly.    Use assistive devices as directed.    Wear shoes that fit well and have soles that .    Wear a personal alarm.    Stay active.    Manage your medical conditions.    Home Safety Tips:  Add items to prevent falls in the bathroom.    Keep paths clear.    Install bright lights in your home.    Keep items you use often on shelves within reach.    Paint or place reflective tape on the edges of your stairs.    Weight Management   Why it is important to manage your weight:  Being overweight increases your risk of health conditions such as heart disease, high blood pressure, type 2 diabetes, and certain types of cancer. It can also increase your risk for osteoarthritis, sleep apnea, and other respiratory problems. Aim for a slow, steady weight loss. Even a small amount of weight loss can lower your risk of health problems.  How to lose weight safely:  A safe and healthy way to lose weight is to eat fewer calories and get regular exercise. You can lose up about 1 pound a week by decreasing the number of calories you eat by 500 calories each day.   Healthy meal plan for weight management:  A healthy meal plan includes a variety of foods, contains fewer calories, and helps you stay healthy. A healthy meal plan includes the following:  Eat whole-grain foods more often.  A healthy meal plan should contain fiber. Fiber is the part of grains, fruits, and vegetables that is not broken down by your body. Whole-grain foods are healthy and provide extra fiber in your diet. Some examples of whole-grain foods are whole-wheat breads and pastas, oatmeal, brown rice, and bulgur.  Eat a variety of vegetables every day.  Include dark, leafy greens such as spinach, kale, jaymie greens, and mustard greens. Eat  yellow and orange vegetables such as carrots, sweet potatoes, and winter squash.   Eat a variety of fruits every day.  Choose fresh or canned fruit (canned in its own juice or light syrup) instead of juice. Fruit juice has very little or no fiber.  Eat low-fat dairy foods.  Drink fat-free (skim) milk or 1% milk. Eat fat-free yogurt and low-fat cottage cheese. Try low-fat cheeses such as mozzarella and other reduced-fat cheeses.  Choose meat and other protein foods that are low in fat.  Choose beans or other legumes such as split peas or lentils. Choose fish, skinless poultry (chicken or turkey), or lean cuts of red meat (beef or pork). Before you cook meat or poultry, cut off any visible fat.   Use less fat and oil.  Try baking foods instead of frying them. Add less fat, such as margarine, sour cream, regular salad dressing and mayonnaise to foods. Eat fewer high-fat foods. Some examples of high-fat foods include french fries, doughnuts, ice cream, and cakes.  Eat fewer sweets.  Limit foods and drinks that are high in sugar. This includes candy, cookies, regular soda, and sweetened drinks.  Exercise:  Exercise at least 30 minutes per day on most days of the week. Some examples of exercise include walking, biking, dancing, and swimming. You can also fit in more physical activity by taking the stairs instead of the elevator or parking farther away from stores. Ask your healthcare provider about the best exercise plan for you.      © Copyright [employer] 2018 Information is for End User's use only and may not be sold, redistributed or otherwise used for commercial purposes. All illustrations and images included in CareNotes® are the copyrighted property of [employer]., Inc. or [employer]

## 2025-05-01 ENCOUNTER — APPOINTMENT (OUTPATIENT)
Dept: PHYSICAL THERAPY | Facility: CLINIC | Age: 65
End: 2025-05-01
Payer: MEDICARE

## 2025-05-05 ENCOUNTER — OFFICE VISIT (OUTPATIENT)
Dept: PHYSICAL THERAPY | Facility: CLINIC | Age: 65
End: 2025-05-05
Attending: ORTHOPAEDIC SURGERY
Payer: MEDICARE

## 2025-05-05 DIAGNOSIS — M16.11 PRIMARY OSTEOARTHRITIS OF RIGHT HIP: Primary | ICD-10-CM

## 2025-05-05 DIAGNOSIS — Z96.641 S/P HIP REPLACEMENT, RIGHT: ICD-10-CM

## 2025-05-05 PROCEDURE — 97112 NEUROMUSCULAR REEDUCATION: CPT | Performed by: PHYSICAL THERAPIST

## 2025-05-05 NOTE — PROGRESS NOTES
"Daily Note     Today's date: 2025  Patient name: Luther Vaughan  : 1960  MRN: [id number]  Referring provider: Augustine Weston*  Dx:   Encounter Diagnosis     ICD-10-CM    1. Primary osteoarthritis of right hip  M16.11       2. S/P hip replacement, right  Z96.641                      Subjective: Patient with no new complaints today.        Objective: See treatment diary below      Assessment: Tolerated treatment well. Patient would benefit from continued PT.  Patient doing well.  Continuing to work on overall strength.  Working to fatigue to achieve muscular fatigue and overall improved strength.  No complaints of pain s/p session.  Progress as able.        Plan: Progress treatment as tolerated.         Diagnosis:     Precautions:     POC Expires Auth Status Start Date Expiration Date PT Visit Limit      No Auth NA NA NA NA   Date    Used         Remaining         Manuals         PROM         Mobs                                    There Ex         Bike NuStep L10 5' ROM NuStep L10 5' ROM NuStep L 10 5' ROM  Held NuStep  L10 5'   Hamstring Stretch         Gastroc Stretch         HS Curls Swiss Ball x15 SL/DL   Swiss Ball x20 DL, x15 SL  Swiss Ball x15 SL, x15 DL  Swiss Ball  X15, SL/DL   Lumbar Flexion          Hip Flexion         Quad Stretch Prone w/ strap 6\" x 20\"    Off Table 20\" x 6 Prone w/ strap 6\"x20    Off table 20\"x6 Prone w/ strap 20\" x 6    Off table 20\" x 6  Prone 20\" x 6  Prone w/ bolster 20\" x 6  Off Table 20\" x 6  Prone w/ strap 6 x 20''   Patient Education          Neruo Re-Ed         Quad Set          Bridge         Clamshell         SLR  5# 3x12 5# 3x12  5# 3x12 5# 3x12   Hip ABD   5# 3x12 5# 3x12   5# 3x12 5# 3x12   Hip Ext         Marching     5# marching 3 laps  5# marching 3 laps   Lateral Tap Down 6\" 2x12 6\" 2x12 8\" 2x10  8\" 2x10 8'' 2 x10   SLS Step onto foam 5\" hold x 10         Side-stepping         Leg Press 150# to fatigue 150# to fatigue " "150# to fatigue  150# to fatigue  150# to fatigue   Wobble board balance         Step Up 6\" 2x12 6\" 2x12 8\" 2x10  8\" 2x10 8'' 2 x 10   Foam         Hurdles          BOSU         Excursion         Squats                   Gait Training         @ Rail                            Modalities         CP PRN                                                            "

## 2025-05-06 ENCOUNTER — RESULTS FOLLOW-UP (OUTPATIENT)
Dept: CARDIOLOGY CLINIC | Facility: CLINIC | Age: 65
End: 2025-05-06

## 2025-05-06 ENCOUNTER — APPOINTMENT (OUTPATIENT)
Dept: LAB | Facility: CLINIC | Age: 65
End: 2025-05-06
Payer: MEDICARE

## 2025-05-06 DIAGNOSIS — E78.5 DYSLIPIDEMIA: ICD-10-CM

## 2025-05-06 DIAGNOSIS — E78.2 MIXED HYPERLIPIDEMIA: ICD-10-CM

## 2025-05-06 DIAGNOSIS — E11.9 TYPE 2 DIABETES MELLITUS WITHOUT COMPLICATION, WITHOUT LONG-TERM CURRENT USE OF INSULIN (HCC): ICD-10-CM

## 2025-05-06 DIAGNOSIS — I50.32 CHRONIC DIASTOLIC HEART FAILURE (HCC): ICD-10-CM

## 2025-05-06 DIAGNOSIS — I10 PRIMARY HYPERTENSION: ICD-10-CM

## 2025-05-06 LAB
ALBUMIN SERPL BCG-MCNC: 3.9 G/DL (ref 3.5–5)
ALP SERPL-CCNC: 84 U/L (ref 34–104)
ALT SERPL W P-5'-P-CCNC: 14 U/L (ref 7–52)
ANION GAP SERPL CALCULATED.3IONS-SCNC: 9 MMOL/L (ref 4–13)
AST SERPL W P-5'-P-CCNC: 17 U/L (ref 13–39)
BILIRUB DIRECT SERPL-MCNC: 0.18 MG/DL (ref 0–0.2)
BILIRUB SERPL-MCNC: 0.96 MG/DL (ref 0.2–1)
BUN SERPL-MCNC: 18 MG/DL (ref 5–25)
CALCIUM SERPL-MCNC: 9.4 MG/DL (ref 8.4–10.2)
CHLORIDE SERPL-SCNC: 102 MMOL/L (ref 96–108)
CO2 SERPL-SCNC: 26 MMOL/L (ref 21–32)
CREAT SERPL-MCNC: 0.84 MG/DL (ref 0.6–1.3)
CREAT UR-MCNC: 120.8 MG/DL
EST. AVERAGE GLUCOSE BLD GHB EST-MCNC: 134 MG/DL
GFR SERPL CREATININE-BSD FRML MDRD: 90 ML/MIN/1.73SQ M
GLUCOSE P FAST SERPL-MCNC: 111 MG/DL (ref 65–99)
HBA1C MFR BLD: 6.3 %
MICROALBUMIN UR-MCNC: <7 MG/L
POTASSIUM SERPL-SCNC: 4.1 MMOL/L (ref 3.5–5.3)
PROT SERPL-MCNC: 6.6 G/DL (ref 6.4–8.4)
SODIUM SERPL-SCNC: 137 MMOL/L (ref 135–147)

## 2025-05-06 PROCEDURE — 82043 UR ALBUMIN QUANTITATIVE: CPT

## 2025-05-06 PROCEDURE — 82570 ASSAY OF URINE CREATININE: CPT

## 2025-05-06 PROCEDURE — 80048 BASIC METABOLIC PNL TOTAL CA: CPT

## 2025-05-06 PROCEDURE — 80076 HEPATIC FUNCTION PANEL: CPT

## 2025-05-06 PROCEDURE — 36415 COLL VENOUS BLD VENIPUNCTURE: CPT

## 2025-05-06 PROCEDURE — 83036 HEMOGLOBIN GLYCOSYLATED A1C: CPT

## 2025-05-07 NOTE — TELEPHONE ENCOUNTER
Patient returned call, transfer to clinical was unsuccessful. Please call patient back at [phone]

## 2025-05-08 ENCOUNTER — APPOINTMENT (OUTPATIENT)
Dept: PHYSICAL THERAPY | Facility: CLINIC | Age: 65
End: 2025-05-08
Attending: ORTHOPAEDIC SURGERY
Payer: MEDICARE

## 2025-05-13 ENCOUNTER — EVALUATION (OUTPATIENT)
Dept: PHYSICAL THERAPY | Facility: CLINIC | Age: 65
End: 2025-05-13
Attending: ORTHOPAEDIC SURGERY
Payer: MEDICARE

## 2025-05-13 DIAGNOSIS — M16.11 PRIMARY OSTEOARTHRITIS OF RIGHT HIP: Primary | ICD-10-CM

## 2025-05-13 DIAGNOSIS — Z96.641 S/P HIP REPLACEMENT, RIGHT: ICD-10-CM

## 2025-05-13 PROCEDURE — 97112 NEUROMUSCULAR REEDUCATION: CPT | Performed by: PHYSICAL THERAPIST

## 2025-05-13 PROCEDURE — 97110 THERAPEUTIC EXERCISES: CPT | Performed by: PHYSICAL THERAPIST

## 2025-05-13 NOTE — LETTER
May 13, 2025    Augustine Weston DO  [address] [facility]  1st Floor  [address]  [facility] [address]    Patient: Luther Vaughan   YOB: 1960   Date of Visit: 2025     Encounter Diagnosis     ICD-10-CM    1. Primary osteoarthritis of right hip  M16.11       2. S/P hip replacement, right  Z96.641           Dear Dr. Augustine Weston, DO:    Thank you for your recent referral of Luther Vaughan. Please review the attached evaluation summary from Luther's recent visit.     Please verify that you agree with the plan of care by signing the attached order.     If you have any questions or concerns, please do not hesitate to call.     I sincerely appreciate the opportunity to share in the care of one of your patients and hope to have another opportunity to work with you in the near future.       Sincerely,    Derrek Montanez, PT      Referring Provider:      I certify that I have read the below Plan of Care and certify the need for these services furnished under this plan of treatment while under my care.                    Augustine Weston DO  [address] [facility]  1st Floor  [address]  [facility] [address]  Via Fax: [phone]          PT Re-Evaluation     Today's date: 2025  Patient name: Luther Vaughan  : 1960  MRN: [id number]  Referring provider: Augustine Weston*  Dx:   Encounter Diagnosis     ICD-10-CM    1. Primary osteoarthritis of right hip  M16.11       2. S/P hip replacement, right  Z96.641                              Assessment  Impairments: abnormal muscle firing, abnormal or restricted ROM, activity intolerance, impaired physical strength, lacks appropriate home exercise program, pain with function, poor posture  and poor body mechanics    Assessment details: Luther Vaughan is a 64 y.o. male who has been consistent with attending and participating in skilled PT sessions.  The patient has made nice progress with skilled PT.  He has been able to demonstrate  improved strength and improved ROM.  He can have some difficulty putting on socks, but does note that he can have days where this task is achievable.  The patient will transition to 1x / week and will look to discharge to a finalized HEP at the end of the month.    Understanding of Dx/Px/POC: good     Prognosis: good    Goals  Impairment Goals  - Decrease pain by 50% in 8 weeks - MET   - Increase right flexibility by 50% in 8 weeks - PARTIALLY MET   - Increase right lower extremity strength golbally to 4+/5 in 8 weeks - PARTIALLY MET     Functional Goals  - Return to Prior Level of Function in 8 weeks - PARTIALLY MET   - Patient will be independent with HEP in 8 weeks - PARTIALLY MET   - Patient will be able to ambulate with decreased pain in 8 weeks - MET   - Patient will be able to ascend/descend stairs with a reciprocal gait pattern in 8 weeks - PARTIALLY MET   - Patient will be able to complete ADLs with decreased pain in 4 weeks - MET       Plan  Patient would benefit from: skilled physical therapy  Planned modality interventions: cryotherapy, thermotherapy: hydrocollator packs and TENS    Planned therapy interventions: home exercise program, graded exercise, functional ROM exercises, flexibility, body mechanics training, postural training, patient education, therapeutic activities, therapeutic exercise, manual therapy, joint mobilization and neuromuscular re-education    Frequency: 1x week  Duration in weeks: 3  Treatment plan discussed with: patient        Subjective Evaluation    History of Present Illness  Mechanism of injury: Patient notes that he has improved with his hip mobility.  He also notes that his confidence has also improved.  He notes that putting on his sock can vary day to day.  He notes that his biggest issue is getting his sock on.  He feels taht uneven surfaces can pose a challenge, but he is happy with his progress.      HPI:  Patient underwent a right hip replacement on 10/21/24.  He now  "presents for his post-op evaluation.      Pain Location:  R Hip   Occupation:     Prior Functional Limitations: Independent prior   AGG:  Sleeping, ambulation, stairs, ADLs / dressing, moving around  Ease:  Ice  Patient Goals:  \"I just want to be myself again\"     Pain  Current pain ratin  At best pain ratin  At worst pain rating: 3  Quality: dull ache          Objective     Passive Range of Motion     Right Hip   Flexion: 98 degrees     Strength/Myotome Testing     Left Hip   Normal muscle strength    Right Hip   Planes of Motion   Flexion: 5  Extension: 4  Abduction: 4    Right Knee   Flexion: 4+  Extension: 4+    Right Ankle/Foot   Dorsiflexion: 5    Ambulation     Comments   Patient will use a SPC for ambulation - patient can demonstrate ambulating without a SPC, antalgic gait pattern     Functional Assessment        Comments  TU.61 sec w/ SPC, 11.87 sec w/ SPC    30 sec STS:  10 reps w/ UE, deferred  due to back pain               Diagnosis:    Precautions:    POC Expires Auth Status Start Date Expiration Date PT Visit Limit     No Auth NA NA NA   Date     Used        Remaining        Manuals        PROM        Mobs                                There Ex        Bike NuStep L10 5' ROM NuStep L10 5' ROM NuStep L 10 5' ROM NuStep L 10 5' ROM    Hamstring Stretch        Gastroc Stretch        HS Curls Swiss Ball x15 SL/DL   Swiss Ball x20 DL, x15 SL Swiss Ball x20 DL, x15 SL     Lumbar Flexion         Hip Flexion        Quad Stretch Prone w/ strap 6\" x 20\"    Off Table 20\" x 6 Prone w/ strap 6\"x20    Off table 20\"x6 Prone w/ strap 20\" x 6    Off table 20\" x 6     Patient Education     RT - RE     Neruo Re-Ed        Quad Set         Bridge        Clamshell        SLR  5# 3x12 5# 3x12 5# 3x12    Hip ABD   5# 3x12 5# 3x12  5# 3x12    Hip Ext        Marching        Lateral Tap Down 6\" 2x12 6\" 2x12 8\" 2x10     SLS Step onto foam 5\" hold x 10        Side-stepping      " "  Leg Press 150# to fatigue 150# to fatigue 150# to fatigue     Wobble board balance        Step Up 6\" 2x12 6\" 2x12 8\" 2x10     Foam        Hurdles         BOSU        Excursion        Squats                 Gait Training        @ Rail                         Modalities        CP PRN                                            "

## 2025-05-13 NOTE — PROGRESS NOTES
PT Re-Evaluation     Today's date: 2025  Patient name: Luther Vaughan  : 1960  MRN: [id number]  Referring provider: Augustine Weston*  Dx:   Encounter Diagnosis     ICD-10-CM    1. Primary osteoarthritis of right hip  M16.11       2. S/P hip replacement, right  Z96.641                              Assessment  Impairments: abnormal muscle firing, abnormal or restricted ROM, activity intolerance, impaired physical strength, lacks appropriate home exercise program, pain with function, poor posture  and poor body mechanics    Assessment details: Luther Vaughan is a 64 y.o. male who has been consistent with attending and participating in skilled PT sessions.  The patient has made nice progress with skilled PT.  He has been able to demonstrate improved strength and improved ROM.  He can have some difficulty putting on socks, but does note that he can have days where this task is achievable.  The patient will transition to 1x / week and will look to discharge to a finalized HEP at the end of the month.    Understanding of Dx/Px/POC: good     Prognosis: good    Goals  Impairment Goals  - Decrease pain by 50% in 8 weeks - MET   - Increase right flexibility by 50% in 8 weeks - PARTIALLY MET   - Increase right lower extremity strength golbally to 4+/5 in 8 weeks - PARTIALLY MET     Functional Goals  - Return to Prior Level of Function in 8 weeks - PARTIALLY MET   - Patient will be independent with HEP in 8 weeks - PARTIALLY MET   - Patient will be able to ambulate with decreased pain in 8 weeks - MET   - Patient will be able to ascend/descend stairs with a reciprocal gait pattern in 8 weeks - PARTIALLY MET   - Patient will be able to complete ADLs with decreased pain in 4 weeks - MET       Plan  Patient would benefit from: skilled physical therapy  Planned modality interventions: cryotherapy, thermotherapy: hydrocollator packs and TENS    Planned therapy interventions: home exercise program, graded  "exercise, functional ROM exercises, flexibility, body mechanics training, postural training, patient education, therapeutic activities, therapeutic exercise, manual therapy, joint mobilization and neuromuscular re-education    Frequency: 1x week  Duration in weeks: 3  Treatment plan discussed with: patient        Subjective Evaluation    History of Present Illness  Mechanism of injury: Patient notes that he has improved with his hip mobility.  He also notes that his confidence has also improved.  He notes that putting on his sock can vary day to day.  He notes that his biggest issue is getting his sock on.  He feels taht uneven surfaces can pose a challenge, but he is happy with his progress.      HPI:  Patient underwent a right hip replacement on 10/21/24.  He now presents for his post-op evaluation.      Pain Location:  R Hip   Occupation:     Prior Functional Limitations: Independent prior   AGG:  Sleeping, ambulation, stairs, ADLs / dressing, moving around  Ease:  Ice  Patient Goals:  \"I just want to be myself again\"     Pain  Current pain ratin  At best pain ratin  At worst pain rating: 3  Quality: dull ache          Objective     Passive Range of Motion     Right Hip   Flexion: 98 degrees     Strength/Myotome Testing     Left Hip   Normal muscle strength    Right Hip   Planes of Motion   Flexion: 5  Extension: 4  Abduction: 4    Right Knee   Flexion: 4+  Extension: 4+    Right Ankle/Foot   Dorsiflexion: 5    Ambulation     Comments   Patient will use a SPC for ambulation - patient can demonstrate ambulating without a SPC, antalgic gait pattern     Functional Assessment        Comments  TU.61 sec w/ SPC, 11.87 sec w/ SPC    30 sec STS:  10 reps w/ UE, deferred  due to back pain               Diagnosis:    Precautions:    POC Expires Auth Status Start Date Expiration Date PT Visit Limit     No Auth NA NA NA   Date     Used        Remaining        Manuals      " "  PROM        Mobs                                There Ex        Bike NuStep L10 5' ROM NuStep L10 5' ROM NuStep L 10 5' ROM NuStep L 10 5' ROM    Hamstring Stretch        Gastroc Stretch        HS Curls Swiss Ball x15 SL/DL   Swiss Ball x20 DL, x15 SL Swiss Ball x20 DL, x15 SL     Lumbar Flexion         Hip Flexion        Quad Stretch Prone w/ strap 6\" x 20\"    Off Table 20\" x 6 Prone w/ strap 6\"x20    Off table 20\"x6 Prone w/ strap 20\" x 6    Off table 20\" x 6     Patient Education     RT - RE     Neruo Re-Ed        Quad Set         Bridge        Clamshell        SLR  5# 3x12 5# 3x12 5# 3x12    Hip ABD   5# 3x12 5# 3x12  5# 3x12    Hip Ext        Marching        Lateral Tap Down 6\" 2x12 6\" 2x12 8\" 2x10     SLS Step onto foam 5\" hold x 10        Side-stepping        Leg Press 150# to fatigue 150# to fatigue 150# to fatigue     Wobble board balance        Step Up 6\" 2x12 6\" 2x12 8\" 2x10     Foam        Hurdles         BOSU        Excursion        Squats                 Gait Training        @ Rail                         Modalities        CP PRN                            "

## 2025-05-15 ENCOUNTER — APPOINTMENT (OUTPATIENT)
Dept: PHYSICAL THERAPY | Facility: CLINIC | Age: 65
End: 2025-05-15
Attending: ORTHOPAEDIC SURGERY
Payer: MEDICARE

## 2025-05-17 ENCOUNTER — RESULTS FOLLOW-UP (OUTPATIENT)
Dept: FAMILY MEDICINE CLINIC | Facility: CLINIC | Age: 65
End: 2025-05-17

## 2025-05-19 ENCOUNTER — OFFICE VISIT (OUTPATIENT)
Dept: PHYSICAL THERAPY | Facility: CLINIC | Age: 65
End: 2025-05-19
Attending: ORTHOPAEDIC SURGERY
Payer: MEDICARE

## 2025-05-19 DIAGNOSIS — Z96.641 S/P HIP REPLACEMENT, RIGHT: ICD-10-CM

## 2025-05-19 DIAGNOSIS — M16.11 PRIMARY OSTEOARTHRITIS OF RIGHT HIP: Primary | ICD-10-CM

## 2025-05-19 PROCEDURE — 97112 NEUROMUSCULAR REEDUCATION: CPT | Performed by: PHYSICAL THERAPIST

## 2025-05-19 PROCEDURE — 97110 THERAPEUTIC EXERCISES: CPT | Performed by: PHYSICAL THERAPIST

## 2025-05-19 NOTE — PROGRESS NOTES
"Daily Note     Today's date: 2025  Patient name: Luther Vaughan  : 1960  MRN: [id number]  Referring provider: Augustine Weston*  Dx:   Encounter Diagnosis     ICD-10-CM    1. Primary osteoarthritis of right hip  M16.11       2. S/P hip replacement, right  Z96.641                      Subjective: Patient notes feeling well overall.        Objective: See treatment diary below      Assessment: Tolerated treatment well. Patient would benefit from continued PT.  Patient doing well overall.  No complaints of pain during or post session.  Patient notes that he has been feeling better over the pat week.  Progress as able.        Plan: Progress treatment as tolerated.       Diagnosis:    Precautions:    POC Expires Auth Status Start Date Expiration Date PT Visit Limit     No Auth NA NA NA   Date    Used        Remaining        Manuals        PROM        Mobs                                There Ex        Bike NuStep L10 5' ROM NuStep L10 5' ROM NuStep L 10 5' ROM NuStep L 10 5' ROM NuStep L 10 5' ROM   Hamstring Stretch        Gastroc Stretch        HS Curls Swiss Ball x15 SL/DL   Swiss Ball x20 DL, x15 SL Swiss Ball x20 DL, x15 SL  Swiss Ball x20 DL, x15   Lumbar Flexion         Hip Flexion        Quad Stretch Prone w/ strap 6\" x 20\"    Off Table 20\" x 6 Prone w/ strap 6\"x20    Off table 20\"x6 Prone w/ strap 20\" x 6    Off table 20\" x 6  Prone w/ strap 20\" x 6    Off table 20\" x 6   Patient Education     RT - RE     Neruo Re-Ed        Quad Set         Bridge        Clamshell        SLR  5# 3x12 5# 3x12 5# 3x12 5# 3x10   Hip ABD   5# 3x12 5# 3x12  5# 3x12 5# 3x10    Hip Ext        Marching        Lateral Tap Down 6\" 2x12 6\" 2x12 8\" 2x10  8\" 2x10   SLS Step onto foam 5\" hold x 10        Side-stepping     Black TB 3 laps    Leg Press 150# to fatigue 150# to fatigue 150# to fatigue  150# to fatigue    Wobble board balance        Step Up 6\" 2x12 6\" 2x12 8\" 2x10  8\" 2x10   Foam    "  Step onto black dynadisc w/ L LE cone tap 2x10   Hurdles         BOSU        Excursion        Squats                 Gait Training        @ Rail                         Modalities        CP PRN

## 2025-05-22 ENCOUNTER — APPOINTMENT (OUTPATIENT)
Dept: PHYSICAL THERAPY | Facility: CLINIC | Age: 65
End: 2025-05-22
Attending: ORTHOPAEDIC SURGERY
Payer: MEDICARE

## 2025-05-28 ENCOUNTER — OFFICE VISIT (OUTPATIENT)
Dept: PHYSICAL THERAPY | Facility: CLINIC | Age: 65
End: 2025-05-28
Attending: ORTHOPAEDIC SURGERY
Payer: MEDICARE

## 2025-05-28 DIAGNOSIS — Z96.641 S/P HIP REPLACEMENT, RIGHT: ICD-10-CM

## 2025-05-28 DIAGNOSIS — M16.11 PRIMARY OSTEOARTHRITIS OF RIGHT HIP: Primary | ICD-10-CM

## 2025-05-28 PROCEDURE — 97112 NEUROMUSCULAR REEDUCATION: CPT

## 2025-05-28 PROCEDURE — 97110 THERAPEUTIC EXERCISES: CPT

## 2025-05-28 NOTE — PROGRESS NOTES
"Daily Note     Today's date: 2025  Patient name: Luther Vaughan  : 1960  MRN: [id number]  Referring provider: Augustine Weston*  Dx:   Encounter Diagnosis     ICD-10-CM    1. Primary osteoarthritis of right hip  M16.11       2. S/P hip replacement, right  Z96.641                      Subjective: Patient states he is feeling pretty good. He states he has good days and bad days.       Objective: See treatment diary below      Assessment: Continued with outlined program. Patient was able to perform strengthening exercises as noted with good tolerance. He exhibits good technique with steps. Will discharge to HEP.       Plan: Discharge to HEP.        Diagnosis:    Precautions:    POC Expires Auth Status Start Date Expiration Date PT Visit Limit     No Auth NA NA NA   Date    Used        Remaining        Manuals        PROM        Mobs                                There Ex        Bike NuStep L10 5' ROM NuStep L10 5' ROM NuStep L 10 5' ROM NuStep L 10 5' ROM NuStep L 10 5' ROM   Hamstring Stretch        Gastroc Stretch        HS Curls Swiss ball x20 DL, x15 SL  Swiss Ball x20 DL, x15 SL Swiss Ball x20 DL, x15 SL  Swiss Ball x20 DL, x15   Lumbar Flexion         Hip Flexion        Quad Stretch Prone with strap 20\"x6    Off table 20'x6 Prone w/ strap 6\"x20    Off table 20\"x6 Prone w/ strap 20\" x 6    Off table 20\" x 6  Prone w/ strap 20\" x 6    Off table 20\" x 6   Patient Education     RT - RE     Neruo Re-Ed        Quad Set         Bridge        Clamshell        SLR 5# 3x12 5# 3x12 5# 3x12 5# 3x12 5# 3x10   Hip ABD  5# 3x12 5# 3x12 5# 3x12  5# 3x12 5# 3x10    Hip Ext        Marching        Lateral Tap Down 8in 2x10  6\" 2x12 8\" 2x10  8\" 2x10   SLS        Side-stepping     Black TB 3 laps    Leg Press 150# to fatigue 150# to fatigue 150# to fatigue  150# to fatigue    Wobble board balance        Step Up 8in 2x10  6\" 2x12 8\" 2x10  8\" 2x10   Foam     Step onto black dynadisc w/ " L LE cone tap 2x10   Hurdles         BOSU        Excursion        Squats                 Gait Training        @ Rail                         Modalities        CP PRN

## 2025-05-29 ENCOUNTER — APPOINTMENT (OUTPATIENT)
Dept: PHYSICAL THERAPY | Facility: CLINIC | Age: 65
End: 2025-05-29
Attending: ORTHOPAEDIC SURGERY
Payer: MEDICARE

## 2025-07-25 DIAGNOSIS — I48.91 NEW ONSET ATRIAL FIBRILLATION (HCC): ICD-10-CM
